# Patient Record
Sex: MALE | Race: WHITE | NOT HISPANIC OR LATINO | Employment: OTHER | URBAN - METROPOLITAN AREA
[De-identification: names, ages, dates, MRNs, and addresses within clinical notes are randomized per-mention and may not be internally consistent; named-entity substitution may affect disease eponyms.]

---

## 2017-01-16 ENCOUNTER — OFFICE VISIT (OUTPATIENT)
Dept: OTOLARYNGOLOGY | Facility: CLINIC | Age: 62
End: 2017-01-16

## 2017-01-16 DIAGNOSIS — J38.7 LARYNGEAL GRANULOMA: Primary | ICD-10-CM

## 2017-01-16 DIAGNOSIS — R07.0 THROAT DISCOMFORT: ICD-10-CM

## 2017-01-16 ASSESSMENT — PAIN SCALES - GENERAL: PAINLEVEL: NO PAIN (0)

## 2017-01-16 NOTE — PROGRESS NOTES
Dear Dr. Yost:    Arias Manzo recently returned for follow-up at the [facility]. My clinic note from our visit is enclosed below.     I appreciate the ongoing opportunity to participate in this patient's care.    Please feel free to contact me with any questions.      Sincerely yours,  Kalyn Negron M.D., M.P.H.  , Laryngology  Director, [facility]  Otolaryngology- Head & Neck Surgery  [phone]            =====  HISTORY OF PRESENT ILLNESS:  Arias Manzo is a pleasant 61-year-old male with a history of recalcitrant laryngeal granuloma who returns in follow up today. He is status post microdirect laryngoscopy with excision of granuloma and steroid injection, Prolaryn gel injection, and bilateral Botox injection on 12/08/2016. He underwent a repeat steroid injection in the clinic on 12/30/2016. He has followed vocal conservation since then and had felt pretty good overall. He has a little throat soreness today.      MEDICATIONS:     Current Outpatient Prescriptions   Medication Sig Dispense Refill     ondansetron (ZOFRAN) 4 MG tablet Take 1-2 tablets (4-8 mg) by mouth every 8 hours as needed for nausea 20 tablet 0     methylPREDNISolone (MEDROL DOSEPAK) 4 MG tablet Follow package instructions 21 tablet 0     BUDESONIDE, INHALATION, 90 MCG/ACT AEPB Inhale 2 puffs into the lungs 2 times daily 3 each 3     omeprazole (PRILOSEC) 20 MG CR capsule Take 1 capsule (20 mg) by mouth 2 times daily 30 capsule 1     botulinum toxin type A (BOTOX) 100 UNITS injection Inject into the muscle once 1 each      lisinopril-hydrochlorothiazide (PRINZIDE,ZESTORETIC) 20-25 MG per tablet Take 1 tablet by mouth daily       Omeprazole (PRILOSEC PO) Take 20 mg by mouth 2 times daily (before meals)       Sertraline HCl (ZOLOFT PO) Take 100 mg by mouth daily       Simvastatin (ZOCOR PO) Take 20 mg by mouth At Bedtime         ALLERGIES:    Allergies   Allergen  Reactions     Flexeril [Cyclobenzaprine] GI Disturbance     Naprosyn [Naproxen] GI Disturbance     Penicillins      As child     Ranitidine GI Disturbance       NEW PMH/PSH: None    REVIEW OF SYSTEMS:  The patient completed a comprehensive 11 point review of systems (below), which was reviewed. Positives are as noted below.  Patient Supplied Answers to Review of Systems   ENT ROS 1/16/2017   Constitutional Unexplained fatigue, Problems with sleep   Neurology Dizzy spells   Psychology Frequently feeling depressed or sad   Eyes -   Ears, Nose, Throat Ringing/noise in ears   Cardiopulmonary -   Gastrointestinal/Genitourinary Heartburn/indigestion   Musculoskeletal Back pain, Neck pain   Hematologic -   Endocrine -   Other Problems with anesthesia in surgery       PHYSICAL EXAM:  General: The patient was alert and conversant, and in no acute distress.    Resp: Breathing comfortably, no stridor or stertor.  Neuro: Symmetric facial function.  Psych: Normal affect, pleasant and cooperative.  Voice/speech: No significant dysphonia.      Intake scores  Last 2 Scores for Patient-Answered VHI Questionnaire  VHI Total Score 12/30/2016 1/16/2017   VHI Total Score 22 20      Last 2 Scores for Patient-Answered EAT Questionnaire  EAT Total Score 12/30/2016 1/16/2017   EAT Total Score 6 4        Last 2 Scores for Patient-Answered CSI Questionnaire  CSI Total Score 12/30/2016 1/16/2017   CSI Total Score 12 12       Procedure:   Flexible fiberoptic laryngoscopy and laryngovideostroboscopy  Indications: This procedure was warranted to evaluate the patient's laryngeal function. Risks, benefits, and alternatives were discussed.  Description: After written informed consent was obtained, a time-out was performed to confirm patient identity, procedure, and procedure site. Topical 3% lidocaine with 0.25% phenylephrine was applied to the nasal cavities. I performed the endoscopy and no complications were apparent. Continuous and  stroboscopic light were utilized to assess routine phonation and variable frequency phonation.  Performed by: Kalyn Negron MD MPH  Findings: Normal nasopharynx. Normal base of tongue, valleculae, and epiglottis. Vocal fold mobility: right: normal; left: normal. Medial edges of the vocal folds: smooth and straight. No focal mucosal lesions were observed on the true vocal folds. Glissade produced appropriate elongation. Mucosa of false vocal folds, aryepiglottic folds, piriform sinuses, and posterior glottis unremarkable. Airway was patent. Small left vocal process granuloma, slightly more prominent than at prior visit.    The addition of stroboscopy allowed evaluation of the mucosal wave.   Amplitude: right: normal; left: normal. Symmetry: good symmetry. Closure pattern: complete. Closure plane: at glottic level. Phase distribution: normal. The granuloma is directly impacted by vocal fold closure, but there is no apparent hyperfunctional pattern, and no significant vocal fold bowing.      IMPRESSION AND PLAN:   Arias Manzo returns with a small left granuloma. It remains dramatically smaller than pre-op, but is slightly larger than at his first post-op visit.     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 After he returns we may consider a repeat procedure. We will call him to schedule.

## 2017-01-16 NOTE — Clinical Note
1/16/2017       RE: Arias Manzo  [address] [address]     Dear Colleague,    Thank you for referring your patient, Arias Manzo, to the [facility] EAR NOSE AND THROAT at [facility]. Please see a copy of my visit note below.    No notes on file    Again, thank you for allowing me to participate in the care of your patient.      Sincerely,    Kalyn Negron MD

## 2017-01-16 NOTE — Clinical Note
1/16/2017      RE: Arias Manzo  [address] [address] [address]       Dear Dr. Yost:    Arias Manzo recently returned for follow-up at the [facility]. My clinic note from our visit is enclosed below.     I appreciate the ongoing opportunity to participate in this patient's care.    Please feel free to contact me with any questions.      Sincerely yours,  Kalyn Negron M.D., M.P.H.  , Laryngology  Director, [facility]  Otolaryngology- Head & Neck Surgery  [phone]            =====  HISTORY OF PRESENT ILLNESS:  Arias Manzo is a pleasant 61-year-old male with a history of recalcitrant laryngeal granuloma who returns in follow up today. He is status post microdirect laryngoscopy with excision of granuloma and steroid injection, Prolaryn gel injection, and bilateral Botox injection on 12/08/2016. He underwent a repeat steroid injection in the clinic on 12/30/2016. He has followed vocal conservation since then and had felt pretty good overall. He has a little throat soreness today.      MEDICATIONS:     Current Outpatient Prescriptions   Medication Sig Dispense Refill     ondansetron (ZOFRAN) 4 MG tablet Take 1-2 tablets (4-8 mg) by mouth every 8 hours as needed for nausea 20 tablet 0     methylPREDNISolone (MEDROL DOSEPAK) 4 MG tablet Follow package instructions 21 tablet 0     BUDESONIDE, INHALATION, 90 MCG/ACT AEPB Inhale 2 puffs into the lungs 2 times daily 3 each 3     omeprazole (PRILOSEC) 20 MG CR capsule Take 1 capsule (20 mg) by mouth 2 times daily 30 capsule 1     botulinum toxin type A (BOTOX) 100 UNITS injection Inject into the muscle once 1 each      lisinopril-hydrochlorothiazide (PRINZIDE,ZESTORETIC) 20-25 MG per tablet Take 1 tablet by mouth daily       Omeprazole (PRILOSEC PO) Take 20 mg by mouth 2 times daily (before meals)       Sertraline HCl (ZOLOFT PO) Take 100 mg by mouth daily       Simvastatin  (ZOCOR PO) Take 20 mg by mouth At Bedtime         ALLERGIES:    Allergies   Allergen Reactions     Flexeril [Cyclobenzaprine] GI Disturbance     Naprosyn [Naproxen] GI Disturbance     Penicillins      As child     Ranitidine GI Disturbance       NEW PMH/PSH: None    REVIEW OF SYSTEMS:  The patient completed a comprehensive 11 point review of systems (below), which was reviewed. Positives are as noted below.  Patient Supplied Answers to Review of Systems   ENT ROS 1/16/2017   Constitutional Unexplained fatigue, Problems with sleep   Neurology Dizzy spells   Psychology Frequently feeling depressed or sad   Eyes -   Ears, Nose, Throat Ringing/noise in ears   Cardiopulmonary -   Gastrointestinal/Genitourinary Heartburn/indigestion   Musculoskeletal Back pain, Neck pain   Hematologic -   Endocrine -   Other Problems with anesthesia in surgery       PHYSICAL EXAM:  General: The patient was alert and conversant, and in no acute distress.    Resp: Breathing comfortably, no stridor or stertor.  Neuro: Symmetric facial function.  Psych: Normal affect, pleasant and cooperative.  Voice/speech: No significant dysphonia.      Intake scores  Last 2 Scores for Patient-Answered VHI Questionnaire  VHI Total Score 12/30/2016 1/16/2017   VHI Total Score 22 20      Last 2 Scores for Patient-Answered EAT Questionnaire  EAT Total Score 12/30/2016 1/16/2017   EAT Total Score 6 4        Last 2 Scores for Patient-Answered CSI Questionnaire  CSI Total Score 12/30/2016 1/16/2017   CSI Total Score 12 12       Procedure:   Flexible fiberoptic laryngoscopy and laryngovideostroboscopy  Indications: This procedure was warranted to evaluate the patient's laryngeal function. Risks, benefits, and alternatives were discussed.  Description: After written informed consent was obtained, a time-out was performed to confirm patient identity, procedure, and procedure site. Topical 3% lidocaine with 0.25% phenylephrine was applied to the nasal cavities. I  performed the endoscopy and no complications were apparent. Continuous and stroboscopic light were utilized to assess routine phonation and variable frequency phonation.  Performed by: Kalyn Negron MD [facility]  Findings: Normal nasopharynx. Normal base of tongue, valleculae, and epiglottis. Vocal fold mobility: right: normal; left: normal. Medial edges of the vocal folds: smooth and straight. No focal mucosal lesions were observed on the true vocal folds. Glissade produced appropriate elongation. Mucosa of false vocal folds, aryepiglottic folds, piriform sinuses, and posterior glottis unremarkable. Airway was patent. Small left vocal process granuloma, slightly more prominent than at prior visit.    The addition of stroboscopy allowed evaluation of the mucosal wave.   Amplitude: right: normal; left: normal. Symmetry: good symmetry. Closure pattern: complete. Closure plane: at glottic level. Phase distribution: normal. The granuloma is directly impacted by vocal fold closure, but there is no apparent hyperfunctional pattern, and no significant vocal fold bowing.      IMPRESSION AND PLAN:   Arias Manzo returns with a small left granuloma. It remains dramatically smaller than pre-op, but is slightly larger than at his first post-op visit.     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 After he returns we may consider a repeat procedure. We will call him to schedule.        Kalyn Negron MD

## 2017-02-03 ENCOUNTER — TELEPHONE (OUTPATIENT)
Dept: OTOLARYNGOLOGY | Facility: CLINIC | Age: 62
End: 2017-02-03

## 2017-02-03 DIAGNOSIS — A49.9 BACTERIAL INFECTION: Primary | ICD-10-CM

## 2017-02-03 DIAGNOSIS — J38.7 LARYNGEAL GRANULOMA: ICD-10-CM

## 2017-02-03 RX ORDER — CLINDAMYCIN HCL 150 MG
CAPSULE ORAL
Qty: 189 CAPSULE | Refills: 0 | Status: SHIPPED | OUTPATIENT
Start: 2017-02-03 | End: 2017-07-03

## 2017-02-03 NOTE — TELEPHONE ENCOUNTER
Eleni--please write him for clindamycin 450 mg PO tid x 3 weeks.  him to eat yogurt with live cultures, and/or take probiotics bc clindamycin carries a risk of C. Diff. If he develops GI problems he should stop the med and call us. Unfortunately he has a penicillin allergy so I can't rx augmentin.     RN called pt and educated him regarding clindamycin script and the importance of eating foods with probiotics and live cultures, such as yogurt.  Patient stated he understood the information and will look out for GI symptoms while on the medication.  Order for medication placed and pharmacy verified with patient.    Eleni Ivy RN  [phone]  [facility] ENT   Head & Neck Surgery

## 2017-02-10 ENCOUNTER — OFFICE VISIT (OUTPATIENT)
Dept: OTOLARYNGOLOGY | Facility: CLINIC | Age: 62
End: 2017-02-10

## 2017-02-10 DIAGNOSIS — J38.7 LARYNGEAL GRANULOMA: Primary | ICD-10-CM

## 2017-02-10 ASSESSMENT — PAIN SCALES - GENERAL: PAINLEVEL: NO PAIN (0)

## 2017-02-10 NOTE — NURSING NOTE
Procedure: Upper aerodigestive tract endoscopy, Flexible or rigid laryngoscopy, possible stroboscopy, possible flexible endoscopic evaluation of swallowing   Reason: symptoms requiring examination   PREPROCEDURE:   Yes Patient ID verified with 2 identifiers (name and  or MRN)   Yes: Procedure and site verified with patient/designee (when able)   Yes: Accurate consent documentation in medical record   No: Marking not required. Reason: [ Procedure does not require site marking. ][ Provider is in continuous attendance with the patient from consent through completion of procedure. ][ Marking unable or refused by patient (see scanned diagram).   TIME OUT:   Yes: Time-Out performed immediately prior to starting procedure, including verbal and active participation of all team members addressing:   * Correct patient identity   * Confirmed that the correct side and site are marked   * An accurate procedure consent form   * Agreement on the procedure to be done   * Correct patient position   * Relevant images and results are properly labeled and appropriately displayed   * The need to administer antibiotics or fluids for irrigation purposes during the procedure as applicable   * Safety precations based on patient history or medication use   DURING PROCEDURE: Verification of correct person, site, and procedure occurs any time the responsibility for care of the patient is transferred to another member of the care team.   Eleni Ivy RN  P Otolaryngology/Head & Neck Surgery

## 2017-02-10 NOTE — LETTER
2/10/2017       RE: Arias Manzo  [address] [address] [facility] [address]     Dear Colleague,    Thank you for referring your patient, Arias Manzo, to the [facility] EAR NOSE AND THROAT at [facility]. Please see a copy of my visit note below.    PROCEDURE(S):  Transnasal flexible laryngoscopy  Steroid injection for left laryngeal granuloma    PRE-OPERATIVE DIAGNOSIS:   Left laryngeal granuloma    POST-OPERATIVE DIAGNOSIS:   As above    SURGEON: Kalyn Negron MD MPH    ASSISTANT(S): Eleni Ivy RN    ANAESTHESIA: Topical local anesthesia    INDICATIONS FOR PROCEDURE:   The patient is a very pleasant 60 year old male with a recalcitrant left laryngeal granuloma and heavy vocal demand who returned with a smaller but still persistent granuloma and persistent symptoms. He wished to undergo a repeat laser treatment and steroid injection. Risks, benefits, and alternatives of the procedure were discussed, including the risk of vocal fold atrophy, persistent granuloma, and need for additional procedures. We will use the same high concentration of dexamethasone as used previously, since he tolerated it well. The patient signed written informed consent.    DESCRIPTION OF PROCEDURE:   After written informed consent was obtained, a time-out was performed to confirm patient identity, procedure, and procedure site. Topical aerosolized 3% lidocaine with 0.25% phenylephrine was applied to both nasal cavities. Flexible fiberoptic laryngoscopy was performed with good visualization of the larynx. 4 cc 4% lidocaine was delivered by nebulizer. 10 cc of 2% lidocaine was then topically applied to the vocal folds through the channel of the endoscope sleeve. The patient was asked to gargle and cough. The endoscope was removed. When adequate topical anesthesia had been obtained, an endoscopic needle was placed through the sheath and a total of approximately 0.7 cc of dexamethasone  24 mg/mL was injected into the base of the granuloma. The patient tolerated the procedure well.      FINDINGS:   Normal nasopharynx. Normal base of tongue, valleculae, and epiglottis. The right true vocal fold demonstrated normal mobility. The left true vocal fold demonstrated normal mobility. Small left laryngeal granuloma. False vocal folds, aryepiglottic folds, piriform sinuses, and posterior glottis were unremarkable. Airway was patent. Successful steroid injection of left laryngeal granuloma.    SPECIMEN(S):   None    DRAINS:   None    ESTIMATED BLOOD LOSS:   Minimal    COMPLICATIONS:   None    DISPOSITION: Stable to home    ATTENDING PRESENCE STATEMENT:  I performed this procedure.    PLAN: Voice rest for 5 days, followed by gradual resumption of gentle voice use. Follow up in 1 month with tentative plan for repeat steroid injection is needed. Continue antibiotics.    Again, thank you for allowing me to participate in the care of your patient.      Sincerely,    Kalyn Negron MD

## 2017-02-10 NOTE — PROGRESS NOTES
PROCEDURE(S):  Transnasal flexible laryngoscopy  Steroid injection for left laryngeal granuloma    PRE-OPERATIVE DIAGNOSIS:   Left laryngeal granuloma    POST-OPERATIVE DIAGNOSIS:   As above    SURGEON: Kalyn Negron MD MPH    ASSISTANT(S): Eleni Ivy RN    ANAESTHESIA: Topical local anesthesia    INDICATIONS FOR PROCEDURE:   The patient is a very pleasant 60 year old male with a recalcitrant left laryngeal granuloma and heavy vocal demand who returned with a smaller but still persistent granuloma and persistent symptoms. He wished to undergo a repeat laser treatment and steroid injection. Risks, benefits, and alternatives of the procedure were discussed, including the risk of vocal fold atrophy, persistent granuloma, and need for additional procedures. We will use the same high concentration of dexamethasone as used previously, since he tolerated it well. The patient signed written informed consent.    DESCRIPTION OF PROCEDURE:   After written informed consent was obtained, a time-out was performed to confirm patient identity, procedure, and procedure site. Topical aerosolized 3% lidocaine with 0.25% phenylephrine was applied to both nasal cavities. Flexible fiberoptic laryngoscopy was performed with good visualization of the larynx. 4 cc 4% lidocaine was delivered by nebulizer. 10 cc of 2% lidocaine was then topically applied to the vocal folds through the channel of the endoscope sleeve. The patient was asked to gargle and cough. The endoscope was removed. When adequate topical anesthesia had been obtained, an endoscopic needle was placed through the sheath and a total of approximately 0.7 cc of dexamethasone 24 mg/mL was injected into the base of the granuloma. The patient tolerated the procedure well.      FINDINGS:   Normal nasopharynx. Normal base of tongue, valleculae, and epiglottis. The right true vocal fold demonstrated normal mobility. The left true vocal fold demonstrated normal mobility. Small  left laryngeal granuloma. False vocal folds, aryepiglottic folds, piriform sinuses, and posterior glottis were unremarkable. Airway was patent. Successful steroid injection of left laryngeal granuloma.    SPECIMEN(S):   None    DRAINS:   None    ESTIMATED BLOOD LOSS:   Minimal    COMPLICATIONS:   None    DISPOSITION: Stable to home    ATTENDING PRESENCE STATEMENT:  I performed this procedure.    PLAN: Voice rest for 5 days, followed by gradual resumption of gentle voice use. Follow up in 1 month with tentative plan for repeat steroid injection is needed. Continue antibiotics.

## 2017-02-10 NOTE — NURSING NOTE
Chief Complaint   Patient presents with     Minor Procedure     Procedure - Steroid injection      Pt signed consent.    JUS Vaca LPN

## 2017-02-20 ENCOUNTER — TELEPHONE (OUTPATIENT)
Dept: OTOLARYNGOLOGY | Facility: CLINIC | Age: 62
End: 2017-02-20

## 2017-02-20 NOTE — TELEPHONE ENCOUNTER
"Pt called scheduling line to report that he has stopped taking his antibiotics because of chest pain.  RN called pt to get more information/ description of the pain and whether it was relieved by discontinuing the antibiotics.  Left message.        Pt called back and stated that when he would be drinking fluids and eating while on the antibiotics, he would get chest pain radiating on the right side of the chest and describes it as a \"gas pain.\" He stated that it was very uncomfortable and he decided to discontinue taking the med.  The chest pain feeling did subside after stopping the medication.  RN will follow up with physician to determine next course of action.    Eleni Ivy RN  [phone]  [facility] ENT   Head & Neck Surgery       "

## 2017-02-23 DIAGNOSIS — A49.9 BACTERIAL INFECTION: Primary | ICD-10-CM

## 2017-02-23 DIAGNOSIS — J38.7 LARYNGEAL GRANULOMA: ICD-10-CM

## 2017-03-10 ENCOUNTER — OFFICE VISIT (OUTPATIENT)
Dept: OTOLARYNGOLOGY | Facility: CLINIC | Age: 62
End: 2017-03-10

## 2017-03-10 VITALS — BODY MASS INDEX: 35.56 KG/M2 | WEIGHT: 254 LBS | HEIGHT: 71 IN

## 2017-03-10 DIAGNOSIS — A49.9 BACTERIAL INFECTION: ICD-10-CM

## 2017-03-10 DIAGNOSIS — R49.0 DYSPHONIA: ICD-10-CM

## 2017-03-10 DIAGNOSIS — J38.7 LARYNGEAL GRANULOMA: Primary | ICD-10-CM

## 2017-03-10 ASSESSMENT — PAIN SCALES - GENERAL: PAINLEVEL: NO PAIN (0)

## 2017-03-10 NOTE — PATIENT INSTRUCTIONS
Plan of Care:  1. Please rest your voice for 5 days, followed by gradual resumption of gentle voice use. 2. 2. Follow up in 1 month with tentative plan for repeat steroid injection is needed. Continue antibiotics.    Clinic Information:  1. To schedule an appointment please call [phone], option 1  2. To talk to a Triage RN please call [phone] select option 3  3. To speak to Dr. eNgron's nurse, Eleni, please call [phone]    Eleni Ivy RN  [facility] ENT   Head & Neck Surgery

## 2017-03-10 NOTE — NURSING NOTE
Procedure: Upper aerodigestive tract endoscopy, Flexible or rigid laryngoscopy, possible stroboscopy, possible flexible endoscopic evaluation of swallowing   Reason: symptoms requiring examination   PREPROCEDURE:   Yes Patient ID verified with 2 identifiers (name and  or MRN)   Yes: Procedure and site verified with patient/designee (when able)   Yes: Accurate consent documentation in medical record   No: Marking not required. Reason: [ Procedure does not require site marking. ][ Provider is in continuous attendance with the patient from consent through completion of procedure. ][ Marking unable or refused by patient (see scanned diagram).   TIME OUT:   Yes: Time-Out performed immediately prior to starting procedure, including verbal and active participation of all team members addressing:   * Correct patient identity   * Confirmed that the correct side and site are marked   * An accurate procedure consent form   * Agreement on the procedure to be done   * Correct patient position   * Relevant images and results are properly labeled and appropriately displayed   * The need to administer antibiotics or fluids for irrigation purposes during the procedure as applicable   * Safety precations based on patient history or medication use   DURING PROCEDURE: Verification of correct person, site, and procedure occurs any time the responsibility for care of the patient is transferred to another member of the care team.   Eleni Ivy RN  [facility] Otolaryngology/Head & Neck Surgery    Patient here today to see Dr Negron for their return botox injection.  1. Patient instructed to monitor period of breathiness and period of positive botox effect.  2. Patient reminded to schedule next botox appointment when symptoms return.  3. Patient understands who to call with questions and concerns and has clinic phone number.  4 Patient stable when discharged.Invasive Procedure Safety Checklist    Date: 3/10/17  Procedure: Botox  Injection  Patient Name: Arias Manzo   MRN: [id number]  : 1955      ACTION: Complete sections and checkboxes as appropriate. Any discrepancy results in a HARD COPY until resolved.      PREPROCEDURE:  YES: Patient ID verified with 2 identifiers (name and  or MRN)  YES: Procedure and site verified with patient/designee (when able)  YES: Accurate consent documentation in medical record  No: H&P (or appropriate assessment) documented in medical record  H&P must be up to 30 days prior to procedure an updated within 24 hours of procedure as applicable  No: Relevant diagnostic and radiology test results appropriately labeled and displayed as applicable  No: Blood products, implants, devices, and/or special equipment available for the procedure as applicable  Yes; Exclusions : Procedure site(s) marked with provider initials  No: Marking not required. Reason: Provider is in continuous attendance with the patient from consent through completion of procedure.      TIME OUT:  YES: Time-Out performed immediately prior to starting procedure, including verbal and active participation of all team members addressing:  * Correct patient identity  * Confirmed that the correct side and site are marked  * An accurate procedure consent form  * Agreement on the procedure to be done  * Correct patient position  * Relevant images and results are properly labeled and appropriately displayed  * The need to administer antibiotics or fluids for irrigation purposes during the procedure as applicable  * Safety precations based on patient history or medication use

## 2017-03-10 NOTE — PROGRESS NOTES
Dear Dr. Yost:  Arias Manzo recently returned for follow-up at the [facility]. My clinic note from our visit is enclosed below.     I appreciate the ongoing opportunity to participate in this patient's care.    Please feel free to contact me with any questions.      Sincerely yours,  Kalyn Negron M.D., M.P.H.  , Laryngology  Director, [facility]  Otolaryngology- Head & Neck Surgery  [phone]            =====  HISTORY OF PRESENT ILLNESS:  Arias Manzo is a pleasant 61 year old male with a recalcitrant granuloma who returns in follow up today.     He developed chest and arm pain in response to the clindamycin that I had previously described, and stopped it. The symptoms resolved immediately so he did not seek further evaluation. He was able to tolerate the Augmentin without difficulty.      MEDICATIONS:     Current Outpatient Prescriptions   Medication Sig Dispense Refill     amoxicillin-clavulanate (AUGMENTIN) 875-125 MG per tablet Please take 1 tab PO BID for 7 days 14 tablet 0     clindamycin (CLEOCIN) 150 MG capsule Please take 3 tabs PO TID x 21 days 189 capsule 0     ondansetron (ZOFRAN) 4 MG tablet Take 1-2 tablets (4-8 mg) by mouth every 8 hours as needed for nausea 20 tablet 0     methylPREDNISolone (MEDROL DOSEPAK) 4 MG tablet Follow package instructions 21 tablet 0     BUDESONIDE, INHALATION, 90 MCG/ACT AEPB Inhale 2 puffs into the lungs 2 times daily 3 each 3     omeprazole (PRILOSEC) 20 MG CR capsule Take 1 capsule (20 mg) by mouth 2 times daily 30 capsule 1     botulinum toxin type A (BOTOX) 100 UNITS injection Inject into the muscle once 1 each      lisinopril-hydrochlorothiazide (PRINZIDE,ZESTORETIC) 20-25 MG per tablet Take 1 tablet by mouth daily       Omeprazole (PRILOSEC PO) Take 20 mg by mouth 2 times daily (before meals)       Sertraline HCl (ZOLOFT PO) Take 100 mg by mouth daily       Simvastatin (ZOCOR PO)  Take 20 mg by mouth At Bedtime         ALLERGIES:    Allergies   Allergen Reactions     Flexeril [Cyclobenzaprine] GI Disturbance     Naprosyn [Naproxen] GI Disturbance     Penicillins      As child     Ranitidine GI Disturbance       NEW PMH/PSH: None    REVIEW OF SYSTEMS:  The patient completed a comprehensive 11 point review of systems (below), which was reviewed. Positives are as noted below.  Patient Supplied Answers to Review of Systems  [facility] ENT ROS 1/16/2017   Constitutional Unexplained fatigue, Problems with sleep   Neurology Dizzy spells   Psychology Frequently feeling depressed or sad   Eyes -   Ears, Nose, Throat Ringing/noise in ears   Cardiopulmonary -   Gastrointestinal/Genitourinary Heartburn/indigestion   Musculoskeletal Back pain, Neck pain   Hematologic -   Endocrine -   Other Problems with anesthesia in surgery       PHYSICAL EXAM:  General: The patient was alert and conversant, and in no acute distress.    Resp: Breathing comfortably, no stridor or stertor.  Neuro: Symmetric facial function.   Psych: Normal affect, pleasant and cooperative.  Voice/speech: No significant dysphonia.      Intake scores  Last 2 Scores for Patient-Answered VHI Questionnaire  VHI Total Score 2/10/2017 3/10/2017   VHI Total Score 20 21      Last 2 Scores for Patient-Answered EAT Questionnaire  EAT Total Score 2/10/2017 3/10/2017   EAT Total Score 10 5      Last 2 Scores for Patient-Answered CSI Questionnaire  CSI Total Score 2/10/2017 3/10/2017   CSI Total Score 12 12       Procedure:   Flexible fiberoptic laryngoscopy  Indications: This procedure was warranted to evaluate the patient's laryngeal function. Risks, benefits, and alternatives were discussed.  Description: After written informed consent was obtained, a time-out was performed to confirm patient identity, procedure, and procedure site. Topical 3% lidocaine with 0.25% phenylephrine was applied to the nasal cavities. I performed the endoscopy and no  complications were apparent.  Performed by: Kalyn Negron MD MPH  SLP: NA  Findings: Normal nasopharynx. Normal base of tongue, valleculae, and epiglottis. The right true vocal fold demonstrated normal mobility. The left true vocal fold demonstrated normal mobility. The medial edges of the vocal folds appeared smooth and straight.   Glissade produced appropriate elongation. Mucosa of the false vocal folds, aryepiglottic folds, piriform sinuses, and posterior glottis unremarkable. Slightly more delineated left laryngeal granuloma compared to prior. Airway was patent.      IMPRESSION AND PLAN:   Arias Manzo returns with a persistent laryngeal granuloma. We discussed consideration of steroid injection and additional oral antibiotics. He is aware of potential risks of both. He would like to proceed. This procedure will be documented as an addendum to this note.        =====  Addendum    PROCEDURE(S):  Transnasal flexible laryngoscopy  Steroid injection for left laryngeal granuloma    PRE-OPERATIVE DIAGNOSIS:   Left laryngeal granuloma    POST-OPERATIVE DIAGNOSIS:   As above    SURGEON: Kalyn Negron MD MPH    ASSISTANT(S): Eleni Ivy RN    ANAESTHESIA: Topical local anesthesia    INDICATIONS FOR PROCEDURE:   The patient is a very pleasant 60 year old male with a recalcitrant left laryngeal granuloma and heavy vocal demand who returned with a smaller but still persistent granuloma and persistent symptoms. He wished to undergo a repeat laser treatment and steroid injection. Risks, benefits, and alternatives of the procedure were discussed, including the risk of vocal fold atrophy, persistent granuloma, and need for additional procedures. We will use the same high concentration of dexamethasone as used previously, since he tolerated it well. The patient signed written informed consent.    DESCRIPTION OF PROCEDURE:   After written informed consent was obtained, a time-out was performed to confirm patient  identity, procedure, and procedure site. Topical aerosolized 3% lidocaine with 0.25% phenylephrine was applied to both nasal cavities. Flexible fiberoptic laryngoscopy was performed with good visualization of the larynx. 4 cc 4% lidocaine was delivered by nebulizer. 10 cc of 2% lidocaine was then topically applied to the vocal folds through the channel of the endoscope sleeve. The patient was asked to gargle and cough. The endoscope was removed. When adequate topical anesthesia had been obtained, an endoscopic needle was placed through the sheath and a total of approximately 0.8 cc of dexamethasone 24 mg/mL was injected into the base of the granuloma; approximately another 1.5 cc was noted to run out but did not enter the vocal fold. The patient tolerated the procedure well.    FINDINGS:   Normal nasopharynx. Normal base of tongue, valleculae, and epiglottis. The right true vocal fold demonstrated normal mobility. The left true vocal fold demonstrated normal mobility. Small left laryngeal granuloma. False vocal folds, aryepiglottic folds, piriform sinuses, and posterior glottis were unremarkable. Airway was patent. Successful steroid injection of left laryngeal granuloma.    SPECIMEN(S):   None    DRAINS:   None    ESTIMATED BLOOD LOSS:   Minimal    COMPLICATIONS:   None    DISPOSITION: Stable to home    ATTENDING PRESENCE STATEMENT:  I performed this procedure.    PLAN: Voice rest for 5 days, followed by gradual resumption of gentle voice use. Follow up in 1 month with tentative plan for repeat steroid injection is needed. Continue antibiotics.

## 2017-03-10 NOTE — MR AVS SNAPSHOT
After Visit Summary   3/10/2017    Arias Manzo    MRN: [id number]           Patient Information     Date Of Birth          1955        Visit Information        Provider Department      3/10/2017 11:40 AM Kalyn Negron MD [facility] Ear Nose [facility] Thro[facility]        Today's Diagnoses     Laryngeal granuloma    -  1    Dysphonia        Bacterial infection          Care Instructions    Plan of Care:  1. Please rest your voice for 5 days, followed by gradual resumption of gentle voice use. 2. 2. Follow up in 1 month with tentative plan for repeat steroid injection is needed. Continue antibiotics.    Clinic Information:  1. To schedule an appointment please call [phone], option 1  2. To talk to a Triage RN please call [phone] select option 3  3. To speak to Dr. Negron's nurse, Eleni, please call [phone]    Eleni Ivy RN  [facility] ENT   Head & Neck Surgery               Follow-ups after your visit        Who to contact     Please call your clinic at [phone] to:    Ask questions about your health    Make or cancel appointments    Discuss your medicines    Learn about your test results    Speak to your doctor   If you have compliments or concerns about an experience at your clinic, or if you wish to file a complaint, please contact [facility] Patient Relations at [phone] or email us at [email or web address]         Additional Information About Your Visit        MyChart Information     [employer] is an electronic gateway that provides easy, online access to your medical records. With [employer], you can request a clinic appointment, read your test results, renew a prescription or communicate with your care team.     To sign up for [employer] visit the website at [email or web address]   You will be asked to enter the access code listed below, as well as some personal information. Please follow the directions to create  "your username and password.     Your access code is: [email or web address]  Expires: 2017  6:31 AM     Your access code will  in 90 days. If you need help or a new code, please contact your [facility] or call [phone] for assistance.        Care EveryWhere ID     This is your Care EveryWhere ID. This could be used by other organizations to access your [place] medical records  [id number]        Your Vitals Were     Height BMI (Body Mass Index)                1.8 m (5' 10.87\") 35.56 kg/m2           Blood Pressure from Last 3 Encounters:   16 97/60   10/30/14 126/81   14 134/75    Weight from Last 3 Encounters:   03/10/17 115.2 kg (254 lb)   16 110.2 kg (243 lb)   16 110.2 kg (243 lb)              We Performed the Following     IMAGESTREAM RECORDING ORDER     LARYNGOSCOPY FLEX FIBEROPTIC, DIAGNOSTIC     LARYNGOSCOPY,INDIRECT+INJECT CORD          Today's Medication Changes          These changes are accurate as of: 3/10/17  2:56 PM.  If you have any questions, ask your nurse or doctor.               These medicines have changed or have updated prescriptions.        Dose/Directions    amoxicillin-clavulanate 875-125 MG per tablet   Commonly known as:  AUGMENTIN   This may have changed:  additional instructions   Used for:  Bacterial infection, Laryngeal granuloma   Changed by:  Kalyn Negron MD        Please take 1 tab PO BID for 21 days   Quantity:  42 tablet   Refills:  0            Where to get your medicines      These medications were sent to [facility] [id number] [place] [address] [facility] [address] [address] [facility], [facility] [address]     Phone:  [phone]     amoxicillin-clavulanate 875-125 MG per tablet                Primary Care Provider Office Phone # Fax #    [facility] [phone] [address] [place] [address]        Thank you!     Thank you for " choosing [facility] EAR NOSE AND TH[facility]AT  for your care. Our goal is always to provide you with excellent care. Hearing back from our patients is one way we can continue to improve our services. Please take a few minutes to complete the written survey that you may receive in the mail after your visit with us. Thank you!             Your Updated Medication List - Protect others around you: Learn how to safely use, store and throw away your medicines at www.disposemymeds.org.          This list is accurate as of: 3/10/17  2:56 PM.  Always use your most recent med list.                   Brand Name Dispense Instructions for use    amoxicillin-clavulanate 875-125 MG per tablet    AUGMENTIN    42 tablet    Please take 1 tab PO BID for 21 days       BOTOX 100 UNITS injection   Generic drug:  botulinum toxin type A     1 each    Inject into the muscle once       BUDESONIDE (INHALATION) 90 MCG/ACT Aepb     3 each    Inhale 2 puffs into the lungs 2 times daily       clindamycin 150 MG capsule    CLEOCIN    189 capsule    Please take 3 tabs PO TID x 21 days       lisinopril-hydrochlorothiazide 20-25 MG per tablet    PRINZIDE/ZESTORETIC     Take 1 tablet by mouth daily       methylPREDNISolone 4 MG tablet    MEDROL DOSEPAK    21 tablet    Follow package instructions       omeprazole 20 MG CR capsule    priLOSEC    30 capsule    Take 1 capsule (20 mg) by mouth 2 times daily       ondansetron 4 MG tablet    ZOFRAN    20 tablet    Take 1-2 tablets (4-8 mg) by mouth every 8 hours as needed for nausea       PRILOSEC PO      Take 20 mg by mouth 2 times daily (before meals)       ZOCOR PO      Take 20 mg by mouth At Bedtime       ZOLOFT PO      Take 100 mg by mouth daily

## 2017-03-10 NOTE — LETTER
3/10/2017      RE: Arias Manzo  [id number] [address] [id number]       Dear Dr. Yost:  Arias Manzo recently returned for follow-up at the [facility]. My clinic note from our visit is enclosed below.     I appreciate the ongoing opportunity to participate in this patient's care.    Please feel free to contact me with any questions.      Sincerely yours,  Kalyn Negron M.D., M.P.H.  , Laryngology  Director, [facility]  Otolaryngology- Head & Neck Surgery  [phone]            =====  HISTORY OF PRESENT ILLNESS:  Arias Manzo is a pleasant 61 year old male with a recalcitrant granuloma who returns in follow up today.     He developed chest and arm pain in response to the clindamycin that I had previously described, and stopped it. The symptoms resolved immediately so he did not seek further evaluation. He was able to tolerate the Augmentin without difficulty.      MEDICATIONS:     Current Outpatient Prescriptions   Medication Sig Dispense Refill     amoxicillin-clavulanate (AUGMENTIN) 875-125 MG per tablet Please take 1 tab PO BID for 7 days 14 tablet 0     clindamycin (CLEOCIN) 150 MG capsule Please take 3 tabs PO TID x 21 days 189 capsule 0     ondansetron (ZOFRAN) 4 MG tablet Take 1-2 tablets (4-8 mg) by mouth every 8 hours as needed for nausea 20 tablet 0     methylPREDNISolone (MEDROL DOSEPAK) 4 MG tablet Follow package instructions 21 tablet 0     BUDESONIDE, INHALATION, 90 MCG/ACT AEPB Inhale 2 puffs into the lungs 2 times daily 3 each 3     omeprazole (PRILOSEC) 20 MG CR capsule Take 1 capsule (20 mg) by mouth 2 times daily 30 capsule 1     botulinum toxin type A (BOTOX) 100 UNITS injection Inject into the muscle once 1 each      lisinopril-hydrochlorothiazide (PRINZIDE,ZESTORETIC) 20-25 MG per tablet Take 1 tablet by mouth daily       Omeprazole (PRILOSEC PO) Take 20 mg by mouth 2 times daily (before meals)        Sertraline HCl (ZOLOFT PO) Take 100 mg by mouth daily       Simvastatin (ZOCOR PO) Take 20 mg by mouth At Bedtime         ALLERGIES:    Allergies   Allergen Reactions     Flexeril [Cyclobenzaprine] GI Disturbance     Naprosyn [Naproxen] GI Disturbance     Penicillins      As child     Ranitidine GI Disturbance       NEW PMH/PSH: None    REVIEW OF SYSTEMS:  The patient completed a comprehensive 11 point review of systems (below), which was reviewed. Positives are as noted below.  Patient Supplied Answers to Review of Systems  [facility] ENT ROS 1/16/2017   Constitutional Unexplained fatigue, Problems with sleep   Neurology Dizzy spells   Psychology Frequently feeling depressed or sad   Eyes -   Ears, Nose, Throat Ringing/noise in ears   Cardiopulmonary -   Gastrointestinal/Genitourinary Heartburn/indigestion   Musculoskeletal Back pain, Neck pain   Hematologic -   Endocrine -   Other Problems with anesthesia in surgery       PHYSICAL EXAM:  General: The patient was alert and conversant, and in no acute distress.    Resp: Breathing comfortably, no stridor or stertor.  Neuro: Symmetric facial function.   Psych: Normal affect, pleasant and cooperative.  Voice/speech: No significant dysphonia.      Intake scores  Last 2 Scores for Patient-Answered VHI Questionnaire  VHI Total Score 2/10/2017 3/10/2017   VHI Total Score 20 21      Last 2 Scores for Patient-Answered EAT Questionnaire  EAT Total Score 2/10/2017 3/10/2017   EAT Total Score 10 5      Last 2 Scores for Patient-Answered CSI Questionnaire  CSI Total Score 2/10/2017 3/10/2017   CSI Total Score 12 12       Procedure:   Flexible fiberoptic laryngoscopy  Indications: This procedure was warranted to evaluate the patient's laryngeal function. Risks, benefits, and alternatives were discussed.  Description: After written informed consent was obtained, a time-out was performed to confirm patient identity, procedure, and procedure site. Topical 3% lidocaine with 0.25%  phenylephrine was applied to the nasal cavities. I performed the endoscopy and no complications were apparent.  Performed by: Kalyn Negron MD MPH  SLP: NA  Findings: Normal nasopharynx. Normal base of tongue, valleculae, and epiglottis. The right true vocal fold demonstrated normal mobility. The left true vocal fold demonstrated normal mobility. The medial edges of the vocal folds appeared smooth and straight.   Glissade produced appropriate elongation. Mucosa of the false vocal folds, aryepiglottic folds, piriform sinuses, and posterior glottis unremarkable. Slightly more delineated left laryngeal granuloma compared to prior. Airway was patent.      IMPRESSION AND PLAN:   Arias Manzo returns with a persistent laryngeal granuloma. We discussed consideration of steroid injection and additional oral antibiotics. He is aware of potential risks of both. He would like to proceed. This procedure will be documented as an addendum to this note.        =====  Addendum    PROCEDURE(S):  Transnasal flexible laryngoscopy  Steroid injection for left laryngeal granuloma    PRE-OPERATIVE DIAGNOSIS:   Left laryngeal granuloma    POST-OPERATIVE DIAGNOSIS:   As above    SURGEON: Kalyn Negron MD MPH    ASSISTANT(S): Eleni Ivy RN    ANAESTHESIA: Topical local anesthesia    INDICATIONS FOR PROCEDURE:   The patient is a very pleasant 60 year old male with a recalcitrant left laryngeal granuloma and heavy vocal demand who returned with a smaller but still persistent granuloma and persistent symptoms. He wished to undergo a repeat laser treatment and steroid injection. Risks, benefits, and alternatives of the procedure were discussed, including the risk of vocal fold atrophy, persistent granuloma, and need for additional procedures. We will use the same high concentration of dexamethasone as used previously, since he tolerated it well. The patient signed written informed consent.    DESCRIPTION OF PROCEDURE:   After  written informed consent was obtained, a time-out was performed to confirm patient identity, procedure, and procedure site. Topical aerosolized 3% lidocaine with 0.25% phenylephrine was applied to both nasal cavities. Flexible fiberoptic laryngoscopy was performed with good visualization of the larynx. 4 cc 4% lidocaine was delivered by nebulizer. 10 cc of 2% lidocaine was then topically applied to the vocal folds through the channel of the endoscope sleeve. The patient was asked to gargle and cough. The endoscope was removed. When adequate topical anesthesia had been obtained, an endoscopic needle was placed through the sheath and a total of approximately 0.8 cc of dexamethasone 24 mg/mL was injected into the base of the granuloma; approximately another 1.5 cc was noted to run out but did not enter the vocal fold. The patient tolerated the procedure well.    FINDINGS:   Normal nasopharynx. Normal base of tongue, valleculae, and epiglottis. The right true vocal fold demonstrated normal mobility. The left true vocal fold demonstrated normal mobility. Small left laryngeal granuloma. False vocal folds, aryepiglottic folds, piriform sinuses, and posterior glottis were unremarkable. Airway was patent. Successful steroid injection of left laryngeal granuloma.    SPECIMEN(S):   None    DRAINS:   None    ESTIMATED BLOOD LOSS:   Minimal    COMPLICATIONS:   None    DISPOSITION: Stable to home    ATTENDING PRESENCE STATEMENT:  I performed this procedure.    PLAN: Voice rest for 5 days, followed by gradual resumption of gentle voice use. Follow up in 1 month with tentative plan for repeat steroid injection is needed. Continue antibiotics.      Kalyn Negron MD

## 2017-07-03 ENCOUNTER — HOSPITAL ENCOUNTER (INPATIENT)
Facility: CLINIC | Age: 62
LOS: 1 days | Discharge: HOME | DRG: 244 | End: 2017-07-04
Attending: INTERNAL MEDICINE | Admitting: INTERNAL MEDICINE
Payer: COMMERCIAL

## 2017-07-03 ENCOUNTER — APPOINTMENT (OUTPATIENT)
Dept: CARDIOLOGY | Facility: CLINIC | Age: 62
DRG: 244 | End: 2017-07-03
Attending: INTERNAL MEDICINE
Payer: COMMERCIAL

## 2017-07-03 ENCOUNTER — APPOINTMENT (OUTPATIENT)
Dept: GENERAL RADIOLOGY | Facility: CLINIC | Age: 62
End: 2017-07-03
Attending: EMERGENCY MEDICINE
Payer: COMMERCIAL

## 2017-07-03 ENCOUNTER — HOSPITAL ENCOUNTER (EMERGENCY)
Facility: CLINIC | Age: 62
Discharge: ANOTHER HOSPITAL | End: 2017-07-03
Attending: EMERGENCY MEDICINE | Admitting: EMERGENCY MEDICINE
Payer: COMMERCIAL

## 2017-07-03 VITALS
SYSTOLIC BLOOD PRESSURE: 166 MMHG | WEIGHT: 239 LBS | HEIGHT: 71 IN | OXYGEN SATURATION: 97 % | RESPIRATION RATE: 20 BRPM | DIASTOLIC BLOOD PRESSURE: 84 MMHG | HEART RATE: 35 BPM | BODY MASS INDEX: 33.46 KG/M2 | TEMPERATURE: 98.6 F

## 2017-07-03 DIAGNOSIS — R00.1 BRADYCARDIA: ICD-10-CM

## 2017-07-03 DIAGNOSIS — I44.1 MOBITZ TYPE 2 SECOND DEGREE HEART BLOCK: ICD-10-CM

## 2017-07-03 DIAGNOSIS — I45.9 HEART BLOCK: ICD-10-CM

## 2017-07-03 DIAGNOSIS — I44.30 HEART BLOCK, AV: Primary | ICD-10-CM

## 2017-07-03 LAB
ALBUMIN SERPL-MCNC: 4.3 G/DL (ref 3.4–5)
ALP SERPL-CCNC: 72 U/L (ref 40–150)
ALT SERPL W P-5'-P-CCNC: 91 U/L (ref 0–70)
ANION GAP SERPL CALCULATED.3IONS-SCNC: 5 MMOL/L (ref 3–14)
AST SERPL W P-5'-P-CCNC: 56 U/L (ref 0–45)
BASOPHILS # BLD AUTO: 0 10E9/L (ref 0–0.2)
BASOPHILS NFR BLD AUTO: 0.4 %
BILIRUB SERPL-MCNC: 0.7 MG/DL (ref 0.2–1.3)
BUN SERPL-MCNC: 24 MG/DL (ref 7–30)
CALCIUM SERPL-MCNC: 9 MG/DL (ref 8.5–10.1)
CHLORIDE SERPL-SCNC: 107 MMOL/L (ref 94–109)
CO2 SERPL-SCNC: 29 MMOL/L (ref 20–32)
CREAT SERPL-MCNC: 0.99 MG/DL (ref 0.66–1.25)
DIFFERENTIAL METHOD BLD: ABNORMAL
EOSINOPHIL # BLD AUTO: 0.2 10E9/L (ref 0–0.7)
EOSINOPHIL NFR BLD AUTO: 3.7 %
ERYTHROCYTE [DISTWIDTH] IN BLOOD BY AUTOMATED COUNT: 12.1 % (ref 10–15)
GFR SERPL CREATININE-BSD FRML MDRD: 76 ML/MIN/1.7M2
GLUCOSE SERPL-MCNC: 105 MG/DL (ref 70–99)
HCT VFR BLD AUTO: 43.1 % (ref 40–53)
HGB BLD-MCNC: 14.5 G/DL (ref 13.3–17.7)
IMM GRANULOCYTES # BLD: 0 10E9/L (ref 0–0.4)
IMM GRANULOCYTES NFR BLD: 0.2 %
INTERPRETATION ECG - MUSE: NORMAL
LYMPHOCYTES # BLD AUTO: 2.7 10E9/L (ref 0.8–5.3)
LYMPHOCYTES NFR BLD AUTO: 54.4 %
MCH RBC QN AUTO: 31.7 PG (ref 26.5–33)
MCHC RBC AUTO-ENTMCNC: 33.6 G/DL (ref 31.5–36.5)
MCV RBC AUTO: 94 FL (ref 78–100)
MONOCYTES # BLD AUTO: 0.5 10E9/L (ref 0–1.3)
MONOCYTES NFR BLD AUTO: 11 %
NEUTROPHILS # BLD AUTO: 1.5 10E9/L (ref 1.6–8.3)
NEUTROPHILS NFR BLD AUTO: 30.3 %
NRBC # BLD AUTO: 0 10*3/UL
NRBC BLD AUTO-RTO: 0 /100
NT-PROBNP SERPL-MCNC: 515 PG/ML (ref 0–900)
PLATELET # BLD AUTO: 171 10E9/L (ref 150–450)
POTASSIUM SERPL-SCNC: 3.6 MMOL/L (ref 3.4–5.3)
PROT SERPL-MCNC: 7.6 G/DL (ref 6.8–8.8)
RBC # BLD AUTO: 4.58 10E12/L (ref 4.4–5.9)
SODIUM SERPL-SCNC: 141 MMOL/L (ref 133–144)
T4 FREE SERPL-MCNC: 0.8 NG/DL (ref 0.76–1.46)
TROPONIN I SERPL-MCNC: 0.02 UG/L (ref 0–0.04)
TROPONIN I SERPL-MCNC: NORMAL UG/L (ref 0–0.04)
TSH SERPL DL<=0.005 MIU/L-ACNC: 4.21 MU/L (ref 0.4–4)
WBC # BLD AUTO: 4.9 10E9/L (ref 4–11)

## 2017-07-03 PROCEDURE — 99153 MOD SED SAME PHYS/QHP EA: CPT

## 2017-07-03 PROCEDURE — 84484 ASSAY OF TROPONIN QUANT: CPT | Performed by: INTERNAL MEDICINE

## 2017-07-03 PROCEDURE — 85025 COMPLETE CBC W/AUTO DIFF WBC: CPT | Performed by: EMERGENCY MEDICINE

## 2017-07-03 PROCEDURE — 25000132 ZZH RX MED GY IP 250 OP 250 PS 637: Performed by: INTERNAL MEDICINE

## 2017-07-03 PROCEDURE — 99285 EMERGENCY DEPT VISIT HI MDM: CPT | Mod: 25

## 2017-07-03 PROCEDURE — C1785 PMKR, DUAL, RATE-RESP: HCPCS

## 2017-07-03 PROCEDURE — 71020 XR CHEST 2 VW: CPT

## 2017-07-03 PROCEDURE — 84443 ASSAY THYROID STIM HORMONE: CPT | Performed by: EMERGENCY MEDICINE

## 2017-07-03 PROCEDURE — 96360 HYDRATION IV INFUSION INIT: CPT

## 2017-07-03 PROCEDURE — 99223 1ST HOSP IP/OBS HIGH 75: CPT | Mod: 25 | Performed by: INTERNAL MEDICINE

## 2017-07-03 PROCEDURE — 33208 INSRT HEART PM ATRIAL & VENT: CPT

## 2017-07-03 PROCEDURE — C1898 LEAD, PMKR, OTHER THAN TRANS: HCPCS

## 2017-07-03 PROCEDURE — 02HK3JZ INSERTION OF PACEMAKER LEAD INTO RIGHT VENTRICLE, PERCUTANEOUS APPROACH: ICD-10-PCS | Performed by: INTERNAL MEDICINE

## 2017-07-03 PROCEDURE — 0JH606Z INSERTION OF PACEMAKER, DUAL CHAMBER INTO CHEST SUBCUTANEOUS TISSUE AND FASCIA, OPEN APPROACH: ICD-10-PCS | Performed by: INTERNAL MEDICINE

## 2017-07-03 PROCEDURE — 27210784 ZZH KIT PACEMAKER CR8

## 2017-07-03 PROCEDURE — 33208 INSRT HEART PM ATRIAL & VENT: CPT | Performed by: INTERNAL MEDICINE

## 2017-07-03 PROCEDURE — 25000125 ZZHC RX 250: Performed by: INTERNAL MEDICINE

## 2017-07-03 PROCEDURE — 27210795 ZZH PAD DEFIB QUICK CR4

## 2017-07-03 PROCEDURE — 25000128 H RX IP 250 OP 636: Performed by: INTERNAL MEDICINE

## 2017-07-03 PROCEDURE — 21000000 ZZH R&B IMCU HEART CARE

## 2017-07-03 PROCEDURE — 83880 ASSAY OF NATRIURETIC PEPTIDE: CPT | Performed by: EMERGENCY MEDICINE

## 2017-07-03 PROCEDURE — 02H63JZ INSERTION OF PACEMAKER LEAD INTO RIGHT ATRIUM, PERCUTANEOUS APPROACH: ICD-10-PCS | Performed by: INTERNAL MEDICINE

## 2017-07-03 PROCEDURE — 99152 MOD SED SAME PHYS/QHP 5/>YRS: CPT | Performed by: INTERNAL MEDICINE

## 2017-07-03 PROCEDURE — 99223 1ST HOSP IP/OBS HIGH 75: CPT | Mod: AI | Performed by: INTERNAL MEDICINE

## 2017-07-03 PROCEDURE — 80053 COMPREHEN METABOLIC PANEL: CPT | Performed by: EMERGENCY MEDICINE

## 2017-07-03 PROCEDURE — 93610 INTRA-ATRIAL PACING: CPT | Mod: 26 | Performed by: INTERNAL MEDICINE

## 2017-07-03 PROCEDURE — 36415 COLL VENOUS BLD VENIPUNCTURE: CPT | Performed by: INTERNAL MEDICINE

## 2017-07-03 PROCEDURE — 93005 ELECTROCARDIOGRAM TRACING: CPT

## 2017-07-03 PROCEDURE — 96361 HYDRATE IV INFUSION ADD-ON: CPT

## 2017-07-03 PROCEDURE — 84484 ASSAY OF TROPONIN QUANT: CPT | Performed by: EMERGENCY MEDICINE

## 2017-07-03 PROCEDURE — 99152 MOD SED SAME PHYS/QHP 5/>YRS: CPT

## 2017-07-03 PROCEDURE — S0020 INJECTION, BUPIVICAINE HYDRO: HCPCS | Performed by: INTERNAL MEDICINE

## 2017-07-03 PROCEDURE — 25000128 H RX IP 250 OP 636: Performed by: EMERGENCY MEDICINE

## 2017-07-03 PROCEDURE — C1892 INTRO/SHEATH,FIXED,PEEL-AWAY: HCPCS

## 2017-07-03 PROCEDURE — S0077 INJECTION, CLINDAMYCIN PHOSP: HCPCS | Performed by: INTERNAL MEDICINE

## 2017-07-03 PROCEDURE — 99153 MOD SED SAME PHYS/QHP EA: CPT | Performed by: INTERNAL MEDICINE

## 2017-07-03 PROCEDURE — 84439 ASSAY OF FREE THYROXINE: CPT | Performed by: EMERGENCY MEDICINE

## 2017-07-03 RX ORDER — ONDANSETRON 4 MG/1
4 TABLET, ORALLY DISINTEGRATING ORAL EVERY 6 HOURS PRN
Status: DISCONTINUED | OUTPATIENT
Start: 2017-07-03 | End: 2017-07-04 | Stop reason: HOSPADM

## 2017-07-03 RX ORDER — HYDRALAZINE HYDROCHLORIDE 20 MG/ML
10 INJECTION INTRAMUSCULAR; INTRAVENOUS EVERY 6 HOURS PRN
Status: DISCONTINUED | OUTPATIENT
Start: 2017-07-03 | End: 2017-07-04 | Stop reason: HOSPADM

## 2017-07-03 RX ORDER — LIDOCAINE HYDROCHLORIDE 10 MG/ML
10-30 INJECTION, SOLUTION EPIDURAL; INFILTRATION; INTRACAUDAL; PERINEURAL
Status: COMPLETED | OUTPATIENT
Start: 2017-07-03 | End: 2017-07-03

## 2017-07-03 RX ORDER — CLINDAMYCIN PHOSPHATE 900 MG/50ML
900 INJECTION, SOLUTION INTRAVENOUS
Status: COMPLETED | OUTPATIENT
Start: 2017-07-03 | End: 2017-07-03

## 2017-07-03 RX ORDER — MORPHINE SULFATE 2 MG/ML
1-2 INJECTION, SOLUTION INTRAMUSCULAR; INTRAVENOUS EVERY 5 MIN PRN
Status: DISCONTINUED | OUTPATIENT
Start: 2017-07-03 | End: 2017-07-03

## 2017-07-03 RX ORDER — LIDOCAINE 40 MG/G
CREAM TOPICAL
Status: DISCONTINUED | OUTPATIENT
Start: 2017-07-03 | End: 2017-07-04 | Stop reason: HOSPADM

## 2017-07-03 RX ORDER — ACETAMINOPHEN 325 MG/1
650 TABLET ORAL EVERY 4 HOURS PRN
Status: DISCONTINUED | OUTPATIENT
Start: 2017-07-03 | End: 2017-07-04 | Stop reason: HOSPADM

## 2017-07-03 RX ORDER — FLUMAZENIL 0.1 MG/ML
0.2 INJECTION, SOLUTION INTRAVENOUS
Status: DISCONTINUED | OUTPATIENT
Start: 2017-07-03 | End: 2017-07-03

## 2017-07-03 RX ORDER — PROMETHAZINE HYDROCHLORIDE 25 MG/ML
6.25-25 INJECTION, SOLUTION INTRAMUSCULAR; INTRAVENOUS EVERY 4 HOURS PRN
Status: DISCONTINUED | OUTPATIENT
Start: 2017-07-03 | End: 2017-07-03

## 2017-07-03 RX ORDER — LORAZEPAM 2 MG/ML
.5-2 INJECTION INTRAMUSCULAR EVERY 10 MIN PRN
Status: DISCONTINUED | OUTPATIENT
Start: 2017-07-03 | End: 2017-07-03

## 2017-07-03 RX ORDER — FUROSEMIDE 10 MG/ML
20-100 INJECTION INTRAMUSCULAR; INTRAVENOUS
Status: DISCONTINUED | OUTPATIENT
Start: 2017-07-03 | End: 2017-07-03

## 2017-07-03 RX ORDER — LIDOCAINE HYDROCHLORIDE 10 MG/ML
10-30 INJECTION, SOLUTION EPIDURAL; INFILTRATION; INTRACAUDAL; PERINEURAL
Status: DISCONTINUED | OUTPATIENT
Start: 2017-07-03 | End: 2017-07-03

## 2017-07-03 RX ORDER — BUPIVACAINE HYDROCHLORIDE 2.5 MG/ML
10-30 INJECTION, SOLUTION EPIDURAL; INFILTRATION; INTRACAUDAL
Status: COMPLETED | OUTPATIENT
Start: 2017-07-03 | End: 2017-07-03

## 2017-07-03 RX ORDER — SODIUM CHLORIDE 9 MG/ML
INJECTION, SOLUTION INTRAVENOUS CONTINUOUS
Status: DISCONTINUED | OUTPATIENT
Start: 2017-07-03 | End: 2017-07-03

## 2017-07-03 RX ORDER — KETOROLAC TROMETHAMINE 30 MG/ML
15-30 INJECTION, SOLUTION INTRAMUSCULAR; INTRAVENOUS
Status: DISCONTINUED | OUTPATIENT
Start: 2017-07-03 | End: 2017-07-03

## 2017-07-03 RX ORDER — DOBUTAMINE HYDROCHLORIDE 200 MG/100ML
5-40 INJECTION INTRAVENOUS CONTINUOUS PRN
Status: DISCONTINUED | OUTPATIENT
Start: 2017-07-03 | End: 2017-07-03

## 2017-07-03 RX ORDER — NITROGLYCERIN 0.4 MG/1
0.4 TABLET SUBLINGUAL EVERY 5 MIN PRN
Status: DISCONTINUED | OUTPATIENT
Start: 2017-07-03 | End: 2017-07-04 | Stop reason: HOSPADM

## 2017-07-03 RX ORDER — IBUTILIDE FUMARATE 1 MG/10ML
0.01 INJECTION, SOLUTION INTRAVENOUS
Status: DISCONTINUED | OUTPATIENT
Start: 2017-07-03 | End: 2017-07-03

## 2017-07-03 RX ORDER — LIDOCAINE HYDROCHLORIDE AND EPINEPHRINE 10; 10 MG/ML; UG/ML
10-30 INJECTION, SOLUTION INFILTRATION; PERINEURAL
Status: DISCONTINUED | OUTPATIENT
Start: 2017-07-03 | End: 2017-07-03

## 2017-07-03 RX ORDER — BUPIVACAINE HYDROCHLORIDE 2.5 MG/ML
10-30 INJECTION, SOLUTION EPIDURAL; INFILTRATION; INTRACAUDAL
Status: DISCONTINUED | OUTPATIENT
Start: 2017-07-03 | End: 2017-07-03

## 2017-07-03 RX ORDER — AMOXICILLIN 250 MG
1-2 CAPSULE ORAL 2 TIMES DAILY PRN
Status: DISCONTINUED | OUTPATIENT
Start: 2017-07-03 | End: 2017-07-04 | Stop reason: HOSPADM

## 2017-07-03 RX ORDER — PROTAMINE SULFATE 10 MG/ML
1-5 INJECTION, SOLUTION INTRAVENOUS
Status: DISCONTINUED | OUTPATIENT
Start: 2017-07-03 | End: 2017-07-03

## 2017-07-03 RX ORDER — ONDANSETRON 2 MG/ML
4 INJECTION INTRAMUSCULAR; INTRAVENOUS EVERY 6 HOURS PRN
Status: DISCONTINUED | OUTPATIENT
Start: 2017-07-03 | End: 2017-07-04 | Stop reason: HOSPADM

## 2017-07-03 RX ORDER — POLYETHYLENE GLYCOL 3350 17 G/17G
17 POWDER, FOR SOLUTION ORAL DAILY PRN
Status: DISCONTINUED | OUTPATIENT
Start: 2017-07-03 | End: 2017-07-04 | Stop reason: HOSPADM

## 2017-07-03 RX ORDER — PROTAMINE SULFATE 10 MG/ML
5-40 INJECTION, SOLUTION INTRAVENOUS EVERY 10 MIN PRN
Status: DISCONTINUED | OUTPATIENT
Start: 2017-07-03 | End: 2017-07-03

## 2017-07-03 RX ORDER — LIDOCAINE 40 MG/G
CREAM TOPICAL
Status: DISCONTINUED | OUTPATIENT
Start: 2017-07-03 | End: 2017-07-03

## 2017-07-03 RX ORDER — ONDANSETRON 2 MG/ML
4 INJECTION INTRAMUSCULAR; INTRAVENOUS EVERY 4 HOURS PRN
Status: DISCONTINUED | OUTPATIENT
Start: 2017-07-03 | End: 2017-07-03

## 2017-07-03 RX ORDER — NALOXONE HYDROCHLORIDE 0.4 MG/ML
.1-.4 INJECTION, SOLUTION INTRAMUSCULAR; INTRAVENOUS; SUBCUTANEOUS
Status: DISCONTINUED | OUTPATIENT
Start: 2017-07-03 | End: 2017-07-04 | Stop reason: HOSPADM

## 2017-07-03 RX ORDER — PROCHLORPERAZINE 25 MG
25 SUPPOSITORY, RECTAL RECTAL EVERY 12 HOURS PRN
Status: DISCONTINUED | OUTPATIENT
Start: 2017-07-03 | End: 2017-07-04 | Stop reason: HOSPADM

## 2017-07-03 RX ORDER — ALUMINA, MAGNESIA, AND SIMETHICONE 2400; 2400; 240 MG/30ML; MG/30ML; MG/30ML
15-30 SUSPENSION ORAL EVERY 4 HOURS PRN
Status: DISCONTINUED | OUTPATIENT
Start: 2017-07-03 | End: 2017-07-04 | Stop reason: HOSPADM

## 2017-07-03 RX ORDER — ADENOSINE 3 MG/ML
6-12 INJECTION, SOLUTION INTRAVENOUS EVERY 5 MIN PRN
Status: DISCONTINUED | OUTPATIENT
Start: 2017-07-03 | End: 2017-07-03

## 2017-07-03 RX ORDER — ACETAMINOPHEN 650 MG/1
650 SUPPOSITORY RECTAL EVERY 4 HOURS PRN
Status: DISCONTINUED | OUTPATIENT
Start: 2017-07-03 | End: 2017-07-04 | Stop reason: HOSPADM

## 2017-07-03 RX ORDER — IBUTILIDE FUMARATE 1 MG/10ML
1 INJECTION, SOLUTION INTRAVENOUS
Status: DISCONTINUED | OUTPATIENT
Start: 2017-07-03 | End: 2017-07-03

## 2017-07-03 RX ORDER — DIPHENHYDRAMINE HYDROCHLORIDE 50 MG/ML
25-50 INJECTION INTRAMUSCULAR; INTRAVENOUS
Status: DISCONTINUED | OUTPATIENT
Start: 2017-07-03 | End: 2017-07-03

## 2017-07-03 RX ORDER — FENTANYL CITRATE 50 UG/ML
25-50 INJECTION, SOLUTION INTRAMUSCULAR; INTRAVENOUS
Status: DISCONTINUED | OUTPATIENT
Start: 2017-07-03 | End: 2017-07-03

## 2017-07-03 RX ORDER — PROCHLORPERAZINE MALEATE 5 MG
5-10 TABLET ORAL EVERY 6 HOURS PRN
Status: DISCONTINUED | OUTPATIENT
Start: 2017-07-03 | End: 2017-07-04 | Stop reason: HOSPADM

## 2017-07-03 RX ORDER — HEPARIN SODIUM 1000 [USP'U]/ML
1000-10000 INJECTION, SOLUTION INTRAVENOUS; SUBCUTANEOUS EVERY 5 MIN PRN
Status: DISCONTINUED | OUTPATIENT
Start: 2017-07-03 | End: 2017-07-03

## 2017-07-03 RX ORDER — ATROPINE SULFATE 0.1 MG/ML
.5-1 INJECTION INTRAVENOUS
Status: DISCONTINUED | OUTPATIENT
Start: 2017-07-03 | End: 2017-07-03

## 2017-07-03 RX ORDER — NALOXONE HYDROCHLORIDE 0.4 MG/ML
0.4 INJECTION, SOLUTION INTRAMUSCULAR; INTRAVENOUS; SUBCUTANEOUS EVERY 5 MIN PRN
Status: DISCONTINUED | OUTPATIENT
Start: 2017-07-03 | End: 2017-07-03

## 2017-07-03 RX ADMIN — MIDAZOLAM HYDROCHLORIDE 1 MG: 1 INJECTION, SOLUTION INTRAMUSCULAR; INTRAVENOUS at 14:36

## 2017-07-03 RX ADMIN — BUPIVACAINE HYDROCHLORIDE 25 MG: 2.5 INJECTION, SOLUTION EPIDURAL; INFILTRATION; INTRACAUDAL at 14:43

## 2017-07-03 RX ADMIN — MIDAZOLAM HYDROCHLORIDE 1 MG: 1 INJECTION, SOLUTION INTRAMUSCULAR; INTRAVENOUS at 14:43

## 2017-07-03 RX ADMIN — FENTANYL CITRATE 50 MCG: 50 INJECTION, SOLUTION INTRAMUSCULAR; INTRAVENOUS at 14:27

## 2017-07-03 RX ADMIN — MIDAZOLAM HYDROCHLORIDE 1 MG: 1 INJECTION, SOLUTION INTRAMUSCULAR; INTRAVENOUS at 14:27

## 2017-07-03 RX ADMIN — SODIUM CHLORIDE: 9 INJECTION, SOLUTION INTRAVENOUS at 14:02

## 2017-07-03 RX ADMIN — LIDOCAINE HYDROCHLORIDE 100 MG: 10 INJECTION, SOLUTION EPIDURAL; INFILTRATION; INTRACAUDAL; PERINEURAL at 14:43

## 2017-07-03 RX ADMIN — SODIUM CHLORIDE 500 ML: 9 INJECTION, SOLUTION INTRAVENOUS at 09:53

## 2017-07-03 RX ADMIN — FENTANYL CITRATE 50 MCG: 50 INJECTION, SOLUTION INTRAMUSCULAR; INTRAVENOUS at 14:36

## 2017-07-03 RX ADMIN — Medication 25000 UNITS: at 14:43

## 2017-07-03 RX ADMIN — CLINDAMYCIN PHOSPHATE 900 MG: 18 INJECTION, SOLUTION INTRAVENOUS at 14:20

## 2017-07-03 RX ADMIN — OMEPRAZOLE 20 MG: 20 CAPSULE, DELAYED RELEASE ORAL at 15:58

## 2017-07-03 RX ADMIN — ACETAMINOPHEN AND CODEINE PHOSPHATE 1 TABLET: 300; 30 TABLET ORAL at 22:23

## 2017-07-03 ASSESSMENT — ACTIVITIES OF DAILY LIVING (ADL)
SWALLOWING: 0-->SWALLOWS FOODS/LIQUIDS WITHOUT DIFFICULTY
RETIRED_EATING: 0-->INDEPENDENT
RETIRED_COMMUNICATION: 0-->UNDERSTANDS/COMMUNICATES WITHOUT DIFFICULTY
AMBULATION: 0-->INDEPENDENT
BATHING: 0-->INDEPENDENT
COGNITION: 0 - NO COGNITION ISSUES REPORTED
TRANSFERRING: 0-->INDEPENDENT
DRESS: 0-->INDEPENDENT
FALL_HISTORY_WITHIN_LAST_SIX_MONTHS: NO
TOILETING: 0-->INDEPENDENT

## 2017-07-03 ASSESSMENT — ENCOUNTER SYMPTOMS
NAUSEA: 0
SHORTNESS OF BREATH: 1
COUGH: 0
VOMITING: 0

## 2017-07-03 ASSESSMENT — PAIN DESCRIPTION - DESCRIPTORS: DESCRIPTORS: ACHING;DULL

## 2017-07-03 NOTE — H&P
"INTERNAL MEDICINE HISTORY AND PHYSICAL  [facility] Hospitalist Service      Arias Manzo [MR#: [id number]  : 1955  62 year old male]  Date of Admission:  2017  Primary Care Provider:  Dr. Elisa Viera, [facility]    Chief Complaint:   2:1 heart block/bradycardia (transfer from [facility]).    History of Present Illness:   Mr. Arias Manzo is a 63 yo male pt with history including HTN, depression, JOSE G, DLD, GERD, lumbar spine/disc disease, laryngeal granuloma and h/o known bifascicular block, who presented to [facility] earlier today with the symptoms of CP and SOB. He notes CP and SOB over the weekend. Notes GARCIA. CP was 3/10, no radiation. No N/V or jaw radiation. No fevers or chills. No abd pain, bloody stools or diarrhea. Given his persistent symptomatology, he presented to [facility] this am for eval.     There, vitals showed T98.6 /104, HR 35, RR20, O2 sats 99%. ECG was done which showed 2:1 AVB. Lytes were unremarkable. Trop negative. Discussion was made with Cardiology and it was felt that pt may need further eval and possibly PPM, so request for transfer was made.    Pt does also noted episode of \"convulsions\" waking him from sleep about a week ago. No leg swelling. H/o bifascicular block and avoids drugs that affect AV node.    I saw the pt in the CICU. Presently feeling OK at rest.    Past Medical History:   1. HTN.  2. Dep/anx.  3. JOSE G. Uses CPAP.  4. DLD.  5. GERD.  6. Spine/disk disease. Has had multiple spine/disk surgeries involving the L spine.  7. H/o laryngeal granulomas, s/p Botox injections and ENT surgeries. Sees Dr. Negron at the [facility].  8. IFG.  9. Bifascicular block. Known RBBB + LAFB.  10. Cholelithiasis.  11. H/o seizures in childhood.    Past Surgical History:     Past Surgical History:   Procedure Laterality Date     ANKLE SURGERY       APPENDECTOMY       BACK SURGERY      lami x2     ENT SURGERY      throat granulomas     HERNIA REPAIR - s/p umbilical " hernia repair and B IHR.       INJECT BOTOX N/A 10/30/2014    Procedure: INJECT BOTOX;  Surgeon: Kalyn Negron MD;  Location: [facility] OR     INJECT BOTOX N/A 12/8/2016    Procedure: INJECT BOTOX;  Surgeon: aKlyn Negron MD;  [facility]     INJECT STEROID (LOCATION) N/A 12/8/2016    Procedure: INJECT STEROID (LOCATION);  Surgeon: Kalyn Negron MD;  [facility] OR     LAMINECTOMY LUMBAR ONE LEVEL  3/5/2014    Procedure: LAMINECTOMY LUMBAR ONE LEVEL;  RIGHT L4-5 DISCECTOMY;  Surgeon: Rodrigo Ríos MD;  Location: [facility] OR     LASER CO2 LARYNGOSCOPY N/A 12/8/2016    Procedure: LASER CO2 LARYNGOSCOPY;  Surgeon: Kalyn Negron MD;  [facility] OR     LASER CO2 LARYNGOSCOPY, COMPLEX Left 10/30/2014    Procedure: LASER CO2 LARYNGOSCOPY, COMPLEX;  Surgeon: Kalyn Negron MD;  Location: [facility] OR     ORTHOPEDIC SURGERY          Allergies:     Allergies   Allergen Reactions     Flexeril [Cyclobenzaprine] GI Disturbance     Naprosyn [Naproxen] GI Disturbance     Penicillins      As child     Ranitidine GI Disturbance        Medications:     Prior to Admission medications    Medication Sig Last Dose Taking? Auth Provider   botulinum toxin type A (BOTOX) 100 UNITS injection Inject into the muscle once More than a month at Unknown time  Kalyn Negron MD   lisinopril-hydrochlorothiazide (PRINZIDE,ZESTORETIC) 20-25 MG per tablet Take 1 tablet by mouth daily 7/2/2017 at Unknown time  Reported, Patient   Omeprazole (PRILOSEC PO) Take 20 mg by mouth 2 times daily (before meals) Taking  Reported, Patient   Sertraline HCl (ZOLOFT PO) Take 100 mg by mouth daily 7/2/2017 at Unknown time  Reported, Patient   Simvastatin (ZOCOR PO) Take 20 mg by mouth At Bedtime 7/2/2017 at Unknown time  Reported, Patient       Social History:   . 2 kids. Does not smoke (smoked a pipe for 10 years but quit 1985). Does drink alcohol regularly. Works as a sales  representative.      Family History:   No h/o premature CAD.  Family History   Problem Relation Age of Onset     CANCER Father        Review of Systems:   As noted in the HPI; otherwise 10 point review of systems was negative.     Physical Exam:   VITALS:  Blood pressure (!) 154/95, resp. rate 25, SpO2 99 %.  General:  Alert, oriented, pleasant.  HEENT:  PERRL, no scleral icterus/conjunctival injection. Oropharynx no erythema or exudate.  Neck:  No bruits, JVD, adenopathy.  Heart/Chest:  Regular, camron, no m/r/g.  Lungs:  Clear bilaterally, no crackles/wheezes.  Abdomen:  Soft, nt, nd, +BS.  Extremities/Musculoskeletal:  No signif LE edema. Palpable DP pulses bilaterally.  Neurologic:  No gross focal motor/sensoary deficits.     Labs, Imaging & Other Data:     Results for orders placed or performed during the hospital encounter of 07/03/17   Chest XR,  PA & LAT    Narrative    XR CHEST 2 VW 7/3/2017 10:46 AM    COMPARISON: None.    HISTORY: Shortness of breath      Impression    IMPRESSION: Mildly enlarged cardiac silhouette. No focal airspace  disease, pleural effusion or pneumothorax.    ANDREW BECKWITH   CBC with platelets differential   Result Value Ref Range    WBC 4.9 4.0 - 11.0 10e9/L    RBC Count 4.58 4.4 - 5.9 10e12/L    Hemoglobin 14.5 13.3 - 17.7 g/dL    Hematocrit 43.1 40.0 - 53.0 %    MCV 94 78 - 100 fl    MCH 31.7 26.5 - 33.0 pg    MCHC 33.6 31.5 - 36.5 g/dL    RDW 12.1 10.0 - 15.0 %    Platelet Count 171 150 - 450 10e9/L    Diff Method Automated Method     % Neutrophils 30.3 %    % Lymphocytes 54.4 %    % Monocytes 11.0 %    % Eosinophils 3.7 %    % Basophils 0.4 %    % Immature Granulocytes 0.2 %    Nucleated RBCs 0 0 /100    Absolute Neutrophil 1.5 (L) 1.6 - 8.3 10e9/L    Absolute Lymphocytes 2.7 0.8 - 5.3 10e9/L    Absolute Monocytes 0.5 0.0 - 1.3 10e9/L    Absolute Eosinophils 0.2 0.0 - 0.7 10e9/L    Absolute Basophils 0.0 0.0 - 0.2 10e9/L    Abs Immature Granulocytes 0.0 0 - 0.4 10e9/L    Absolute  Nucleated RBC 0.0    Nt probnp inpatient   Result Value Ref Range    N-Terminal Pro BNP Inpatient 515 0 - 900 pg/mL   Comprehensive metabolic panel   Result Value Ref Range    Sodium 141 133 - 144 mmol/L    Potassium 3.6 3.4 - 5.3 mmol/L    Chloride 107 94 - 109 mmol/L    Carbon Dioxide 29 20 - 32 mmol/L    Anion Gap 5 3 - 14 mmol/L    Glucose 105 (H) 70 - 99 mg/dL    Urea Nitrogen 24 7 - 30 mg/dL    Creatinine 0.99 0.66 - 1.25 mg/dL    GFR Estimate 76 >60 mL/min/1.7m2    GFR Estimate If Black >90   GFR Calc   >60 mL/min/1.7m2    Calcium 9.0 8.5 - 10.1 mg/dL    Bilirubin Total 0.7 0.2 - 1.3 mg/dL    Albumin 4.3 3.4 - 5.0 g/dL    Protein Total 7.6 6.8 - 8.8 g/dL    Alkaline Phosphatase 72 40 - 150 U/L    ALT 91 (H) 0 - 70 U/L    AST 56 (H) 0 - 45 U/L   Troponin I   Result Value Ref Range    Troponin I ES  0.000 - 0.045 ug/L     <0.015  The 99th percentile for upper reference range is 0.045 ug/L.  Troponin values in   the range of 0.045 - 0.120 ug/L may be associated with risks of adverse   clinical events.     TSH with free T4 reflex   Result Value Ref Range    TSH 4.21 (H) 0.40 - 4.00 mU/L   T4 free   Result Value Ref Range    T4 Free 0.80 0.76 - 1.46 ng/dL   EKG 12 lead   Result Value Ref Range    Interpretation ECG Click View Image link to view waveform and result      ECG: showed bradycardia in 30's with 2:1 AV Block.      ASSESSMENT & PLAN:   Mr. Arias Manzo is a 63 yo male pt with history including HTN, depression, JOSE G, DLD, GERD, lumbar spine/disc disease, laryngeal granuloma and h/o known bifascicular block, who presented to [facility] earlier today with the symptoms of CP and SOB and found with bradycardia in 30's and 2nd degree AVB type 2. Subsequently transferred to [facility].    1. Bradycardia/second degree AV block, suspect progression of underlying conduction system disease, but also with CP.  * Known h/o bifascicular block.  - Cardiology aware of pt and will be formally consulted.  -  "Monitor on telemetry and external pacing as needed for now.  - Invasive measures (temp pacer etc) as needed, per Cardiology.  - Check echocardiogram.  - CP/SOB w/u as below.    2. CP/SOB, suspect due to the above.  * ECG as above, trop negative. CXR no acute infiltrates.  - Echo as above.  - Repeat another trop.  - Consider ischemic eval, likely outpt.  - Cardiology consult as above.    3. Elevated TSH.  * TSH slightly high at 4.21, FT4 normal.  - Recheck outpt in several weeks.    4. H/o \"convulsion\" 1 week PTA.   * Brief episode which woke pt from sleep, ?due to bradyarrhythmia.  * Pt with h/o seizures in childhood.  - Monitor clinically for now.    5. HTN.  [PTA: lisinopril-HCTZ 20-25 mg daily.]  - Hold lisinopril-HCTZ for now, given risk of further hemodynamic instability due to the above.    6. GERD.  - Continue omeprazole.    7. Depression/anxiety.  - Continue sertraline.    8. Dyslipidemia.  - Continue simvastatin.    9. JOSE G.  - Continue CPAP.    Prophylaxis.  - PCD's.    Dispo.  - Pending above.    CODE STATUS: FULL      Kareem Milner Jr., MD  [phone] (p)  Text Page              "

## 2017-07-03 NOTE — IP AVS SNAPSHOT
[facility]    [address]    [facility] [address]    Phone:  [phone]                                       After Visit Summary   7/3/2017    Arias Manzo    MRN: [id number]           After Visit Summary Signature Page     I have received my discharge instructions, and my questions have been answered. I have discussed any challenges I see with this plan with the nurse or doctor.    ..........................................................................................................................................  Patient/Patient Representative Signature      ..........................................................................................................................................  Patient Representative Print Name and Relationship to Patient    ..................................................               ................................................  Date                                            Time    ..........................................................................................................................................  Reviewed by Signature/Title    ...................................................              ..............................................  Date                                                            Time

## 2017-07-03 NOTE — ED NOTES
HR dipping to 32, MD gave verbal order to apply pacer pads as a precaution.  Explained procedure to pt and applied the pads.

## 2017-07-03 NOTE — IP AVS SNAPSHOT
MRN:[id number]                      After Visit Summary   7/3/2017    Arias Manzo    MRN: [id number]           Thank you!     Thank you for choosing [facility] for your care. Our goal is always to provide you with excellent care. Hearing back from our patients is one way we can continue to improve our services. Please take a few minutes to complete the written survey that you may receive in the mail after you visit with us. Thank you!        Patient Information     Date Of Birth          1955        Designated Caregiver       Most Recent Value    Caregiver    Will someone help with your care after discharge? no      About your hospital stay     You were admitted on:  July 3, 2017 You last received care in the:  [facility]    You were discharged on:  July 4, 2017       Who to Call     For medical emergencies, please call 911.  For non-urgent questions about your medical care, please call your primary care provider or clinic, [phone]          Attending Provider     Provider Specialty    Kareem Milner MD Internal Medicine       Primary Care Provider Office Phone # Fax #    [facility] [phone]      After Care Instructions     Activity       Your activity upon discharge: activity as tolerated            Diet       Follow this diet upon discharge: Orders Placed This Encounter      Advance Diet as Tolerated: Regular Diet Adult                    Follow-up Appointments     Follow Up and recommended labs and tests       Follow up with primary care provider, [facility], within 7 days for hospital follow- up.  No follow up labs or test are needed.                  Additional Services     Follow-Up with Cardiac Advanced Practice Provider                 Further instructions from your care team       Heart clinic will arrange for Device Clinic appointment in 1 week and with EP Clinic provider in 6 weeks    Pending  "Results     Date and Time Order Name Status Description    7/3/2017 1531 X-ray Chest 2 vws* Preliminary     7/3/2017 1303 Echo Complete with Rashad In process             Statement of Approval     Ordered          17 1052  I have reviewed and agree with all the recommendations and orders detailed in this document.  EFFECTIVE NOW     Approved and electronically signed by:  Kareem Milner MD             Admission Information     Date & Time Provider Department Dept. Phone    7/3/2017 Kareem Milner MD [facility] [phone]      Your Vitals Were     Blood Pressure Pulse Temperature Respirations Height Weight    155/103 (BP Location: Left arm) 55 98  F (36.7  C) (Oral) 18 1.803 m (5' 11\") 108.4 kg (239 lb)    Pulse Oximetry BMI (Body Mass Index)                96% 33.33 kg/m2          MyChart Information     [employer] lets you send messages to your doctor, view your test results, renew your prescriptions, schedule appointments and more. To sign up, go to [email or web address] . Click on \"Log in\" on the left side of the screen, which will take you to the Welcome page. Then click on \"Sign up Now\" on the right side of the page.     You will be asked to enter the access code listed below, as well as some personal information. Please follow the directions to create your username and password.     Your access code is: [email or web address]  Expires: 10/2/2017 11:00 AM     Your access code will  in 90 days. If you need help or a new code, please call your [place] clinic or [phone].        Care EveryWhere ID     This is your Care EveryWhere ID. This could be used by other organizations to access your [facility] medical records  [id number]        Equal Access to Services     GREGG ESPINOZA : Jameel Forrest, josé antonio deleon, alphonso segal, juan f deshpande. So [facility] [phone].    ATENCIÓN: Si habla español, tiene a hansen " disposición servicios gratuitos de asistencia lingüística. Isiah tirado [phone].    We comply with applicable federal civil rights laws and [place] laws. We do not discriminate on the basis of race, color, national origin, age, disability sex, sexual orientation or gender identity.               Review of your medicines      START taking        Dose / Directions    acetaminophen-codeine 300-30 MG per tablet   Commonly known as:  TYLENOL #3        Dose:  1-2 tablet   Take 1-2 tablets by mouth every 4 hours as needed for other (mild or moderate pain)   Quantity:  20 tablet   Refills:  0         CONTINUE these medicines which have NOT CHANGED        Dose / Directions    BOTOX 100 UNITS injection   Used for:  Dysphonia, Laryngeal granuloma   Generic drug:  botulinum toxin type A        Inject into the muscle once   Quantity:  1 each   Refills:  0       lisinopril-hydrochlorothiazide 20-25 MG per tablet   Commonly known as:  PRINZIDE/ZESTORETIC        Dose:  1 tablet   Take 1 tablet by mouth daily   Refills:  0       PRILOSEC PO        Dose:  20 mg   Take 20 mg by mouth 2 times daily (before meals)   Refills:  0       ZOCOR PO        Dose:  20 mg   Take 20 mg by mouth At Bedtime   Refills:  0       ZOLOFT PO        Dose:  100 mg   Take 100 mg by mouth daily   Refills:  0            Where to get your medicines      Some of these will need a paper prescription and others can be bought over the counter. Ask your nurse if you have questions.     Bring a paper prescription for each of these medications     acetaminophen-codeine 300-30 MG per tablet                Protect others around you: Learn how to safely use, store and throw away your medicines at www.disposemymeds.org.             Medication List: This is a list of all your medications and when to take them. Check marks below indicate your daily home schedule. Keep this list as a reference.      Medications           Morning Afternoon Evening Bedtime As Needed     acetaminophen-codeine 300-30 MG per tablet   Commonly known as:  TYLENOL #3   Take 1-2 tablets by mouth every 4 hours as needed for other (mild or moderate pain)   Last time this was given:  1 tablet on 7/4/2017  2:56 AM   Next Dose Due:  As needed                                   BOTOX 100 UNITS injection   Inject into the muscle once   Generic drug:  botulinum toxin type A   Next Dose Due:  Resume taking as you do at home                                  lisinopril-hydrochlorothiazide 20-25 MG per tablet   Commonly known as:  PRINZIDE/ZESTORETIC   Take 1 tablet by mouth daily   Next Dose Due:  Today any time                                   PRILOSEC PO   Take 20 mg by mouth 2 times daily (before meals)   Last time this was given:  20 mg on 7/4/2017  6:59 AM   Next Dose Due:  This evening                                       ZOCOR PO   Take 20 mg by mouth At Bedtime   Next Dose Due:  Tonight at bedtime                                   ZOLOFT PO   Take 100 mg by mouth daily   Last time this was given:  100 mg on 7/4/2017  8:46 AM   Next Dose Due:  Tomorrow 7/5/17                                             More Information        Living with a Pacemaker  When you have a pacemaker, you can do almost everything you did before your surgery. Here are tips for living well with a pacemaker.    Carry an ID card  When you first get your pacemaker, you ll be given an ID card to carry. This card contains important information about the device. Show it to any doctor, dentist, or other provider you visit. Pacemakers may set off metal detectors. So you may need to show your card to security personnel, such as those in the airport security checkpoint.  What to avoid    Be careful when using a cell phone. Hold it to the ear farthest from your pacemaker, or use a headset. Don t carry the phone in your breast pocket, over the pacemaker, even when it s turned off.    Avoid very strong magnets. These include those used for an MRI  or in hand-held security wands. Some pacemaker devices are considered safer for having an MRI (MR-conditional devices) but safety precautions must still be used. Show your ID card when you go through security.    Avoid strong electrical fields. These are made by radio transmitting towers and ham radios. They are also made by heavy-duty electrical equipment. A running engine makes an electrical field. Don't lean over the open blanco of a running car. Most household and yard appliances will not cause any problems. If you use any large power tools, such as an industrial , talk to your doctor.     Call your doctor if you have any symptoms. These include dizziness or palpitations.  What s OK  Below are some of the many things that are safe to use:    Microwave ovens    Computers    Hair dryers    Power tools    Radios, televisions, and CD players    Electric blankets and heating pads    Vacuum     Cars  Follow up  Plan to have periodic checkps with your healthcare provider to evaluate the battery life of your pacemaker. Depending on your device and how much your body uses the pacing functions of the pacemaker, you will need a new pacemaker generator implanted at some point, usually about every 5 to 7 years. On average, this monitoring should happen every 6 months, or as advised by your healthcare provider.  For some devices, the monitoring of the device function and battery-life can be done with a remote monitor that can be set up in your home. Remote monitoring systems use the internet or telephone to communicate the information from your device to your healthcare provider.  Date Last Reviewed: 5/1/2016 2000-2017 [employer]. [address] [place] [address], [place] [id number]. All rights reserved. This information is not intended as a substitute for professional medical care. Always follow your healthcare professional's instructions.                Pacemakers    A pacemaker is a small electronic  device that keeps your heart beating at the right pace. Inserting the pacemaker into your body is called implantation. You stay awake during the procedure. You may be asked some questions or be asked to take some deep breaths.  How do I get ready for a pacemaker procedure?    Don t eat or drink anything after midnight the night before the procedure, or 8 hours before the procedure.    Tell your doctor about any allergies to medicines, shellfish or iodine contrast, tape or adhesives, or antibiotic types of soaps. Alternatives can be provided and precautions can be taken to avoid exposing you to anything that you are allergic to.    Follow your healthcare provider's instructions on what medicines to take. If you are taking a blood thinning medicines, your healthcare provider may give you instructions on stopping it before the procedure to decrease the risk of major bleeding. You may be instructed to stop medicines that interact with the contrast dye that is used to place the pacemaker wires in the vein.    You may be asked to shower with antibacterial soap the night before your procedure and the morning of the procedure. Ask your provider if he or she wants you to use a certain kind of soap.    Your provider may ask you to use clean bed sheets and pajamas the night before the procedure to reduce the risk of infection.    You may be given an antibiotic through an IV to also protect you from infection after the pacemaker implant procedure.    You will have blood drawn depending on your overall health. If your kidney function is not normal, special precautions may be needed before the procedure.    If you are a woman of child-bearing age you may be asked to take a pregnancy test before the procedure.  What happens during the procedure?    Your doctor may prescribe a medicine to help you relax and to prevent pain during the procedure.    A local anesthetic is given by injection to numb the area where the pacemaker will be  inserted. This keeps you from feeling pain during the procedure.    The doctor will make a cut (incision) where the generator is placed.    The doctor will guide the wire (lead) through a vein into your heart s chambers using X-ray monitors.    The doctor will attach the pacemaker generator to the lead or leads.    The doctor will close the incision site with stitches and may seal the site with a surgical glue to prevent infection.    A dressing may be applied to your incision to reduce the risk of bleeding and also protect it from infection.    The pacemaker s settings are programmed to help your heart beat at a rate that s right for you.  What happens after the procedure?    You will have a chest X-ray while you are in the recovery area.    Your pacemaker settings will be rechecked.    Your provider may prescribe antibiotics to take after the procedure to prevent infection.    Follow the instructions you are given for caring for the implantation site. You will likely be told not to raise the arm on that side for a certain amount of time. You may have a sling or arm immobilizer put on to keep you from moving your arm on the incision side. This is done to prevent the new pacemaker wired from becoming displaced. Your doctor will give you instructions on how long and when you should wear this.    Take your temperature and check your incision for signs of infection every day for a week.    Return for a follow-up visit as advised.    As your healthcare provider when it will be safe to shower, bathe, or swim. Generally, you should not soak in water for about a week to prevent the incision from softening, opening, or becoming infected.    Avoid all activities that would put pressure on your incision site or cause irritation to the incision. Do not use lotions, powders, or ointments unless your provider says it is safe to do so. Do not carry a purse or backpack that would put pressure on your incision site.  Call 911  Call  911 if you have:    Chest pain    Severe trouble breathing  When should I call my healthcare provider?  Call your healthcare provider right away if you have any of the following:    You feel any of the symptoms you had before the pacemaker was implanted. These include dizziness, lightheadedness, lack of energy, or fainting spells.    Your chest or abdominal muscles twitch.    You have hiccups that won't stop    You have a rapid or pounding heartbeat or shortness of breath.    You feel pain in the area around your pacemaker.    You have a fever over 100.4 F (38 C)    Redness, severe swelling, drainage, bleeding, or warmth at the incision site    Your incision site is not healing or the incision separates or opens    Your pacemaker generator feels loose or like it is wiggling in the pocket under the skin    If you need an MRI for any reason. In some cases, it is not safe to have an MRI with a pacemaker.   Date Last Reviewed: 3/30/2016    1087-1079 [employer]. [address] [place] [address]. All rights reserved. This information is not intended as a substitute for professional medical care. Always follow your healthcare professional's instructions.                Discharge Instructions for Pacemaker Implantation  You have had a procedure to insert a pacemaker. Once inside your body, this small electronic device helps keep your heart from beating too slowly. A pacemaker can t fix existing heart problems. But it can help you feel better and have more energy. As you recover, follow all of the instructions you are given, including those below.  Activity    Don t drive until your doctor says it s OK. Plan to have someone drive you home after the procedure.    Follow the instructions you are given about limiting your activity.    If you are fitted with an arm sling, keep your arm in the sling for as long as your doctor tells you to. Most often, the sling will be removed the following day though you  may be instructed to sleep with it on for a period to prevent damage to the pacemaker while it's healing.    Do not raise your arm on the incision side above shoulder level or stretch your arm behind your back for as long as directed by your doctor. This gives the leads a chance to secure themselves inside your heart.    Ask your doctor when you can expect to return to work. Depending on the type of work you do, you may have restrictions until your cardiologist clears you for unrestricted activity.    You can still exercise. It s good for your body and your heart. Talk with your doctor about an exercise plan and the types of exercise to minimize the risk of damaging your pacemaker.  Other precautions    Follow your doctor's directions carefully for wound care. If there is a dressing, ask whether you should remove it or keep it on until your next visit. Never put any creams, lotions, or products like peroxide on an incision unless your doctor tells you to. Do not get the incision wet until your doctor says it's OK.    Every day, take your temperature and check your incision for signs of infection (redness, swelling, drainage, or warmth). Do this for 7 days or as advised by your doctor.    Learn to take your own pulse. Keep a record of your results. Ask your doctor what pulse rate means you should call for medical attention.    Before you receive any treatment, tell all healthcare providers (including your dentist) that you have a pacemaker.    You will be given an ID card that contains information about your pacemaker. Always carry this card with you. You can show this card if your pacemaker sets off a metal detector. You should also show it to avoid screening with a hand-held security wand.    Keep your cell phone away from your pacemaker. Don t carry the phone in your shirt pocket overlying the pacemaker, even when it s turned off.    Avoid strong magnets. Examples are those used in MRIs or in hand-held security  reid. Some pacemakers are now safe to use with MRI scanners. Ask your doctor if you have such a pacemaker.    Avoid strong electrical fields. Examples are those made by radio transmitting towers,  ham  radios, and heavy-duty electrical equipment.    Avoid leaning over the open blanco of a running car. A running engine creates an electrical field. Most household and yard appliances will not cause any problems. If you use any large power tools, such as an industrial , talk with your doctor.   Follow-up care    See your cardiologist in the next 7 to 10 days. Call and make an appointment as soon as you get home.    Make regular follow-up appointments with your doctor. He or she will check the pacemaker to make sure it s working properly.    Plan on having periodic check-ups with your healthcare provider to evaluate the battery life of your pacemaker. Depending on your device and how much your body uses the pacing functions of the pacemaker, you will need a new device generator implanted at some point, generally about every 5 to 7 years.    Some pacemakers have a built-in antenna that can transmit information such as trouble alerts over the internet to your doctor. Ask your doctor if your pacemaker is capable of remote monitoring.  When should I call my healthcare provider?  Call 911 if you have:    Chest pain    Severe trouble breathing     Call your healthcare provider right away if you have any of these:    Dizziness    Lack of energy    Fainting spells    Twitching chest muscles    Rapid pulse or pounding heartbeat    Shortness of breath    Pain around your pacemaker    Fever above 100.4 F (38 C) or other signs of infection (redness, swelling, drainage, or warmth at the incision site)    Your incision is not healing or your incision separates or opens    Hiccups that won t stop    Redness, severe swelling, drainage, worsening pain, bleeding, or warmth at the incision site    If your pacemaker generator feels  loose or like it is wiggling in the pocket under the skin   Date Last Reviewed: 6/1/2016 2000-2017 [employer] [address] [place] [address], [place] [address]. All rights reserved. This information is not intended as a substitute for professional medical care. Always follow your healthcare professional's instructions.

## 2017-07-03 NOTE — ED PROVIDER NOTES
"  History     Chief Complaint:  Shortness of Breath    The history is provided by the patient.      Arias Manzo is a 62 year old male who presents with shortness of breath and dizziness. Patient had onset of chest pain and dyspnea with exertion starting on Friday (today being Monday).  Patient states that over the weekend, he gets winded with ambulation and experiences substernal chest pain (3/10 with no radiation).  No nausea or vomiting or radiation to the jaw. This persisted prompting visit to the emergency department. Currently patient has no chest pain or shortness of breath.   Patient has never experienced chest pain in the past.  The patient does also note an episode of \"convulsions\" or shaking waking him from sleep a week prior, this lasted just a couple seconds (patient with a remote history of seizures as a young man but no recently).  He does not think he passed out. He denies nausea, vomiting, cough, leg swelling, history of cardiac stenting, or other complaint.   Patient does not have a cardiac history.  He is not on b blockers.  No skipped doses of medications.  No arm pain or abdominal pain.  Currently no chest pain or SOB.    Allergies:  Flexeril [Cyclobenzaprine]  Naprosyn [Naproxen]  Penicillins  Ranitidine     Medications:    Lisinopril-HCTZ  Sertraline  Simvastatin  Botox  Omeprazole     Past Medical History:    Alcohol withdrawal  CPAP  Depression  GERD  Hepatic steatosis   Hoarseness  HLD  HTN  IFG  Bifascicular heart block:   Left anterior fascicular block   RBBB  PONV  Seizures  Sleep apnea  Asthma   Dysphonia  Laryngeal granuloma     Past Surgical History:    Ankle surgery  Appendectomy  Laminectomy x 2  Throat granulomas  Hernia repair  Inject botox  Inject steroid  Laser CO2 laryngoscopy x 2  Orthopedic surgery     Family History:    Cancer    Social History:  Presents alone   Tobacco use: Former pipe smoker of 10 years, QD 1985  Alcohol use: 2 drinks daily  PCP: Ester Jara " "Nicollet    Marital Status:       Review of Systems   Respiratory: Positive for shortness of breath. Negative for cough.    Cardiovascular: Positive for chest pain.   Gastrointestinal: Negative for nausea and vomiting.   All other systems reviewed and are negative.  Pt c/o dyspnea with exertion since Friday. Denies cough.   No PND.  GARCIA but not at rest.    Physical Exam     Patient Vitals for the past 24 hrs:   BP Temp Temp src Pulse Heart Rate Resp SpO2 Height Weight   07/03/17 1015 152/76 - - - 33 - 95 % - -   07/03/17 0945 - - - - - - 97 % - -   07/03/17 0930 - - - - - - 96 % - -   07/03/17 0910 - - - - - - 98 % - -   07/03/17 0909 162/77 - - - - - 97 % - -   07/03/17 0857 (!) 150/104 98.6  F (37  C) Oral (!) 35 - 20 99 % 1.803 m (5' 11\") 108.4 kg (239 lb)       Physical Exam  GEN: patient appears tired  HEAD: atraumatic, normocephalic  EYES: pupils reactive, conjunctivae normal  ENT: TMs flat and white bilaterally, oropharynx normal with no erythema or exudate, mucus membranes dry  NECK: no cervical LAD, trachea midline, no JVD  RESPIRATORY: no tachypnea, breath sounds clear to auscultation (no rales, wheezes, rhonchi)  CVS: normal S1/S2, I /VI systolic ej murmurs, no rubs/gallops  ABDOMEN: soft, nontender, no masses or organomegaly, no rebound, decreased bowel sounds  BACK: no CVAT  EXTREMITIES: intact pulses x 2 (radial pulses intact), no edema  MUSCULOSKELETAL: no deformities  SKIN: warm and dry  NEURO: GCS 15, cranial nerves intact.  Motor- moves all 4 extremities  Sensation- intact  Coordination- ambulatory.  Overall symmetrical exam  HEME: no bruising or petechiae/contusions  LYMPH: no lymphadenopathy      Emergency Department Course   ECG (09:07:21):  Rate 36 bpm. DE interval 160. QRS duration 162. QT/QTc 568/439. P-R-T axes 55 -61 84. Marked sinus bradycardia. RBBB. Left anterior fascicular block. Bifascicular block. Abnormal ECG.  Interpreted at 0910 by Roxana Jackson MD.   2nd degree heart " "block type II with dropped beats  Old ECG is a poor copy from outside system ([facility] in Nov 2016).  At that time, RBBB and LAFB with HR 56    Imaging:  Radiographic findings were communicated with the patient who voiced understanding of the findings.    XR chest, 2 views:  IMPRESSION: Mildly enlarged cardiac silhouette. No focal airspace disease, pleural effusion or pneumothorax.    ANDREW BECKWITH    Imaging independently reviewed and agree with radiologist interpretation.     Laboratory:  CBC: WNL (WBC 4.9, HGB 14.5, )   CMP: Glucose 105 (H), ALT 91 (H), AST 56 (H) ow WNL (Creatinine 0.99)   BNP: 515 (normal)  0909: Troponin: <0.015   TSH with free T4 reflex: 4.21 (H)  T4 free: 0.80    Interventions:  0953: NS 0.5L IV Bolus    [place]  Cardiac/Sp02 monitoring  Oxygen by nasal cannula at 2L/min  Pacemaker pads and code cart to the bedside    Emergency Department Course:  Past medical records, nursing notes, and vitals reviewed.  0910: I performed an exam of the patient and obtained history as documented above.   Above workup undertaken.  0943: Discussed patient with Dr. Fritz of cardiology.   9:53 AM Calling [facility] for cardiology  0957: I spoke with Dr. Fritz again who agreed with plan for transfer to [facility].   1014: Discussed patient with Dr. Lisa of cardiology at [facility].   1020: I rechecked the patient. Explained findings to patient.     I personally reviewed the laboratory results with the Patient and answered all related questions prior to transfer.    Findings and plan explained to the Patient.    1039: Patient will be transferred to [facility] via EMS. Discussed the case with Dr. Milner, who will admit the patient to a monitored bed for further monitoring, evaluation, and treatment.      /84  Pulse (!) 35  Temp 98.6  F (37  C) (Oral)  Resp 20  Ht 1.803 m (5' 11\")  Wt 108.4 kg (239 lb)  SpO2 97%  BMI 33.33 kg/m2  Discussed results with patient.  Gave patient copies of results (applicable labs, CT scans and/or " ultrasound).  Answered questions.  Asked patient to followup with PCP.    Impression & Plan    Medical Decision Making:  Airas Manzo is a very pleasant 62 year old male who does not have a cardiac history (except for bifascicular block) who presents with shortness of breath and windedness whenever he goes to ambulate (GARCIA). The patient states that he normally does not get chest pain but he describes dyspnea on exertion without any cough and also feeling tightness in his chest when he ambulates. He feels dizzy at times but has not passed out. The patient's initial EKG showed that he was markedly bradycardic with a heart rate in the 30's. We have an old ECG from an outside system and at that time he had a RBBB and a left anterior fascicular block with a heart rate of 56 (bifascicular block).  However, today's EKG shows the bifascicular block but then a rate of 36. But more importantly today, there is a second degree heart block with every other dropped beat so basically a 2:1 AV conduction block (Mobitz type II). The case was discussed with cardiology Dr Fritz who reviewed his records/ECG and agrese with me that he does need a pacemaker. His heart rate is currently in the 30's, but is hemodynamically stable with regard to his blood pressure.   IV was placed and labs sent.    Pro BNP does not show any CHF. CBC has no abnormality. Troponin and TSH are normal. The chest XR does show a mildly enlarged cardiac silhouette which could be early diastolic CHF. His nuclear stress test we have from the health [facility]'s system was in 2016 (see care everywhere). The echo was non-diagnostic due to resting ST and T wave abnormalities but there was no perfusion defects and his ejection fraction was about 66%. We have discussed the case with the team at [facility] and are waiting on him to get transferred there.     Pacer pads have been placed in the meantime and his blood pressure is staying steady.     Diagnosis:    ICD-10-CM    1. Bradycardia R00.1   2. Heart block I45.9   3. Type II AV block (Mobitz II)    Disposition:  Transferred to [facility].      Malik Dumont  7/3/2017   [facility] EMERGENCY DEPARTMENT  I, Malik Dumont, am serving as a scribe at 9:23 AM on 7/3/2017 to document services personally performed by Roxana Jackson MD based on my observations and the provider's statements to me.       Roxana Jackson MD  07/03/17 1919

## 2017-07-03 NOTE — PLAN OF CARE
Problem: Cardiac Output Decreased (Adult)  Goal: Identify Related Risk Factors and Signs and Symptoms  Related risk factors and signs and symptoms are identified upon initiation of Human Response Clinical Practice Guideline (CPG)   Outcome: Improving  Pacemaker site CDI, pressure dressing in place. Rhythm paced, pt slightly hypertensive. Pt denies pain except for some soreness.

## 2017-07-03 NOTE — CONSULTS
Cardiology Progress Note  [facility], [facility]          Assessment and Plan:   #1 Symptomatic heart block, not on AV forest blocking agents, with history of bifascicular block    His symptoms of chest pressure the past few days likely demand in the setting of low heart rate. Troponin negative. History of bifascicular block, not on AV forest blocking agents.  No tick bites or other recent exposures.  Risks/benefits of PPM discussed and patient understands and agrees with proceeding.  Has been NPO past midnight.  No concerns for infection.     SHIVA Lisa MD [facility]         History:   Mr. Arias Manzo is a 63 yo male pt with history including HTN, depression, JOSE G, DLD, GERD, lumbar spine/disc disease, laryngeal granuloma and h/o known bifascicular block, who presented to [facility] earlier today with the symptoms of CP and SOB. He notes CP and SOB over the weekend associated with exertion.  No history of CAD.  Troponin negative.  In the ED 2:1 AVB noted and sent by ambulance to [facility] for consideration of PM.   No reversible cause found for heart block; not on AV forest blocking agents. No tick bites. Lytes were unremarkable.          Past Medical History:   1. HTN.  2. Dep/anx.  3. JOSE G. Uses CPAP.  4. DLD.  5. GERD.  6. Spine/disk disease. Has had multiple spine/disk surgeries involving the L spine.  7. H/o laryngeal granulomas, s/p Botox injections and ENT surgeries. Sees Dr. Negron at the [facility].  8. IFG.  9. Bifascicular block. Known RBBB + LAFB.  10. Cholelithiasis.  11. H/o seizures in childhood.     Past Surgical History:            Past Surgical History:   Procedure Laterality Date     ANKLE SURGERY         APPENDECTOMY         BACK SURGERY         lami x2     ENT SURGERY         throat granulomas     HERNIA REPAIR - s/p umbilical hernia repair and B IHR.         INJECT BOTOX N/A 10/30/2014     Procedure: INJECT BOTOX;  Surgeon: Kalyn Negron MD;  Location:  [facility] OR     INJECT BOTOX N/A 12/8/2016     Procedure: INJECT BOTOX;  Surgeon: Kalyn Negron MD;  Location: [facility] OR     INJECT STEROID (LOCATION) N/A 12/8/2016     Procedure: INJECT STEROID (LOCATION);  Surgeon: Kalyn Negron MD;  Location: [facility] OR     LAMINECTOMY LUMBAR ONE LEVEL   3/5/2014     Procedure: LAMINECTOMY LUMBAR ONE LEVEL;  RIGHT L4-5 DISCECTOMY;  Surgeon: Rodrigo Ríos MD;  Location: [facility] OR     LASER CO2 LARYNGOSCOPY N/A 12/8/2016     Procedure: LASER CO2 LARYNGOSCOPY;  Surgeon: Kalyn Negron MD;  Location: [facility] OR     LASER CO2 LARYNGOSCOPY, COMPLEX Left 10/30/2014     Procedure: LASER CO2 LARYNGOSCOPY, COMPLEX;  Surgeon: Kalyn Negron MD;  Location: [facility] OR     ORTHOPEDIC SURGERY          SH:      2 Kids  Quit smoking 20 years ago  Works in [employer]    FH:  No early CAD    10 point ROS negative beyond that noted in the HPI      Allergies:            Allergies   Allergen Reactions     Flexeril [Cyclobenzaprine] GI Disturbance     Naprosyn [Naproxen] GI Disturbance     Penicillins         As child     Ranitidine GI Disturbance              Medications:       sodium chloride (PF)  3 mL Intracatheter Q8H     omeprazole (priLOSEC) CR capsule 20 mg  20 mg Oral BID AC     [START ON 7/4/2017] sertraline (ZOLOFT) tablet 100 mg  100 mg Oral Daily     clindamycin  900 mg Intravenous Pre-Op/Pre-procedure x 1 dose     lidocaine (buffered or not buffered), lidocaine 4%, sodium chloride (PF), - MEDICATION INSTRUCTIONS -, nitroGLYcerin, alum & mag hydroxide-simethicone, senna-docusate, polyethylene glycol, acetaminophen, acetaminophen, ondansetron **OR** ondansetron, prochlorperazine **OR** prochlorperazine **OR** prochlorperazine, lidocaine (buffered or not buffered), lidocaine 4%               Physical Exam:   Blood pressure (!) 154/95, temperature 98  F (36.7  C), temperature source Oral, resp. rate 25, SpO2 99 %.  Wt Readings from Last 4 Encounters:   07/03/17 108.4 kg  (239 lb)   03/10/17 115.2 kg (254 lb)   16 110.2 kg (243 lb)   16 110.2 kg (243 lb)         Vital Sign Ranges  Temperature Temp  Av.3  F (36.8  C)  Min: 98  F (36.7  C)  Max: 98.6  F (37  C)   Blood pressure Systolic (24hrs), Av , Min:146 , Max:171        Diastolic (24hrs), Av, Min:71, Max:104      Pulse Pulse  Av  Min: 35  Max: 35   Respirations Resp  Av.5  Min: 15  Max: 25   Pulse oximetry SpO2  Av.1 %  Min: 95 %  Max: 100 %       No intake or output data in the 24 hours ending 17 1356    Constitutional: Awake, alert, cooperative, no apparent distress   Lungs: Clear to auscultation bilaterally, no crackles or wheezing   Cardiovascular: Regular rate and rhythm, normal S1 and S2, and no murmur noted   Abdomen: Normal bowel sounds, soft, non-distended, non-tender   Skin: No rashes, no cyanosis, no edema   Other: Vessl - no elevated JVP          Data:     Recent Labs   Lab Test  17   0909   WBC  4.9   HGB  14.5   MCV  94   PLT  171      Recent Labs   Lab Test  17   0909   NA  141   POTASSIUM  3.6   CHLORIDE  107   CO2  29   BUN  24   CR  0.99   ANIONGAP  5   UZIEL  9.0   GLC  105*         Fabian Lisa MD

## 2017-07-03 NOTE — PROCEDURES
Dictated.  Successful dual-chamber PM placement ([employer]).  DDD 55/140 ppm.    Pt was in atrial flutter with slow VR before instrumentation.  We paced him out of it (via the RA lead).  Poor RV sensing in many locations.  Lead tip was placed in the RVOT.    EBL 15 cc.    Plan:  - CXR in the am  - [facility] monitoring  - routine post PM care  - this is a relatively young patient with AV conduction disease and poor RV electrical properties.  ?infiltrative myocardial disease, such as cardiac sarcoid.  Consider cMRI after 2 months.    - the patient appears to have paroxysmal atrial arrhythmias - we will monitor via device.

## 2017-07-04 ENCOUNTER — APPOINTMENT (OUTPATIENT)
Dept: CARDIOLOGY | Facility: CLINIC | Age: 62
DRG: 244 | End: 2017-07-04
Attending: INTERNAL MEDICINE
Payer: COMMERCIAL

## 2017-07-04 ENCOUNTER — APPOINTMENT (OUTPATIENT)
Dept: GENERAL RADIOLOGY | Facility: CLINIC | Age: 62
DRG: 244 | End: 2017-07-04
Attending: INTERNAL MEDICINE
Payer: COMMERCIAL

## 2017-07-04 VITALS
SYSTOLIC BLOOD PRESSURE: 149 MMHG | TEMPERATURE: 97.4 F | HEIGHT: 71 IN | BODY MASS INDEX: 33.46 KG/M2 | DIASTOLIC BLOOD PRESSURE: 98 MMHG | HEART RATE: 55 BPM | WEIGHT: 239 LBS | RESPIRATION RATE: 18 BRPM | OXYGEN SATURATION: 96 %

## 2017-07-04 LAB
ANION GAP SERPL CALCULATED.3IONS-SCNC: 8 MMOL/L (ref 3–14)
BASOPHILS # BLD AUTO: 0 10E9/L (ref 0–0.2)
BASOPHILS NFR BLD AUTO: 0.2 %
BUN SERPL-MCNC: 19 MG/DL (ref 7–30)
CALCIUM SERPL-MCNC: 8.4 MG/DL (ref 8.5–10.1)
CHLORIDE SERPL-SCNC: 105 MMOL/L (ref 94–109)
CO2 SERPL-SCNC: 27 MMOL/L (ref 20–32)
CREAT SERPL-MCNC: 0.86 MG/DL (ref 0.66–1.25)
DIFFERENTIAL METHOD BLD: ABNORMAL
EOSINOPHIL # BLD AUTO: 0.2 10E9/L (ref 0–0.7)
EOSINOPHIL NFR BLD AUTO: 2.6 %
ERYTHROCYTE [DISTWIDTH] IN BLOOD BY AUTOMATED COUNT: 12.5 % (ref 10–15)
GFR SERPL CREATININE-BSD FRML MDRD: 90 ML/MIN/1.7M2
GLUCOSE SERPL-MCNC: 106 MG/DL (ref 70–99)
HCT VFR BLD AUTO: 39.3 % (ref 40–53)
HGB BLD-MCNC: 13.8 G/DL (ref 13.3–17.7)
IMM GRANULOCYTES # BLD: 0 10E9/L (ref 0–0.4)
IMM GRANULOCYTES NFR BLD: 0.2 %
LYMPHOCYTES # BLD AUTO: 2 10E9/L (ref 0.8–5.3)
LYMPHOCYTES NFR BLD AUTO: 35 %
MCH RBC QN AUTO: 32.2 PG (ref 26.5–33)
MCHC RBC AUTO-ENTMCNC: 35.1 G/DL (ref 31.5–36.5)
MCV RBC AUTO: 92 FL (ref 78–100)
MONOCYTES # BLD AUTO: 0.7 10E9/L (ref 0–1.3)
MONOCYTES NFR BLD AUTO: 11.2 %
NEUTROPHILS # BLD AUTO: 3 10E9/L (ref 1.6–8.3)
NEUTROPHILS NFR BLD AUTO: 50.8 %
NRBC # BLD AUTO: 0 10*3/UL
NRBC BLD AUTO-RTO: 0 /100
PLATELET # BLD AUTO: 149 10E9/L (ref 150–450)
POTASSIUM SERPL-SCNC: 3.7 MMOL/L (ref 3.4–5.3)
RBC # BLD AUTO: 4.28 10E12/L (ref 4.4–5.9)
SODIUM SERPL-SCNC: 140 MMOL/L (ref 133–144)
WBC # BLD AUTO: 5.8 10E9/L (ref 4–11)

## 2017-07-04 PROCEDURE — 25000132 ZZH RX MED GY IP 250 OP 250 PS 637: Performed by: INTERNAL MEDICINE

## 2017-07-04 PROCEDURE — 99207 ZZC CDG-CODE INCORRECT PER BILLING BASED ON TIME: CPT | Performed by: INTERNAL MEDICINE

## 2017-07-04 PROCEDURE — 80048 BASIC METABOLIC PNL TOTAL CA: CPT | Performed by: INTERNAL MEDICINE

## 2017-07-04 PROCEDURE — 99232 SBSQ HOSP IP/OBS MODERATE 35: CPT | Mod: 24 | Performed by: INTERNAL MEDICINE

## 2017-07-04 PROCEDURE — 40000264 ECHO COMPLETE WITH LUMASON

## 2017-07-04 PROCEDURE — 36415 COLL VENOUS BLD VENIPUNCTURE: CPT | Performed by: INTERNAL MEDICINE

## 2017-07-04 PROCEDURE — 94660 CPAP INITIATION&MGMT: CPT

## 2017-07-04 PROCEDURE — 25500064 ZZH RX 255 OP 636: Performed by: INTERNAL MEDICINE

## 2017-07-04 PROCEDURE — 93306 TTE W/DOPPLER COMPLETE: CPT | Mod: 26 | Performed by: INTERNAL MEDICINE

## 2017-07-04 PROCEDURE — 40000275 ZZH STATISTIC RCP TIME EA 10 MIN

## 2017-07-04 PROCEDURE — 99239 HOSP IP/OBS DSCHRG MGMT >30: CPT | Performed by: INTERNAL MEDICINE

## 2017-07-04 PROCEDURE — 40000986 XR CHEST 2 VW

## 2017-07-04 PROCEDURE — 85025 COMPLETE CBC W/AUTO DIFF WBC: CPT | Performed by: INTERNAL MEDICINE

## 2017-07-04 RX ADMIN — ACETAMINOPHEN AND CODEINE PHOSPHATE 1 TABLET: 300; 30 TABLET ORAL at 02:56

## 2017-07-04 RX ADMIN — OMEPRAZOLE 20 MG: 20 CAPSULE, DELAYED RELEASE ORAL at 06:59

## 2017-07-04 RX ADMIN — SULFUR HEXAFLUORIDE 2 ML: KIT at 08:30

## 2017-07-04 RX ADMIN — SERTRALINE HYDROCHLORIDE 100 MG: 50 TABLET ORAL at 08:46

## 2017-07-04 ASSESSMENT — PAIN DESCRIPTION - DESCRIPTORS: DESCRIPTORS: ACHING;DULL

## 2017-07-04 NOTE — PLAN OF CARE
Problem: Goal Outcome Summary  Goal: Goal Outcome Summary  Outcome: No Change  VSS, HR 55 A Paced. O2 sats 98% RA. A/O x3. NO alarms, calls appropriately. L upper chest dressing C/D/I no hematoma at pacemaker site.

## 2017-07-04 NOTE — DISCHARGE SUMMARY
[facility]  Discharge Summary        Arias Manzo MRN# [id number]   YOB: 1955 Age: 62 year old     Date of Admission: 7/3/2017  Date of Discharge: 7/4/2017  Admitting Physician: Kareem Milner MD  Discharge Physician: Kareem Milner MD     Primary Provider: Dr. Elisa Viera, [facility]  Primary Care Physician Phone Number: [phone]         Discharge Diagnoses:   1. Mobitz 2 AV block, suspect progression of underlying conduction system disease, s/p PPM 7/3.2. 2. CP/SOB, suspect due to the above.        Other Chronic Medical Problems:      1. HTN.  2. Dep/anx.  3. JOSE G. Uses CPAP.  4. DLD.  5. GERD.  6. Spine/disk disease. Has had multiple spine/disk surgeries involving the L spine.  7. H/o laryngeal granulomas, s/p Botox injections and ENT surgeries. Sees Dr. Negron at the [facility].  8. IFG.  9. Bifascicular block. Known RBBB + LAFB.  10. Cholelithiasis.  11. H/o seizures in childhood.       Allergies:         Allergies   Allergen Reactions     Flexeril [Cyclobenzaprine] GI Disturbance     Naprosyn [Naproxen] GI Disturbance     Penicillins      As child     Ranitidine GI Disturbance           Discharge Medications:        Current Discharge Medication List      START taking these medications    Details   acetaminophen-codeine (TYLENOL #3) 300-30 MG per tablet Take 1-2 tablets by mouth every 4 hours as needed for other (mild or moderate pain)  Qty: 20 tablet, Refills: 0    Associated Diagnoses: Heart block, AV         CONTINUE these medications which have NOT CHANGED    Details   botulinum toxin type A (BOTOX) 100 UNITS injection Inject into the muscle once  Qty: 1 each    Associated Diagnoses: Dysphonia; Laryngeal granuloma      lisinopril-hydrochlorothiazide (PRINZIDE,ZESTORETIC) 20-25 MG per tablet Take 1 tablet by mouth daily      Omeprazole (PRILOSEC PO) Take 20 mg by mouth 2 times daily (before meals)      Sertraline HCl (ZOLOFT PO) Take 100 mg by  "mouth daily      Simvastatin (ZOCOR PO) Take 20 mg by mouth At Bedtime                 Discharge Instructions and Follow-Up:      F/u with PCP 1-2 weeks.  F/u with Device Clinic apt in 1 week and with EP Clinic provider in 6 weeks.        Consultations This Hospital Stay:      Cardiology.        Admission History:      Please see the H&P by Kareem Milner MD on 7/3/2017 for complete details. Briefly, Mr. Arias Manzo is a 61 yo male pt with history including HTN, depression, JOSE G, DLD, GERD, lumbar spine/disc disease, laryngeal granuloma and h/o known bifascicular block, who presented to [facility] 7/3 with the symptoms of CP and SOB and found with bradycardia in 30's and 2nd degree AVB type 2. Subsequently transferred to [facility].        Problem Oriented Hospital Course:      1. Bradycardia/Mobitz 2 AV block, suspect progression of underlying conduction system disease, s/p PPM 7/3.  * Known h/o bifascicular block.  * Seen by Cardiology and underwent PPM 7/3 placement.  * Echo 7/4 results pending.  - Pt will f/u with EP outpt and consider cardiac MRI to eval for infiltrated disease causing above issues.     2. CP/SOB, suspect due to the above.  * ECG as above, trop negative. CXR no acute infiltrates.  * Symptoms resolved at the time of d/c.     3. Elevated TSH.  * TSH slightly high at 4.21, FT4 normal.  - Recheck outpt in several weeks.     4. H/o \"convulsion\" 1 week PTA.   * Brief episode which woke pt from sleep, ?due to bradyarrhythmia.  * Pt with h/o seizures in childhood.  * No events here.        Code Status:      Full Code        Pending Results:      Echo 7/4 results pending.  Unresulted Labs Ordered in the Past 30 Days of this Admission     No orders found for last 61 day(s).                Discharge Disposition:      Discharged to home.        Discharge Time:      Greater than 30 minutes.        Key Imaging Studies, Lab Findings and Procedures/Surgeries:      S/p PPM placement 7/3.    Results for " orders placed or performed during the hospital encounter of 07/03/17   X-ray Chest 2 vws*    Narrative    CHEST TWO VIEWS July 4, 2017 8:22 AM     HISTORY: Check for pneumothorax and lead position.     COMPARISON: None.      Impression    IMPRESSION: New left chest pacemaker identified. No pneumothorax. No  acute airspace disease. Stable cardiac silhouette.     Echo 7/4: results pending.    ADDENDUM:  Echo 7/4 showed:  Interpretation Summary     Left ventricular systolic function is borderline reduced.  The visual ejection fraction is estimated at 45-50%.  The study was technically difficult. There is no comparison study available.    Pt will have primary care and cardiology f/u as noted above.    Kareem Milner Jr., MD  [phone] (p)  Text Page

## 2017-07-04 NOTE — DISCHARGE INSTRUCTIONS
Heart clinic will arrange for Device Clinic appointment in 1 week and with EP Clinic provider in 6 weeks

## 2017-07-04 NOTE — PROGRESS NOTES
"EP Cardiology Progress Note  Enmanuel Benavidez MD         Assessment and Plan:      1.  Mobitz 2 AV block:  had PM implanted on 07/03.  Incision, CXR, PM interrogation ---> all OK this am.    Plan:  - OK to send home  - I reviewed post-PM care with patient  - will arrange for Device Clinic apt in 1 week and with EP Clinic provider in 6 weeks  - poor R-wave sensing in many locations - consider cMRI after 2 months (will arrange forthis when we see in the clinic).                     Interval History:   no new complaints; feels well          Review of Systems:   C: NEGATIVE for fever, chills, change in weight  E/M: NEGATIVE for ear, mouth and throat problems  R: NEGATIVE for significant cough or SOB  CV: NEGATIVE for chest pain, palpitations or peripheral edema          Physical Exam:        Blood pressure (!) 155/103, pulse 55, temperature 98  F (36.7  C), temperature source Oral, resp. rate 18, height 1.803 m (5' 11\"), weight 108.4 kg (239 lb), SpO2 96 %.  Vitals:    07/03/17 1630   Weight: 108.4 kg (239 lb)     Vital Signs with Ranges  Temp:  [97.7  F (36.5  C)-98  F (36.7  C)] 98  F (36.7  C)  Pulse:  [55] 55  Heart Rate:  [32-76] 74  Resp:  [12-25] 18  BP: (146-171)/() 155/103  SpO2:  [94 %-100 %] 96 %  I/O's Last 24 hours  I/O last 3 completed shifts:  In: 530 [P.O.:530]  Out: 1350 [Urine:1350]    EXAM:  A+O x 3  LUNGS: clear  CV: RRR, no murmur  ABD: soft, NT  EXTR: no edema         Medications:          sodium chloride (PF)  10 mL Intravenous Once     sodium chloride (PF)  3 mL Intracatheter Q8H     omeprazole (priLOSEC) CR capsule 20 mg  20 mg Oral BID AC     sertraline (ZOLOFT) tablet 100 mg  100 mg Oral Daily     sodium chloride (PF)  3 mL Intracatheter Q8H            Data:      All new lab and imaging data was reviewed.   Recent Labs   Lab Test  07/04/17   0500  07/03/17   0909   WBC  5.8  4.9   HGB  13.8  14.5   MCV  92  94   PLT  149*  171      Recent Labs   Lab Test  07/04/17   0500  07/03/17   0909 "   NA  140  141   POTASSIUM  3.7  3.6   CHLORIDE  105  107   CO2  27  29   BUN  19  24   CR  0.86  0.99   ANIONGAP  8  5   UZIEL  8.4*  9.0   GLC  106*  105*     Recent Labs   Lab Test  07/03/17   1554  07/03/17   0909   TROPI  0.018  <0.015  The 99th percentile for upper reference range is 0.045 ug/L.  Troponin values in   the range of 0.045 - 0.120 ug/L may be associated with risks of adverse   clinical events.           EKG results:  Reviewed      Imaging:   Recent Results (from the past 24 hour(s))   Chest XR,  PA & LAT    Narrative    XR CHEST 2 VW 7/3/2017 10:46 AM    COMPARISON: None.    HISTORY: Shortness of breath      Impression    IMPRESSION: Mildly enlarged cardiac silhouette. No focal airspace  disease, pleural effusion or pneumothorax.    ANDREW BECKWITH   X-ray Chest 2 vws*    Narrative    CHEST TWO VIEWS July 4, 2017 8:22 AM     HISTORY: Check for pneumothorax and lead position.     COMPARISON: None.      Impression    IMPRESSION: New left chest pacemaker identified. No pneumothorax. No  acute airspace disease. Stable cardiac silhouette.

## 2017-07-04 NOTE — PLAN OF CARE
Problem: Discharge Planning  Goal: Discharge Planning (Adult, OB, Behavioral, Peds)  Outcome: Adequate for Discharge Date Met:  07/04/17  Pt is adequate to be discharged to home per Dr. Benavidez, pacer site stable, discharge instruction is given to pt. Pt verbalized his understanding regarding instruction.

## 2017-07-04 NOTE — PROGRESS NOTES
"[facility]    Internal Medicine Hospitalist Progress Note  07/04/2017  I evaluated patient on the above date.    Kareem Milner Jr., MD  [phone] (p)  Text Page (7 am to 6 pm)      Assessment & Plan D/c home, see d/c summary.    Interval History   Doing much better. No CP or SOB.    -Data reviewed today: I reviewed all new labs and imaging over the last 24 hours. I personally reviewed no images or EKG's today.    Physical Exam   Heart Rate: 74, Blood pressure (!) 155/103, pulse 55, temperature 98  F (36.7  C), temperature source Oral, resp. rate 18, height 1.803 m (5' 11\"), weight 108.4 kg (239 lb), SpO2 96 %.  Vitals:    07/03/17 1630   Weight: 108.4 kg (239 lb)     Vital Signs with Ranges  Temp:  [97.7  F (36.5  C)-98  F (36.7  C)] 98  F (36.7  C)  Pulse:  [55] 55  Heart Rate:  [32-76] 74  Resp:  [12-25] 18  BP: (149-171)/() 155/103  SpO2:  [94 %-100 %] 96 %  Patient Vitals for the past 24 hrs:   BP Temp Temp src Pulse Heart Rate Resp SpO2 Height Weight   07/04/17 0837 (!) 155/103 - - 55 - 18 96 % - -   07/04/17 0717 - 98  F (36.7  C) Oral - - - - - -   07/04/17 0600 - 97.7  F (36.5  C) - - - 16 - - -   07/04/17 0400 - 97.8  F (36.6  C) Oral - - 16 98 % - -   07/04/17 0348 - - - - - - 99 % - -   07/04/17 0300 - - - - - 16 - - -   07/04/17 0255 - - - - - - 99 % - -   07/04/17 0200 - - - - - 16 - - -   07/04/17 0100 - - - - - 16 - - -   07/04/17 0000 - 97.7  F (36.5  C) Oral - - 16 98 % - -   07/03/17 2300 - - - - - 16 - - -   07/03/17 2223 154/88 - - - 74 16 98 % - -   07/03/17 2138 156/90 - - - 75 16 - - -   07/03/17 2100 152/89 - - - 75 16 - - -   07/03/17 2000 150/88 97.7  F (36.5  C) Oral - 76 16 98 % - -   07/03/17 1902 155/89 97.9  F (36.6  C) Oral - 75 16 - - -   07/03/17 1830 (!) 155/91 - - - 63 18 - - -   07/03/17 1800 (!) 154/92 - - - 62 21 97 % - -   07/03/17 1730 156/86 - - - 62 20 96 % - -   07/03/17 1700 (!) 154/113 - - - 70 21 - - -   07/03/17 1630 (!) 149/92 - - - 66 12 94 % " "1.803 m (5' 11\") 108.4 kg (239 lb)   07/03/17 1615 (!) 151/93 - - - 57 16 95 % - -   07/03/17 1600 149/89 - - - 60 16 96 % - -   07/03/17 1546 158/89 - - - 60 18 94 % - -   07/03/17 1531 (!) 155/92 - - - 61 - - - -   07/03/17 1500 (!) 155/92 97.8  F (36.6  C) - - 60 16 - - -   07/03/17 1300 (!) 154/95 98  F (36.7  C) Oral - 36 25 99 % - -   07/03/17 1258 (!) 154/95 - - - 32 15 100 % - -   07/03/17 1255 162/75 - - - 32 18 96 % - -     I/O's Last 24 hours  I/O last 3 completed shifts:  In: 530 [P.O.:530]  Out: 1350 [Urine:1350]    Constitutional: Alert, oriented, pleasant.  Respiratory:   Cardiovascular:   GI:   Skin/Integumen:   Other:        Data     Recent Labs  Lab 07/04/17  0500 07/03/17  1554 07/03/17  0909   WBC 5.8  --  4.9   HGB 13.8  --  14.5   MCV 92  --  94   *  --  171     --  141   POTASSIUM 3.7  --  3.6   CHLORIDE 105  --  107   CO2 27  --  29   BUN 19  --  24   CR 0.86  --  0.99   ANIONGAP 8  --  5   UZIEL 8.4*  --  9.0   *  --  105*   ALBUMIN  --   --  4.3   PROTTOTAL  --   --  7.6   BILITOTAL  --   --  0.7   ALKPHOS  --   --  72   ALT  --   --  91*   AST  --   --  56*   TROPI  --  0.018 <0.015The 99th percentile for upper reference range is 0.045 ug/L.  Troponin values in the range of 0.045 - 0.120 ug/L may be associated with risks of adverse clinical events.     Recent Labs   Lab Test  07/04/17   0500  07/03/17   0909   GLC  106*  105*         Recent Results (from the past 24 hour(s))   X-ray Chest 2 vws*    Narrative    CHEST TWO VIEWS July 4, 2017 8:22 AM     HISTORY: Check for pneumothorax and lead position.     COMPARISON: None.      Impression    IMPRESSION: New left chest pacemaker identified. No pneumothorax. No  acute airspace disease. Stable cardiac silhouette.       Medications   All medications were reviewed.    - MEDICATION INSTRUCTIONS -         sodium chloride (PF)  3 mL Intracatheter Q8H     omeprazole (priLOSEC) CR capsule 20 mg  20 mg Oral BID AC     sertraline " (ZOLOFT) tablet 100 mg  100 mg Oral Daily

## 2017-07-11 ENCOUNTER — ALLIED HEALTH/NURSE VISIT (OUTPATIENT)
Dept: CARDIOLOGY | Facility: CLINIC | Age: 62
End: 2017-07-11
Payer: COMMERCIAL

## 2017-07-11 DIAGNOSIS — Z95.0 CARDIAC PACEMAKER IN SITU: Primary | ICD-10-CM

## 2017-07-11 PROCEDURE — 93280 PM DEVICE PROGR EVAL DUAL: CPT | Performed by: INTERNAL MEDICINE

## 2017-07-11 NOTE — MR AVS SNAPSHOT
"              After Visit Summary   7/11/2017    Arias Manzo    MRN: [id number]           Patient Information     Date Of Birth          1955        Visit Information        Provider Department      7/11/2017 10:00 AM DELONG DCR2 [facility]        Today's Diagnoses     Cardiac pacemaker in situ    -  1       Follow-ups after your visit        Your next 10 appointments already scheduled     Aug 23, 2017  3:30 PM CDT   Pacemaker Check with RU DCRN   [facility] ([facility])    [address]  [facility] [address] [phone]              Who to contact     If you have questions or need follow up information about today's clinic visit or your schedule please contact [facility] directly at [phone].  Normal or non-critical lab and imaging results will be communicated to you by uSpeakhart, letter or phone within 4 business days after the clinic has received the results. If you do not hear from us within 7 days, please contact the clinic through MyChart or phone. If you have a critical or abnormal lab result, we will notify you by phone as soon as possible.  Submit refill requests through [employer] or call your pharmacy and they will forward the refill request to us. Please allow 3 business days for your refill to be completed.          Additional Information About Your Visit        uSpeakhart Information     [employer] lets you send messages to your doctor, view your test results, renew your prescriptions, schedule appointments and more. To sign up, go to [email or web address] . Click on \"Log in\" on the left side of the screen, which will take you to the Welcome page. Then click on \"Sign up Now\" on the right side of the page.     You will be asked to enter the access code listed below, as well as some personal information. Please follow the directions to create your " username and password.     Your access code is: [email or web address]  Expires: 10/2/2017 11:00 AM     Your access code will  in 90 days. If you need help or a new code, please call your [place] clinic or [phone].        Care EveryWhere ID     This is your Care EveryWhere ID. This could be used by other organizations to access your [place] medical records  [id number]         Blood Pressure from Last 3 Encounters:   17 (!) 149/98   17 166/84   16 97/60    Weight from Last 3 Encounters:   17 108.4 kg (239 lb)   17 108.4 kg (239 lb)   03/10/17 115.2 kg (254 lb)              We Performed the Following     PM DEVICE PROGRAMMING EVAL, DUAL LEAD PACER ([id number])        Primary Care Provider Office Phone # Fax #    Ester [facility] [phone] [address] [place] [address]        Equal Access to Services     [facility]: Hadii aad ku hadasho Soomaali, waaxda luqadaha, qaybta kaalmada adeegyada, waxay idiin haykimberlyn shayne gruber . So [facility] [phone].    ATENCIÓN: Si habla español, tiene a hansen disposición servicios gratuitos de asistencia lingüística. Llame al [phone].    We comply with applicable federal civil rights laws and [place] laws. We do not discriminate on the basis of race, color, national origin, age, disability sex, sexual orientation or gender identity.            Thank you!     Thank you for choosing [facility] [place]  for your care. Our goal is always to provide you with excellent care. Hearing back from our patients is one way we can continue to improve our services. Please take a few minutes to complete the written survey that you may receive in the mail after your visit with us. Thank you!             Your Updated Medication List - Protect others around you: Learn how to safely use, store and throw away your medicines at www.disposemymeds.org.          This list is accurate as of: 17  10:45 AM.  Always use your most recent med list.                   Brand Name Dispense Instructions for use Diagnosis    acetaminophen-codeine 300-30 MG per tablet    TYLENOL #3    20 tablet    Take 1-2 tablets by mouth every 4 hours as needed for other (mild or moderate pain)    Heart block, AV       BOTOX 100 UNITS injection   Generic drug:  botulinum toxin type A     1 each    Inject into the muscle once    Dysphonia, Laryngeal granuloma       lisinopril-hydrochlorothiazide 20-25 MG per tablet    PRINZIDE/ZESTORETIC     Take 1 tablet by mouth daily        PRILOSEC PO      Take 20 mg by mouth 2 times daily (before meals)        ZOCOR PO      Take 20 mg by mouth At Bedtime        ZOLOFT PO      Take 100 mg by mouth daily

## 2017-07-11 NOTE — PROGRESS NOTES
[employer] Anny DR/MRI 7-10 day Post Pacemaker Device Check  AP: 11.5% : 100 %  Mode: DDD        Underlying Rhythm: SR with 2:1 AVBlock/ rate 32 bpm  Heart Rate: initializing  Sensing: Stable    Pacing Threshold: Stable    Impedance: WNL  Battery Status: initializing  Incision: Steri strips were removed by pt 2 days ago; incision healing well with no evidence of hematoma or infection. Minimal swelling over device. No redness  Atrial Arrhythmia: 7 mode switch episodes for AF/AFl. Durations <5 minutes on all but one, lasting 57 minutes. PT IS NOT ON AC.   Ventricular Arrhythmia: NONE  Setting Change: NONE    Care Plan: RTC in 6 weeks/ Reviewed use of remote transmission monitor and follow up schedule. ashvin

## 2017-07-18 ENCOUNTER — TELEPHONE (OUTPATIENT)
Dept: INTERNAL MEDICINE | Facility: CLINIC | Age: 62
End: 2017-07-18

## 2017-07-18 NOTE — TELEPHONE ENCOUNTER
ELDON at patient's home number to call back to see what the reason is for his appointment with Chrissy Cabral on 7/21 as in computer it says pacemaker check.  ELEANOR Madrid R.N.

## 2017-07-21 ENCOUNTER — OFFICE VISIT (OUTPATIENT)
Dept: INTERNAL MEDICINE | Facility: CLINIC | Age: 62
End: 2017-07-21
Payer: COMMERCIAL

## 2017-07-21 VITALS
HEIGHT: 71 IN | HEART RATE: 68 BPM | RESPIRATION RATE: 12 BRPM | WEIGHT: 240 LBS | DIASTOLIC BLOOD PRESSURE: 70 MMHG | SYSTOLIC BLOOD PRESSURE: 120 MMHG | TEMPERATURE: 98.3 F | BODY MASS INDEX: 33.6 KG/M2 | OXYGEN SATURATION: 97 %

## 2017-07-21 DIAGNOSIS — M62.08 DIASTASIS OF RECTUS ABDOMINIS: Primary | ICD-10-CM

## 2017-07-21 DIAGNOSIS — Z95.0 HISTORY OF PERMANENT CARDIAC PACEMAKER PLACEMENT: ICD-10-CM

## 2017-07-21 PROCEDURE — 99214 OFFICE O/P EST MOD 30 MIN: CPT | Performed by: NURSE PRACTITIONER

## 2017-07-21 NOTE — MR AVS SNAPSHOT
"              After Visit Summary   7/21/2017    Arias Manzo    MRN: [id number]           Patient Information     Date Of Birth          1955        Visit Information        Provider Department      7/21/2017 8:20 AM Chrissy Andres APRN CNP [facility]        Today's Diagnoses     Diastasis of rectus abdominis    -  1    History of permanent cardiac pacemaker placement          Care Instructions    Follow up per cardiology           Follow-ups after your visit        Your next 10 appointments already scheduled     Aug 23, 2017  3:30 PM CDT   Pacemaker Check with SHARMILA DOVE   [facility] [place] ([facility])    [address]  [facility] [address] [phone]              Who to contact     If you have questions or need follow up information about today's clinic visit or your schedule please contact [facility] directly at [phone].  Normal or non-critical lab and imaging results will be communicated to you by MyChart, letter or phone within 4 business days after the clinic has received the results. If you do not hear from us within 7 days, please contact the clinic through MyChart or phone. If you have a critical or abnormal lab result, we will notify you by phone as soon as possible.  Submit refill requests through [employer] or call your pharmacy and they will forward the refill request to us. Please allow 3 business days for your refill to be completed.          Additional Information About Your Visit        MyChart Information     [employer] lets you send messages to your doctor, view your test results, renew your prescriptions, schedule appointments and more. To sign up, go to [email or web address] . Click on \"Log in\" on the left side of the screen, which will take you to the Welcome page. Then click on \"Sign up Now\" on the right side of the page.     You will be asked to enter the access code listed " "below, as well as some personal information. Please follow the directions to create your username and password.     Your access code is: [email or web address]  Expires: 10/2/2017 11:00 AM     Your access code will  in 90 days. If you need help or a new code, please call your [place] clinic or [phone].        Care EveryWhere ID     This is your Care EveryWhere ID. This could be used by other organizations to access your [facility] medical records  [id number]        Your Vitals Were     Pulse Temperature Respirations Height Pulse Oximetry BMI (Body Mass Index)    68 98.3  F (36.8  C) (Oral) 12 5' 11\" (1.803 m) 97% 33.47 kg/m2       Blood Pressure from Last 3 Encounters:   17 120/70   17 (!) 149/98   17 166/84    Weight from Last 3 Encounters:   17 240 lb (108.9 kg)   17 239 lb (108.4 kg)   17 239 lb (108.4 kg)              Today, you had the following     No orders found for display       Primary Care Provider Office Phone # Fax #    Ester [facility] [phone] [address] [place] [address]        Equal Access to Services     [facility] : Hadii yair ku hadasho Soomaali, waaxda luqadaha, qaybta kaalmada adeegyada, juan f sheth hayyakelin gruber . So [facility] [phone].    ATENCIÓN: Si habla español, tiene a hansen disposición servicios gratuitos de asistencia lingüística. Llame al [phone].    We comply with applicable federal civil rights laws and [place] laws. We do not discriminate on the basis of race, color, national origin, age, disability sex, sexual orientation or gender identity.            Thank you!     Thank you for choosing [facility]  for your care. Our goal is always to provide you with excellent care. Hearing back from our patients is one way we can continue to improve our services. Please take a few minutes to complete the written survey that you may receive in the mail after your visit with us. " Thank you!             Your Updated Medication List - Protect others around you: Learn how to safely use, store and throw away your medicines at www.disposemymeds.org.          This list is accurate as of: 7/21/17 12:29 PM.  Always use your most recent med list.                   Brand Name Dispense Instructions for use Diagnosis    lisinopril-hydrochlorothiazide 20-25 MG per tablet    PRINZIDE/ZESTORETIC     Take 1 tablet by mouth daily        ZOCOR PO      Take 20 mg by mouth At Bedtime        ZOLOFT PO      Take 100 mg by mouth daily

## 2017-07-21 NOTE — PROGRESS NOTES
SUBJECTIVE:                                                    Arias Manzo is a 62 year old male who presents to clinic today for the following health issues:          Hospital Follow-up Visit:    Hospital/Nursing Home/IP Rehab Facility: [facility]  Date of Admission: 7/3/17  Date of Discharge: 7/4/17  Reason(s) for Admission: Pace Maker Placement            Problems taking medications regularly:  None       Medication changes since discharge: None       Problems adhering to non-medication therapy:  None    Summary of hospitalization:  [facility] discharge summary reviewed  Diagnostic Tests/Treatments reviewed.  Follow up needed: cardiology   Other Healthcare Providers Involved in Patient s Care:         Specialist appointment - cardiology ; pacemaker   Update since discharge: improved.     Post Discharge Medication Reconciliation: discharge medications reconciled and changed, per note/orders (see AVS).  Plan of care communicated with patient     Coding guidelines for this visit:  Type of Medical   Decision Making Face-to-Face Visit       within 7 Days of discharge Face-to-Face Visit        within 14 days of discharge   Moderate Complexity [id number]   High Complexity 73890 [id number]     Pacemaker recently   Doing well   Heart block - PPM placed     Diastasis recti   Not new   No issues                  Problem list and histories reviewed & adjusted, as indicated.  Additional history: as documented    Patient Active Problem List   Diagnosis     Dysphonia     Laryngeal granuloma     Heart block, AV     History of permanent cardiac pacemaker placement     Past Surgical History:   Procedure Laterality Date     ANKLE SURGERY       APPENDECTOMY       BACK SURGERY      lami x2     ENT SURGERY      throat granulomas     HERNIA REPAIR       INJECT BOTOX N/A 10/30/2014    Procedure: INJECT BOTOX;  Surgeon: Kalyn Negron MD;  Location: [facility] OR     INJECT BOTOX N/A 12/8/2016     "Procedure: INJECT BOTOX;  Surgeon: Kalyn Negron MD;  [facility]     INJECT STEROID (LOCATION) N/A 12/8/2016    Procedure: INJECT STEROID (LOCATION);  Surgeon: Kalyn Negron MD;  Location: [facility] OR     LAMINECTOMY LUMBAR ONE LEVEL  3/5/2014    Procedure: LAMINECTOMY LUMBAR ONE LEVEL;  RIGHT L4-5 DISCECTOMY;  Surgeon: Rodrigo Ríos MD;  Location: [facility] OR     LASER CO2 LARYNGOSCOPY N/A 12/8/2016    Procedure: LASER CO2 LARYNGOSCOPY;  Surgeon: Kalyn Negron MD;  Location: [facility] OR     LASER CO2 LARYNGOSCOPY, COMPLEX Left 10/30/2014    Procedure: LASER CO2 LARYNGOSCOPY, COMPLEX;  Surgeon: Kalyn Negron MD;  Location: [facility] OR     ORTHOPEDIC SURGERY         Social History   Substance Use Topics     Smoking status: Former Smoker     Years: 10.00     Types: Pipe     Start date: 10/10/1976     Quit date: 5/25/1985     Smokeless tobacco: Never Used     Alcohol use Yes      Comment: daily- 2 drinks /day     Family History   Problem Relation Age of Onset     CANCER Father              Reviewed and updated as needed this visit by clinical staffTobacco  Allergies  Meds  Fam Hx  Soc Hx      Reviewed and updated as needed this visit by Provider         ROS:  Constitutional, HEENT, cardiovascular, pulmonary, GI, , musculoskeletal, neuro, skin, endocrine and psych systems are negative, except as otherwise noted.      OBJECTIVE:   /70 (BP Location: Left arm, Patient Position: Chair, Cuff Size: Adult Large)  Pulse 68  Temp 98.3  F (36.8  C) (Oral)  Resp 12  Ht 5' 11\" (1.803 m)  Wt 240 lb (108.9 kg)  SpO2 97%  BMI 33.47 kg/m2  Body mass index is 33.47 kg/(m^2).  GENERAL: alert and no distress  RESP: lungs clear to auscultation - no rales, rhonchi or wheezes  CV: regular rate and rhythm, normal S1 S2, no S3 or S4, no murmur, click or rub, no peripheral edema; PPM site left chest well healed without redness swelling or inflammation   ABDOMEN: soft, nontender, no " hepatosplenomegaly, no masses and bowel sounds normal  diastasis recti present   MS: no gross musculoskeletal defects noted, no edema  NEURO: Normal strength and tone, mentation intact and speech normal  PSYCH: mentation appears normal, affect normal/bright    Diagnostic Test Results:  Reviewed previous notes     ASSESSMENT/PLAN:           (M62.08) Diastasis of rectus abdominis  (primary encounter diagnosis)  Comment: no pian or issues; just curiosity as to what it was   Plan: reassurance and if pain or issues would follow up     (Z95.0) History of permanent cardiac pacemaker placement  Comment: tolerating placement; site well healed    Plan: cardiology follow up            Patient Instructions   Follow up per cardiology       DEEJAY Figueroa [facility]

## 2017-07-21 NOTE — NURSING NOTE
"Chief Complaint   Patient presents with     Hospital F/U       Initial /70 (BP Location: Left arm, Patient Position: Chair, Cuff Size: Adult Large)  Pulse 68  Temp 98.3  F (36.8  C) (Oral)  Resp 12  Ht 5' 11\" (1.803 m)  Wt 240 lb (108.9 kg)  SpO2 97%  BMI 33.47 kg/m2 Estimated body mass index is 33.47 kg/(m^2) as calculated from the following:    Height as of this encounter: 5' 11\" (1.803 m).    Weight as of this encounter: 240 lb (108.9 kg).  Medication Reconciliation: complete     JENNY Castillo      "

## 2017-08-16 ENCOUNTER — HOSPITAL ENCOUNTER (EMERGENCY)
Facility: CLINIC | Age: 62
Discharge: HOME | End: 2017-08-16
Attending: EMERGENCY MEDICINE | Admitting: EMERGENCY MEDICINE
Payer: COMMERCIAL

## 2017-08-16 VITALS
OXYGEN SATURATION: 96 % | RESPIRATION RATE: 17 BRPM | WEIGHT: 238 LBS | TEMPERATURE: 98.1 F | DIASTOLIC BLOOD PRESSURE: 104 MMHG | HEART RATE: 80 BPM | BODY MASS INDEX: 33.32 KG/M2 | HEIGHT: 71 IN | SYSTOLIC BLOOD PRESSURE: 173 MMHG

## 2017-08-16 DIAGNOSIS — R11.0 NAUSEA: ICD-10-CM

## 2017-08-16 LAB
ALBUMIN SERPL-MCNC: 4.1 G/DL (ref 3.4–5)
ALP SERPL-CCNC: 85 U/L (ref 40–150)
ALT SERPL W P-5'-P-CCNC: 30 U/L (ref 0–70)
ANION GAP SERPL CALCULATED.3IONS-SCNC: 4 MMOL/L (ref 3–14)
AST SERPL W P-5'-P-CCNC: 23 U/L (ref 0–45)
BASOPHILS # BLD AUTO: 0 10E9/L (ref 0–0.2)
BASOPHILS NFR BLD AUTO: 0.3 %
BILIRUB DIRECT SERPL-MCNC: 0.2 MG/DL (ref 0–0.2)
BILIRUB SERPL-MCNC: 0.7 MG/DL (ref 0.2–1.3)
BUN SERPL-MCNC: 18 MG/DL (ref 7–30)
CALCIUM SERPL-MCNC: 9.3 MG/DL (ref 8.5–10.1)
CHLORIDE SERPL-SCNC: 105 MMOL/L (ref 94–109)
CO2 SERPL-SCNC: 31 MMOL/L (ref 20–32)
CREAT SERPL-MCNC: 1.04 MG/DL (ref 0.66–1.25)
DIFFERENTIAL METHOD BLD: ABNORMAL
EOSINOPHIL # BLD AUTO: 0.2 10E9/L (ref 0–0.7)
EOSINOPHIL NFR BLD AUTO: 2.1 %
ERYTHROCYTE [DISTWIDTH] IN BLOOD BY AUTOMATED COUNT: 12.7 % (ref 10–15)
GFR SERPL CREATININE-BSD FRML MDRD: 72 ML/MIN/1.7M2
GLUCOSE SERPL-MCNC: 113 MG/DL (ref 70–99)
HCT VFR BLD AUTO: 40.4 % (ref 40–53)
HGB BLD-MCNC: 14 G/DL (ref 13.3–17.7)
IMM GRANULOCYTES # BLD: 0 10E9/L (ref 0–0.4)
IMM GRANULOCYTES NFR BLD: 0.3 %
LIPASE SERPL-CCNC: 101 U/L (ref 73–393)
LYMPHOCYTES # BLD AUTO: 2.5 10E9/L (ref 0.8–5.3)
LYMPHOCYTES NFR BLD AUTO: 34.8 %
MCH RBC QN AUTO: 32 PG (ref 26.5–33)
MCHC RBC AUTO-ENTMCNC: 34.7 G/DL (ref 31.5–36.5)
MCV RBC AUTO: 92 FL (ref 78–100)
MONOCYTES # BLD AUTO: 0.7 10E9/L (ref 0–1.3)
MONOCYTES NFR BLD AUTO: 9.4 %
NEUTROPHILS # BLD AUTO: 3.8 10E9/L (ref 1.6–8.3)
NEUTROPHILS NFR BLD AUTO: 53.1 %
NRBC # BLD AUTO: 0 10*3/UL
NRBC BLD AUTO-RTO: 0 /100
PLATELET # BLD AUTO: 179 10E9/L (ref 150–450)
POTASSIUM SERPL-SCNC: 3.7 MMOL/L (ref 3.4–5.3)
PROT SERPL-MCNC: 7.6 G/DL (ref 6.8–8.8)
RBC # BLD AUTO: 4.38 10E12/L (ref 4.4–5.9)
SODIUM SERPL-SCNC: 140 MMOL/L (ref 133–144)
TROPONIN I BLD-MCNC: 0 UG/L (ref 0–0.1)
WBC # BLD AUTO: 7.2 10E9/L (ref 4–11)

## 2017-08-16 PROCEDURE — 83690 ASSAY OF LIPASE: CPT | Performed by: EMERGENCY MEDICINE

## 2017-08-16 PROCEDURE — 85025 COMPLETE CBC W/AUTO DIFF WBC: CPT | Performed by: EMERGENCY MEDICINE

## 2017-08-16 PROCEDURE — 93005 ELECTROCARDIOGRAM TRACING: CPT

## 2017-08-16 PROCEDURE — 80048 BASIC METABOLIC PNL TOTAL CA: CPT | Performed by: EMERGENCY MEDICINE

## 2017-08-16 PROCEDURE — 84484 ASSAY OF TROPONIN QUANT: CPT

## 2017-08-16 PROCEDURE — 99284 EMERGENCY DEPT VISIT MOD MDM: CPT

## 2017-08-16 PROCEDURE — 80076 HEPATIC FUNCTION PANEL: CPT | Performed by: EMERGENCY MEDICINE

## 2017-08-16 ASSESSMENT — ENCOUNTER SYMPTOMS
NAUSEA: 1
SHORTNESS OF BREATH: 0
VOMITING: 0
ABDOMINAL PAIN: 0
DIARRHEA: 0
FEVER: 0

## 2017-08-16 NOTE — ED AVS SNAPSHOT
[facility] Emergency Department    [address]    [facility] [address]    Phone:  [phone]    Fax:  [phone]                                       Arias Manzo   MRN: [id number]    Department:  [facility] Emergency Department   Date of Visit:  8/16/2017           After Visit Summary Signature Page     I have received my discharge instructions, and my questions have been answered. I have discussed any challenges I see with this plan with the nurse or doctor.    ..........................................................................................................................................  Patient/Patient Representative Signature      ..........................................................................................................................................  Patient Representative Print Name and Relationship to Patient    ..................................................               ................................................  Date                                            Time    ..........................................................................................................................................  Reviewed by Signature/Title    ...................................................              ..............................................  Date                                                            Time

## 2017-08-16 NOTE — ED AVS SNAPSHOT
[facility] Emergency Department    [address] [place] [address]    Phone:  [phone]    Fax:  [phone]                                       Arias Manzo   MRN: [id number]    Department:  [facility] Emergency Department   Date of Visit:  8/16/2017           Patient Information     Date Of Birth          1955        Your diagnoses for this visit were:     Nausea        You were seen by Brendon Parikh MD.      Follow-up Information     Schedule an appointment as soon as possible for a visit with [facility], [place].    Specialty:  Family Practice    Contact information:    [address]   [place] [address]  [phone]        Future Appointments        Provider Department Dept Phone Center    8/23/2017 3:30 PM SHARMILA [facility] Heart Device RN [facility] [place] [address] [phone] [facility] CLIN    8/25/2017 8:50 AM Priscila López, NICKOLAS, APRN CNP [facility] [place] [address] [phone] [facility] PSA CLIN      24 Hour Appointment Hotline       To make an appointment at any [facility] clinic, call [phone] ([phone]). If you don't have a family doctor or clinic, we will help you find one. [facility] clinics are conveniently located to serve the needs of you and your family.             Review of your medicines      Our records show that you are taking the medicines listed below. If these are incorrect, please call your family doctor or clinic.        Dose / Directions Last dose taken    lisinopril-hydrochlorothiazide 20-25 MG per tablet   Commonly known as:  PRINZIDE/ZESTORETIC   Dose:  1 tablet        Take 1 tablet by mouth daily   Refills:  0        ZOCOR PO   Dose:  20 mg        Take 20 mg by mouth At Bedtime   Refills:  0        ZOLOFT PO   Dose:  100 mg        Take 100 mg by mouth daily   Refills:  0                Procedures and tests performed during your visit     Basic metabolic  panel (BMP)    CBC + differential    EKG 12 lead    Hepatic panel    ISTAT troponin    IV access    Lipase    Troponin POCT      Orders Needing Specimen Collection     None      Pending Results     Date and Time Order Name Status Description    8/16/2017 1946 EKG 12 lead Preliminary             Pending Culture Results     No orders found from 8/14/2017 to 8/17/2017.            Pending Results Instructions     If you had any lab results that were not finalized at the time of your Discharge, you can call the ED Lab Result RN at [phone]. You will be contacted by this team for any positive Lab results or changes in treatment. The nurses are available 7 days a week from 10A to 6:30P.  You can leave a message 24 hours per day and they will return your call.        Test Results From Your Hospital Stay        8/16/2017  8:24 PM      Component Results     Component Value Ref Range & Units Status    WBC 7.2 4.0 - 11.0 10e9/L Final    RBC Count 4.38 (L) 4.4 - 5.9 10e12/L Final    Hemoglobin 14.0 13.3 - 17.7 g/dL Final    Hematocrit 40.4 40.0 - 53.0 % Final    MCV 92 78 - 100 fl Final    MCH 32.0 26.5 - 33.0 pg Final    MCHC 34.7 31.5 - 36.5 g/dL Final    RDW 12.7 10.0 - 15.0 % Final    Platelet Count 179 150 - 450 10e9/L Final    Diff Method Automated Method  Final    % Neutrophils 53.1 % Final    % Lymphocytes 34.8 % Final    % Monocytes 9.4 % Final    % Eosinophils 2.1 % Final    % Basophils 0.3 % Final    % Immature Granulocytes 0.3 % Final    Nucleated RBCs 0 0 /100 Final    Absolute Neutrophil 3.8 1.6 - 8.3 10e9/L Final    Absolute Lymphocytes 2.5 0.8 - 5.3 10e9/L Final    Absolute Monocytes 0.7 0.0 - 1.3 10e9/L Final    Absolute Eosinophils 0.2 0.0 - 0.7 10e9/L Final    Absolute Basophils 0.0 0.0 - 0.2 10e9/L Final    Abs Immature Granulocytes 0.0 0 - 0.4 10e9/L Final    Absolute Nucleated RBC 0.0  Final         8/16/2017  8:43 PM      Component Results     Component Value Ref Range & Units Status    Sodium 140 133  - 144 mmol/L Final    Potassium 3.7 3.4 - 5.3 mmol/L Final    Chloride 105 94 - 109 mmol/L Final    Carbon Dioxide 31 20 - 32 mmol/L Final    Anion Gap 4 3 - 14 mmol/L Final    Glucose 113 (H) 70 - 99 mg/dL Final    Urea Nitrogen 18 7 - 30 mg/dL Final    Creatinine 1.04 0.66 - 1.25 mg/dL Final    GFR Estimate 72 >60 mL/min/1.7m2 Final    Non  GFR Calc    GFR Estimate If Black 87 >60 mL/min/1.7m2 Final    African American GFR Calc    Calcium 9.3 8.5 - 10.1 mg/dL Final         8/16/2017  8:43 PM      Component Results     Component Value Ref Range & Units Status    Bilirubin Direct 0.2 0.0 - 0.2 mg/dL Final    Bilirubin Total 0.7 0.2 - 1.3 mg/dL Final    Albumin 4.1 3.4 - 5.0 g/dL Final    Protein Total 7.6 6.8 - 8.8 g/dL Final    Alkaline Phosphatase 85 40 - 150 U/L Final    ALT 30 0 - 70 U/L Final    AST 23 0 - 45 U/L Final         8/16/2017  8:43 PM      Component Results     Component Value Ref Range & Units Status    Lipase 101 73 - 393 U/L Final         8/16/2017  8:26 PM      Component Results     Component Value Ref Range & Units Status    Troponin I 0.00 0.00 - 0.10 ug/L Final                Clinical Quality Measure: Blood Pressure Screening     Your blood pressure was checked while you were in the emergency department today. The last reading we obtained was  BP: (!) 162/99 . Please read the guidelines below about what these numbers mean and what you should do about them.  If your systolic blood pressure (the top number) is less than 120 and your diastolic blood pressure (the bottom number) is less than 80, then your blood pressure is normal. There is nothing more that you need to do about it.  If your systolic blood pressure (the top number) is 120-139 or your diastolic blood pressure (the bottom number) is 80-89, your blood pressure may be higher than it should be. You should have your blood pressure rechecked within a year by a primary care provider.  If your systolic blood pressure (the  "top number) is 140 or greater or your diastolic blood pressure (the bottom number) is 90 or greater, you may have high blood pressure. High blood pressure is treatable, but if left untreated over time it can put you at risk for heart attack, stroke, or kidney failure. You should have your blood pressure rechecked by a primary care provider within the next 4 weeks.  If your provider in the emergency department today gave you specific instructions to follow-up with your doctor or provider even sooner than that, you should follow that instruction and not wait for up to 4 weeks for your follow-up visit.        Thank you for choosing [facility]       Thank you for choosing [facility] for your care. Our goal is always to provide you with excellent care. Hearing back from our patients is one way we can continue to improve our services. Please take a few minutes to complete the written survey that you may receive in the mail after you visit with us. Thank you!        Just[employer] ePrint Information     [facility] lets you send messages to your doctor, view your test results, renew your prescriptions, schedule appointments and more. To sign up, go to [email or web address] . Click on \"Log in\" on the left side of the screen, which will take you to the Welcome page. Then click on \"Sign up Now\" on the right side of the page.     You will be asked to enter the access code listed below, as well as some personal information. Please follow the directions to create your username and password.     Your access code is: [email or web address]  Expires: 10/2/2017 11:00 AM     Your access code will  in 90 days. If you need help or a new code, please call your [facility] clinic or [phone].        Care EveryWhere ID     This is your Care EveryWhere ID. This could be used by other organizations to access your [facility] medical records  [id number]        Equal Access to Services     STEPHEN ESPINOZA : josé antonio Gil qaybta " juan f gamboa ah. So [facility] [phone].    ATENCIÓN: Si habla español, tiene a hansen disposición servicios gratuitos de asistencia lingüística. Llame al [phone].    We comply with applicable federal civil rights laws and [place] laws. We do not discriminate on the basis of race, color, national origin, age, disability sex, sexual orientation or gender identity.            After Visit Summary       This is your record. Keep this with you and show to your community pharmacist(s) and doctor(s) at your next visit.

## 2017-08-17 ENCOUNTER — DOCUMENTATION ONLY (OUTPATIENT)
Dept: CARDIOLOGY | Facility: CLINIC | Age: 62
End: 2017-08-17

## 2017-08-17 LAB — INTERPRETATION ECG - MUSE: NORMAL

## 2017-08-17 NOTE — ED NOTES
"Recent pacemaker last month  Has been feeling well but sudden onset of nausea and \"just not feeling right\"   Feels it may be indigestion   Alert warm dry    Still having short of breath  When walking up steps   Here for eval  "

## 2017-08-17 NOTE — PROGRESS NOTES
Call to pt; he sent a remote [facility] last evening at 7pm after experiencing severe nausea. He went to the ED, not knowing if it was a heart attack.  Review of the transmission shows presenting rhythm at the time of transmission was sinus, - rate 80 bpm. No abnormal rhythms noted. He has a check in [facility] next Wednesday, and will be seeing Priscila López on Friday 8-25.  He is feeling well now. No etiology found at ED. SueLangenbrunnerRN

## 2017-08-17 NOTE — ED PROVIDER NOTES
History     Chief Complaint:  Nausea    HPI   Arias Manzo is a 62 year old male with a pacemaker and history of GERD and appendectomy who presents to the emergency department today for evaluation of nausea. The patient had a pacemaker implant at [facility] on July 3 due to a second degree AV block. Today, the patient reports the sudden onset of severe nausea at 1800 without vomiting, abdominal pain, diarrhea, syncope, or diaphoresis. He also denies any chest pain or shortness of breath, but as he had the cardiac pacemaker placed recently, he wanted to make sure things were okay. Upon arrival here, nausea is improved and manageable.    Allergies:  Flexeril [Cyclobenzaprine]  Naprosyn [Naproxen]  Penicillins  Ranitidine      Medications:    Lisinopril-HCTZ  Sertraline  Simvastatin  Botox  Omeprazole      Past Medical History:    Alcohol withdrawal  CPAP  Depression  GERD  Hepatic steatosis   Hoarseness  HLD  HTN  IFG  Bifascicular heart block:  Left anterior fascicular block  RBBB  PONV  Seizures  Sleep apnea  Asthma   Dysphonia  Laryngeal granuloma      Past Surgical History:    Ankle surgery  Appendectomy  Laminectomy x 2  Throat granulomas  Hernia repair  Inject botox  Inject steroid  Laser CO2 laryngoscopy x 2  Orthopedic surgery   Implant pacemaker     Family History:    Cancer     Social History:  Presents alone   Tobacco use: Former pipe smoker of 10 years, QD 1985  Alcohol use: 2 drinks daily  PCP: [place] [facility]    Marital Status:       Review of Systems   Constitutional: Negative for fever.   Respiratory: Negative for shortness of breath.    Cardiovascular: Negative for chest pain.   Gastrointestinal: Positive for nausea. Negative for abdominal pain, diarrhea and vomiting.   Neurological: Negative for syncope.   All other systems reviewed and are negative.    Physical Exam   Vitals:  Patient Vitals for the past 24 hrs:   BP Temp Temp src Pulse Heart Rate Resp SpO2 Height Weight  "  08/16/17 2145 (!) 173/104 - - - 65 - 96 % - -   08/16/17 2130 (!) 164/106 - - - 68 - 94 % - -   08/16/17 2115 (!) 161/103 - - - 67 - 94 % - -   08/16/17 2100 (!) 162/99 - - - 66 - 94 % - -   08/16/17 2045 - - - - 72 - 94 % - -   08/16/17 2030 - - - - 77 - 94 % - -   08/16/17 2015 - - - - 72 - 94 % - -   08/16/17 2000 - - - - 71 - 94 % - -   08/16/17 1945 - - - - 73 - 94 % - -   08/16/17 1940 (!) 163/100 98.1  F (36.7  C) Oral 80 - 17 95 % 1.803 m (5' 11\") 108 kg (238 lb)      Physical Exam  Vital signs and nursing notes reviewed.     Constitutional: laying on gurney appears comfortable no diaphoresis  HENT: Oropharynx is clear and moist  Eyes: Conjunctivae are normal bilaterally. Pupils equal  Neck: normal range of motion  Cardiovascular: Normal rate, regular rhythm, normal heart sounds.   Pulmonary/Chest: Effort normal and breath sounds normal. No respiratory distress.  Pacemaker left upper chest, no overlying erythema  Abdominal: Soft. Bowel sounds are normal. No tenderness to palpation. No rebound or guarding.   Musculoskeletal: No joint swelling or edema.  No pedal edema  Neurological: Alert and oriented. No focal weakness  Skin: Skin is warm and dry. No rash noted.   Psych: normal affect    Emergency Department Course     ECG:  ECG taken at 1940, ECG read at 1942  Atrial-sensed ventricular-paced rhythm with prolonged AV conduction  Abnormal ECG  Rate 78 bpm. IA interval 228. QRS duration 170. QT/QTc 432/492. P-R-T axes 45 87 -65.    Laboratory:  Laboratory findings were communicated with the patient who voiced understanding of the findings.    Troponin POCT: 0.00  CBC: WNL. (WBC 7.2, HGB 14.0, )   BMP: Glucose 113 (H) o/w WNL (Creatinine 1.04)  Hepatic panel: AWNL  Lipase: 101    Emergency Department Course:  Nursing notes and vitals reviewed.  I performed an exam of the patient as documented above.   IV was inserted and blood was drawn for laboratory testing, results above.  At 2106 the patient was " rechecked and was updated on the results of laboratory studies.   I discussed the treatment plan with the patient. He expressed understanding of this plan and consented to discharge. He will be discharged home with instructions for care and follow up. In addition, the patient will return to the emergency department if their symptoms persist, worsen, if new symptoms arise or if there is any concern.  All questions were answered.  I personally reviewed the laboratory results with the Patient and answered all related questions prior to discharge.    Impression & Plan      Medical Decision Making:  Arias Manzo is a 62 year old male who presents with episode of sudden onset of extreme nausea. He denied having any chest pain or shortness of breath associated with it.  He recently had a cardiac pacemaker placed, and he wanted to make sure things were looking okay. On arrival, patient has normal vital signs, aside from hypertension, and has a paced rhythm at a regular rate. His laboratory tests showed no significant abnormality either. He was observed here in the emergency department without any signs of dysrhythmia or other concerning findings. Patient continued to appear well. He had no recurrent episodes of nausea, chest pain, shortness of breath, or other complaints. Therefore, I felt he'd be safe for discharge home. There is no indication he has acute coronary syndrome or any other dangerous process at this time and I felt he would be safe for discharge home.    Diagnosis:    ICD-10-CM    1. Nausea R11.0      Disposition:   Home    Scribe Disclosure:  I, Tavia Ramírez, am serving as a scribe at 7:48 PM on 8/16/2017 to document services personally performed by Brendon Parikh MD, based on my observations and the provider's statements to me.    8/16/2017   [facility] EMERGENCY DEPARTMENT       Brendon Parikh MD  08/17/17 0210

## 2017-08-23 ENCOUNTER — PRE VISIT (OUTPATIENT)
Dept: CARDIOLOGY | Facility: CLINIC | Age: 62
End: 2017-08-23

## 2017-08-23 ENCOUNTER — ALLIED HEALTH/NURSE VISIT (OUTPATIENT)
Dept: CARDIOLOGY | Facility: CLINIC | Age: 62
End: 2017-08-23
Payer: COMMERCIAL

## 2017-08-23 DIAGNOSIS — Z95.0 CARDIAC PACEMAKER IN SITU: Primary | ICD-10-CM

## 2017-08-23 PROCEDURE — 93280 PM DEVICE PROGR EVAL DUAL: CPT | Performed by: INTERNAL MEDICINE

## 2017-08-23 NOTE — PROGRESS NOTES
[employer] Ad[employer] DR BARGER Pacemaker Device Check  AP: 6 % : 100 %  Mode: DDD        Underlying Rhythm: SR with first degree AVB  Heart Rate: Adequate variation per histogram  Sensing: stable    Pacing Threshold: stable   Impedance: stable  Battery Status: 10.5 years  Atrial Arrhythmia: one mode switch episode for PAF lasting 9 min.   Ventricular Arrhythmia: none  Setting Change: AV delay extended ( OK'd with Dr Benavidez) to promote intrinsic conduction with resulting AS/VS    Care Plan: f/u 3 months remote PPM check. And with Priscila on 8/25/17. Kristyn

## 2017-08-23 NOTE — MR AVS SNAPSHOT
After Visit Summary   8/23/2017    Arias Manzo    MRN: [id number]           Patient Information     Date Of Birth          1955        Visit Information        Provider Department      8/23/2017 3:30 PM RU GABEN [facility]        Today's Diagnoses     Cardiac pacemaker in situ    -  1       Follow-ups after your visit        Your next 10 appointments already scheduled     Aug 25, 2017  8:50 AM CDT   [facility] EP RETURN with DEEJAY Haque CNP   [facility] ([facility])    [address]  [place] [id number] [phone]            Nov 27, 2017  9:30 AM CST   Remote PPM Check with DELONG TECH1   [facility] ([facility])    [address]  [place] [id number] [phone]           This appointment is for a remote check of your pacemaker.  This is not an appointment at the office.              Who to contact     If you have questions or need follow up information about today's clinic visit or your schedule please contact [facility] directly at [phone].  Normal or non-critical lab and imaging results will be communicated to you by Baboohart, letter or phone within 4 business days after the clinic has received the results. If you do not hear from us within 7 days, please contact the clinic through Baboohart or phone. If you have a critical or abnormal lab result, we will notify you by phone as soon as possible.  Submit refill requests through Bgifty or call your pharmacy and they will forward the refill request to us. Please allow 3 business days for your refill to be completed.          Additional Information About Your Visit        Baboohart Information     [employer]ty lets you send messages to your doctor, view your test results, renew your prescriptions, schedule  "appointments and more. To sign up, go to [email or web address] . Click on \"Log in\" on the left side of the screen, which will take you to the Welcome page. Then click on \"Sign up Now\" on the right side of the page.     You will be asked to enter the access code listed below, as well as some personal information. Please follow the directions to create your username and password.     Your access code is: [email or web address]  Expires: 10/2/2017 11:00 AM     Your access code will  in 90 days. If you need help or a new code, please call your [facility] clinic or [phone].        Care EveryWhere ID     This is your Care EveryWhere ID. This could be used by other organizations to access your [facility] medical records  [id number]         Blood Pressure from Last 3 Encounters:   17 (!) 173/104   17 120/70   17 (!) 149/98    Weight from Last 3 Encounters:   17 108 kg (238 lb)   17 108.9 kg (240 lb)   17 108.4 kg (239 lb)              We Performed the Following     PM DEVICE PROGRAMMING EVAL, DUAL LEAD PACER (39129)        Primary Care Provider Office Phone # Fax #    [facility] [phone] [address] [place] [address]        Equal Access to Services     STEPHEN ESPINOZA : Hadii aad ku hadasho Soomaali, waaxda luqadaha, qaybta kaalmada adeegyada, waxay meryl gruber . So [facility] [phone].    ATENCIÓN: Si habla español, tiene a hansen disposición servicios gratuitos de asistencia lingüística. Llame al [phone].    We comply with applicable federal civil rights laws and [place] laws. We do not discriminate on the basis of race, color, national origin, age, disability sex, sexual orientation or gender identity.            Thank you!     Thank you for choosing [facility] [place]  for your care. Our goal is always to provide you with excellent care. Hearing back from our patients is one way we " can continue to improve our services. Please take a few minutes to complete the written survey that you may receive in the mail after your visit with us. Thank you!             Your Updated Medication List - Protect others around you: Learn how to safely use, store and throw away your medicines at www.disposemymeds.org.          This list is accurate as of: 8/23/17  4:19 PM.  Always use your most recent med list.                   Brand Name Dispense Instructions for use Diagnosis    lisinopril-hydrochlorothiazide 20-25 MG per tablet    PRINZIDE/ZESTORETIC     Take 1 tablet by mouth daily        ZOCOR PO      Take 20 mg by mouth At Bedtime        ZOLOFT PO      Take 100 mg by mouth daily

## 2017-08-25 ENCOUNTER — OFFICE VISIT (OUTPATIENT)
Dept: CARDIOLOGY | Facility: CLINIC | Age: 62
End: 2017-08-25
Attending: INTERNAL MEDICINE
Payer: COMMERCIAL

## 2017-08-25 VITALS
HEIGHT: 71 IN | SYSTOLIC BLOOD PRESSURE: 132 MMHG | WEIGHT: 244.8 LBS | BODY MASS INDEX: 34.27 KG/M2 | HEART RATE: 58 BPM | DIASTOLIC BLOOD PRESSURE: 84 MMHG

## 2017-08-25 DIAGNOSIS — I44.30 HEART BLOCK, AV: ICD-10-CM

## 2017-08-25 DIAGNOSIS — I42.9 CARDIOMYOPATHY, UNSPECIFIED (H): Primary | ICD-10-CM

## 2017-08-25 DIAGNOSIS — F40.240 CLAUSTROPHOBIA: ICD-10-CM

## 2017-08-25 PROCEDURE — 99214 OFFICE O/P EST MOD 30 MIN: CPT | Mod: 24 | Performed by: NURSE PRACTITIONER

## 2017-08-25 RX ORDER — ASPIRIN 81 MG/1
81 TABLET ORAL DAILY
COMMUNITY
Start: 2017-08-25 | End: 2018-05-25

## 2017-08-25 RX ORDER — DIAZEPAM 10 MG
TABLET ORAL
Qty: 2 TABLET | Refills: 0 | Status: SHIPPED | OUTPATIENT
Start: 2017-08-25 | End: 2018-02-15

## 2017-08-25 NOTE — PROGRESS NOTES
HPI and Plan:   See dictation    Orders Placed This Encounter   Procedures     MRI Cardiac w/contrast and flow       Orders Placed This Encounter   Medications     diazepam (VALIUM) 10 MG tablet     Sig: Take one pill one hour prior to procedure.  May be repeated x1 one hour later for effect.     Dispense:  2 tablet     Refill:  0     aspirin EC 81 MG EC tablet     Sig: Take 1 tablet (81 mg) by mouth daily       There are no discontinued medications.      Encounter Diagnoses   Name Primary?     Heart block, AV      Cardiomyopathy, unspecified (H) Yes     Claustrophobia        CURRENT MEDICATIONS:  Current Outpatient Prescriptions   Medication Sig Dispense Refill     diazepam (VALIUM) 10 MG tablet Take one pill one hour prior to procedure.  May be repeated x1 one hour later for effect. 2 tablet 0     aspirin EC 81 MG EC tablet Take 1 tablet (81 mg) by mouth daily       lisinopril-hydrochlorothiazide (PRINZIDE,ZESTORETIC) 20-25 MG per tablet Take 1 tablet by mouth daily       Sertraline HCl (ZOLOFT PO) Take 100 mg by mouth daily       Simvastatin (ZOCOR PO) Take 20 mg by mouth At Bedtime         ALLERGIES     Allergies   Allergen Reactions     Flexeril [Cyclobenzaprine] GI Disturbance     Naprosyn [Naproxen] GI Disturbance     Penicillins      As child     Ranitidine GI Disturbance       PAST MEDICAL HISTORY:  Past Medical History:   Diagnosis Date     CPAP (continuous positive airway pressure) dependence      Depression      Gastro-oesophageal reflux disease      Hepatic steatosis      Hoarseness      Hyperlipidemia      Hypertension      IFG (impaired fasting glucose)      LAFB (left anterior fascicular block)      PONV (postoperative nausea and vomiting)      RBBB      Seizures (H)     as child     Sleep apnea     cpap     Uncomplicated asthma     denies asthma       PAST SURGICAL HISTORY:  Past Surgical History:   Procedure Laterality Date     ANKLE SURGERY       APPENDECTOMY       BACK SURGERY      lami x2      ENT SURGERY      throat granulomas     HERNIA REPAIR       INJECT BOTOX N/A 10/30/2014    Procedure: INJECT BOTOX;  Surgeon: Kalyn Negron MD;  Location: [facility]     INJECT BOTOX N/A 12/8/2016    Procedure: INJECT BOTOX;  Surgeon: Kalyn Negron MD;  [facility]     INJECT STEROID (LOCATION) N/A 12/8/2016    Procedure: INJECT STEROID (LOCATION);  Surgeon: Kalyn Negron MD;  Location[facility]     LAMINECTOMY LUMBAR ONE LEVEL  3/5/2014    Procedure: LAMINECTOMY LUMBAR ONE LEVEL;  RIGHT L4-5 DISCECTOMY;  Surgeon: Rodrigo Ríos MD;  Location: [facility] OR     LASER CO2 LARYNGOSCOPY N/A 12/8/2016    Procedure: LASER CO2 LARYNGOSCOPY;  Surgeon: Kalyn Negron MD;  [facility]     LASER CO2 LARYNGOSCOPY, COMPLEX Left 10/30/2014    Procedure: LASER CO2 LARYNGOSCOPY, COMPLEX;  Surgeon: Kalyn Negron MD;  Location: [facility] OR     ORTHOPEDIC SURGERY         FAMILY HISTORY:  Family History   Problem Relation Age of Onset     CANCER Father        SOCIAL HISTORY:  Social History     Social History     Marital status:      Spouse name: N/A     Number of children: N/A     Years of education: N/A     Social History Main Topics     Smoking status: Former Smoker     Years: 10.00     Types: Pipe     Start date: 10/10/1976     Quit date: 5/25/1985     Smokeless tobacco: Never Used     Alcohol use Yes      Comment: daily- 2 drinks /day     Drug use: No     Sexual activity: Yes     Partners: Female     Birth control/ protection: Other     Other Topics Concern     None     Social History Narrative       Review of Systems:  Skin:  Negative       Eyes:  Positive for glasses    ENT:  Negative      Respiratory:  Positive for dyspnea on exertion;sleep apnea;CPAP     Cardiovascular:  Negative      Gastroenterology: Negative      Genitourinary:  not assessed      Musculoskeletal:  Positive for back pain;neck pain    Neurologic:  Negative      Psychiatric:  Positive for sleep  "disturbances    Heme/Lymph/Imm:  Negative      Endocrine:  Negative        Physical Exam:  Vitals: /84  Pulse 58  Ht 1.803 m (5' 10.98\")  Wt 111 kg (244 lb 12.8 oz)  BMI 34.16 kg/m2              "

## 2017-08-25 NOTE — PATIENT INSTRUCTIONS
-Start aspirin 81 mg daily  -Schedule MRI.  We may change it to a stress MRI if Dr. Benavidez feels it is warranted.  -We will continue monitoring your device check for any further arrhythmia and let you know if anything needs to be changed.

## 2017-08-25 NOTE — MR AVS SNAPSHOT
After Visit Summary   8/25/2017    Arias Manzo    MRN: [id number]           Patient Information     Date Of Birth          1955        Visit Information        Provider Department      8/25/2017 8:50 AM Priscila López APRN CNP [facility]        Today's Diagnoses     Cardiomyopathy, unspecified (H)    -  1    Heart block, AV        Claustrophobia          Care Instructions    -Start aspirin 81 mg daily  -Schedule MRI.  We may change it to a stress MRI if Dr. Benavidez feels it is warranted.  -We will continue monitoring your device check for any further arrhythmia and let you know if anything needs to be changed.            Follow-ups after your visit        Your next 10 appointments already scheduled     Nov 27, 2017  9:30 AM CST   Remote PPM Check with DELONG TECH1   [facility] ([facility])    [address] [place] [id number] [phone]           This appointment is for a remote check of your pacemaker.  This is not an appointment at the office.              Future tests that were ordered for you today     Open Future Orders        Priority Expected Expires Ordered    MRI Cardiac w/contrast and flow Routine 8/26/2017 8/25/2018 8/25/2017            Who to contact     If you have questions or need follow up information about today's clinic visit or your schedule please contact [facility] directly at [phone].  Normal or non-critical lab and imaging results will be communicated to you by MyChart, letter or phone within 4 business days after the clinic has received the results. If you do not hear from us within 7 days, please contact the clinic through MyChart or phone. If you have a critical or abnormal lab result, we will notify you by phone as soon as possible.  Submit refill requests through SureVisit or call your pharmacy and  "they will forward the refill request to us. Please allow 3 business days for your refill to be completed.          Additional Information About Your Visit        [employer] Information     [employer] lets you send messages to your doctor, view your test results, renew your prescriptions, schedule appointments and more. To sign up, go to [email or web address] . Click on \"Log in\" on the left side of the screen, which will take you to the Welcome page. Then click on \"Sign up Now\" on the right side of the page.     You will be asked to enter the access code listed below, as well as some personal information. Please follow the directions to create your username and password.     Your access code is: [email or web address]  Expires: 10/2/2017 11:00 AM     Your access code will  in 90 days. If you need help or a new code, please call your [place] clinic or [phone].        Care EveryWhere ID     This is your [facility] EveryWhere ID. This could be used by other organizations to access your [place] medical records  [id number]        Your Vitals Were     Pulse Height BMI (Body Mass Index)             58 1.803 m (5' 10.98\") 34.16 kg/m2          Blood Pressure from Last 3 Encounters:   17 132/84   17 (!) 173/104   17 120/70    Weight from Last 3 Encounters:   17 111 kg (244 lb 12.8 oz)   17 108 kg (238 lb)   17 108.9 kg (240 lb)              We Performed the Following     Follow-Up with Cardiac Advanced Practice Provider          Today's Medication Changes          These changes are accurate as of: 17  9:15 AM.  If you have any questions, ask your nurse or doctor.               Start taking these medicines.        Dose/Directions    aspirin EC 81 MG EC tablet   Used for:  Cardiomyopathy, unspecified (H)   Started by:  Priscila López APRN CNP        Dose:  81 mg   Take 1 tablet (81 mg) by mouth daily   Refills:  0       diazepam 10 MG tablet   Commonly known as:  VALIUM   Used for: "  Claustrophobia   Started by:  Priscila López, DEEJAY CNP        Take one pill one hour prior to procedure.  May be repeated x1 one hour later for effect.   Quantity:  2 tablet   Refills:  0            Where to get your medicines      Some of these will need a paper prescription and others can be bought over the counter.  Ask your nurse if you have questions.     Bring a paper prescription for each of these medications     diazepam 10 MG tablet       You don't need a prescription for these medications     aspirin EC 81 MG EC tablet                Primary Care Provider Office Phone # Fax #    [facility] [phone] [address] [place] [address]        Equal Access to Services     GREGG ESPINOZA : Hadii yair Forrest, waaxda luqadaha, qaybta kaalmada luzma, juan f deshpande. So [facility] [phone].    ATENCIÓN: Si habla español, tiene a hansen disposición servicios gratuitos de asistencia lingüística. Llame al [phone].    We comply with applicable federal civil rights laws and [place] laws. We do not discriminate on the basis of race, color, national origin, age, disability sex, sexual orientation or gender identity.            Thank you!     Thank you for choosing [facility]  for your care. Our goal is always to provide you with excellent care. Hearing back from our patients is one way we can continue to improve our services. Please take a few minutes to complete the written survey that you may receive in the mail after your visit with us. Thank you!             Your Updated Medication List - Protect others around you: Learn how to safely use, store and throw away your medicines at www.disposemymeds.org.          This list is accurate as of: 8/25/17  9:15 AM.  Always use your most recent med list.                   Brand Name Dispense Instructions for use Diagnosis    aspirin EC 81 MG EC  tablet      Take 1 tablet (81 mg) by mouth daily    Cardiomyopathy, unspecified (H)       diazepam 10 MG tablet    VALIUM    2 tablet    Take one pill one hour prior to procedure.  May be repeated x1 one hour later for effect.    Claustrophobia       lisinopril-hydrochlorothiazide 20-25 MG per tablet    PRINZIDE/ZESTORETIC     Take 1 tablet by mouth daily        ZOCOR PO      Take 20 mg by mouth At Bedtime        ZOLOFT PO      Take 100 mg by mouth daily

## 2017-08-25 NOTE — PROGRESS NOTES
HISTORY OF PRESENT ILLNESS:  Arias is a 62-year-old gentleman presenting in clinic today for a followup visit.  He has a history of hypertension, obstructive sleep apnea, dyslipidemia, lumbar spine disease and laryngeal granulomas who presented to [facility] in 07/2017 with chest pain and shortness of breath.  His troponin was negative.  In the ER he was found to 2:1 AV block and was taken to [facility] for consideration of a pacemaker.  Additionally, electrolytes were stable and there is no apparent history consistent for Lyme disease.        He underwent successful dual-chamber pacemaker implantation by Dr. Benavidez.  During the implantation, he was found to have poor RV electrical properties, raising question of infiltrative myocardial disease such as sarcoid.  MRI was recommended for 2 months post pacemaker.      Echocardiogram on 07/04 showed normal LV size with borderline reduction in left ventricular function with an ejection fraction of 45%-50% and mild global hypokinesis.  The right ventricle was normal in size with borderline reduction in function.  RVSP could not be approximated due to inadequate TR.  Right atrial pressure was noted to be mildly elevated at 15-20.      A device check from 08/23 showed 100% V pacing.  Underlying rhythm was sinus with first-degree AV block.  There was 1 mode switch episodes for paroxysmal atrial fibrillation lasting around 9 minutes.      He had a nuclear stress test in 02/2016 that was without ischemia.       In clinic today, Mr. Manzo tells me that he has been healing well from his pacemaker insertion.  He has no concerns about the site.  The only symptom he does notice is dyspnea at very high levels of exertion, such as climbing 3 flights of stairs.  This has been present for the last year.  He has no exertional chest pain.  He did have an episode of severe nausea recently and was seen in the ER where no specific abnormality was found.      In clinic  today, blood pressure is 132/84, heart rate is 58 and regular.      PHYSICAL EXAMINATION:   GENERAL:  Reveals a well-appearing gentleman in no acute distress.   HEENT:  Normocephalic and atraumatic.   NECK:  Supple without jugular venous distension, carotid bruits.   LUNGS:  Clear bilaterally.   HEART:  S1, S2, with a regular rate and rhythm, no murmurs, rubs or gallops.  The device is well healed in the left infraclavicular area.   ABDOMEN:  Soft.     EXTREMITIES:  Lower extremities show no edema.      IMPRESSION AND PLAN:  Mr. Christian is a 62-year-old gentleman who presented to the ER recently with shortness of breath and chest pain.  He was found to be in high-degree AV block and underwent permanent pacemaker implantation.  During the study, it was noted that his RV had poor electrical properties.  This raised a question about infiltrative cardiomyopathy and cardiac MRI has now been recommended.  We will schedule this as he is about 2 months out from is MRI-compatible device.  Additionally, echocardiogram in the hospital showed a mild cardiomyopathy with just some RV and LV dysfunction.  This will further be evaluated with an MRI.  He had a nuclear stress test in 2016 that was without ischemia.        We will continue to follow his Device Clinic checks closely for his burden of atrial fibrillation as it was rather low with only that 9-minute episode that was rate controlled.  He is on an aspirin daily and his CHADS2-VASc score is 1 for hypertension alone.  If he has anymore prolonged episodes, we will need to consider anticoagulation.        It was my pleasure participating in the care of Mr. Christian in clinic today.  Further recommendations will be pending the results of the MRI.         DEEJAY SHAFER, CNP             D: 2017 12:45   T: 2017 13:31   MT: RYAN      Name:     JOHN CHRISTIAN   MRN:      [id number]        Account:      [id number]   :      1955            Service Date: 08/25/2017      Document: [id number]

## 2017-08-25 NOTE — LETTER
8/25/2017    Leann G Hutchison, MD [facility]   [address] [place] [address]    RE: Arias Manzo       Dear Colleague,    I had the pleasure of seeing Arias Manzo in the [facility].    Arias is a 62-year-old gentleman presenting in clinic today for a followup visit.  He has a history of hypertension, obstructive sleep apnea, dyslipidemia, lumbar spine disease and laryngeal granulomas who presented to [facility] in 07/2017 with chest pain and shortness of breath.  His troponin was negative.  In the ER he was found to 2:1 AV block and was taken to [facility] for consideration of a pacemaker.  Additionally, electrolytes were stable and there is no apparent history consistent for Lyme disease.        He underwent successful dual-chamber pacemaker implantation by Dr. Benavidez.  During the implantation, he was found to have poor RV electrical properties, raising question of infiltrative myocardial disease such as sarcoid.  MRI was recommended for 2 months post pacemaker.      Echocardiogram on 07/04 showed normal LV size with borderline reduction in left ventricular function with an ejection fraction of 45%-50% and mild global hypokinesis.  The right ventricle was normal in size with borderline reduction in function.  RVSP could not be approximated due to inadequate TR.  Right atrial pressure was noted to be mildly elevated at 15-20.      A device check from 08/23 showed 100% V pacing.  Underlying rhythm was sinus with first-degree AV block.  There was 1 mode switch episodes for paroxysmal atrial fibrillation lasting around 9 minutes.      He had a nuclear stress test in 02/2016 that was without ischemia.       In clinic today, Mr. Manzo tells me that he has been healing well from his pacemaker insertion.  He has no concerns about the site.  The only symptom he does notice is dyspnea at very high levels of exertion, such as climbing 3  flights of stairs.  This has been present for the last year.  He has no exertional chest pain.  He did have an episode of severe nausea recently and was seen in the ER where no specific abnormality was found.      In clinic today, blood pressure is 132/84, heart rate is 58 and regular.      PHYSICAL EXAMINATION:   GENERAL:  Reveals a well-appearing gentleman in no acute distress.   HEENT:  Normocephalic and atraumatic.   NECK:  Supple without jugular venous distension, carotid bruits.   LUNGS:  Clear bilaterally.   HEART:  S1, S2, with a regular rate and rhythm, no murmurs, rubs or gallops.  The device is well healed in the left infraclavicular area.   ABDOMEN:  Soft.     EXTREMITIES:  Lower extremities show no edema.     Outpatient Encounter Prescriptions as of 8/25/2017   Medication Sig Dispense Refill     diazepam (VALIUM) 10 MG tablet Take one pill one hour prior to procedure.  May be repeated x1 one hour later for effect. 2 tablet 0     aspirin EC 81 MG EC tablet Take 1 tablet (81 mg) by mouth daily       lisinopril-hydrochlorothiazide (PRINZIDE,ZESTORETIC) 20-25 MG per tablet Take 1 tablet by mouth daily       Sertraline HCl (ZOLOFT PO) Take 100 mg by mouth daily       Simvastatin (ZOCOR PO) Take 20 mg by mouth At Bedtime       Facility-Administered Encounter Medications as of 8/25/2017   Medication Dose Route Frequency Provider Last Rate Last Dose     neostigmine (PROSTIGMINE) injection   Intravenous PRN Marietta Mehta, APRN CRNA   4 mg at 10/30/14 1429     ondansetron (ZOFRAN) injection   Intravenous PRN Marietta Mehta APRN CRNA   4 mg at 10/30/14 1425      IMPRESSION AND PLAN:  Mr. Manzo is a 62-year-old gentleman who presented to the ER recently with shortness of breath and chest pain.  He was found to be in high-degree AV block and underwent permanent pacemaker implantation.  During the study, it was noted that his RV had poor electrical properties.  This raised a  question about infiltrative cardiomyopathy and cardiac MRI has now been recommended.  We will schedule this as he is about 2 months out from is MRI-compatible device.  Additionally, echocardiogram in the hospital showed a mild cardiomyopathy with just some RV and LV dysfunction.  This will further be evaluated with an MRI.  He had a nuclear stress test in 02/2016 that was without ischemia.        We will continue to follow his Device Clinic checks closely for his burden of atrial fibrillation as it was rather low with only that 9-minute episode that was rate controlled.  He is on an aspirin daily and his CHADS2-VASc score is 1 for hypertension alone.  If he has anymore prolonged episodes, we will need to consider anticoagulation.        It was my pleasure participating in the care of Mr. Manzo in clinic today.  Further recommendations will be pending the results of the MRI.      Again, thank you for allowing me to participate in the care of your patient.      Sincerely,    Priscila López, NICKOLAS, APRN CNP     [facility]

## 2017-08-28 ENCOUNTER — DOCUMENTATION ONLY (OUTPATIENT)
Dept: CARDIOLOGY | Facility: CLINIC | Age: 62
End: 2017-08-28

## 2017-09-13 ENCOUNTER — HOSPITAL ENCOUNTER (OUTPATIENT)
Dept: CARDIOLOGY | Facility: CLINIC | Age: 62
Discharge: HOME | End: 2017-09-13
Attending: NURSE PRACTITIONER | Admitting: NURSE PRACTITIONER
Payer: COMMERCIAL

## 2017-09-13 VITALS — SYSTOLIC BLOOD PRESSURE: 118 MMHG | OXYGEN SATURATION: 99 % | DIASTOLIC BLOOD PRESSURE: 70 MMHG | HEART RATE: 60 BPM

## 2017-09-13 DIAGNOSIS — I42.9 CARDIOMYOPATHY, UNSPECIFIED (H): ICD-10-CM

## 2017-09-13 DIAGNOSIS — I44.30 HEART BLOCK, AV: ICD-10-CM

## 2017-09-13 PROCEDURE — 75561 CARDIAC MRI FOR MORPH W/DYE: CPT

## 2017-09-13 PROCEDURE — A9585 GADOBUTROL INJECTION: HCPCS | Performed by: INTERNAL MEDICINE

## 2017-09-13 PROCEDURE — 25000128 H RX IP 250 OP 636: Performed by: INTERNAL MEDICINE

## 2017-09-13 PROCEDURE — 75561 CARDIAC MRI FOR MORPH W/DYE: CPT | Mod: 26 | Performed by: INTERNAL MEDICINE

## 2017-09-13 RX ORDER — DIAZEPAM 5 MG
5 TABLET ORAL EVERY 30 MIN PRN
Status: DISCONTINUED | OUTPATIENT
Start: 2017-09-13 | End: 2017-09-14 | Stop reason: HOSPADM

## 2017-09-13 RX ORDER — DIPHENHYDRAMINE HYDROCHLORIDE 50 MG/ML
25-50 INJECTION INTRAMUSCULAR; INTRAVENOUS
Status: DISCONTINUED | OUTPATIENT
Start: 2017-09-13 | End: 2017-09-14 | Stop reason: HOSPADM

## 2017-09-13 RX ORDER — ONDANSETRON 2 MG/ML
4 INJECTION INTRAMUSCULAR; INTRAVENOUS
Status: DISCONTINUED | OUTPATIENT
Start: 2017-09-13 | End: 2017-09-14 | Stop reason: HOSPADM

## 2017-09-13 RX ORDER — METHYLPREDNISOLONE SODIUM SUCCINATE 125 MG/2ML
125 INJECTION, POWDER, LYOPHILIZED, FOR SOLUTION INTRAMUSCULAR; INTRAVENOUS
Status: DISCONTINUED | OUTPATIENT
Start: 2017-09-13 | End: 2017-09-14 | Stop reason: HOSPADM

## 2017-09-13 RX ORDER — ACYCLOVIR 200 MG/1
0-1 CAPSULE ORAL
Status: DISCONTINUED | OUTPATIENT
Start: 2017-09-13 | End: 2017-09-14 | Stop reason: HOSPADM

## 2017-09-13 RX ORDER — DIPHENHYDRAMINE HCL 25 MG
25 CAPSULE ORAL
Status: DISCONTINUED | OUTPATIENT
Start: 2017-09-13 | End: 2017-09-14 | Stop reason: HOSPADM

## 2017-09-13 RX ORDER — GADOBUTROL 604.72 MG/ML
5-65 INJECTION INTRAVENOUS ONCE
Status: COMPLETED | OUTPATIENT
Start: 2017-09-13 | End: 2017-09-13

## 2017-09-13 RX ADMIN — GADOBUTROL 15 ML: 604.72 INJECTION INTRAVENOUS at 16:11

## 2017-09-21 ENCOUNTER — TELEPHONE (OUTPATIENT)
Dept: CARDIOLOGY | Facility: CLINIC | Age: 62
End: 2017-09-21

## 2017-09-21 DIAGNOSIS — I42.9 CARDIOMYOPATHY, UNSPECIFIED (H): Primary | ICD-10-CM

## 2017-09-21 NOTE — TELEPHONE ENCOUNTER
Patient called to request results of MR myocardium w/contrast.  Will message Priscila Ivey, KIAN to review results and will get back to patient.  CUCO Kelly

## 2017-09-22 NOTE — TELEPHONE ENCOUNTER
MRI looks ok.  No evidence for cardiac infiltrative disease.  LVEF low normal at 51%.  Dr. Benavidez reviewed study and suggests echo and follow up with him in 1 year.  Thanks! -KJ

## 2017-09-26 NOTE — TELEPHONE ENCOUNTER
Spoke with patient regarding results of MRI.  No evidence for cardiac infiltrative disease.  Instructed patient to follow up with Dr Benavidez next August and do ECHO prior to appt.  Orders placed in epic.  CUCO Kelly

## 2017-11-15 ENCOUNTER — TELEPHONE (OUTPATIENT)
Dept: CARDIOLOGY | Facility: CLINIC | Age: 62
End: 2017-11-15

## 2017-11-15 NOTE — TELEPHONE ENCOUNTER
Antibiotic prophylaxis is not routinely recommended in patients with pacemakers/ICDs, unless there are OTHER high-risk conditions present (none of these is present in Rui Manzo).    DI

## 2017-11-15 NOTE — LETTER
November 15, 2017    RE:  Arias Manzo 1955  [address] [facility] [id number]          To whom it may concern:    Antibiotic prophylaxis is not routinely recommended in patients with pacemakers/ICDs, unless there are OTHER high-risk conditions present. Arias Manzo does not have any other high-risk conditions, so there is no reason for antibiotics prior to dental treatment.     Should you have any questions, please call [phone].      Sincerely,    Dr. Miley Benavidez    [facility]

## 2017-11-15 NOTE — TELEPHONE ENCOUNTER
Pt called in, he was supposed to see the dentist for a routine cleaning, but they said because of his pacemaker he either needs antibiotic prophylaxis, or a note from his cardiologist saying antibiotics are unnecessary. Dual chamber PPM was implanted in July 2017 for AVB. Pt has no dental procedures needed, just a routine cleaning.      Pt would like this reviewed with Dr. Benavidez. He said if no antibiotics are necessary, he would like the letter mailed to him so he can keep a copy and give a copy to his dentist. Will review with Dr. Benavidez.

## 2017-11-15 NOTE — TELEPHONE ENCOUNTER
Letter written. Called pt, he requests 2 copies to be mailed to his home address. Verified address on file and mailed pt 2 copies of letter.

## 2017-11-27 ENCOUNTER — ALLIED HEALTH/NURSE VISIT (OUTPATIENT)
Dept: CARDIOLOGY | Facility: CLINIC | Age: 62
End: 2017-11-27
Payer: COMMERCIAL

## 2017-11-27 DIAGNOSIS — Z95.0 CARDIAC PACEMAKER IN SITU: Primary | ICD-10-CM

## 2017-11-27 PROCEDURE — 93296 REM INTERROG EVL PM/IDS: CPT | Performed by: INTERNAL MEDICINE

## 2017-11-27 PROCEDURE — 93294 REM INTERROG EVL PM/LDLS PM: CPT | Performed by: INTERNAL MEDICINE

## 2017-11-27 NOTE — PROGRESS NOTES
[employer] (D) Remote PPM Device Check  AP: 21 % : 21 %  Mode: DDD        Presenting Rhythm: AS/VS, rate 60bpm  Heart Rate: Adequate rates per histogram  Sensing: Stable    Pacing Threshold: Stable    Impedance: Stable  Battery Status: 9-11.5 years  Atrial Arrhythmia: 1 brief mode switch episode, no EGM.  Ventricular Arrhythmia: None     Care Plan: F/u PPM [facility] q 3 months. LM with results. RosarioCVT

## 2017-11-27 NOTE — MR AVS SNAPSHOT
"              After Visit Summary   11/27/2017    Arias Manzo    MRN: [id number]           Patient Information     Date Of Birth          1955        Visit Information        Provider Department      11/27/2017 9:30 AM SINDI ASHTON [facility]        Today's Diagnoses     Cardiac pacemaker in situ    -  1       Follow-ups after your visit        Your next 10 appointments already scheduled     Mar 05, 2018  2:15 PM CST   Remote PPM Check with DELONG TECH1   [facility] ([facility])    [address] [place], [place] [address]  [facility] [address] [phone]           This appointment is for a remote check of your pacemaker.  This is not an appointment at the office.              Who to contact     If you have questions or need follow up information about today's clinic visit or your schedule please contact [facility] directly at [phone].  Normal or non-critical lab and imaging results will be communicated to you by [employer], letter or phone within 4 business days after the clinic has received the results. If you do not hear from us within 7 days, please contact the clinic through [employer] or phone. If you have a critical or abnormal lab result, we will notify you by phone as soon as possible.  Submit refill requests through Dop[employer] or call your pharmacy and they will forward the refill request to us. Please allow 3 business days for your refill to be completed.          Additional Information About Your Visit        MyChart Information     Dopios lets you send messages to your doctor, view your test results, renew your prescriptions, schedule appointments and more. To sign up, go to [email or web address] . Click on \"Log in\" on the left side of the screen, which will take you to the Welcome page. Then click on \"Sign up Now\" on the right side of the page.     You will be asked to enter the " access code listed below, as well as some personal information. Please follow the directions to create your username and password.     Your access code is: [email or web address]  Expires: 2018 10:39 AM     Your access code will  in 90 days. If you need help or a new code, please call your [place] clinic or [phone].        Care EveryWhere ID     This is your Care EveryWhere ID. This could be used by other organizations to access your [place] medical records  [id number]         Blood Pressure from Last 3 Encounters:   17 118/70   17 132/84   17 (!) 173/104    Weight from Last 3 Encounters:   17 111 kg (244 lb 12.8 oz)   17 108 kg (238 lb)   17 108.9 kg (240 lb)              We Performed the Following     INTERROGATION DEVICE EVAL REMOTE, PACER/ICD (95456)     PM DEVICE INTERROGATE REMOTE (43003)        Primary Care Provider Office Phone # Fax #    Elisa Dougherty [phone]       [facility] [address] [place] [address]        Equal Access to Services     STEPHEN ESPINOZA AH: Hadii aad ku hadasho Soomaali, waaxda luqadaha, qaybta kaalmada adeegyada, waxay idiin hayaan adeeg kharareal lamatti deshpande. So [facility] [phone].    ATENCIÓN: Si habla español, tiene a hansen disposición servicios gratuitos de asistencia lingüística. Llame al [phone].    We comply with applicable federal civil rights laws and [place] laws. We do not discriminate on the basis of race, color, national origin, age, disability, sex, sexual orientation, or gender identity.            Thank you!     Thank you for choosing [facility]ART [facility]  for your care. Our goal is always to provide you with excellent care. Hearing back from our patients is one way we can continue to improve our services. Please take a few minutes to complete the written survey that you may receive in the mail after your visit with us. Thank you!             Your Updated  Medication List - Protect others around you: Learn how to safely use, store and throw away your medicines at www.disposemymeds.org.          This list is accurate as of: 11/27/17 10:39 AM.  Always use your most recent med list.                   Brand Name Dispense Instructions for use Diagnosis    aspirin EC 81 MG EC tablet      Take 1 tablet (81 mg) by mouth daily    Cardiomyopathy, unspecified       diazepam 10 MG tablet    VALIUM    2 tablet    Take one pill one hour prior to procedure.  May be repeated x1 one hour later for effect.    Claustrophobia       lisinopril-hydrochlorothiazide 20-25 MG per tablet    PRINZIDE/ZESTORETIC     Take 1 tablet by mouth daily        ZOCOR PO      Take 20 mg by mouth At Bedtime        ZOLOFT PO      Take 100 mg by mouth daily

## 2017-12-21 ENCOUNTER — ALLIED HEALTH/NURSE VISIT (OUTPATIENT)
Dept: CARDIOLOGY | Facility: CLINIC | Age: 62
End: 2017-12-21
Payer: COMMERCIAL

## 2017-12-21 DIAGNOSIS — Z95.0 CARDIAC PACEMAKER IN SITU: Primary | ICD-10-CM

## 2017-12-21 NOTE — MR AVS SNAPSHOT
After Visit Summary   12/21/2017    Arias Manzo    MRN: [id number]           Patient Information     Date Of Birth          1955        Visit Information        Provider Department      12/21/2017 1:30 PM DELONG [address]        Today's Diagnoses     Cardiac pacemaker in situ    -  1       Follow-ups after your visit        Your next 10 appointments already scheduled     Dec 21, 2017  1:30 PM CST   Remote PPM Check with DELONG TECH1   [facility] ([facility])    [address] [id number] [phone]           This appointment is for a remote check of your pacemaker.  This is not an appointment at the office.            Mar 05, 2018  2:15 PM CST   Remote PPM Check with DELONG TECH1   [facility] ([facility])    [address] [id number] [phone]           This appointment is for a remote check of your pacemaker.  This is not an appointment at the office.              Who to contact     If you have questions or need follow up information about today's clinic visit or your schedule please contact [facility] directly at [phone].  Normal or non-critical lab and imaging results will be communicated to you by [employer], letter or phone within 4 business days after the clinic has received the results. If you do not hear from us within 7 days, please contact the clinic through [employer] or phone. If you have a critical or abnormal lab result, we will notify you by phone as soon as possible.  Submit refill requests through Atrent[employer] or call your pharmacy and they will forward the refill request to us. Please allow 3 business days for your refill to be completed.          Additional Information About Your Visit        Atrenta Information     Atrenta lets you send messages to  "your doctor, view your test results, renew your prescriptions, schedule appointments and more. To sign up, go to [email or web address] . Click on \"Log in\" on the left side of the screen, which will take you to the Welcome page. Then click on \"Sign up Now\" on the right side of the page.     You will be asked to enter the access code listed below, as well as some personal information. Please follow the directions to create your username and password.     Your access code is: [email or web address]  Expires: 2018 10:39 AM     Your access code will  in 90 days. If you need help or a new code, please call your [place] clinic or [phone].        Care EveryWhere ID     This is your Care EveryWhere ID. This could be used by other organizations to access your [place] medical records  [id number]         Blood Pressure from Last 3 Encounters:   17 118/70   17 132/84   17 (!) 173/104    Weight from Last 3 Encounters:   17 111 kg (244 lb 12.8 oz)   17 108 kg (238 lb)   17 108.9 kg (240 lb)              Today, you had the following     No orders found for display       Primary Care Provider Office Phone # Fax #    Elisa Dougherty [phone]       [facility] [address] [place] [address]        Equal Access to Services     [facility] Hospital[facility]YL[facility] AH: Hadii aad ku hadasho Soomaali, waaxda luqadaha, qaybta kaalmada adeegyada, waxay meryl gruber . So [facility] [phone].    ATENCIÓN: Si habla español, tiene a hansen disposición servicios gratuitos de asistencia lingüística. Llame al [phone].    We comply with applicable federal civil rights laws and [place] laws. We do not discriminate on the basis of race, color, national origin, age, disability, sex, sexual orientation, or gender identity.            Thank you!     Thank you for choosing [facility]ART [facility]   [facility]  for your care. Our goal is always to provide " you with excellent care. Hearing back from our patients is one way we can continue to improve our services. Please take a few minutes to complete the written survey that you may receive in the mail after your visit with us. Thank you!             Your Updated Medication List - Protect others around you: Learn how to safely use, store and throw away your medicines at www.disposemymeds.org.          This list is accurate as of: 12/21/17  9:55 AM.  Always use your most recent med list.                   Brand Name Dispense Instructions for use Diagnosis    aspirin EC 81 MG EC tablet      Take 1 tablet (81 mg) by mouth daily    Cardiomyopathy, unspecified       diazepam 10 MG tablet    VALIUM    2 tablet    Take one pill one hour prior to procedure.  May be repeated x1 one hour later for effect.    Claustrophobia       lisinopril-hydrochlorothiazide 20-25 MG per tablet    PRINZIDE/ZESTORETIC     Take 1 tablet by mouth daily        ZOCOR PO      Take 20 mg by mouth At Bedtime        ZOLOFT PO      Take 100 mg by mouth daily

## 2017-12-21 NOTE — PROGRESS NOTES
"Courtesy Check    Patient sent transmission due to feeling some pain near pacemaker site and feeling some \"jolts.\"    Presenting Rhythm shows AS/VS, rates 64-66bpm. Leads stable. No episodes detected. Patient no longer has pain. Instructed patient to let us know if it happens again. E.Quarles,CVT  "

## 2018-01-04 ENCOUNTER — DOCUMENTATION ONLY (OUTPATIENT)
Dept: CARDIOLOGY | Facility: CLINIC | Age: 63
End: 2018-01-04

## 2018-01-04 DIAGNOSIS — R06.09 DYSPNEA ON EXERTION: Primary | ICD-10-CM

## 2018-01-04 NOTE — PROGRESS NOTES
I spoke with Dr. Dougherty earlier today.  Mr. AGUILAR will be having lap alexandra- next week.  He is doing well overall but does c/o GARCIA; no CP.  OK to proceed with surgery next week.  I have requested a f/up with me in 3-4 weeks to discuss his GARCIA.

## 2018-01-31 ENCOUNTER — TELEPHONE (OUTPATIENT)
Dept: CARDIOLOGY | Facility: CLINIC | Age: 63
End: 2018-01-31

## 2018-01-31 NOTE — TELEPHONE ENCOUNTER
Patient calling in to report that his heart rate has been elevated throughout the day at 110-135 BPM. He is otherwise not that symptomatic. He has history of a one time episode of PAF last year, not on AC. Requesting to send remote to check for arrhythmias. He will send tonight when he gets home from work. CUCO Michael

## 2018-02-01 NOTE — TELEPHONE ENCOUNTER
"Reviewed Carelink transmission. Rhythm at time of transmission showed AS/ at 98 BPM. There were no Mode switches nor HVR detected (MS rate set at 171 BPM and VT monitor zone set at 150 BPM); 9% A-paced; 8 % V-paced; programmed DDD 55. History of First degree AVB and intermittent second degree. AV delays set at 180/150 ms.   Patient states he usually has a slow HR and was concerned that it was elevated yesterday. He reports it started when he was \"sweeping\" the snow off his driveway with a broom. Felt SOB, saw his fitbit showed HR at 133 BPM. States it stayed elevated in the 100's \"most of the day\" with SOB and left sided CP. . Also reports a few days ago of left arm tingling down to fingertips which has since subsided. He is seeing Dr Benavidez next week for regular follow up. Nothing concerning seen on transmission. CUCO Michael  "

## 2018-02-15 ENCOUNTER — OFFICE VISIT (OUTPATIENT)
Dept: CARDIOLOGY | Facility: CLINIC | Age: 63
End: 2018-02-15
Attending: INTERNAL MEDICINE
Payer: COMMERCIAL

## 2018-02-15 VITALS
HEIGHT: 71 IN | DIASTOLIC BLOOD PRESSURE: 74 MMHG | BODY MASS INDEX: 34.44 KG/M2 | HEART RATE: 64 BPM | SYSTOLIC BLOOD PRESSURE: 138 MMHG | WEIGHT: 246 LBS

## 2018-02-15 DIAGNOSIS — I44.30 HEART BLOCK, AV: Primary | ICD-10-CM

## 2018-02-15 DIAGNOSIS — R06.09 DYSPNEA ON EXERTION: ICD-10-CM

## 2018-02-15 PROCEDURE — 99214 OFFICE O/P EST MOD 30 MIN: CPT | Performed by: INTERNAL MEDICINE

## 2018-02-15 PROCEDURE — 93000 ELECTROCARDIOGRAM COMPLETE: CPT | Performed by: INTERNAL MEDICINE

## 2018-02-15 NOTE — LETTER
2/15/2018      Leann G Hutchison, MD [facility] [place] [address] [place] [address]      RE: Arias Manzo       Dear Colleague,    I had the pleasure of seeing Arias Manzo in the [facility].    Service Date: 02/15/2018      HISTORY OF PRESENT ILLNESS:    It was my pleasure seeing Mr. Arias Manzo in follow-up of symptomatic 2:1 AV block with pacemaker implantation (07/03/2017), hypertension, obstructive sleep apnea, dyslipidemia, lumbar spine disease.  I was involved in his pacemaker implantation.  During the procedure, he had somewhat poor RV sensing and pacing properties, raising the question of a myocardial infiltrative process.  Later on he had a cardiac MRI which showed LVEF of 51%, with no evidence of infiltrative myocardial disease.      His most significant concern is dyspnea on exertion.  For example, going up 2 flights of stairs causes significant shortness of breath.  He does not have distinct chest pressure with this.  However, he can be uncomfortable for up to 15 minutes.      A couple of months ago he had an episode of tachycardia according to his Fitbit.  This was after rather modest exertion.  His heart rate was in the 140s and stayed there for a while.  Interrogation of his pacemaker after this event failed to show any atrial arrhythmias such as atrial fibrillation or atrial flutter.  It is unclear what caused this.  He has not had a recurrence.      PHYSICAL EXAMINATION:   VITAL SIGNS:  Blood pressure 138/74, pulse 64 and regular, weight 111 kg, height 180 cm.   GENERAL:  He is a pleasant gentleman who is moderately overweight, in no apparent distress.  I went for a walk with him and together we went up 3 flights of stairs.  He started with a heart rate of 80 beats per minute and after this exertion it went up to 112 beats per minute.  His room air saturation remained stable around 97%.  He appeared mildly short of breath.  NECK:   Thick, supple, without bruits.   LUNGS:  Clear.   CARDIOVASCULAR:  Regular rhythm, no gallop, murmur or rub.   ABDOMEN:  Soft, nontender, mildly obese.   EXTREMITIES:  No edema.     DIAGNOSTIC STUDIES:  ECG today shows sinus rhythm with RBBB and left anterior fascicular block.      IMPRESSION:   1.  Intermittent second-degree AV block.  His pacemaker is well functioning.  His incision has healed well.  Continue routine follow-up in the Device Clinic.   2.  Dyspnea on exertion.  No anginal features.  However, he is male with dyslipidemia and hypertension.  Given his risk factors, I would investigate further for myocardial ischemia.  He has chronotropic incompetence (he told me that even years ago his heart rate would never go above 118 beats per minute) and because of this as well as his underlying bundle branch block, I have ordered a Lexiscan nuclear stress test.      RECOMMENDATIONS:   A.  Lexiscan nuclear stress test.   B.  If the test is normal, we would see him in the clinic every 2 years.   C.  His LVEF is 51%, low normal, and we will keep an eye on it periodically with echocardiography.   D.  Routine Device Clinic visits.        It was my pleasure seeing Mr. Christian.  Time spent 25 minutes, greater than 50% of the time spent in discussion.        TREV JOHNSON MD, FACC         cc:   Elisa Dougherty MD    [facility] [place] [address] [place] [address]             D: 02/15/2018   T: 02/15/2018   MT: TIAN      Name:     JOHN CHRISTIAN   MRN:      [id number]        Account:      [id number]   :      1955           Service Date: 02/15/2018      Document: [id number]           Outpatient Encounter Prescriptions as of 2/15/2018   Medication Sig Dispense Refill     lisinopril-hydrochlorothiazide (PRINZIDE,ZESTORETIC) 20-25 MG per tablet Take 1 tablet by mouth daily       Sertraline HCl (ZOLOFT PO) Take 100 mg by mouth daily       Simvastatin (ZOCOR PO) Take 20 mg by  mouth At Bedtime       aspirin EC 81 MG EC tablet Take 1 tablet (81 mg) by mouth daily (Patient not taking: Reported on 2/15/2018)       [DISCONTINUED] diazepam (VALIUM) 10 MG tablet Take one pill one hour prior to procedure.  May be repeated x1 one hour later for effect. 2 tablet 0     Facility-Administered Encounter Medications as of 2/15/2018   Medication Dose Route Frequency Provider Last Rate Last Dose     neostigmine (PROSTIGMINE) injection   Intravenous PRN Marietta Mehta APRN CRNA   4 mg at 10/30/14 1429     ondansetron (ZOFRAN) injection   Intravenous PRN Marietta Mehta, APRN CRNA   4 mg at 10/30/14 1425       Again, thank you for allowing me to participate in the care of your patient.      Sincerely,    Miley Benavidez MD     [facility]

## 2018-02-15 NOTE — MR AVS SNAPSHOT
After Visit Summary   2/15/2018    Arias Manzo    MRN: [id number]           Patient Information     Date Of Birth          1955        Visit Information        Provider Department      2/15/2018 2:45 PM [facility]        Today's Diagnoses     Heart block, AV    -  1    Dyspnea on exertion           Follow-ups after your visit        Additional Services     Follow-Up with Cardiac Advanced Practice Provider                 Your next 10 appointments already scheduled     Feb 19, 2018 11:00 AM CST   (Arrive by 10:45 AM)   NM MPI WITH LEXISCAN with RHNM1   [facility] ([facility])    [address]  [facility] [id number] [phone]           For a ONE day exam: Allow 3-4 hours for test. For a TWO day exam: Allow  minutes PER day for test.  On the day of your resting scan: Please stop eating 3 hours before the test. You may drink water or juice.  On the day of your stress test: Stop all caffeine 12 hours before the test. This includes coffee, tea, soda pop, chocolate and certain medicines (such as Anacin, Excedrin and NoDoz). Also avoid decaf coffee and tea, as these contain small amounts of caffeine.  Stop eating 3 hours before the test. You may drink water.  You may need to stop some medicines before the test. Follow your doctor s orders. - If you take a beta blocker: Do not take your beta-blocker on the day before your test, unless specifically told to by your doctor. And do not take it on the day of your test. Bring it with you to take after the test. - If you take Aggrenox or dipyridamole (Persantine, Permole), stop taking it 48 hours before your test. - If you take Viagra, Cialis or Levitra, stop taking it 48 hours before your test. - If you take theophylline or aminophylline, stop taking it 12 hours before your test.  Do not take nitrates on the day of your test. Do  not wear your Nitro-Patch.  Please wear a loose two-piece outfit. If you will have an exercise test, bring rubber-soled walking shoes.  When you arrive, please tell us if you have a pacemaker or ICD (implantable defibrillator).  Please call your Imaging Department at your exam site with any questions.            Feb 19, 2018 12:30 PM CST   Ekg Stress Nm Lexiscan with RESRM3   [facility] ([facility])    [address]  [facility] [id number] [phone]            Mar 05, 2018  2:15 PM CST   Remote PPM Check with DELONG TECH1   [facility]   [place] ([facility])    [address] [facility] [address]  [facility] [address] [phone]           This appointment is for a remote check of your pacemaker.  This is not an appointment at the office.              Future tests that were ordered for you today     Open Future Orders        Priority Expected Expires Ordered    Follow-Up with Cardiac Advanced Practice Provider Routine 2/19/2020 6/30/2020 2/15/2018    NM Lexiscan stress test (nuc card) Routine 2/22/2018 2/15/2019 2/15/2018            Who to contact     If you have questions or need follow up information about today's clinic visit or your schedule please contact [facility] directly at [phone].  Normal or non-critical lab and imaging results will be communicated to you by MyChart, letter or phone within 4 business days after the clinic has received the results. If you do not hear from us within 7 days, please contact the clinic through [employer] or phone. If you have a critical or abnormal lab result, we will notify you by phone as soon as possible.  Submit refill requests through [employer] or call your pharmacy and they will forward the refill request to us. Please allow 3 business days for your refill to be completed.          Additional Information About Your Visit        MyChart Information      "[employer] lets you send messages to your doctor, view your test results, renew your prescriptions, schedule appointments and more. To sign up, go to [email or web address] . Click on \"Log in\" on the left side of the screen, which will take you to the Welcome page. Then click on \"Sign up Now\" on the right side of the page.     You will be asked to enter the access code listed below, as well as some personal information. Please follow the directions to create your username and password.     Your access code is: [email or web address]  Expires: 2018 10:39 AM     Your access code will  in 90 days. If you need help or a new code, please call your [place] clinic or [phone].        Care EveryWhere ID     This is your Care EveryWhere ID. This could be used by other organizations to access your [place] medical records  [id number]        Your Vitals Were     Pulse Height BMI (Body Mass Index)             64 1.803 m (5' 10.98\") 34.33 kg/m2          Blood Pressure from Last 3 Encounters:   02/15/18 138/74   17 118/70   17 132/84    Weight from Last 3 Encounters:   02/15/18 111.6 kg (246 lb)   17 111 kg (244 lb 12.8 oz)   17 108 kg (238 lb)              We Performed the Following     EKG 12-lead complete w/read - Clinics (performed today)     Follow-Up with Electrophysiologist        Primary Care Provider Office Phone # Fax #    Elisa Dougherty [phone]       [facility] [address] [facility] [place] [address]        Equal Access to Services     [facility] Hospital[facility] : Jameel Forrest, walatoya deleon, alphonso castelanaluvaldo segal, juan f deshpande. So [facility] [phone].    ATENCIÓN: Si habla español, tiene a hansen disposición servicios gratuitos de asistencia lingüística. Llame al [phone].    We comply with applicable federal civil rights laws and [place] laws. We do not discriminate on the basis of race, color, national origin, " age, disability, sex, sexual orientation, or gender identity.            Thank you!     Thank you for choosing [facility]  for your care. Our goal is always to provide you with excellent care. Hearing back from our patients is one way we can continue to improve our services. Please take a few minutes to complete the written survey that you may receive in the mail after your visit with us. Thank you!             Your Updated Medication List - Protect others around you: Learn how to safely use, store and throw away your medicines at www.disposemymeds.org.          This list is accurate as of 2/15/18  3:19 PM.  Always use your most recent med list.                   Brand Name Dispense Instructions for use Diagnosis    aspirin EC 81 MG EC tablet      Take 1 tablet (81 mg) by mouth daily    Cardiomyopathy, unspecified       lisinopril-hydrochlorothiazide 20-25 MG per tablet    PRINZIDE/ZESTORETIC     Take 1 tablet by mouth daily        ZOCOR PO      Take 20 mg by mouth At Bedtime        ZOLOFT PO      Take 100 mg by mouth daily

## 2018-02-15 NOTE — PROGRESS NOTES
HPI and Plan:   See dictation    Orders Placed This Encounter   Procedures     NM Lexiscan stress test (nuc card)     Follow-Up with Cardiac Advanced Practice Provider     EKG 12-lead complete w/read - Clinics (performed today)       No orders of the defined types were placed in this encounter.      Medications Discontinued During This Encounter   Medication Reason     diazepam (VALIUM) 10 MG tablet Therapy completed         Encounter Diagnoses   Name Primary?     Dyspnea on exertion      Heart block, AV Yes       CURRENT MEDICATIONS:  Current Outpatient Prescriptions   Medication Sig Dispense Refill     lisinopril-hydrochlorothiazide (PRINZIDE,ZESTORETIC) 20-25 MG per tablet Take 1 tablet by mouth daily       Sertraline HCl (ZOLOFT PO) Take 100 mg by mouth daily       Simvastatin (ZOCOR PO) Take 20 mg by mouth At Bedtime       aspirin EC 81 MG EC tablet Take 1 tablet (81 mg) by mouth daily (Patient not taking: Reported on 2/15/2018)         ALLERGIES     Allergies   Allergen Reactions     Flexeril [Cyclobenzaprine] GI Disturbance     Naprosyn [Naproxen] GI Disturbance     Penicillins      As child     Ranitidine GI Disturbance       PAST MEDICAL HISTORY:  Past Medical History:   Diagnosis Date     CPAP (continuous positive airway pressure) dependence      Depression      Gastro-oesophageal reflux disease      Hepatic steatosis      Hoarseness      Hyperlipidemia      Hypertension      IFG (impaired fasting glucose)      LAFB (left anterior fascicular block)      PONV (postoperative nausea and vomiting)      RBBB      Seizures (H)     as child     Sleep apnea     cpap     Uncomplicated asthma     denies asthma       PAST SURGICAL HISTORY:  Past Surgical History:   Procedure Laterality Date     ANKLE SURGERY       APPENDECTOMY       BACK SURGERY      lami x2     ENT SURGERY      throat granulomas     HERNIA REPAIR       INJECT BOTOX N/A 10/30/2014    Procedure: INJECT BOTOX;  Surgeon: Kalyn Negron MD;   [facility]     INJECT BOTOX N/A 12/8/2016    Procedure: INJECT BOTOX;  Surgeon: Kalyn Negron MD;  [facility]     INJECT STEROID (LOCATION) N/A 12/8/2016    Procedure: INJECT STEROID (LOCATION);  Surgeon: Kalyn Negron MD;  [facility]     LAMINECTOMY LUMBAR ONE LEVEL  3/5/2014    Procedure: LAMINECTOMY LUMBAR ONE LEVEL;  RIGHT L4-5 DISCECTOMY;  Surgeon: Rodrigo Ríos MD;  Location: [facility] OR     LASER CO2 LARYNGOSCOPY N/A 12/8/2016    Procedure: LASER CO2 LARYNGOSCOPY;  Surgeon: Kalyn Negron MD;  [facility]     LASER CO2 LARYNGOSCOPY, COMPLEX Left 10/30/2014    Procedure: LASER CO2 LARYNGOSCOPY, COMPLEX;  Surgeon: Kalyn Negron MD;  Location[facility] OR     ORTHOPEDIC SURGERY         FAMILY HISTORY:  Family History   Problem Relation Age of Onset     CANCER Father        SOCIAL HISTORY:  Social History     Social History     Marital status:      Spouse name: N/A     Number of children: N/A     Years of education: N/A     Social History Main Topics     Smoking status: Former Smoker     Years: 10.00     Types: Pipe     Start date: 10/10/1976     Quit date: 5/25/1985     Smokeless tobacco: Never Used     Alcohol use Yes      Comment: daily- 2 drinks /day     Drug use: No     Sexual activity: Yes     Partners: Female     Birth control/ protection: Other     Other Topics Concern     None     Social History Narrative       Review of Systems:  Skin:  Negative       Eyes:  Positive for glasses    ENT:  Negative      Respiratory:  Positive for dyspnea on exertion;sleep apnea;CPAP     Cardiovascular:  Negative palpitations;Positive for    Gastroenterology: Negative      Genitourinary:  not assessed      Musculoskeletal:  Positive for back pain;neck pain    Neurologic:  Negative      Psychiatric:  Positive for sleep disturbances    Heme/Lymph/Imm:  Negative      Endocrine:  Negative        [id number]

## 2018-02-15 NOTE — LETTER
2/15/2018    Leann G Hutchison, MD [facility] [place] [address] [place] [address]    RE: Arias Manzo       Dear Colleague,    I had the pleasure of seeing Arias Manzo in the [facility].    HPI and Plan:   See dictation    Orders Placed This Encounter   Procedures     NM Lexiscan stress test (nuc card)     Follow-Up with Cardiac Advanced Practice Provider     EKG 12-lead complete w/read - Clinics (performed today)       No orders of the defined types were placed in this encounter.      Medications Discontinued During This Encounter   Medication Reason     diazepam (VALIUM) 10 MG tablet Therapy completed         Encounter Diagnoses   Name Primary?     Dyspnea on exertion      Heart block, AV Yes       CURRENT MEDICATIONS:  Current Outpatient Prescriptions   Medication Sig Dispense Refill     lisinopril-hydrochlorothiazide (PRINZIDE,ZESTORETIC) 20-25 MG per tablet Take 1 tablet by mouth daily       Sertraline HCl (ZOLOFT PO) Take 100 mg by mouth daily       Simvastatin (ZOCOR PO) Take 20 mg by mouth At Bedtime       aspirin EC 81 MG EC tablet Take 1 tablet (81 mg) by mouth daily (Patient not taking: Reported on 2/15/2018)         ALLERGIES     Allergies   Allergen Reactions     Flexeril [Cyclobenzaprine] GI Disturbance     Naprosyn [Naproxen] GI Disturbance     Penicillins      As child     Ranitidine GI Disturbance       PAST MEDICAL HISTORY:  Past Medical History:   Diagnosis Date     CPAP (continuous positive airway pressure) dependence      Depression      Gastro-oesophageal reflux disease      Hepatic steatosis      Hoarseness      Hyperlipidemia      Hypertension      IFG (impaired fasting glucose)      LAFB (left anterior fascicular block)      PONV (postoperative nausea and vomiting)      RBBB      Seizures (H)     as child     Sleep apnea     cpap     Uncomplicated asthma     denies asthma       PAST SURGICAL HISTORY:  Past Surgical History:    Procedure Laterality Date     ANKLE SURGERY       APPENDECTOMY       BACK SURGERY      lami x2     ENT SURGERY      throat granulomas     HERNIA REPAIR       INJECT BOTOX N/A 10/30/2014    Procedure: INJECT BOTOX;  Surgeon: Kalyn Negron MD;  [facility]     INJECT BOTOX N/A 12/8/2016    Procedure: INJECT BOTOX;  Surgeon: Kalyn Negron MD;  [facility]     INJECT STEROID (LOCATION) N/A 12/8/2016    Procedure: INJECT STEROID (LOCATION);  Surgeon: Kalyn Negron MD;  [facility]     LAMINECTOMY LUMBAR ONE LEVEL  3/5/2014    Procedure: LAMINECTOMY LUMBAR ONE LEVEL;  RIGHT L4-5 DISCECTOMY;  Surgeon: Rodrigo Ríos MD;  [facility]     LASER CO2 LARYNGOSCOPY N/A 12/8/2016    Procedure: LASER CO2 LARYNGOSCOPY;  Surgeon: Kalyn Negron MD;  [facility]     LASER CO2 LARYNGOSCOPY, COMPLEX Left 10/30/2014    Procedure: LASER CO2 LARYNGOSCOPY, COMPLEX;  Surgeon: Kalyn Negron MD;  [facility]     ORTHOPEDIC SURGERY         FAMILY HISTORY:  Family History   Problem Relation Age of Onset     CANCER Father        SOCIAL HISTORY:  Social History     Social History     Marital status:      Spouse name: N/A     Number of children: N/A     Years of education: N/A     Social History Main Topics     Smoking status: Former Smoker     Years: 10.00     Types: Pipe     Start date: 10/10/1976     Quit date: 5/25/1985     Smokeless tobacco: Never Used     Alcohol use Yes      Comment: daily- 2 drinks /day     Drug use: No     Sexual activity: Yes     Partners: Female     Birth control/ protection: Other     Other Topics Concern     None     Social History Narrative       Review of Systems:  Skin:  Negative       Eyes:  Positive for glasses    ENT:  Negative      Respiratory:  Positive for dyspnea on exertion;sleep apnea;CPAP     Cardiovascular:  Negative palpitations;Positive for    Gastroenterology: Negative      Genitourinary:  not assessed       Musculoskeletal:  Positive for back pain;neck pain    Neurologic:  Negative      Psychiatric:  Positive for sleep disturbances    Heme/Lymph/Imm:  Negative      Endocrine:  Negative        [id number]              Thank you for allowing me to participate in the care of your patient.      Sincerely,     Miley Benavidez MD     [facility]    cc:   Miley Benavidez MD  [address] [facility] [address]  [place] [address]

## 2018-02-16 NOTE — PROGRESS NOTES
Service Date: 02/15/2018      HISTORY OF PRESENT ILLNESS:    It was my pleasure seeing Mr. Arias Manzo in follow-up of symptomatic 2:1 AV block with pacemaker implantation (07/03/2017), hypertension, obstructive sleep apnea, dyslipidemia, lumbar spine disease.  I was involved in his pacemaker implantation.  During the procedure, he had somewhat poor RV sensing and pacing properties, raising the question of a myocardial infiltrative process.  Later on he had a cardiac MRI which showed LVEF of 51%, with no evidence of infiltrative myocardial disease.      His most significant concern is dyspnea on exertion.  For example, going up 2 flights of stairs causes significant shortness of breath.  He does not have distinct chest pressure with this.  However, he can be uncomfortable for up to 15 minutes.      A couple of months ago he had an episode of tachycardia according to his Fitbit.  This was after rather modest exertion.  His heart rate was in the 140s and stayed there for a while.  Interrogation of his pacemaker after this event failed to show any atrial arrhythmias such as atrial fibrillation or atrial flutter.  It is unclear what caused this.  He has not had a recurrence.      PHYSICAL EXAMINATION:   VITAL SIGNS:  Blood pressure 138/74, pulse 64 and regular, weight 111 kg, height 180 cm.   GENERAL:  He is a pleasant gentleman who is moderately overweight, in no apparent distress.  I went for a walk with him and together we went up 3 flights of stairs.  He started with a heart rate of 80 beats per minute and after this exertion it went up to 112 beats per minute.  His room air saturation remained stable around 97%.  He appeared mildly short of breath.  NECK:  Thick, supple, without bruits.   LUNGS:  Clear.   CARDIOVASCULAR:  Regular rhythm, no gallop, murmur or rub.   ABDOMEN:  Soft, nontender, mildly obese.   EXTREMITIES:  No edema.     DIAGNOSTIC STUDIES:  ECG today shows sinus rhythm with RBBB and left  anterior fascicular block.      IMPRESSION:   1.  Intermittent second-degree AV block.  His pacemaker is well functioning.  His incision has healed well.  Continue routine follow-up in the Device Clinic.   2.  Dyspnea on exertion.  No anginal features.  However, he is male with dyslipidemia and hypertension.  Given his risk factors, I would investigate further for myocardial ischemia.  He has chronotropic incompetence (he told me that even years ago his heart rate would never go above 118 beats per minute) and because of this as well as his underlying bundle branch block, I have ordered a Lexiscan nuclear stress test.      RECOMMENDATIONS:   A.  Lexiscan nuclear stress test.   B.  If the test is normal, we would see him in the clinic every 2 years.   C.  His LVEF is 51%, low normal, and we will keep an eye on it periodically with echocardiography.   D.  Routine Device Clinic visits.        It was my pleasure seeing Mr. Christian.  Time spent 25 minutes, greater than 50% of the time spent in discussion.        TREV JOHNSON MD, FACC         cc:   Elisa Dougherty MD    [facility] [place] [address] [place]  [address]             D: 02/15/2018   T: 02/15/2018   MT: TIAN      Name:     JOHN CHRISTIAN   MRN:      [id number]        Account:      [id number]   :      1955           Service Date: 02/15/2018      Document: [id number]

## 2018-02-19 ENCOUNTER — HOSPITAL ENCOUNTER (OUTPATIENT)
Dept: CARDIOLOGY | Facility: CLINIC | Age: 63
Discharge: HOME | End: 2018-02-19
Attending: INTERNAL MEDICINE | Admitting: INTERNAL MEDICINE
Payer: COMMERCIAL

## 2018-02-19 ENCOUNTER — HOSPITAL ENCOUNTER (OUTPATIENT)
Dept: NUCLEAR MEDICINE | Facility: CLINIC | Age: 63
Setting detail: NUCLEAR MEDICINE
End: 2018-02-19
Attending: INTERNAL MEDICINE
Payer: COMMERCIAL

## 2018-02-19 DIAGNOSIS — R06.09 DYSPNEA ON EXERTION: ICD-10-CM

## 2018-02-19 DIAGNOSIS — I44.30 HEART BLOCK, AV: ICD-10-CM

## 2018-02-19 PROCEDURE — 78452 HT MUSCLE IMAGE SPECT MULT: CPT | Mod: 26 | Performed by: INTERNAL MEDICINE

## 2018-02-19 PROCEDURE — 34300033 ZZH RX 343: Performed by: INTERNAL MEDICINE

## 2018-02-19 PROCEDURE — A9502 TC99M TETROFOSMIN: HCPCS | Performed by: INTERNAL MEDICINE

## 2018-02-19 PROCEDURE — 93016 CV STRESS TEST SUPVJ ONLY: CPT | Performed by: INTERNAL MEDICINE

## 2018-02-19 PROCEDURE — 78452 HT MUSCLE IMAGE SPECT MULT: CPT

## 2018-02-19 PROCEDURE — 93018 CV STRESS TEST I&R ONLY: CPT | Performed by: INTERNAL MEDICINE

## 2018-02-19 PROCEDURE — 93017 CV STRESS TEST TRACING ONLY: CPT

## 2018-02-19 PROCEDURE — 25000128 H RX IP 250 OP 636

## 2018-02-19 RX ORDER — REGADENOSON 0.08 MG/ML
INJECTION, SOLUTION INTRAVENOUS
Status: COMPLETED
Start: 2018-02-19 | End: 2018-02-19

## 2018-02-19 RX ADMIN — REGADENOSON 0.4 MG: 0.08 INJECTION, SOLUTION INTRAVENOUS at 12:23

## 2018-02-19 RX ADMIN — TETROFOSMIN 32.5 MCI.: 1.38 INJECTION, POWDER, LYOPHILIZED, FOR SOLUTION INTRAVENOUS at 12:15

## 2018-02-19 RX ADMIN — TETROFOSMIN 11 MCI.: 1.38 INJECTION, POWDER, LYOPHILIZED, FOR SOLUTION INTRAVENOUS at 10:59

## 2018-02-19 NOTE — PROGRESS NOTES
Pre-procedure:    Initial vital signs: /94, HR 75, RR 12  Allergies reviewed:    Rhythm:   Medications taken within 48 hours of procedure:    Last Caffeine:   Lung sounds: CTA, no wheezing, crackles or rtx  Health History (COPD, Asthma, etc):     Procedure: Lexiscan  Reaction/symptoms after receiving Cayla injection:   Intensity of Pain:   Rhythm:   1. Vital Signs:/85, HR 71, RR 14  2. Vital Signs:/90, HR 76, RR 12     Reversal agent:     Post:   Resolution of symptoms?: YES  Vital signs: /88, HR 75, RR 11  Vital signs: BP /, HR , RR   Rhythm:   Walk: NO  Comment:   Return to Radiology

## 2018-02-20 ENCOUNTER — TELEPHONE (OUTPATIENT)
Dept: CARDIOLOGY | Facility: CLINIC | Age: 63
End: 2018-02-20

## 2018-02-20 NOTE — TELEPHONE ENCOUNTER
Notes Recorded by Miley Benavidez MD on 2/20/2018 at 7:29 AM  Normal test, good news.   Please call.  Called and informed pt of stress test results and need to f/u every 2 years. Pt was pleased with results. Carter

## 2018-03-12 ENCOUNTER — ALLIED HEALTH/NURSE VISIT (OUTPATIENT)
Dept: CARDIOLOGY | Facility: CLINIC | Age: 63
End: 2018-03-12
Payer: COMMERCIAL

## 2018-03-12 DIAGNOSIS — Z95.0 CARDIAC PACEMAKER IN SITU: Primary | ICD-10-CM

## 2018-03-12 PROCEDURE — 93294 REM INTERROG EVL PM/LDLS PM: CPT | Performed by: INTERNAL MEDICINE

## 2018-03-12 PROCEDURE — 93296 REM INTERROG EVL PM/IDS: CPT | Performed by: INTERNAL MEDICINE

## 2018-03-12 NOTE — MR AVS SNAPSHOT
After Visit Summary   3/12/2018    Arias Manzo    MRN: [id number]           Patient Information     Date Of Birth          1955        Visit Information        Provider Department      3/12/2018 2:15 PM DELONG [address]        Today's Diagnoses     Cardiac pacemaker in situ    -  1       Follow-ups after your visit        Your next 10 appointments already scheduled     Mar 12, 2018  2:15 PM CDT   Remote PPM Check with DELONG TECH1   [facility] ([facility])    [address] [id number] [phone]           This appointment is for a remote check of your pacemaker.  This is not an appointment at the office.            Jun 18, 2018 10:30 AM CDT   Remote PPM Check with DELONG TECH[address] [place] [address] ([facility])    [id number]  [facility] [id number] [phone]           This appointment is for a remote check of your pacemaker.  This is not an appointment at the office.              Who to contact     If you have questions or need follow up information about today's clinic visit or your schedule please contact [facility] directly at [phone].  Normal or non-critical lab and imaging results will be communicated to you by "[employer]hart, letter or phone within 4 business days after the clinic has received the results. If you do not hear from us within 7 days, please contact the clinic through "[employer]hart or phone. If you have a critical or abnormal lab result, we will notify you by phone as soon as possible.  Submit refill requests through [employer] or call your pharmacy and they will forward the refill request to us. Please allow 3 business days for your refill to be completed.          Additional Information About Your Visit        [employer] Information     [employer] lets you send messages to  "your doctor, view your test results, renew your prescriptions, schedule appointments and more. To sign up, go to [email or web address] . Click on \"Log in\" on the left side of the screen, which will take you to the Welcome page. Then click on \"Sign up Now\" on the right side of the page.     You will be asked to enter the access code listed below, as well as some personal information. Please follow the directions to create your username and password.     Your access code is: [email or web address]  Expires: 6/10/2018  8:58 AM     Your access code will  in 90 days. If you need help or a new code, please call your [facility] clinic or [phone].        Care EveryWhere ID     This is your Care EveryWhere ID. This could be used by other organizations to access your [facility] medical records  [id number]         Blood Pressure from Last 3 Encounters:   02/15/18 138/74   17 118/70   17 132/84    Weight from Last 3 Encounters:   02/15/18 111.6 kg (246 lb)   17 111 kg (244 lb 12.8 oz)   17 108 kg (238 lb)              We Performed the Following     INTERROGATION DEVICE EVAL REMOTE, PACER/ICD (63803)     PM DEVICE INTERROGATE REMOTE (98877)        Primary Care Provider Office Phone # Fax #    Elisa Dougherty [phone]       [facility] [place] [address] [place] [address]        Equal Access to Services     GREGG ESPINOZA : Hadii yair ku hadasho Soomaali, waaxda luqadaha, qaybta kaalmada adeegyada, juan f deshpande. So [facility] [phone].    ATENCIÓN: Si habla español, tiene a hansen disposición servicios gratuitos de asistencia lingüística. Llame al [phone].    We comply with applicable federal civil rights laws and [place] laws. We do not discriminate on the basis of race, color, national origin, age, disability, sex, sexual orientation, or gender identity.            Thank you!     Thank you for choosing [facility] " CARE   [facility]  for your care. Our goal is always to provide you with excellent care. Hearing back from our patients is one way we can continue to improve our services. Please take a few minutes to complete the written survey that you may receive in the mail after your visit with us. Thank you!             Your Updated Medication List - Protect others around you: Learn how to safely use, store and throw away your medicines at www.disposemymeds.org.          This list is accurate as of 3/12/18  8:58 AM.  Always use your most recent med list.                   Brand Name Dispense Instructions for use Diagnosis    aspirin EC 81 MG EC tablet      Take 1 tablet (81 mg) by mouth daily    Cardiomyopathy, unspecified       lisinopril-hydrochlorothiazide 20-25 MG per tablet    PRINZIDE/ZESTORETIC     Take 1 tablet by mouth daily        ZOCOR PO      Take 20 mg by mouth At Bedtime        ZOLOFT PO      Take 100 mg by mouth daily

## 2018-03-12 NOTE — PROGRESS NOTES
[employer] (D) Remote PPM Device Check  AP: 19 % : 42 %  Mode: DDD        Presenting Rhythm: AS/VS, rate 67bpm  Heart Rate: Adequate rates per histogram  Sensing: Stable    Pacing Threshold: Stable    Impedance: Stable  Battery Status: 8.5-11 years  Atrial Arrhythmia: None  Ventricular Arrhythmia: None     Care Plan: F/u PPM [facility] q 3 months. LM with results. DIDI PonceT

## 2018-05-16 ENCOUNTER — TELEPHONE (OUTPATIENT)
Dept: CARDIOLOGY | Facility: CLINIC | Age: 63
End: 2018-05-16

## 2018-05-16 ENCOUNTER — ALLIED HEALTH/NURSE VISIT (OUTPATIENT)
Dept: CARDIOLOGY | Facility: CLINIC | Age: 63
End: 2018-05-16
Payer: COMMERCIAL

## 2018-05-16 DIAGNOSIS — Z95.0 CARDIAC PACEMAKER IN SITU: Primary | ICD-10-CM

## 2018-05-16 PROCEDURE — 99207 ZZC NO CHARGE LOS: CPT | Performed by: INTERNAL MEDICINE

## 2018-05-16 NOTE — TELEPHONE ENCOUNTER
Courtesy check: No charge. Pt sent transmission 5/15/18 due to chest pain that was relieved with rest. Presenting rhythm was AS/ at rate 100. He was implanted for 2nd degree AVB and he  44% of the time. He has MVP on since AVB in intermittent. He also had 6 episodes of PAF with longest lasting 3 hours. Episodes were on 4/12, 4/2 and 3/23. Stable lead measurements. Called and reviewed results with pt. No further chest pain. He had negative Lexiscan in 2/18. He was  when he had chest discomfort, so he might get symptoms with AV block and  at faster rates. MVP is on and may need to turn it off, so his AV delay is not so long. Will review  episodes of PAF with Dr Benavidez. His Chads Vasc score is 1 (HTN). Carter

## 2018-05-16 NOTE — TELEPHONE ENCOUNTER
Needs a f/up (with KIAN or me) to discuss AF (new problem, ?this may be the cause of his sx's?).  Would start BB to control VR.    We could try MVP turning 'off' and see if he feels better.  Hard to know.  But he will  a lot more and his EF is already borderline.  Let's do it though.    Thx, D

## 2018-05-16 NOTE — TELEPHONE ENCOUNTER
Called pt and informed him of Dr Benavidez' recommendations. He will f/u with KIAN to discuss afib. We will turn MVP off if pt cont to have symptomatic episodes with AS/ at rate in tne 100s. ANA

## 2018-05-16 NOTE — MR AVS SNAPSHOT
After Visit Summary   5/16/2018    Arias Manzo    MRN: [id number]           Patient Information     Date Of Birth          1955        Visit Information        Provider Department      5/16/2018 4:30 PM DELONG DCR2 [facility]        Today's Diagnoses     Cardiac pacemaker in situ    -  1       Follow-ups after your visit        Your next 10 appointments already scheduled     May 16, 2018  4:30 PM CDT   Courtesy Device Check with DELONG DCR2   [facility] ([facility])    [address] [facility] [address]  [facility] [address] [phone] OPT 2            Jun 18, 2018 10:30 AM CDT   Remote PPM Check with DELONG TECH1   [facility] ([facility])    [id number]  [facility] [address] [phone] OPT 2           This appointment is for a remote check of your pacemaker.  This is not an appointment at the office.              Who to contact     If you have questions or need follow up information about today's clinic visit or your schedule please contact [facility] directly at [phone].  Normal or non-critical lab and imaging results will be communicated to you by [employer], letter or phone within 4 business days after the clinic has received the results. If you do not hear from us within 7 days, please contact the clinic through [employer] or phone. If you have a critical or abnormal lab result, we will notify you by phone as soon as possible.  Submit refill requests through [employer] or call your pharmacy and they will forward the refill request to us. Please allow 3 business days for your refill to be completed.          Additional Information About Your Visit        MyChart Information     [employer] lets you send messages to your doctor, view your test results, renew your prescriptions, schedule appointments and more. To  "sign up, go to [email or web address] . Click on \"Log in\" on the left side of the screen, which will take you to the Welcome page. Then click on \"Sign up Now\" on the right side of the page.     You will be asked to enter the access code listed below, as well as some personal information. Please follow the directions to create your username and password.     Your access code is: [email or web address]  Expires: 6/10/2018  8:58 AM     Your access code will  in 90 days. If you need help or a new code, please call your [facility] clinic or [phone].        Care EveryWhere ID     This is your Care EveryWhere ID. This could be used by other organizations to access your [facility] medical records  [id number]         Blood Pressure from Last 3 Encounters:   02/15/18 138/74   17 118/70   17 132/84    Weight from Last 3 Encounters:   02/15/18 111.6 kg (246 lb)   17 111 kg (244 lb 12.8 oz)   17 108 kg (238 lb)              Today, you had the following     No orders found for display       Primary Care Provider Office Phone # Fax #    Elisa Dougherty [phone]       [facility] [address] [place] [address]        Equal Access to Services     [facility]: Hadii aad ku hadasho Soomaali, waaxda luqadaha, qaybta kaalmada adeegyada, juan f gruber . So [facility] [phone].    ATENCIÓN: Si habla español, tiene a hansen disposición servicios gratuitos de asistencia lingüística. Llame al [phone].    We comply with applicable federal civil rights laws and [place] laws. We do not discriminate on the basis of race, color, national origin, age, disability, sex, sexual orientation, or gender identity.            Thank you!     Thank you for choosing [facility]  for your care. Our goal is always to provide you with excellent care. Hearing back from our patients is one way we can continue to improve our " services. Please take a few minutes to complete the written survey that you may receive in the mail after your visit with us. Thank you!             Your Updated Medication List - Protect others around you: Learn how to safely use, store and throw away your medicines at www.disposemymeds.org.          This list is accurate as of 5/16/18 10:21 AM.  Always use your most recent med list.                   Brand Name Dispense Instructions for use Diagnosis    aspirin 81 MG EC tablet      Take 1 tablet (81 mg) by mouth daily    Cardiomyopathy, unspecified       lisinopril-hydrochlorothiazide 20-25 MG per tablet    PRINZIDE/ZESTORETIC     Take 1 tablet by mouth daily        ZOCOR PO      Take 20 mg by mouth At Bedtime        ZOLOFT PO      Take 100 mg by mouth daily

## 2018-05-17 ENCOUNTER — ALLIED HEALTH/NURSE VISIT (OUTPATIENT)
Dept: CARDIOLOGY | Facility: CLINIC | Age: 63
End: 2018-05-17
Payer: COMMERCIAL

## 2018-05-17 ENCOUNTER — OFFICE VISIT (OUTPATIENT)
Dept: CARDIOLOGY | Facility: CLINIC | Age: 63
End: 2018-05-17
Attending: INTERNAL MEDICINE
Payer: COMMERCIAL

## 2018-05-17 VITALS
BODY MASS INDEX: 33.43 KG/M2 | DIASTOLIC BLOOD PRESSURE: 70 MMHG | HEIGHT: 71 IN | SYSTOLIC BLOOD PRESSURE: 112 MMHG | WEIGHT: 238.8 LBS | HEART RATE: 86 BPM

## 2018-05-17 DIAGNOSIS — I48.0 PAROXYSMAL ATRIAL FIBRILLATION (H): ICD-10-CM

## 2018-05-17 DIAGNOSIS — Z95.0 CARDIAC PACEMAKER IN SITU: Primary | ICD-10-CM

## 2018-05-17 DIAGNOSIS — I44.30 HEART BLOCK, AV: Primary | ICD-10-CM

## 2018-05-17 DIAGNOSIS — Z95.0 CARDIAC PACEMAKER IN SITU: ICD-10-CM

## 2018-05-17 PROCEDURE — 99214 OFFICE O/P EST MOD 30 MIN: CPT | Performed by: PHYSICIAN ASSISTANT

## 2018-05-17 RX ORDER — METOPROLOL SUCCINATE 25 MG/1
25 TABLET, EXTENDED RELEASE ORAL DAILY
Qty: 30 TABLET | Refills: 3 | Status: SHIPPED | OUTPATIENT
Start: 2018-05-17 | End: 2018-09-06

## 2018-05-17 RX ORDER — SIMVASTATIN 20 MG
20 TABLET ORAL DAILY
COMMUNITY
End: 2023-12-15

## 2018-05-17 NOTE — PATIENT INSTRUCTIONS
1. Discussed your episodes of atrial fibrillation. This could be related to chest discomfort    * Will start metoprolol XL 25 mg daily   * Discussed risk of stroke given history of high blood pressure (though almost 64 years old and slightly low ejection fraction).      * As we discussed will start Eliquis 5 mg twice daily.     * Other options are Xarelto 20 mg daily and Pradaxa 150 mg twice daily.     * All increase risk of bleeding    2. Continue all medications as planned    3. If any issues, questions CALL [phone]

## 2018-05-17 NOTE — MR AVS SNAPSHOT
"              After Visit Summary   5/17/2018    Arias Manzo    MRN: [id number]           Patient Information     Date Of Birth          1955        Visit Information        Provider Department      5/17/2018 2:45 PM DELONG DCR2 [facility]        Today's Diagnoses     Cardiac pacemaker in situ    -  1       Follow-ups after your visit        Your next 10 appointments already scheduled     Jun 18, 2018 10:30 AM CDT   Remote PPM Check with DELONG TECH1   [facility] ([facility])    [address] [place], [place] [address]  [facility] [address] [phone] OPT 2           This appointment is for a remote check of your pacemaker.  This is not an appointment at the office.              Who to contact     If you have questions or need follow up information about today's clinic visit or your schedule please contact [facility] directly at [phone].  Normal or non-critical lab and imaging results will be communicated to you by [employer], letter or phone within 4 business days after the clinic has received the results. If you do not hear from us within 7 days, please contact the clinic through [employer] or phone. If you have a critical or abnormal lab result, we will notify you by phone as soon as possible.  Submit refill requests through [employer] or call your pharmacy and they will forward the refill request to us. Please allow 3 business days for your refill to be completed.          Additional Information About Your Visit        MyChart Information     [employer] lets you send messages to your doctor, view your test results, renew your prescriptions, schedule appointments and more. To sign up, go to [email or web address] . Click on \"Log in\" on the left side of the screen, which will take you to the Welcome page. Then click on \"Sign up Now\" on the right side of the page.     You will be asked to enter the " access code listed below, as well as some personal information. Please follow the directions to create your username and password.     Your access code is: [email or web address]  Expires: 6/10/2018  8:58 AM     Your access code will  in 90 days. If you need help or a new code, please call your [facility] clinic or [phone].        Care EveryWhere ID     This is your Care EveryWhere ID. This could be used by other organizations to access your [facility] medical records  [id number]         Blood Pressure from Last 3 Encounters:   18 112/70   02/15/18 138/74   17 118/70    Weight from Last 3 Encounters:   18 108.3 kg (238 lb 12.8 oz)   02/15/18 111.6 kg (246 lb)   17 111 kg (244 lb 12.8 oz)              Today, you had the following     No orders found for display         Today's Medication Changes          These changes are accurate as of 18 11:59 PM.  If you have any questions, ask your nurse or doctor.               Start taking these medicines.        Dose/Directions    metoprolol succinate 25 MG 24 hr tablet   Commonly known as:  TOPROL-XL   Used for:  Paroxysmal atrial fibrillation (H)   Started by:  Melani Khan PA-C        Dose:  25 mg   Take 1 tablet (25 mg) by mouth daily   Quantity:  30 tablet   Refills:  3            Where to get your medicines      These medications were sent to [facility] [address] [place] [address] AT [address]  [id number] [place] [address]     Phone:  [phone]     metoprolol succinate 25 MG 24 hr tablet                Primary Care Provider Office Phone # Fax #    Elisa Dougherty [phone]       [facility] [address] [address] [address]        Equal Access to Services     [facility]: Jameel Forrest, waaxda luqadaha, qaybta kaalmaraegan segal, juan f deshpande. [facility] [phone].    ATENCIÓN: Josephine yarbrough  español, tiene a hansen disposición servicios gratuitos de asistencia lingüística. Isiah tirado [phone].    We comply with applicable federal civil rights laws and [place] laws. We do not discriminate on the basis of race, color, national origin, age, disability, sex, sexual orientation, or gender identity.            Thank you!     Thank you for choosing [facility]  for your care. Our goal is always to provide you with excellent care. Hearing back from our patients is one way we can continue to improve our services. Please take a few minutes to complete the written survey that you may receive in the mail after your visit with us. Thank you!             Your Updated Medication List - Protect others around you: Learn how to safely use, store and throw away your medicines at www.disposemymeds.org.          This list is accurate as of 5/17/18 11:59 PM.  Always use your most recent med list.                   Brand Name Dispense Instructions for use Diagnosis    aspirin 81 MG EC tablet      Take 1 tablet (81 mg) by mouth daily    Cardiomyopathy, unspecified       lisinopril-hydrochlorothiazide 20-25 MG per tablet    PRINZIDE/ZESTORETIC     Take 1 tablet by mouth daily        metoprolol succinate 25 MG 24 hr tablet    TOPROL-XL    30 tablet    Take 1 tablet (25 mg) by mouth daily    Paroxysmal atrial fibrillation (H)       simvastatin 20 MG tablet    ZOCOR     Take 20 mg by mouth daily        ZOLOFT PO      Take 100 mg by mouth daily

## 2018-05-17 NOTE — LETTER
5/17/2018      Leann G Hutchison, MD [facility] [address] [place] [address]      RE: Arias LUU Jovany       Dear Colleague,    I had the pleasure of seeing Arias Manzo in the [facility].    Service Date: 05/17/2018      HISTORY OF PRESENT ILLNESS:  I had the pleasure of seeing Arias today when he came for followup of his recently discovered atrial fibrillation.  He is a very pleasant 63-year-old who has a history of hypertension, obstructive sleep apnea, dyslipidemia, lumbar spine disease and laryngeal granulomas.  He presented to [facility] in 07/2017 with chest discomfort and shortness of breath.  Troponin was negative, but he was noted to have a 2:1 AV block.  He was taken to [facility], where he met with Dr. Benavidez.  A dual-chamber Medtronic pacemaker was implanted in 07/2017.      There was some concern about infiltrate of heart disease, as he had somewhat poor RV sensing and pacing properties noted during the procedure.  A cardiac MRI showed an ejection fraction of 51% and no evidence of myocardial infarction, fibrosis or infiltrative disease (09/2017).      When he saw Dr. Benavidez in February, he complained of dyspnea on exertion, especially with going up 2 flights of stairs.  He did not have any distinct chest discomfort but noted he could be uncomfortable at times for up to 15 minutes.  Dr. Benavidez recommended a nuclear stress test, which was done 02/2018 showing no evidence of ischemia.      More recently he had a device interrogation done due to concerns regarding chest discomfort.  He had called on 05/15 noting that he had had episodes of chest discomfort that was relieved with rest.  Courtesy check showed that he was V paced 44% of the time.  He had 6 episodes of paroxysmal atrial fibrillation, with the longest episode lasting 3 hours (03/23, 04/02 and 04/12).  Interestingly, some of these episodes of chest discomfort correlated with  "these findings.  Samra Graves noted that he was ventricular pacing when he had chest discomfort, so he could get symptoms with AV block and ventricular pacing at faster rates.  Dr. Benavidez reviewed this and recommended initiation of beta blocker for atrial fibrillation, turning MVP off so his AV delay was not so long, and to discuss anticoagulation given a CHADS-VASc score of 1 (hypertension).      Arias comes back today telling me that he feels a \"tightness\" in the upper chest near his shoulder on the left with physical exertion.  He has not had any lightheadedness or dizziness associated.  It has not been getting worse.  He denies palpitations, lightheadedness or syncope.  He notes dyspnea with stairs and states it has not changed.      He denies any recent bleeding issues.  He can think of nothing he has been doing differently since this discomfort started a few months ago.        ASSESSMENT AND PLAN:     1.  Atrial fibrillation.  He has had brief episodes of atrial fibrillation in the past, even noted back at his first device check where he had an episode lasting almost 1 hour.  He had episodes now lasting up to 3 hours, and some of these appeared to be symptomatic with chest discomfort.      At this time, we will initiate beta blocker therapy in the form of metoprolol 25 mg daily.      We had a long discussion regarding anticoagulation.  Technically, his CHADS-VASc score is 1 (hypertension), and we note that he is 63 years old and has an EF by cardiac MRI done in September which was low normal at 51%.      He would like to err on the side of caution and would like to initiate anticoagulation.  We talked about warfarin therapy, and he notes that he would have a hard time keeping his alcohol intake and diet consistent.  We again reviewed recommendations to keep alcohol intake less than 2 servings per day for men.      We also discussed novel anticoagulants such as Eliquis, Xarelto and Pradaxa.  He understands that all " of these anticoagulants increase the risk of bleeding which could potentially be fatal.  At this time, he agrees to start Eliquis 5 mg twice daily.  I will start him on samples, but I did give him the name of Xarelto and Pradaxa should he find his insurance company prefers one over the other.      I will contact him next week to see if the addition of metoprolol has improved his symptoms at all (as well as the device changes that Julia makes today) and can send in a prescription for Eliquis at that time as well if he is tolerating it.      2.  Hypertension.  Blood pressure is under fine control.  We will have him continue his current medications.      3.  Cardiomyopathy.  Cardiac MRI showed no evidence of infiltrative disease.  His EF was slightly low at 51%.  Dr. Benavidez notes that he could have a reduction in his ejection fraction as he may pace more as MVP is turned off, but he would like to watch that carefully.  We will set up followup and followup echocardiogram when I talk to him next week.         LIBRA BERRY PA-C             D: 2018   T: 2018   MT: TIAN      Name:     JOHN CHRISTIAN   MRN:      [id number]        Account:      [id number]   :      1955           Service Date: 2018      Document: [id number]           Outpatient Encounter Prescriptions as of 2018   Medication Sig Dispense Refill     lisinopril-hydrochlorothiazide (PRINZIDE,ZESTORETIC) 20-25 MG per tablet Take 1 tablet by mouth daily       metoprolol succinate (TOPROL-XL) 25 MG 24 hr tablet Take 1 tablet (25 mg) by mouth daily 30 tablet 3     Sertraline HCl (ZOLOFT PO) Take 100 mg by mouth daily       simvastatin (ZOCOR) 20 MG tablet Take 20 mg by mouth daily       aspirin EC 81 MG EC tablet Take 1 tablet (81 mg) by mouth daily (Patient not taking: Reported on 2/15/2018)       [DISCONTINUED] Simvastatin (ZOCOR PO) Take 20 mg by mouth At Bedtime       Facility-Administered Encounter Medications as of  5/17/2018   Medication Dose Route Frequency Provider Last Rate Last Dose     neostigmine (PROSTIGMINE) injection   Intravenous PRN Marietta Mehta APRN CRNA   4 mg at 10/30/14 1429     ondansetron (ZOFRAN) injection   Intravenous PRN Marietta Mehta APRN CRNA   4 mg at 10/30/14 1425       Again, thank you for allowing me to participate in the care of your patient.      Sincerely,    Melani Khan PA-C     [facility]

## 2018-05-17 NOTE — PROGRESS NOTES
HPI and Plan:   See dictation #[id number]    No orders of the defined types were placed in this encounter.      Orders Placed This Encounter   Medications     simvastatin (ZOCOR) 20 MG tablet     Sig: Take 20 mg by mouth daily     metoprolol succinate (TOPROL-XL) 25 MG 24 hr tablet     Sig: Take 1 tablet (25 mg) by mouth daily     Dispense:  30 tablet     Refill:  3       Medications Discontinued During This Encounter   Medication Reason     Simvastatin (ZOCOR PO) Medication Reconciliation Clean Up       Encounter Diagnoses   Name Primary?     Cardiac pacemaker in situ      Heart block, AV Yes     Paroxysmal atrial fibrillation (H)        CURRENT MEDICATIONS:  Current Outpatient Prescriptions   Medication Sig Dispense Refill     lisinopril-hydrochlorothiazide (PRINZIDE,ZESTORETIC) 20-25 MG per tablet Take 1 tablet by mouth daily       metoprolol succinate (TOPROL-XL) 25 MG 24 hr tablet Take 1 tablet (25 mg) by mouth daily 30 tablet 3     Sertraline HCl (ZOLOFT PO) Take 100 mg by mouth daily       simvastatin (ZOCOR) 20 MG tablet Take 20 mg by mouth daily       aspirin EC 81 MG EC tablet Take 1 tablet (81 mg) by mouth daily (Patient not taking: Reported on 2/15/2018)         ALLERGIES     Allergies   Allergen Reactions     Flexeril [Cyclobenzaprine] GI Disturbance     Naprosyn [Naproxen] GI Disturbance     Penicillins      As child     Ranitidine GI Disturbance       PAST MEDICAL HISTORY:  Past Medical History:   Diagnosis Date     CPAP (continuous positive airway pressure) dependence      Depression      Gastro-oesophageal reflux disease      Hepatic steatosis      Hoarseness      Hyperlipidemia      Hypertension      IFG (impaired fasting glucose)      LAFB (left anterior fascicular block)      PONV (postoperative nausea and vomiting)      RBBB      Seizures (H)     as child     Sleep apnea     cpap     Uncomplicated asthma     denies asthma       PAST SURGICAL HISTORY:  Past Surgical History:   Procedure Laterality  Date     ANKLE SURGERY       APPENDECTOMY       BACK SURGERY      lami x2     ENT SURGERY      throat granulomas     HERNIA REPAIR       INJECT BOTOX N/A 10/30/2014    Procedure: INJECT BOTOX;  Surgeon: Kalyn Negron MD;  Location: [facility]     INJECT BOTOX N/A 12/8/2016    Procedure: INJECT BOTOX;  Surgeon: Kalyn Negron MD;  [facility]     INJECT STEROID (LOCATION) N/A 12/8/2016    Procedure: INJECT STEROID (LOCATION);  Surgeon: Kalyn Negron MD;  [facility]     LAMINECTOMY LUMBAR ONE LEVEL  3/5/2014    Procedure: LAMINECTOMY LUMBAR ONE LEVEL;  RIGHT L4-5 DISCECTOMY;  Surgeon: Rodrigo Ríos MD;  Location: [facility] OR     LASER CO2 LARYNGOSCOPY N/A 12/8/2016    Procedure: LASER CO2 LARYNGOSCOPY;  Surgeon: Kalyn Negron MD;  [facility]     LASER CO2 LARYNGOSCOPY, COMPLEX Left 10/30/2014    Procedure: LASER CO2 LARYNGOSCOPY, COMPLEX;  Surgeon: Kalyn Negron MD;  Location: [facility] OR     ORTHOPEDIC SURGERY         FAMILY HISTORY:  Family History   Problem Relation Age of Onset     CANCER Father        SOCIAL HISTORY:  Social History     Social History     Marital status:      Spouse name: N/A     Number of children: N/A     Years of education: N/A     Social History Main Topics     Smoking status: Former Smoker     Years: 10.00     Types: Pipe     Start date: 10/10/1976     Quit date: 5/25/1985     Smokeless tobacco: Never Used     Alcohol use Yes      Comment: daily- 2 drinks /day     Drug use: No     Sexual activity: Yes     Partners: Female     Birth control/ protection: Other     Other Topics Concern     None     Social History Narrative       Review of Systems:  Skin:  Negative       Eyes:  Positive for glasses    ENT:  Negative      Respiratory:  Positive for dyspnea on exertion;sleep apnea;CPAP See HPI   Cardiovascular:  Negative for;palpitations;edema Positive for;chest pain    Gastroenterology: Negative for melena;hematochezia   "  Genitourinary:  not assessed      Musculoskeletal:  Positive for back pain;neck pain    Neurologic:  Negative      Psychiatric:  Positive for sleep disturbances    Heme/Lymph/Imm:  Negative      Endocrine:  Negative        Physical Exam:  Vitals: /70  Pulse 86  Ht 1.803 m (5' 10.98\")  Wt 108.3 kg (238 lb 12.8 oz)  BMI 33.32 kg/m2    Constitutional:  cooperative, alert and oriented, well developed, well nourished, in no acute distress        Skin:  warm and dry to the touch, no apparent skin lesions or masses noted   pacemaker incision in the left infraclavicular area was well-healed      Head:  normocephalic, no masses or lesions        Eyes:  pupils equal and round;conjunctivae and lids unremarkable;sclera white;no xanthalasma        Lymph:      ENT:  no pallor or cyanosis, dentition good        Neck:  JVP normal;carotid pulses are full and equal bilaterally        Respiratory:  normal breath sounds, clear to auscultation, normal A-P diameter, normal symmetry, normal respiratory excursion, no use of accessory muscles         Cardiac: regular rhythm, normal S1/S2, no S3 or S4, apical impulse not displaced, no murmurs, gallops or rubs                pulses full and equal                                        GI:           Extremities and Muscular Skeletal:  no edema;no deformities, clubbing, cyanosis, erythema observed              Neurological:  no gross motor deficits        Psych:  Alert and Oriented x 3        "

## 2018-05-17 NOTE — LETTER
5/17/2018    Leann G Hutchison, MD [facility] [place] [address] [place] [address]    RE: Arias Manzo       Dear Colleague,    I had the pleasure of seeing Arias Manzo in the [facility].    HPI and Plan:   See dictation #[id number]    No orders of the defined types were placed in this encounter.      Orders Placed This Encounter   Medications     simvastatin (ZOCOR) 20 MG tablet     Sig: Take 20 mg by mouth daily     metoprolol succinate (TOPROL-XL) 25 MG 24 hr tablet     Sig: Take 1 tablet (25 mg) by mouth daily     Dispense:  30 tablet     Refill:  3       Medications Discontinued During This Encounter   Medication Reason     Simvastatin (ZOCOR PO) Medication Reconciliation Clean Up       Encounter Diagnoses   Name Primary?     Cardiac pacemaker in situ      Heart block, AV Yes     Paroxysmal atrial fibrillation (H)        CURRENT MEDICATIONS:  Current Outpatient Prescriptions   Medication Sig Dispense Refill     lisinopril-hydrochlorothiazide (PRINZIDE,ZESTORETIC) 20-25 MG per tablet Take 1 tablet by mouth daily       metoprolol succinate (TOPROL-XL) 25 MG 24 hr tablet Take 1 tablet (25 mg) by mouth daily 30 tablet 3     Sertraline HCl (ZOLOFT PO) Take 100 mg by mouth daily       simvastatin (ZOCOR) 20 MG tablet Take 20 mg by mouth daily       aspirin EC 81 MG EC tablet Take 1 tablet (81 mg) by mouth daily (Patient not taking: Reported on 2/15/2018)         ALLERGIES     Allergies   Allergen Reactions     Flexeril [Cyclobenzaprine] GI Disturbance     Naprosyn [Naproxen] GI Disturbance     Penicillins      As child     Ranitidine GI Disturbance       PAST MEDICAL HISTORY:  Past Medical History:   Diagnosis Date     CPAP (continuous positive airway pressure) dependence      Depression      Gastro-oesophageal reflux disease      Hepatic steatosis      Hoarseness      Hyperlipidemia      Hypertension      IFG (impaired fasting glucose)      LAFB (left  anterior fascicular block)      PONV (postoperative nausea and vomiting)      RBBB      Seizures (H)     as child     Sleep apnea     cpap     Uncomplicated asthma     denies asthma       PAST SURGICAL HISTORY:  Past Surgical History:   Procedure Laterality Date     ANKLE SURGERY       APPENDECTOMY       BACK SURGERY      lami x2     ENT SURGERY      throat granulomas     HERNIA REPAIR       INJECT BOTOX N/A 10/30/2014    Procedure: INJECT BOTOX;  Surgeon: Kalyn Negron MD;  [facility]: [facility]     INJECT BOTOX N/A 12/8/2016    Procedure: INJECT BOTOX;  Surgeon: Kalyn Negron MD;  [facility]     INJECT STEROID (LOCATION) N/A 12/8/2016    Procedure: INJECT STEROID (LOCATION);  Surgeon: Kalyn Negron MD;  [facility]     LAMINECTOMY LUMBAR ONE LEVEL  3/5/2014    Procedure: LAMINECTOMY LUMBAR ONE LEVEL;  RIGHT L4-5 DISCECTOMY;  Surgeon: Rodrigo Ríos MD;  Location: [facility]     LASER CO2 LARYNGOSCOPY N/A 12/8/2016    Procedure: LASER CO2 LARYNGOSCOPY;  Surgeon: Kalyn Negron MD;  [facility]     LASER CO2 LARYNGOSCOPY, COMPLEX Left 10/30/2014    Procedure: LASER CO2 LARYNGOSCOPY, COMPLEX;  Surgeon: Kalyn Negron MD;  Location: [facility]     ORTHOPEDIC SURGERY         FAMILY HISTORY:  Family History   Problem Relation Age of Onset     CANCER Father        SOCIAL HISTORY:  Social History     Social History     Marital status:      Spouse name: N/A     Number of children: N/A     Years of education: N/A     Social History Main Topics     Smoking status: Former Smoker     Years: 10.00     Types: Pipe     Start date: 10/10/1976     Quit date: 5/25/1985     Smokeless tobacco: Never Used     Alcohol use Yes      Comment: daily- 2 drinks /day     Drug use: No     Sexual activity: Yes     Partners: Female     Birth control/ protection: Other     Other Topics Concern     None     Social History Narrative       Review of Systems:  Skin:  Negative      "  Eyes:  Positive for glasses    ENT:  Negative      Respiratory:  Positive for dyspnea on exertion;sleep apnea;CPAP See HPI   Cardiovascular:  Negative for;palpitations;edema Positive for;chest pain    Gastroenterology: Negative for melena;hematochezia    Genitourinary:  not assessed      Musculoskeletal:  Positive for back pain;neck pain    Neurologic:  Negative      Psychiatric:  Positive for sleep disturbances    Heme/Lymph/Imm:  Negative      Endocrine:  Negative        Physical Exam:  Vitals: /70  Pulse 86  Ht 1.803 m (5' 10.98\")  Wt 108.3 kg (238 lb 12.8 oz)  BMI 33.32 kg/m2    Constitutional:  cooperative, alert and oriented, well developed, well nourished, in no acute distress        Skin:  warm and dry to the touch, no apparent skin lesions or masses noted   pacemaker incision in the left infraclavicular area was well-healed      Head:  normocephalic, no masses or lesions        Eyes:  pupils equal and round;conjunctivae and lids unremarkable;sclera white;no xanthalasma        Lymph:      ENT:  no pallor or cyanosis, dentition good        Neck:  JVP normal;carotid pulses are full and equal bilaterally        Respiratory:  normal breath sounds, clear to auscultation, normal A-P diameter, normal symmetry, normal respiratory excursion, no use of accessory muscles         Cardiac: regular rhythm, normal S1/S2, no S3 or S4, apical impulse not displaced, no murmurs, gallops or rubs                pulses full and equal                                        GI:           Extremities and Muscular Skeletal:  no edema;no deformities, clubbing, cyanosis, erythema observed              Neurological:  no gross motor deficits        Psych:  Alert and Oriented x 3          Thank you for allowing me to participate in the care of your patient.      Sincerely,     Melani Khan PA-C     [facility]    cc:   Miley Benavidez MD  [address] [facility] [address]  [place], " [place] [address]

## 2018-05-17 NOTE — MR AVS SNAPSHOT
After Visit Summary   5/17/2018    Arias Manzo    MRN: [id number]           Patient Information     Date Of Birth          1955        Visit Information        Provider Department      5/17/2018 2:30 PM Melani Khan PA-C [facility]        Today's Diagnoses     Heart block, AV    -  1    Cardiac pacemaker in situ        Paroxysmal atrial fibrillation (H)          Care Instructions    1. Discussed your episodes of atrial fibrillation. This could be related to chest discomfort    * Will start metoprolol XL 25 mg daily   * Discussed risk of stroke given history of high blood pressure (though almost 64 years old and slightly low ejection fraction).      * As we discussed will start Eliquis 5 mg twice daily.     * Other options are Xarelto 20 mg daily and Pradaxa 150 mg twice daily.     * All increase risk of bleeding    2. Continue all medications as planned    3. If any issues, questions CALL [phone]          Follow-ups after your visit        Your next 10 appointments already scheduled     Jun 18, 2018 10:30 AM CDT   Remote PPM Check with DELONG TECH1   [facility] ([facility])    [address] [place] [address] [phone] OPT 2           This appointment is for a remote check of your pacemaker.  This is not an appointment at the office.              Who to contact     If you have questions or need follow up information about today's clinic visit or your schedule please contact [facility] directly at [phone].  Normal or non-critical lab and imaging results will be communicated to you by MyChart, letter or phone within 4 business days after the clinic has received the results. If you do not hear from us within 7 days, please contact the clinic through MyChart or phone. If you have a critical or abnormal lab result, we will  "notify you by phone as soon as possible.  Submit refill requests through [employer] or call your pharmacy and they will forward the refill request to us. Please allow 3 business days for your refill to be completed.          Additional Information About Your Visit        [employer] Information     [employer] lets you send messages to your doctor, view your test results, renew your prescriptions, schedule appointments and more. To sign up, go to [email or web address] . Click on \"Log in\" on the left side of the screen, which will take you to the Welcome page. Then click on \"Sign up Now\" on the right side of the page.     You will be asked to enter the access code listed below, as well as some personal information. Please follow the directions to create your username and password.     Your access code is: [email or web address]  Expires: 6/10/2018  8:58 AM     Your access code will  in 90 days. If you need help or a new code, please call your [place] clinic or [phone].        Care EveryWhere ID     This is your Care EveryWhere ID. This could be used by other organizations to access your [place] medical records  [id number]        Your Vitals Were     Pulse Height BMI (Body Mass Index)             86 1.803 m (5' 10.98\") 33.32 kg/m2          Blood Pressure from Last 3 Encounters:   18 112/70   02/15/18 138/74   17 118/70    Weight from Last 3 Encounters:   18 108.3 kg (238 lb 12.8 oz)   02/15/18 111.6 kg (246 lb)   17 111 kg (244 lb 12.8 oz)              We Performed the Following     Follow-Up with Cardiac Advanced Practice Provider          Today's Medication Changes          These changes are accurate as of 18  2:58 PM.  If you have any questions, ask your nurse or doctor.               Start taking these medicines.        Dose/Directions    metoprolol succinate 25 MG 24 hr tablet   Commonly known as:  TOPROL-XL   Used for:  Paroxysmal atrial fibrillation (H)   Started by:  Melani Khan " MYA Turcios        Dose:  25 mg   Take 1 tablet (25 mg) by mouth daily   Quantity:  30 tablet   Refills:  3            Where to get your medicines      These medications were sent to [facility] [address] [facility] [address] [facility] [address] [address] [facility], [facility] [address]     Phone:  [phone]     metoprolol succinate 25 MG 24 hr tablet                Primary Care Provider Office Phone # Fax #    Elisa Dougherty [phone]       [place] [facility] [address] [facility]   [facility] [address]        Equal Access to Services     [facility] Center[facility] : Hadii yair an hadasho Soroyal, waaxda luqadaha, qaybta kaalmada adeegyada, juan f deshpande. So [facility] [phone].    ATENCIÓN: Si habla español, tiene a hansen disposición servicios gratuitos de asistencia lingüística. [facility] [phone].    We comply with applicable federal civil rights laws and [place] laws. We do not discriminate on the basis of race, color, national origin, age, disability, sex, sexual orientation, or gender identity.            Thank you!     Thank you for choosing [facility]  for your care. Our goal is always to provide you with excellent care. Hearing back from our patients is one way we can continue to improve our services. Please take a few minutes to complete the written survey that you may receive in the mail after your visit with us. Thank you!             Your Updated Medication List - Protect others around you: Learn how to safely use, store and throw away your medicines at www.disposemymeds.org.          This list is accurate as of 5/17/18  2:58 PM.  Always use your most recent med list.                   Brand Name Dispense Instructions for use Diagnosis    aspirin 81 MG EC tablet      Take 1 tablet (81 mg) by mouth daily    Cardiomyopathy, unspecified       lisinopril-hydrochlorothiazide 20-25 MG per tablet     PRINZIDE/ZESTORETIC     Take 1 tablet by mouth daily        metoprolol succinate 25 MG 24 hr tablet    TOPROL-XL    30 tablet    Take 1 tablet (25 mg) by mouth daily    Paroxysmal atrial fibrillation (H)       simvastatin 20 MG tablet    ZOCOR     Take 20 mg by mouth daily        ZOLOFT PO      Take 100 mg by mouth daily

## 2018-05-17 NOTE — PROGRESS NOTES
Medtronic Advisa PPM Courtesy Check Only-No Charge  Pt seen by Maria Elena Khan today. Pt had been experiencing intermittent episodes of chest pain yesterday, now believed to be related to episodes of PAF. Questioned if could be related to  at higher rates. Medication adjusted by Maria Elena Khan. Writer  pt at  for a few minutes and chest pain not generated. Denies chest pain prior to device check and at time of discharge. BATSHEVA Mccloud RN.

## 2018-05-18 NOTE — PROGRESS NOTES
Service Date: 05/17/2018      HISTORY OF PRESENT ILLNESS:  I had the pleasure of seeing Arias today when he came for followup of his recently discovered atrial fibrillation.  He is a very pleasant 63-year-old who has a history of hypertension, obstructive sleep apnea, dyslipidemia, lumbar spine disease and laryngeal granulomas.  He presented to [facility] in 07/2017 with chest discomfort and shortness of breath.  Troponin was negative, but he was noted to have a 2:1 AV block.  He was taken to [facility], where he met with Dr. Benavidez.  A dual-chamber Medtronic pacemaker was implanted in 07/2017.      There was some concern about infiltrate of heart disease, as he had somewhat poor RV sensing and pacing properties noted during the procedure.  A cardiac MRI showed an ejection fraction of 51% and no evidence of myocardial infarction, fibrosis or infiltrative disease (09/2017).      When he saw Dr. Benavidez in February, he complained of dyspnea on exertion, especially with going up 2 flights of stairs.  He did not have any distinct chest discomfort but noted he could be uncomfortable at times for up to 15 minutes.  Dr. Benavidez recommended a nuclear stress test, which was done 02/2018 showing no evidence of ischemia.      More recently he had a device interrogation done due to concerns regarding chest discomfort.  He had called on 05/15 noting that he had had episodes of chest discomfort that was relieved with rest.  Courtesy check showed that he was V paced 44% of the time.  He had 6 episodes of paroxysmal atrial fibrillation, with the longest episode lasting 3 hours (03/23, 04/02 and 04/12).  Interestingly, some of these episodes of chest discomfort correlated with these findings.  Samra Graves noted that he was ventricular pacing when he had chest discomfort, so he could get symptoms with AV block and ventricular pacing at faster rates.  Dr. Benavidez reviewed this and recommended initiation of beta blocker for atrial  "fibrillation, turning MVP off so his AV delay was not so long, and to discuss anticoagulation given a CHADS-VASc score of 1 (hypertension).      Arias comes back today telling me that he feels a \"tightness\" in the upper chest near his shoulder on the left with physical exertion.  He has not had any lightheadedness or dizziness associated.  It has not been getting worse.  He denies palpitations, lightheadedness or syncope.  He notes dyspnea with stairs and states it has not changed.      He denies any recent bleeding issues.  He can think of nothing he has been doing differently since this discomfort started a few months ago.        ASSESSMENT AND PLAN:     1.  Atrial fibrillation.  He has had brief episodes of atrial fibrillation in the past, even noted back at his first device check where he had an episode lasting almost 1 hour.  He had episodes now lasting up to 3 hours, and some of these appeared to be symptomatic with chest discomfort.      At this time, we will initiate beta blocker therapy in the form of metoprolol 25 mg daily.      We had a long discussion regarding anticoagulation.  Technically, his CHADS-VASc score is 1 (hypertension), and we note that he is 63 years old and has an EF by cardiac MRI done in September which was low normal at 51%.      He would like to err on the side of caution and would like to initiate anticoagulation.  We talked about warfarin therapy, and he notes that he would have a hard time keeping his alcohol intake and diet consistent.  We again reviewed recommendations to keep alcohol intake less than 2 servings per day for men.      We also discussed novel anticoagulants such as Eliquis, Xarelto and Pradaxa.  He understands that all of these anticoagulants increase the risk of bleeding which could potentially be fatal.  At this time, he agrees to start Eliquis 5 mg twice daily.  I will start him on samples, but I did give him the name of Xarelto and Pradaxa should he find his " insurance company prefers one over the other.      I will contact him next week to see if the addition of metoprolol has improved his symptoms at all (as well as the device changes that Julia makes today) and can send in a prescription for Eliquis at that time as well if he is tolerating it.      2.  Hypertension.  Blood pressure is under fine control.  We will have him continue his current medications.      3.  Cardiomyopathy.  Cardiac MRI showed no evidence of infiltrative disease.  His EF was slightly low at 51%.  Dr. Benavidez notes that he could have a reduction in his ejection fraction as he may pace more as MVP is turned off, but he would like to watch that carefully.  We will set up followup and followup echocardiogram when I talk to him next week.         LIBRA BERRY PA-C             D: 2018   T: 2018   MT: TIAN      Name:     JOHN CHRISTIAN   MRN:      [id number]        Account:      [id number]   :      1955           Service Date: 2018      Document: [id number]

## 2018-05-25 ENCOUNTER — TELEPHONE (OUTPATIENT)
Dept: CARDIOLOGY | Facility: CLINIC | Age: 63
End: 2018-05-25

## 2018-05-25 DIAGNOSIS — I48.0 PAROXYSMAL ATRIAL FIBRILLATION (H): Primary | ICD-10-CM

## 2018-05-25 NOTE — TELEPHONE ENCOUNTER
"Called pt today to see how he was doing after initiation of metoprolol XL 25 mg daily and adjustments to PPM settings given episodes of CP, AFib and GARCIA.    He feels \"110% better.\"  Has been very active (moving wood, mowing lawn, etc) and has had NO issues with CP. GARCIA is also much improved. Feels great and really happy with how things have gone    Tolerating Eliquis without issue and he checked insurance. Will send Eliquis Rx to [facility]'s as requested.    Pt to see us 10/2018 with echo. Call if issues prior.  "

## 2018-06-18 ENCOUNTER — TELEPHONE (OUTPATIENT)
Dept: CARDIOLOGY | Facility: CLINIC | Age: 63
End: 2018-06-18

## 2018-06-18 ENCOUNTER — ALLIED HEALTH/NURSE VISIT (OUTPATIENT)
Dept: CARDIOLOGY | Facility: CLINIC | Age: 63
End: 2018-06-18
Payer: COMMERCIAL

## 2018-06-18 DIAGNOSIS — Z95.0 CARDIAC PACEMAKER IN SITU: Primary | ICD-10-CM

## 2018-06-18 PROCEDURE — 93296 REM INTERROG EVL PM/IDS: CPT | Performed by: INTERNAL MEDICINE

## 2018-06-18 PROCEDURE — 93294 REM INTERROG EVL PM/LDLS PM: CPT | Performed by: INTERNAL MEDICINE

## 2018-06-18 NOTE — MR AVS SNAPSHOT
After Visit Summary   6/18/2018    Arias Manzo    MRN: [id number]           Patient Information     Date Of Birth          1955        Visit Information        Provider Department      6/18/2018 10:30 AM SINDI ASHTON [facility]        Today's Diagnoses     Cardiac pacemaker in situ    -  1       Follow-ups after your visit        Additional Services     Follow-Up with Device Clinic                 Your next 10 appointments already scheduled     Jun 18, 2018 10:30 AM CDT   Remote PPM Check with SINDI ASHTON   [facility] ([facility])    [facility] [facility] [address]  [facility] [address] [phone] OPT 2           This appointment is for a remote check of your pacemaker.  This is not an appointment at the office.              Future tests that were ordered for you today     Open Future Orders        Priority Expected Expires Ordered    Follow-Up with Device Clinic Routine 9/18/2018 6/18/2019 6/18/2018            Who to contact     If you have questions or need follow up information about today's clinic visit or your schedule please contact [facility] directly at [phone].  Normal or non-critical lab and imaging results will be communicated to you by [employer], letter or phone within 4 business days after the clinic has received the results. If you do not hear from us within 7 days, please contact the clinic through [employer] or phone. If you have a critical or abnormal lab result, we will notify you by phone as soon as possible.  Submit refill requests through [employer] or call your pharmacy and they will forward the refill request to us. Please allow 3 business days for your refill to be completed.          Additional Information About Your Visit        [employer] Information     [employer] lets you send messages to your doctor, view your test results, renew your prescriptions,  "schedule appointments and more. To sign up, go to [email or web address] . Click on \"Log in\" on the left side of the screen, which will take you to the Welcome page. Then click on \"Sign up Now\" on the right side of the page.     You will be asked to enter the access code listed below, as well as some personal information. Please follow the directions to create your username and password.     Your access code is: [email or web address]  Expires: 2018  8:29 AM     Your access code will  in 90 days. If you need help or a new code, please call your [place] clinic or [phone].        Care EveryWhere ID     This is your Care EveryWhere ID. This could be used by other organizations to access your [place] medical records  [id number]         Blood Pressure from Last 3 Encounters:   18 112/70   02/15/18 138/74   17 118/70    Weight from Last 3 Encounters:   18 108.3 kg (238 lb 12.8 oz)   02/15/18 111.6 kg (246 lb)   17 111 kg (244 lb 12.8 oz)              We Performed the Following     INTERROGATION DEVICE EVAL REMOTE, PACER/ICD (28674)     PM DEVICE INTERROGATE REMOTE (56358)        Primary Care Provider Office Phone # Fax #    Elisa Dougherty [phone]       [facility] [address] [place] [address]        Equal Access to Services     [facility] AH: Hadii aad ku hadasho Soomaali, waaxda luqadaha, qaybta kaalmada adeegyada, waxay meryl deshpande. So [facility] [phone].    ATENCIÓN: Si habla español, tiene a hansen disposición servicios gratuitos de asistencia lingüística. Llame al [phone].    We comply with applicable federal civil rights laws and [place] laws. We do not discriminate on the basis of race, color, national origin, age, disability, sex, sexual orientation, or gender identity.            Thank you!     Thank you for choosing [facility]  for your care. Our goal is always to " provide you with excellent care. Hearing back from our patients is one way we can continue to improve our services. Please take a few minutes to complete the written survey that you may receive in the mail after your visit with us. Thank you!             Your Updated Medication List - Protect others around you: Learn how to safely use, store and throw away your medicines at www.disposemymeds.org.          This list is accurate as of 6/18/18  8:29 AM.  Always use your most recent med list.                   Brand Name Dispense Instructions for use Diagnosis    apixaban ANTICOAGULANT 5 MG tablet    ELIQUIS    60 tablet    Take 1 tablet (5 mg) by mouth 2 times daily    Paroxysmal atrial fibrillation (H)       lisinopril-hydrochlorothiazide 20-25 MG per tablet    PRINZIDE/ZESTORETIC     Take 1 tablet by mouth daily        metoprolol succinate 25 MG 24 hr tablet    TOPROL-XL    30 tablet    Take 1 tablet (25 mg) by mouth daily    Paroxysmal atrial fibrillation (H)       simvastatin 20 MG tablet    ZOCOR     Take 20 mg by mouth daily        ZOLOFT PO      Take 100 mg by mouth daily

## 2018-06-18 NOTE — TELEPHONE ENCOUNTER
,    FYI: Your patient had an episode of NSVT on today's remote transmission. Episode lasted 16 beats, rates starting at 81bpm and ramping up to 170bpm. EF 45-50% (2017). Had to leave message for patient so was unable to inquire about symptoms.      Medtronic Advisa (D) Remote PPM Device Check  AP: 46 % : 86 %  Mode: DDD        Presenting Rhythm: AP/  Heart Rate: Adequate rates per histogram  Sensing: Stable    Pacing Threshold: Stable    Impedance: Stable  Battery Status: 8-10 years  Atrial Arrhythmia: None  Ventricular Arrhythmia: 1 ventricular high rate. EGM shows Vs>As for NSVT lasting 16 beats, rates starting at 81bpm and ramping up to 170bpm. EF 45-50% (2017). Will message Dr. Benavidez with findings.        Care Plan: F/u Annual threshold in 3 months, order placed. LM with results. ETrish,CVT

## 2018-06-18 NOTE — PROGRESS NOTES
Medtronic Advisa (D) Remote PPM Device Check  AP: 46 % : 86 %  Mode: DDD        Presenting Rhythm: AP/  Heart Rate: Adequate rates per histogram  Sensing: Stable    Pacing Threshold: Stable    Impedance: Stable  Battery Status: 8-10 years  Atrial Arrhythmia: None  Ventricular Arrhythmia: 1 ventricular high rate. EGM shows Vs>As for NSVT lasting 16 beats, rates starting at 81bpm and ramping up to 170bpm. EF 45-50% (2017). Will        Care Plan: F/u Annual threshold in 3 months, order placed. LM with results. Rosario,CVT

## 2018-09-06 DIAGNOSIS — I48.0 PAROXYSMAL ATRIAL FIBRILLATION (H): ICD-10-CM

## 2018-09-06 RX ORDER — METOPROLOL SUCCINATE 25 MG/1
25 TABLET, EXTENDED RELEASE ORAL DAILY
Qty: 30 TABLET | Refills: 3 | Status: SHIPPED | OUTPATIENT
Start: 2018-09-06 | End: 2019-01-04

## 2018-09-17 ENCOUNTER — ALLIED HEALTH/NURSE VISIT (OUTPATIENT)
Dept: CARDIOLOGY | Facility: CLINIC | Age: 63
End: 2018-09-17
Attending: INTERNAL MEDICINE
Payer: COMMERCIAL

## 2018-09-17 ENCOUNTER — HOSPITAL ENCOUNTER (OUTPATIENT)
Dept: CARDIOLOGY | Facility: CLINIC | Age: 63
Discharge: HOME | End: 2018-09-17
Attending: NURSE PRACTITIONER | Admitting: NURSE PRACTITIONER
Payer: COMMERCIAL

## 2018-09-17 ENCOUNTER — OFFICE VISIT (OUTPATIENT)
Dept: CARDIOLOGY | Facility: CLINIC | Age: 63
End: 2018-09-17
Attending: NURSE PRACTITIONER
Payer: COMMERCIAL

## 2018-09-17 VITALS
WEIGHT: 241.8 LBS | HEIGHT: 71 IN | DIASTOLIC BLOOD PRESSURE: 92 MMHG | BODY MASS INDEX: 33.85 KG/M2 | HEART RATE: 55 BPM | SYSTOLIC BLOOD PRESSURE: 150 MMHG

## 2018-09-17 DIAGNOSIS — Z95.0 CARDIAC PACEMAKER IN SITU: ICD-10-CM

## 2018-09-17 DIAGNOSIS — I48.0 PAROXYSMAL ATRIAL FIBRILLATION (H): ICD-10-CM

## 2018-09-17 DIAGNOSIS — I44.30 HEART BLOCK, AV: Primary | ICD-10-CM

## 2018-09-17 DIAGNOSIS — I42.9 CARDIOMYOPATHY (H): ICD-10-CM

## 2018-09-17 PROCEDURE — 99214 OFFICE O/P EST MOD 30 MIN: CPT | Performed by: INTERNAL MEDICINE

## 2018-09-17 PROCEDURE — 93306 TTE W/DOPPLER COMPLETE: CPT

## 2018-09-17 PROCEDURE — 93280 PM DEVICE PROGR EVAL DUAL: CPT | Performed by: INTERNAL MEDICINE

## 2018-09-17 PROCEDURE — 93306 TTE W/DOPPLER COMPLETE: CPT | Mod: 26 | Performed by: INTERNAL MEDICINE

## 2018-09-17 PROCEDURE — 25500064 ZZH RX 255 OP 636: Performed by: NURSE PRACTITIONER

## 2018-09-17 RX ADMIN — HUMAN ALBUMIN MICROSPHERES AND PERFLUTREN 7 ML: 10; .22 INJECTION, SOLUTION INTRAVENOUS at 13:20

## 2018-09-17 NOTE — PROGRESS NOTES
HPI and Plan:   See dictation    Orders Placed This Encounter   Procedures     Follow-Up with Electrophysiologist     Echocardiogram       No orders of the defined types were placed in this encounter.      There are no discontinued medications.      Encounter Diagnoses   Name Primary?     Heart block, AV Yes     Paroxysmal atrial fibrillation (H)        CURRENT MEDICATIONS:  Current Outpatient Prescriptions   Medication Sig Dispense Refill     apixaban ANTICOAGULANT (ELIQUIS) 5 MG tablet Take 1 tablet (5 mg) by mouth 2 times daily 60 tablet 5     lisinopril-hydrochlorothiazide (PRINZIDE,ZESTORETIC) 20-25 MG per tablet Take 1 tablet by mouth daily       metoprolol succinate (TOPROL-XL) 25 MG 24 hr tablet Take 1 tablet (25 mg) by mouth daily 30 tablet 3     Sertraline HCl (ZOLOFT PO) Take 100 mg by mouth daily       simvastatin (ZOCOR) 20 MG tablet Take 20 mg by mouth daily         ALLERGIES     Allergies   Allergen Reactions     Flexeril [Cyclobenzaprine] GI Disturbance     Naprosyn [Naproxen] GI Disturbance     Penicillins      As child     Ranitidine GI Disturbance       PAST MEDICAL HISTORY:  Past Medical History:   Diagnosis Date     CPAP (continuous positive airway pressure) dependence      Depression      Gastro-oesophageal reflux disease      Hepatic steatosis      Hoarseness      Hyperlipidemia      Hypertension      IFG (impaired fasting glucose)      LAFB (left anterior fascicular block)      PONV (postoperative nausea and vomiting)      RBBB      Seizures (H)     as child     Sleep apnea     cpap     Uncomplicated asthma     denies asthma       PAST SURGICAL HISTORY:  Past Surgical History:   Procedure Laterality Date     ANKLE SURGERY       APPENDECTOMY       BACK SURGERY      lami x2     ENT SURGERY      throat granulomas     HERNIA REPAIR       INJECT BOTOX N/A 10/30/2014    Procedure: INJECT BOTOX;  Surgeon: Kalyn Negron MD;  Location:  OR     INJECT BOTOX N/A 12/8/2016    Procedure:  INJECT BOTOX;  Surgeon: Kalyn Negron MD;  Location: [facility] OR     INJECT STEROID (LOCATION) N/A 12/8/2016    Procedure: INJECT STEROID (LOCATION);  Surgeon: Kalyn Negron MD;  Location: [facility] OR     LAMINECTOMY LUMBAR ONE LEVEL  3/5/2014    Procedure: LAMINECTOMY LUMBAR ONE LEVEL;  RIGHT L4-5 DISCECTOMY;  Surgeon: Rodrigo Ríos MD;  Location: [facility] OR     LASER CO2 LARYNGOSCOPY N/A 12/8/2016    Procedure: LASER CO2 LARYNGOSCOPY;  Surgeon: Kalyn Negron MD;  Location: [facility] OR     LASER CO2 LARYNGOSCOPY, COMPLEX Left 10/30/2014    Procedure: LASER CO2 LARYNGOSCOPY, COMPLEX;  Surgeon: aKlyn Negron MD;  Location: [facility] OR     ORTHOPEDIC SURGERY         FAMILY HISTORY:  Family History   Problem Relation Age of Onset     Cancer Father        SOCIAL HISTORY:  Social History     Social History     Marital status:      Spouse name: N/A     Number of children: N/A     Years of education: N/A     Social History Main Topics     Smoking status: Former Smoker     Years: 10.00     Types: Pipe     Start date: 10/10/1976     Quit date: 5/25/1985     Smokeless tobacco: Never Used     Alcohol use Yes      Comment: daily- 2 drinks /day     Drug use: No     Sexual activity: Yes     Partners: Female     Birth control/ protection: Other     Other Topics Concern     None     Social History Narrative       Review of Systems:  Skin:  Negative       Eyes:  Positive for glasses    ENT:  Negative      Respiratory:  Negative       Cardiovascular:  Negative      Gastroenterology: Negative      Genitourinary:  Negative      Musculoskeletal:  Negative      Neurologic:  Positive for numbness or tingling of hands occ  Psychiatric:  Positive for depression trreated  Heme/Lymph/Imm:  Negative      Endocrine:  Negative        [id number]

## 2018-09-17 NOTE — MR AVS SNAPSHOT
After Visit Summary   9/17/2018    Arias Manzo    MRN: [id number]           Patient Information     Date Of Birth          1955        Visit Information        Provider Department      9/17/2018 11:15 AM SINDI BERNALN [facility]        Today's Diagnoses     Heart block, AV    -  1    Cardiac pacemaker in situ           Follow-ups after your visit        Your next 10 appointments already scheduled     Sep 17, 2018 12:45 PM CDT   Ech Complete with [id number]   [facility] CV Echocardiography (Cardiovascular Imaging at [facility])    [address]  [address]  [facility] [address] [phone]           1.  Please bring or wear a comfortable two-piece outfit. 2.  You may eat, drink and take your normal medicines. 3.  For any questions that cannot be answered, please contact the ordering physician 4.  Please do not wear perfumes or scented lotions on the day of your exam.            Sep 17, 2018  3:45 PM CDT   P EP RETURN with Miley Benavidez MD   [facility] ([facility])    [facility]  [facility] [address] [phone] OPT 2            Dec 17, 2018  8:45 AM CST   Remote PPM Check with DELONG TECH1   [facility] ([facility])    [address] [place], [place] [address]  [facility] [address] [phone] OPT 2           This appointment is for a remote check of your pacemaker.  This is not an appointment at the office.              Who to contact     If you have questions or need follow up information about today's clinic visit or your schedule please contact [facility] directly at [phone].  Normal or non-critical lab and imaging results will be communicated to you by MyChart, letter or phone within 4 business days after the clinic has received the results. If you do not hear  from us within 7 days, please contact the clinic through ACEt or phone. If you have a critical or abnormal lab result, we will notify you by phone as soon as possible.  Submit refill requests through TryLife or call your pharmacy and they will forward the refill request to us. Please allow 3 business days for your refill to be completed.          Additional Information About Your Visit        Care EveryWhere ID     This is your Care EveryWhere ID. This could be used by other organizations to access your [facility] medical records  [id number]         Blood Pressure from Last 3 Encounters:   05/17/18 112/70   02/15/18 138/74   09/13/17 118/70    Weight from Last 3 Encounters:   05/17/18 108.3 kg (238 lb 12.8 oz)   02/15/18 111.6 kg (246 lb)   08/25/17 111 kg (244 lb 12.8 oz)              We Performed the Following     Follow-Up with Device Clinic     PM DEVICE PROGRAMMING EVAL, DUAL LEAD PACER ([id number])        Primary Care Provider Office Phone # Fax #    Elisa Dougherty [phone]       [facility] [address] [place] [address]        Equal Access to Services     STEPHEN ESPINOZA AH: Hadii aad ku hadasho Socorettaali, waaxda luqadaha, qaybta kaalmada adeegyada, juan f deshpande. So [facility] [phone].    ATENCIÓN: Si habla español, tiene a hansen disposición servicios gratuitos de asistencia lingüística. [facility] [phone].    We comply with applicable federal civil rights laws and [place] laws. We do not discriminate on the basis of race, color, national origin, age, disability, sex, sexual orientation, or gender identity.            Thank you!     Thank you for choosing [facility]  for your care. Our goal is always to provide you with excellent care. Hearing back from our patients is one way we can continue to improve our services. Please take a few minutes to complete the written survey that you may receive in the  mail after your visit with us. Thank you!             Your Updated Medication List - Protect others around you: Learn how to safely use, store and throw away your medicines at www.disposemymeds.org.          This list is accurate as of 9/17/18 11:41 AM.  Always use your most recent med list.                   Brand Name Dispense Instructions for use Diagnosis    apixaban ANTICOAGULANT 5 MG tablet    ELIQUIS    60 tablet    Take 1 tablet (5 mg) by mouth 2 times daily    Paroxysmal atrial fibrillation (H)       lisinopril-hydrochlorothiazide 20-25 MG per tablet    PRINZIDE/ZESTORETIC     Take 1 tablet by mouth daily        metoprolol succinate 25 MG 24 hr tablet    TOPROL-XL    30 tablet    Take 1 tablet (25 mg) by mouth daily    Paroxysmal atrial fibrillation (H)       simvastatin 20 MG tablet    ZOCOR     Take 20 mg by mouth daily        ZOLOFT PO      Take 100 mg by mouth daily

## 2018-09-17 NOTE — PROGRESS NOTES
Medtronic Advisa (D) Pacemaker Device Check  AP: 41 % : 87 %  Mode: DDD         Underlying Rhythm: SB in the 40's, first degree AVB of 210ms  Heart Rate: good variability  Sensing: WNL    Pacing Threshold: WNL   Impedance: WNL  Battery Status: 8.5 yrs estimated longevity   Device Site: [facility]  Atrial Arrhythmia: noen  Ventricular Arrhythmia: none  Setting Change: changed A Lead Amplitude Safety Margin from 2.0X to 1.5X per clinic protocol. Increased Paced and Sensed AV Delays to reduce V pacing (changed Paced AVD from 270ms to 300ms, and changed Sensed AVD from 250ms to 270ms). As a result of changing AV Delays, had to change Upper Tracking Rate and Upper Sensor Rate from 140 bpm to 130 bpm. Based on HR Histogram, pt had AS beats up in the 120's, but nothing higher.     Care Plan: remote in 3 months, scheduled. OV with Dr. Benavidez this afternoon.    RODRÍGUEZ RN

## 2018-09-17 NOTE — LETTER
9/17/2018    Leann G Hutchison, MD [facility] [place] [address] [place] [address]    RE: Airas Manzo       Dear Colleague,    I had the pleasure of seeing Arias Manzo in the [facility].    HPI and Plan:   See dictation    Orders Placed This Encounter   Procedures     Follow-Up with Electrophysiologist     Echocardiogram       No orders of the defined types were placed in this encounter.      There are no discontinued medications.      Encounter Diagnoses   Name Primary?     Heart block, AV Yes     Paroxysmal atrial fibrillation (H)        CURRENT MEDICATIONS:  Current Outpatient Prescriptions   Medication Sig Dispense Refill     apixaban ANTICOAGULANT (ELIQUIS) 5 MG tablet Take 1 tablet (5 mg) by mouth 2 times daily 60 tablet 5     lisinopril-hydrochlorothiazide (PRINZIDE,ZESTORETIC) 20-25 MG per tablet Take 1 tablet by mouth daily       metoprolol succinate (TOPROL-XL) 25 MG 24 hr tablet Take 1 tablet (25 mg) by mouth daily 30 tablet 3     Sertraline HCl (ZOLOFT PO) Take 100 mg by mouth daily       simvastatin (ZOCOR) 20 MG tablet Take 20 mg by mouth daily         ALLERGIES     Allergies   Allergen Reactions     Flexeril [Cyclobenzaprine] GI Disturbance     Naprosyn [Naproxen] GI Disturbance     Penicillins      As child     Ranitidine GI Disturbance       PAST MEDICAL HISTORY:  Past Medical History:   Diagnosis Date     CPAP (continuous positive airway pressure) dependence      Depression      Gastro-oesophageal reflux disease      Hepatic steatosis      Hoarseness      Hyperlipidemia      Hypertension      IFG (impaired fasting glucose)      LAFB (left anterior fascicular block)      PONV (postoperative nausea and vomiting)      RBBB      Seizures (H)     as child     Sleep apnea     cpap     Uncomplicated asthma     denies asthma       PAST SURGICAL HISTORY:  Past Surgical History:   Procedure Laterality Date     ANKLE SURGERY       APPENDECTOMY        BACK SURGERY      lami x2     ENT SURGERY      throat granulomas     HERNIA REPAIR       INJECT BOTOX N/A 10/30/2014    Procedure: INJECT BOTOX;  Surgeon: Kalyn Negron MD;  Location: [facility] OR     INJECT BOTOX N/A 12/8/2016    Procedure: INJECT BOTOX;  Surgeon: Kalyn Negron MD;  [facility]     INJECT STEROID (LOCATION) N/A 12/8/2016    Procedure: INJECT STEROID (LOCATION);  Surgeon: Kalyn Negron MD;  Location[facility] OR     LAMINECTOMY LUMBAR ONE LEVEL  3/5/2014    Procedure: LAMINECTOMY LUMBAR ONE LEVEL;  RIGHT L4-5 DISCECTOMY;  Surgeon: Rodrigo Ríos MD;  Location: [facility] OR     LASER CO2 LARYNGOSCOPY N/A 12/8/2016    Procedure: LASER CO2 LARYNGOSCOPY;  Surgeon: Kalyn Negron MD;  [facility]: [facility] OR     LASER CO2 LARYNGOSCOPY, COMPLEX Left 10/30/2014    Procedure: LASER CO2 LARYNGOSCOPY, COMPLEX;  Surgeon: Kalyn Negron MD;  Location: [facility] OR     ORTHOPEDIC SURGERY         FAMILY HISTORY:  Family History   Problem Relation Age of Onset     Cancer Father        SOCIAL HISTORY:  Social History     Social History     Marital status:      Spouse name: N/A     Number of children: N/A     Years of education: N/A     Social History Main Topics     Smoking status: Former Smoker     Years: 10.00     Types: Pipe     Start date: 10/10/1976     Quit date: 5/25/1985     Smokeless tobacco: Never Used     Alcohol use Yes      Comment: daily- 2 drinks /day     Drug use: No     Sexual activity: Yes     Partners: Female     Birth control/ protection: Other     Other Topics Concern     None     Social History Narrative       Review of Systems:  Skin:  Negative       Eyes:  Positive for glasses    ENT:  Negative      Respiratory:  Negative       Cardiovascular:  Negative      Gastroenterology: Negative      Genitourinary:  Negative      Musculoskeletal:  Negative      Neurologic:  Positive for numbness or tingling of hands occ  Psychiatric:  Positive for depression  trreated  Heme/Lymph/Imm:  Negative      Endocrine:  Negative        [id number]          Thank you for allowing me to participate in the care of your patient.      Sincerely,     Miley Benavidez MD     [facility]    cc:   Priscila López, APRN CNP  [address]  [place] [address]

## 2018-09-17 NOTE — LETTER
9/17/2018      Leann G Hutchison, MD [facility] [place] [address] [place] [address]      RE: Arias Manzo       Dear Colleague,    I had the pleasure of seeing Arias Manzo in the [facility].    Service Date: 09/17/2018      HISTORY OF PRESENT ILLNESS:    I had the pleasure to see Mr. Arias Manzo, a delightful 63-year-old gentleman with the following medical issues:   A.  Symptomatic 2:1 AV block with pacemaker implantation in 07/2017.   B.  Borderline LV dysfunction, EF 45%-50%.   C.  Hypertension.   D.  Paroxysmal atrial fibrillation, treated with metoprolol and apixaban.      Mr. Manzo has been feeling well.  He has occasional awareness of atrial fibrillation.  Typically symptoms last no more than 1-2 minutes.      Over the past year, we have seen ventricular pacing percentage around 80%-90%, which is higher than previously.  Interestingly, Arias can occasionally feel ventricular pacing.  He says it is a sharp sensation.  Thankfully, it is only intermittent.      He does not have chest pain with exertion.  He does have some mild dyspnea on exertion.  No syncope or near-syncope.      PHYSICAL EXAMINATION:   VITAL SIGNS:  Blood pressure (second measurement) 150/92, pulse 60 and regular, weight 109 kilos, height 180 cm.   GENERAL:  He is a pleasant gentleman who is mildly overweight, in no distress.   HEAD:  Normocephalic.   NECK:  Supple without bruits.   LUNGS:  Clear.   CARDIOVASCULAR:  Normal JVP, regular rhythm.  No gallop, murmur or rub.  Nicely healed left pectoral incision.   ABDOMEN:  Mildly obese, soft, nontender.   EXTREMITIES:  No edema.      DIAGNOSTIC STUDIES:    His echocardiogram earlier today showed EF of 45%-50%, which is stable compared to previous studies.  There is septal dyssynchrony.  No significant valvular abnormalities.      IMPRESSION:   1.  Symptomatic AV block.  The patient's pacemaker is functioning normally.   Ventricular pacing is around 80%.  The patient had low-normal LV function to begin with and this may be aggravated by nearly continuous ventricular pacing.  EF has remained stable in the 45%-50% range and he has no symptoms.  There is no strong indication for further intervention at this point.  He did have a previous nuclear stress test that did not show ischemia.   2.  Hypertension.  Due to an oversight there was a huge delay in rooming him today as we erroneously thought that he was a no-show for his appointment.  His blood pressure was initially high with systolic in the 160s but after a 15-minute wait was in the 150s.  I asked him to keep an eye on his blood pressure with his automated blood pressure cuff at home.      RECOMMENDATIONS:   A.  Monitor blood pressure at home.   B.  Continue current medications.   C.  Routine followup in the Device Clinic.   D.  I have requested a followup visit in 1 year in our [facility] with echocardiogram before the visit.      Thank you for the opportunity to be part of his care.        TREV JOHNSON MD, FACC         cc:   Elisa Dougherty MD    [facility] [place] [address] [place] [address]             D: 2018   T: 2018   MT: TIAN      Name:     JOHN CHRISTIAN   MRN:      [id number]        Account:      [id number]   :      1955           Service Date: 2018      Document: [id number]           Outpatient Encounter Prescriptions as of 2018   Medication Sig Dispense Refill     apixaban ANTICOAGULANT (ELIQUIS) 5 MG tablet Take 1 tablet (5 mg) by mouth 2 times daily 60 tablet 5     lisinopril-hydrochlorothiazide (PRINZIDE,ZESTORETIC) 20-25 MG per tablet Take 1 tablet by mouth daily       metoprolol succinate (TOPROL-XL) 25 MG 24 hr tablet Take 1 tablet (25 mg) by mouth daily 30 tablet 3     Sertraline HCl (ZOLOFT PO) Take 100 mg by mouth daily       simvastatin (ZOCOR) 20 MG tablet Take 20 mg by mouth daily        Facility-Administered Encounter Medications as of 9/17/2018   Medication Dose Route Frequency Provider Last Rate Last Dose     neostigmine (PROSTIGMINE) injection   Intravenous PRN Marietta Mehta APRN CRNA   4 mg at 10/30/14 1429     ondansetron (ZOFRAN) injection   Intravenous PRN Marietta Mehta APRN CRNA   4 mg at 10/30/14 1425       Again, thank you for allowing me to participate in the care of your patient.      Sincerely,    Miley Benavidez MD     [facility]

## 2018-09-17 NOTE — MR AVS SNAPSHOT
After Visit Summary   9/17/2018    Arias Manzo    MRN: [id number]           Patient Information     Date Of Birth          1955        Visit Information        Provider Department      9/17/2018 3:45 PM Miley Benavidez MD [facility]        Today's Diagnoses     Heart block, AV    -  1    Paroxysmal atrial fibrillation (H)           Follow-ups after your visit        Additional Services     Follow-Up with Electrophysiologist                 Your next 10 appointments already scheduled     Dec 17, 2018  8:45 AM CST   Remote PPM Check with DELONG TECH1   [facility] ([facility])    [address]  [facility] [id number] [phone] OPT 2           This appointment is for a remote check of your pacemaker.  This is not an appointment at the office.              Future tests that were ordered for you today     Open Future Orders        Priority Expected Expires Ordered    Echocardiogram Routine 9/17/2019 9/18/2019 9/17/2018    Follow-Up with Electrophysiologist Routine 9/17/2019 9/18/2019 9/17/2018    ECHO COMPLETE WITH OPTISON Routine 10/1/2018 9/26/2019 9/26/2017            Who to contact     If you have questions or need follow up information about today's clinic visit or your schedule please contact [facility] directly at [phone].  Normal or non-critical lab and imaging results will be communicated to you by MyChart, letter or phone within 4 business days after the clinic has received the results. If you do not hear from us within 7 days, please contact the clinic through MyChart or phone. If you have a critical or abnormal lab result, we will notify you by phone as soon as possible.  Submit refill requests through Wanderu or call your pharmacy and they will forward the refill request to us. Please allow 3 business days for your refill to  "be completed.          Additional Information About Your Visit        Care EveryWhere ID     This is your Care EveryWhere ID. This could be used by other organizations to access your [place] medical records  [id number]        Your Vitals Were     Pulse Height BMI (Body Mass Index)             55 1.803 m (5' 10.98\") 33.74 kg/m2          Blood Pressure from Last 3 Encounters:   09/17/18 (!) 150/92   05/17/18 112/70   02/15/18 138/74    Weight from Last 3 Encounters:   09/17/18 109.7 kg (241 lb 12.8 oz)   05/17/18 108.3 kg (238 lb 12.8 oz)   02/15/18 111.6 kg (246 lb)              We Performed the Following     Follow-Up with Electrophysiologist        Primary Care Provider Office Phone # Fax #    Elisa Dougherty [phone]       [facility] [address] [place] [address]        Equal Access to Services     [place] campusTAYLOR : Hadii aad ku hadasho Soomaali, waaxda luqadaha, qaybta kaalmada adeegyada, waxay idiin haykimberlyn shayne gruber . So [facility] [phone].    ATENCIÓN: Si habla español, tiene a hansen disposición servicios gratuitos de asistencia lingüística. Llame al [phone].    We comply with applicable federal civil rights laws and [place] laws. We do not discriminate on the basis of race, color, national origin, age, disability, sex, sexual orientation, or gender identity.            Thank you!     Thank you for choosing [facility] HEART [facility]  for your care. Our goal is always to provide you with excellent care. Hearing back from our patients is one way we can continue to improve our services. Please take a few minutes to complete the written survey that you may receive in the mail after your visit with us. Thank you!             Your Updated Medication List - Protect others around you: Learn how to safely use, store and throw away your medicines at www.disposemymeds.org.          This list is accurate as of 9/17/18  5:30 PM.  Always use " your most recent med list.                   Brand Name Dispense Instructions for use Diagnosis    apixaban ANTICOAGULANT 5 MG tablet    ELIQUIS    60 tablet    Take 1 tablet (5 mg) by mouth 2 times daily    Paroxysmal atrial fibrillation (H)       lisinopril-hydrochlorothiazide 20-25 MG per tablet    PRINZIDE/ZESTORETIC     Take 1 tablet by mouth daily        metoprolol succinate 25 MG 24 hr tablet    TOPROL-XL    30 tablet    Take 1 tablet (25 mg) by mouth daily    Paroxysmal atrial fibrillation (H)       simvastatin 20 MG tablet    ZOCOR     Take 20 mg by mouth daily        ZOLOFT PO      Take 100 mg by mouth daily

## 2018-09-18 NOTE — PROGRESS NOTES
Service Date: 09/17/2018      HISTORY OF PRESENT ILLNESS:    I had the pleasure to see Mr. Arias Manzo, a delightful 63-year-old gentleman with the following medical issues:   A.  Symptomatic 2:1 AV block with pacemaker implantation in 07/2017.   B.  Borderline LV dysfunction, EF 45%-50%.   C.  Hypertension.   D.  Paroxysmal atrial fibrillation, treated with metoprolol and apixaban.      Mr. Manzo has been feeling well.  He has occasional awareness of atrial fibrillation.  Typically symptoms last no more than 1-2 minutes.      Over the past year, we have seen ventricular pacing percentage around 80%-90%, which is higher than previously.  Interestingly, Arias can occasionally feel ventricular pacing.  He says it is a sharp sensation.  Thankfully, it is only intermittent.      He does not have chest pain with exertion.  He does have some mild dyspnea on exertion.  No syncope or near-syncope.      PHYSICAL EXAMINATION:   VITAL SIGNS:  Blood pressure (second measurement) 150/92, pulse 60 and regular, weight 109 kilos, height 180 cm.   GENERAL:  He is a pleasant gentleman who is mildly overweight, in no distress.   HEAD:  Normocephalic.   NECK:  Supple without bruits.   LUNGS:  Clear.   CARDIOVASCULAR:  Normal JVP, regular rhythm.  No gallop, murmur or rub.  Nicely healed left pectoral incision.   ABDOMEN:  Mildly obese, soft, nontender.   EXTREMITIES:  No edema.      DIAGNOSTIC STUDIES:    His echocardiogram earlier today showed EF of 45%-50%, which is stable compared to previous studies.  There is septal dyssynchrony.  No significant valvular abnormalities.      IMPRESSION:   1.  Symptomatic AV block.  The patient's pacemaker is functioning normally.  Ventricular pacing is around 80%.  The patient had low-normal LV function to begin with and this may be aggravated by nearly continuous ventricular pacing.  EF has remained stable in the 45%-50% range and he has no symptoms.  There is no strong indication for  further intervention at this point.  He did have a previous nuclear stress test that did not show ischemia.   2.  Hypertension.  Due to an oversight there was a huge delay in rooming him today as we erroneously thought that he was a no-show for his appointment.  His blood pressure was initially high with systolic in the 160s but after a 15-minute wait was in the 150s.  I asked him to keep an eye on his blood pressure with his automated blood pressure cuff at home.      RECOMMENDATIONS:   A.  Monitor blood pressure at home.   B.  Continue current medications.   C.  Routine followup in the Device Clinic.   D.  I have requested a followup visit in 1 year in our [facility] with echocardiogram before the visit.      Thank you for the opportunity to be part of his care.        TREV JOHNSON MD, [facility]         cc:   Elisa Dougherty MD    [facility] [place] [address] [place] [address]             D: 2018   T: 2018   MT: TIAN      Name:     JOHN CHRISTIAN   MRN:      [id number]        Account:      [id number]   :      1955           Service Date: 2018      Document: [id number]

## 2018-12-17 ENCOUNTER — ANCILLARY PROCEDURE (OUTPATIENT)
Dept: CARDIOLOGY | Facility: CLINIC | Age: 63
End: 2018-12-17
Attending: INTERNAL MEDICINE
Payer: COMMERCIAL

## 2018-12-17 DIAGNOSIS — I44.1 AV BLOCK, 2ND DEGREE: ICD-10-CM

## 2018-12-17 PROCEDURE — 93294 REM INTERROG EVL PM/LDLS PM: CPT | Performed by: INTERNAL MEDICINE

## 2018-12-17 PROCEDURE — 93296 REM INTERROG EVL PM/IDS: CPT | Performed by: INTERNAL MEDICINE

## 2018-12-19 DIAGNOSIS — I48.0 PAROXYSMAL ATRIAL FIBRILLATION (H): ICD-10-CM

## 2019-01-04 DIAGNOSIS — I48.0 PAROXYSMAL ATRIAL FIBRILLATION (H): ICD-10-CM

## 2019-01-04 RX ORDER — METOPROLOL SUCCINATE 25 MG/1
25 TABLET, EXTENDED RELEASE ORAL DAILY
Qty: 90 TABLET | Refills: 2 | Status: SHIPPED | OUTPATIENT
Start: 2019-01-04 | End: 2020-04-29

## 2019-02-11 ENCOUNTER — DOCUMENTATION ONLY (OUTPATIENT)
Dept: CARE COORDINATION | Facility: CLINIC | Age: 64
End: 2019-02-11

## 2019-02-20 LAB
MDC_IDC_EPISODE_DTM: NORMAL
MDC_IDC_EPISODE_DTM: NORMAL
MDC_IDC_EPISODE_DURATION: 117 S
MDC_IDC_EPISODE_DURATION: 40 S
MDC_IDC_EPISODE_ID: 19
MDC_IDC_EPISODE_ID: 20
MDC_IDC_EPISODE_TYPE: NORMAL
MDC_IDC_EPISODE_TYPE: NORMAL
MDC_IDC_LEAD_IMPLANT_DT: NORMAL
MDC_IDC_LEAD_IMPLANT_DT: NORMAL
MDC_IDC_LEAD_LOCATION: NORMAL
MDC_IDC_LEAD_LOCATION: NORMAL
MDC_IDC_LEAD_LOCATION_DETAIL_1: NORMAL
MDC_IDC_LEAD_LOCATION_DETAIL_1: NORMAL
MDC_IDC_LEAD_MFG: NORMAL
MDC_IDC_LEAD_MFG: NORMAL
MDC_IDC_LEAD_MODEL: NORMAL
MDC_IDC_LEAD_MODEL: NORMAL
MDC_IDC_LEAD_POLARITY_TYPE: NORMAL
MDC_IDC_LEAD_POLARITY_TYPE: NORMAL
MDC_IDC_LEAD_SERIAL: NORMAL
MDC_IDC_LEAD_SERIAL: NORMAL
MDC_IDC_MSMT_BATTERY_DTM: NORMAL
MDC_IDC_MSMT_BATTERY_REMAINING_LONGEVITY: 106 MO
MDC_IDC_MSMT_BATTERY_RRT_TRIGGER: 2.83
MDC_IDC_MSMT_BATTERY_STATUS: NORMAL
MDC_IDC_MSMT_BATTERY_VOLTAGE: 3.02 V
MDC_IDC_MSMT_LEADCHNL_RA_IMPEDANCE_VALUE: 361 OHM
MDC_IDC_MSMT_LEADCHNL_RA_IMPEDANCE_VALUE: 418 OHM
MDC_IDC_MSMT_LEADCHNL_RA_PACING_THRESHOLD_AMPLITUDE: 0.5 V
MDC_IDC_MSMT_LEADCHNL_RA_PACING_THRESHOLD_PULSEWIDTH: 0.4 MS
MDC_IDC_MSMT_LEADCHNL_RA_SENSING_INTR_AMPL: 1 MV
MDC_IDC_MSMT_LEADCHNL_RA_SENSING_INTR_AMPL: 1 MV
MDC_IDC_MSMT_LEADCHNL_RV_IMPEDANCE_VALUE: 437 OHM
MDC_IDC_MSMT_LEADCHNL_RV_IMPEDANCE_VALUE: 627 OHM
MDC_IDC_MSMT_LEADCHNL_RV_PACING_THRESHOLD_AMPLITUDE: 0.62 V
MDC_IDC_MSMT_LEADCHNL_RV_PACING_THRESHOLD_PULSEWIDTH: 0.4 MS
MDC_IDC_MSMT_LEADCHNL_RV_SENSING_INTR_AMPL: 15.75 MV
MDC_IDC_MSMT_LEADCHNL_RV_SENSING_INTR_AMPL: 15.75 MV
MDC_IDC_PG_IMPLANT_DTM: NORMAL
MDC_IDC_PG_MFG: NORMAL
MDC_IDC_PG_MODEL: NORMAL
MDC_IDC_PG_SERIAL: NORMAL
MDC_IDC_PG_TYPE: NORMAL
MDC_IDC_SESS_CLINIC_NAME: NORMAL
MDC_IDC_SESS_DTM: NORMAL
MDC_IDC_SESS_TYPE: NORMAL
MDC_IDC_SET_BRADY_AT_MODE_SWITCH_RATE: 171 {BEATS}/MIN
MDC_IDC_SET_BRADY_HYSTRATE: NORMAL
MDC_IDC_SET_BRADY_LOWRATE: 55 {BEATS}/MIN
MDC_IDC_SET_BRADY_MAX_SENSOR_RATE: 130 {BEATS}/MIN
MDC_IDC_SET_BRADY_MAX_TRACKING_RATE: 130 {BEATS}/MIN
MDC_IDC_SET_BRADY_MODE: NORMAL
MDC_IDC_SET_BRADY_PAV_DELAY_LOW: 300 MS
MDC_IDC_SET_BRADY_SAV_DELAY_LOW: 270 MS
MDC_IDC_SET_LEADCHNL_RA_PACING_AMPLITUDE: 1.5 V
MDC_IDC_SET_LEADCHNL_RA_PACING_ANODE_ELECTRODE_1: NORMAL
MDC_IDC_SET_LEADCHNL_RA_PACING_ANODE_LOCATION_1: NORMAL
MDC_IDC_SET_LEADCHNL_RA_PACING_CAPTURE_MODE: NORMAL
MDC_IDC_SET_LEADCHNL_RA_PACING_CATHODE_ELECTRODE_1: NORMAL
MDC_IDC_SET_LEADCHNL_RA_PACING_CATHODE_LOCATION_1: NORMAL
MDC_IDC_SET_LEADCHNL_RA_PACING_POLARITY: NORMAL
MDC_IDC_SET_LEADCHNL_RA_PACING_PULSEWIDTH: 0.4 MS
MDC_IDC_SET_LEADCHNL_RA_SENSING_ANODE_ELECTRODE_1: NORMAL
MDC_IDC_SET_LEADCHNL_RA_SENSING_ANODE_LOCATION_1: NORMAL
MDC_IDC_SET_LEADCHNL_RA_SENSING_CATHODE_ELECTRODE_1: NORMAL
MDC_IDC_SET_LEADCHNL_RA_SENSING_CATHODE_LOCATION_1: NORMAL
MDC_IDC_SET_LEADCHNL_RA_SENSING_POLARITY: NORMAL
MDC_IDC_SET_LEADCHNL_RA_SENSING_SENSITIVITY: 0.3 MV
MDC_IDC_SET_LEADCHNL_RV_PACING_AMPLITUDE: 2 V
MDC_IDC_SET_LEADCHNL_RV_PACING_ANODE_ELECTRODE_1: NORMAL
MDC_IDC_SET_LEADCHNL_RV_PACING_ANODE_LOCATION_1: NORMAL
MDC_IDC_SET_LEADCHNL_RV_PACING_CAPTURE_MODE: NORMAL
MDC_IDC_SET_LEADCHNL_RV_PACING_CATHODE_ELECTRODE_1: NORMAL
MDC_IDC_SET_LEADCHNL_RV_PACING_CATHODE_LOCATION_1: NORMAL
MDC_IDC_SET_LEADCHNL_RV_PACING_POLARITY: NORMAL
MDC_IDC_SET_LEADCHNL_RV_PACING_PULSEWIDTH: 0.4 MS
MDC_IDC_SET_LEADCHNL_RV_SENSING_ANODE_ELECTRODE_1: NORMAL
MDC_IDC_SET_LEADCHNL_RV_SENSING_ANODE_LOCATION_1: NORMAL
MDC_IDC_SET_LEADCHNL_RV_SENSING_CATHODE_ELECTRODE_1: NORMAL
MDC_IDC_SET_LEADCHNL_RV_SENSING_CATHODE_LOCATION_1: NORMAL
MDC_IDC_SET_LEADCHNL_RV_SENSING_POLARITY: NORMAL
MDC_IDC_SET_LEADCHNL_RV_SENSING_SENSITIVITY: 0.9 MV
MDC_IDC_SET_ZONE_DETECTION_INTERVAL: 350 MS
MDC_IDC_SET_ZONE_DETECTION_INTERVAL: 400 MS
MDC_IDC_SET_ZONE_TYPE: NORMAL
MDC_IDC_STAT_AT_BURDEN_PERCENT: 0 %
MDC_IDC_STAT_AT_DTM_END: NORMAL
MDC_IDC_STAT_AT_DTM_START: NORMAL
MDC_IDC_STAT_BRADY_AP_VP_PERCENT: 36.64 %
MDC_IDC_STAT_BRADY_AP_VS_PERCENT: 4.43 %
MDC_IDC_STAT_BRADY_AS_VP_PERCENT: 40.99 %
MDC_IDC_STAT_BRADY_AS_VS_PERCENT: 17.94 %
MDC_IDC_STAT_BRADY_DTM_END: NORMAL
MDC_IDC_STAT_BRADY_DTM_START: NORMAL
MDC_IDC_STAT_BRADY_RA_PERCENT_PACED: 40.86 %
MDC_IDC_STAT_BRADY_RV_PERCENT_PACED: 77.45 %
MDC_IDC_STAT_EPISODE_RECENT_COUNT: 0
MDC_IDC_STAT_EPISODE_RECENT_COUNT: 2
MDC_IDC_STAT_EPISODE_RECENT_COUNT_DTM_END: NORMAL
MDC_IDC_STAT_EPISODE_RECENT_COUNT_DTM_START: NORMAL
MDC_IDC_STAT_EPISODE_TOTAL_COUNT: 0
MDC_IDC_STAT_EPISODE_TOTAL_COUNT: 1
MDC_IDC_STAT_EPISODE_TOTAL_COUNT: 17
MDC_IDC_STAT_EPISODE_TOTAL_COUNT: 2
MDC_IDC_STAT_EPISODE_TOTAL_COUNT_DTM_END: NORMAL
MDC_IDC_STAT_EPISODE_TOTAL_COUNT_DTM_START: NORMAL
MDC_IDC_STAT_EPISODE_TYPE: NORMAL

## 2019-03-04 ENCOUNTER — OFFICE VISIT (OUTPATIENT)
Dept: OTOLARYNGOLOGY | Facility: CLINIC | Age: 64
End: 2019-03-04
Payer: COMMERCIAL

## 2019-03-04 ENCOUNTER — APPOINTMENT (OUTPATIENT)
Dept: OTOLARYNGOLOGY | Facility: CLINIC | Age: 64
End: 2019-03-04
Payer: COMMERCIAL

## 2019-03-04 DIAGNOSIS — J38.7 LARYNGEAL GRANULOMA: Primary | ICD-10-CM

## 2019-03-04 NOTE — PATIENT INSTRUCTIONS
1.  You were seen in the ENT Clinic today by Dr. Negron.  If you have any questions or concerns after your appointment, please call [phone].  Press option #1 for scheduling related needs.  Press option #3 for Nurse advice.  2.  Plan is to return to clinic as needed    RUFUS Rboerts  [facility]

## 2019-03-04 NOTE — PROGRESS NOTES
Dear Colleague:    Arias Manzo recently returned for follow-up at the [facility]. My clinic note from our visit is enclosed below.  Speech recognition software may have been used in the documentation below; input is reviewed before signature to the best of my ability.     I appreciate the ongoing opportunity to participate in this patient's care.    Please feel free to contact me with any questions.      Sincerely yours,  Kalyn Negron M.D., M.P.H.  , Laryngology  Director, [facility]  Otolaryngology- Head & Neck Surgery  [phone]            =====  HISTORY OF PRESENT ILLNESS:  Arias Manzo is a pleasant 63-year-old male with a history of recalcitrant and recurrent laryngeal granuloma who returns in follow up today.  He was last seen in 2017. Since then he has been doing well overall, with occasional twinges.    Recently, he coughed up a little blood, which he felt was how the granuloma originally started six years ago, and therefore scheduled a check-up. He has decreased vocal demands now as he is semi-retired.    He has a pacemaker now.       MEDICATIONS:     Current Outpatient Medications   Medication Sig Dispense Refill     apixaban ANTICOAGULANT (ELIQUIS) 5 MG tablet Take 1 tablet (5 mg) by mouth 2 times daily 180 tablet 3     lisinopril-hydrochlorothiazide (PRINZIDE,ZESTORETIC) 20-25 MG per tablet Take 1 tablet by mouth daily       metoprolol succinate ER (TOPROL-XL) 25 MG 24 hr tablet Take 1 tablet (25 mg) by mouth daily 90 tablet 2     Sertraline HCl (ZOLOFT PO) Take 100 mg by mouth daily       simvastatin (ZOCOR) 20 MG tablet Take 20 mg by mouth daily         ALLERGIES:    Allergies   Allergen Reactions     Flexeril [Cyclobenzaprine] GI Disturbance     Naprosyn [Naproxen] GI Disturbance     Penicillins      As child     Ranitidine GI Disturbance       NEW PMH/PSH: As above    REVIEW OF SYSTEMS:  The patient completed a  comprehensive 11 point review of systems (below), which was reviewed. Positives are as noted below.  Patient Supplied Answers to Review of Systems   ENT ROS 3/4/2019   Constitutional Problems with sleep   Neurology -   Psychology -   Eyes -   Ears, Nose, Throat Ringing/noise in ears   Cardiopulmonary Breathing problems   Gastrointestinal/Genitourinary -   Musculoskeletal Neck pain   Hematologic Easy bruising   Endocrine -   Other Problems with anesthesia in surgery       PHYSICAL EXAM:  General: The patient was alert and conversant, and in no acute distress.    Oral cavity/oropharynx: No masses or lesions. Dentition unchanged since prior. Tongue mobility and palate elevation intact and symmetric.  Neck: No palpable cervical lymphadenopathy, no significant tenderness to palpation of the thyrohyoid space, which was narrow. No obvious thyroid abnormality.  Resp: Breathing comfortably, no stridor or stertor.  Neuro: Symmetric facial function. Other cranial nerve function as documented above.  Psych: Normal affect, pleasant and cooperative.  Voice/speech: No significant dysphonia.      Intake scores  Last 2 Scores for Patient-Answered VHI Questionnaire  VHI Total Score 2/10/2017 3/10/2017   VHI Total Score 20 21      Last 2 Scores for Patient-Answered EAT Questionnaire  EAT Total Score 2/10/2017 3/10/2017   EAT Total Score 10 5      Last 2 Scores for Patient-Answered CSI Questionnaire  CSI Total Score 2/10/2017 3/10/2017   CSI Total Score 12 12       Procedure:   Flexible fiberoptic laryngoscopy  Indications: This procedure was warranted to evaluate the patient's laryngeal anatomy and function. Risks, benefits, and alternatives were discussed.  Description: After written informed consent was obtained, a time-out was performed to confirm patient identity, procedure, and procedure site. Topical 3% lidocaine with 0.25% phenylephrine was applied to the nasal cavities. I performed the endoscopy and no complications were  apparent.  Performed by: Kalyn Negron MD MPH  SLP: NA  Findings: Normal nasopharynx. Normal base of tongue, valleculae, and epiglottis. The right true vocal fold demonstrated normal mobility. The left true vocal fold demonstrated normal mobility. The medial edges of the vocal folds appeared smooth and straight. No focal mucosal lesions were observed on the true vocal folds. Glissade produced appropriate elongation. There was mild to moderate supraglottic recruitment with connected speech. Mucosa of the false vocal folds, aryepiglottic folds, piriform sinuses, and posterior glottis unremarkable. Airway was patent.          IMPRESSION AND PLAN:   Arias Manzo returns with resolution of his granuloma. I did not see any obvious sources of hemoptysis, which occurred once but has not recurred. He is on apixaban now.     We discussed that if it recurs, we will need to repeat a scope or consider referrals to Gastroenterology/Pulmonary for further evaluation, but for now we are happy to see that his throat is doing well.     He will return as needed.  I appreciate the opportunity to participate in the care of this pleasant patient.

## 2019-03-04 NOTE — LETTER
3/4/2019      RE: Arias Manzo  [address] [facility] [id number]       Dear Colleague:    Arias Manzo recently returned for follow-up at the [facility]. My clinic note from our visit is enclosed below.  Speech recognition software may have been used in the documentation below; input is reviewed before signature to the best of my ability.     I appreciate the ongoing opportunity to participate in this patient's care.    Please feel free to contact me with any questions.      Sincerely yours,  Kalyn Negron M.D., M.P.H.  , Laryngology  Director, [facility]  Otolaryngology- Head & Neck Surgery  [phone]            =====  HISTORY OF PRESENT ILLNESS:  Arias Manzo is a pleasant 63-year-old male with a history of recalcitrant and recurrent laryngeal granuloma who returns in follow up today.  He was last seen in 2017. Since then he has been doing well overall, with occasional twinges.    Recently, he coughed up a little blood, which he felt was how the granuloma originally started six years ago, and therefore scheduled a check-up. He has decreased vocal demands now as he is semi-retired.    He has a pacemaker now.       MEDICATIONS:     Current Outpatient Medications   Medication Sig Dispense Refill     apixaban ANTICOAGULANT (ELIQUIS) 5 MG tablet Take 1 tablet (5 mg) by mouth 2 times daily 180 tablet 3     lisinopril-hydrochlorothiazide (PRINZIDE,ZESTORETIC) 20-25 MG per tablet Take 1 tablet by mouth daily       metoprolol succinate ER (TOPROL-XL) 25 MG 24 hr tablet Take 1 tablet (25 mg) by mouth daily 90 tablet 2     Sertraline HCl (ZOLOFT PO) Take 100 mg by mouth daily       simvastatin (ZOCOR) 20 MG tablet Take 20 mg by mouth daily         ALLERGIES:    Allergies   Allergen Reactions     Flexeril [Cyclobenzaprine] GI Disturbance     Naprosyn [Naproxen] GI Disturbance     Penicillins      As child     Ranitidine GI  Disturbance       NEW PMH/PSH: As above    REVIEW OF SYSTEMS:  The patient completed a comprehensive 11 point review of systems (below), which was reviewed. Positives are as noted below.  Patient Supplied Answers to Review of Systems   ENT ROS 3/4/2019   Constitutional Problems with sleep   Neurology -   Psychology -   Eyes -   Ears, Nose, Throat Ringing/noise in ears   Cardiopulmonary Breathing problems   Gastrointestinal/Genitourinary -   Musculoskeletal Neck pain   Hematologic Easy bruising   Endocrine -   Other Problems with anesthesia in surgery       PHYSICAL EXAM:  General: The patient was alert and conversant, and in no acute distress.    Oral cavity/oropharynx: No masses or lesions. Dentition unchanged since prior. Tongue mobility and palate elevation intact and symmetric.  Neck: No palpable cervical lymphadenopathy, no significant tenderness to palpation of the thyrohyoid space, which was narrow. No obvious thyroid abnormality.  Resp: Breathing comfortably, no stridor or stertor.  Neuro: Symmetric facial function. Other cranial nerve function as documented above.  Psych: Normal affect, pleasant and cooperative.  Voice/speech: No significant dysphonia.      Intake scores  Last 2 Scores for Patient-Answered VHI Questionnaire  VHI Total Score 2/10/2017 3/10/2017   VHI Total Score 20 21      Last 2 Scores for Patient-Answered EAT Questionnaire  EAT Total Score 2/10/2017 3/10/2017   EAT Total Score 10 5      Last 2 Scores for Patient-Answered CSI Questionnaire  CSI Total Score 2/10/2017 3/10/2017   CSI Total Score 12 12       Procedure:   Flexible fiberoptic laryngoscopy  Indications: This procedure was warranted to evaluate the patient's laryngeal anatomy and function. Risks, benefits, and alternatives were discussed.  Description: After written informed consent was obtained, a time-out was performed to confirm patient identity, procedure, and procedure site. Topical 3% lidocaine with 0.25% phenylephrine was  applied to the nasal cavities. I performed the endoscopy and no complications were apparent.  Performed by: Kalyn Negron MD MPH  SLP: NA  Findings: Normal nasopharynx. Normal base of tongue, valleculae, and epiglottis. The right true vocal fold demonstrated normal mobility. The left true vocal fold demonstrated normal mobility. The medial edges of the vocal folds appeared smooth and straight. No focal mucosal lesions were observed on the true vocal folds. Glissade produced appropriate elongation. There was mild to moderate supraglottic recruitment with connected speech. Mucosa of the false vocal folds, aryepiglottic folds, piriform sinuses, and posterior glottis unremarkable. Airway was patent.          IMPRESSION AND PLAN:   Arias Manzo returns with resolution of his granuloma. I did not see any obvious sources of hemoptysis, which occurred once but has not recurred. He is on apixaban now.     We discussed that if it recurs, we will need to repeat a scope or consider referrals to Gastroenterology/Pulmonary for further evaluation, but for now we are happy to see that his throat is doing well.     He will return as needed.  I appreciate the opportunity to participate in the care of this pleasant patient.         Kalyn Negron MD

## 2019-04-08 ENCOUNTER — ANCILLARY PROCEDURE (OUTPATIENT)
Dept: CARDIOLOGY | Facility: CLINIC | Age: 64
End: 2019-04-08
Attending: INTERNAL MEDICINE
Payer: MEDICARE

## 2019-04-08 DIAGNOSIS — I44.1 AV BLOCK, 2ND DEGREE: ICD-10-CM

## 2019-04-08 PROCEDURE — 93296 REM INTERROG EVL PM/IDS: CPT | Performed by: INTERNAL MEDICINE

## 2019-04-08 PROCEDURE — 93294 REM INTERROG EVL PM/LDLS PM: CPT | Performed by: INTERNAL MEDICINE

## 2019-05-07 LAB
MDC_IDC_EPISODE_DTM: NORMAL
MDC_IDC_EPISODE_DTM: NORMAL
MDC_IDC_EPISODE_DURATION: 102 S
MDC_IDC_EPISODE_DURATION: 35 S
MDC_IDC_EPISODE_ID: 23
MDC_IDC_EPISODE_ID: 24
MDC_IDC_EPISODE_TYPE: NORMAL
MDC_IDC_EPISODE_TYPE: NORMAL
MDC_IDC_LEAD_IMPLANT_DT: NORMAL
MDC_IDC_LEAD_IMPLANT_DT: NORMAL
MDC_IDC_LEAD_LOCATION: NORMAL
MDC_IDC_LEAD_LOCATION: NORMAL
MDC_IDC_LEAD_LOCATION_DETAIL_1: NORMAL
MDC_IDC_LEAD_LOCATION_DETAIL_1: NORMAL
MDC_IDC_LEAD_MFG: NORMAL
MDC_IDC_LEAD_MFG: NORMAL
MDC_IDC_LEAD_MODEL: NORMAL
MDC_IDC_LEAD_MODEL: NORMAL
MDC_IDC_LEAD_POLARITY_TYPE: NORMAL
MDC_IDC_LEAD_POLARITY_TYPE: NORMAL
MDC_IDC_LEAD_SERIAL: NORMAL
MDC_IDC_LEAD_SERIAL: NORMAL
MDC_IDC_MSMT_BATTERY_DTM: NORMAL
MDC_IDC_MSMT_BATTERY_REMAINING_LONGEVITY: 97 MO
MDC_IDC_MSMT_BATTERY_RRT_TRIGGER: 2.83
MDC_IDC_MSMT_BATTERY_STATUS: NORMAL
MDC_IDC_MSMT_BATTERY_VOLTAGE: 3.01 V
MDC_IDC_MSMT_LEADCHNL_RA_IMPEDANCE_VALUE: 361 OHM
MDC_IDC_MSMT_LEADCHNL_RA_IMPEDANCE_VALUE: 418 OHM
MDC_IDC_MSMT_LEADCHNL_RA_PACING_THRESHOLD_AMPLITUDE: 0.62 V
MDC_IDC_MSMT_LEADCHNL_RA_PACING_THRESHOLD_PULSEWIDTH: 0.4 MS
MDC_IDC_MSMT_LEADCHNL_RA_SENSING_INTR_AMPL: 0.88 MV
MDC_IDC_MSMT_LEADCHNL_RA_SENSING_INTR_AMPL: 0.88 MV
MDC_IDC_MSMT_LEADCHNL_RV_IMPEDANCE_VALUE: 418 OHM
MDC_IDC_MSMT_LEADCHNL_RV_IMPEDANCE_VALUE: 456 OHM
MDC_IDC_MSMT_LEADCHNL_RV_PACING_THRESHOLD_AMPLITUDE: 0.5 V
MDC_IDC_MSMT_LEADCHNL_RV_PACING_THRESHOLD_PULSEWIDTH: 0.4 MS
MDC_IDC_MSMT_LEADCHNL_RV_SENSING_INTR_AMPL: 16.5 MV
MDC_IDC_MSMT_LEADCHNL_RV_SENSING_INTR_AMPL: 16.5 MV
MDC_IDC_PG_IMPLANT_DTM: NORMAL
MDC_IDC_PG_MFG: NORMAL
MDC_IDC_PG_MODEL: NORMAL
MDC_IDC_PG_SERIAL: NORMAL
MDC_IDC_PG_TYPE: NORMAL
MDC_IDC_SESS_CLINIC_NAME: NORMAL
MDC_IDC_SESS_DTM: NORMAL
MDC_IDC_SESS_TYPE: NORMAL
MDC_IDC_SET_BRADY_AT_MODE_SWITCH_RATE: 171 {BEATS}/MIN
MDC_IDC_SET_BRADY_HYSTRATE: NORMAL
MDC_IDC_SET_BRADY_LOWRATE: 55 {BEATS}/MIN
MDC_IDC_SET_BRADY_MAX_SENSOR_RATE: 130 {BEATS}/MIN
MDC_IDC_SET_BRADY_MAX_TRACKING_RATE: 130 {BEATS}/MIN
MDC_IDC_SET_BRADY_MODE: NORMAL
MDC_IDC_SET_BRADY_PAV_DELAY_LOW: 300 MS
MDC_IDC_SET_BRADY_SAV_DELAY_LOW: 270 MS
MDC_IDC_SET_LEADCHNL_RA_PACING_AMPLITUDE: 1.5 V
MDC_IDC_SET_LEADCHNL_RA_PACING_ANODE_ELECTRODE_1: NORMAL
MDC_IDC_SET_LEADCHNL_RA_PACING_ANODE_LOCATION_1: NORMAL
MDC_IDC_SET_LEADCHNL_RA_PACING_CAPTURE_MODE: NORMAL
MDC_IDC_SET_LEADCHNL_RA_PACING_CATHODE_ELECTRODE_1: NORMAL
MDC_IDC_SET_LEADCHNL_RA_PACING_CATHODE_LOCATION_1: NORMAL
MDC_IDC_SET_LEADCHNL_RA_PACING_POLARITY: NORMAL
MDC_IDC_SET_LEADCHNL_RA_PACING_PULSEWIDTH: 0.4 MS
MDC_IDC_SET_LEADCHNL_RA_SENSING_ANODE_ELECTRODE_1: NORMAL
MDC_IDC_SET_LEADCHNL_RA_SENSING_ANODE_LOCATION_1: NORMAL
MDC_IDC_SET_LEADCHNL_RA_SENSING_CATHODE_ELECTRODE_1: NORMAL
MDC_IDC_SET_LEADCHNL_RA_SENSING_CATHODE_LOCATION_1: NORMAL
MDC_IDC_SET_LEADCHNL_RA_SENSING_POLARITY: NORMAL
MDC_IDC_SET_LEADCHNL_RA_SENSING_SENSITIVITY: 0.3 MV
MDC_IDC_SET_LEADCHNL_RV_PACING_AMPLITUDE: 2 V
MDC_IDC_SET_LEADCHNL_RV_PACING_ANODE_ELECTRODE_1: NORMAL
MDC_IDC_SET_LEADCHNL_RV_PACING_ANODE_LOCATION_1: NORMAL
MDC_IDC_SET_LEADCHNL_RV_PACING_CAPTURE_MODE: NORMAL
MDC_IDC_SET_LEADCHNL_RV_PACING_CATHODE_ELECTRODE_1: NORMAL
MDC_IDC_SET_LEADCHNL_RV_PACING_CATHODE_LOCATION_1: NORMAL
MDC_IDC_SET_LEADCHNL_RV_PACING_POLARITY: NORMAL
MDC_IDC_SET_LEADCHNL_RV_PACING_PULSEWIDTH: 0.4 MS
MDC_IDC_SET_LEADCHNL_RV_SENSING_ANODE_ELECTRODE_1: NORMAL
MDC_IDC_SET_LEADCHNL_RV_SENSING_ANODE_LOCATION_1: NORMAL
MDC_IDC_SET_LEADCHNL_RV_SENSING_CATHODE_ELECTRODE_1: NORMAL
MDC_IDC_SET_LEADCHNL_RV_SENSING_CATHODE_LOCATION_1: NORMAL
MDC_IDC_SET_LEADCHNL_RV_SENSING_POLARITY: NORMAL
MDC_IDC_SET_LEADCHNL_RV_SENSING_SENSITIVITY: 0.9 MV
MDC_IDC_SET_ZONE_DETECTION_INTERVAL: 350 MS
MDC_IDC_SET_ZONE_DETECTION_INTERVAL: 400 MS
MDC_IDC_SET_ZONE_TYPE: NORMAL
MDC_IDC_STAT_AT_BURDEN_PERCENT: 0 %
MDC_IDC_STAT_AT_DTM_END: NORMAL
MDC_IDC_STAT_AT_DTM_START: NORMAL
MDC_IDC_STAT_BRADY_AP_VP_PERCENT: 41.66 %
MDC_IDC_STAT_BRADY_AP_VS_PERCENT: 3.43 %
MDC_IDC_STAT_BRADY_AS_VP_PERCENT: 29.26 %
MDC_IDC_STAT_BRADY_AS_VS_PERCENT: 25.65 %
MDC_IDC_STAT_BRADY_DTM_END: NORMAL
MDC_IDC_STAT_BRADY_DTM_START: NORMAL
MDC_IDC_STAT_BRADY_RA_PERCENT_PACED: 45.06 %
MDC_IDC_STAT_BRADY_RV_PERCENT_PACED: 70.93 %
MDC_IDC_STAT_EPISODE_RECENT_COUNT: 0
MDC_IDC_STAT_EPISODE_RECENT_COUNT: 2
MDC_IDC_STAT_EPISODE_RECENT_COUNT_DTM_END: NORMAL
MDC_IDC_STAT_EPISODE_RECENT_COUNT_DTM_START: NORMAL
MDC_IDC_STAT_EPISODE_TOTAL_COUNT: 0
MDC_IDC_STAT_EPISODE_TOTAL_COUNT: 1
MDC_IDC_STAT_EPISODE_TOTAL_COUNT: 2
MDC_IDC_STAT_EPISODE_TOTAL_COUNT: 21
MDC_IDC_STAT_EPISODE_TOTAL_COUNT_DTM_END: NORMAL
MDC_IDC_STAT_EPISODE_TOTAL_COUNT_DTM_START: NORMAL
MDC_IDC_STAT_EPISODE_TYPE: NORMAL

## 2019-07-21 ENCOUNTER — ANCILLARY PROCEDURE (OUTPATIENT)
Dept: CARDIOLOGY | Facility: CLINIC | Age: 64
End: 2019-07-21
Attending: INTERNAL MEDICINE
Payer: MEDICARE

## 2019-07-21 DIAGNOSIS — I44.1 AV BLOCK, 2ND DEGREE: ICD-10-CM

## 2019-07-21 PROCEDURE — 93296 REM INTERROG EVL PM/IDS: CPT | Performed by: INTERNAL MEDICINE

## 2019-07-21 PROCEDURE — 93294 REM INTERROG EVL PM/LDLS PM: CPT | Performed by: INTERNAL MEDICINE

## 2019-07-26 LAB
MDC_IDC_EPISODE_DTM: NORMAL
MDC_IDC_EPISODE_DURATION: 102 S
MDC_IDC_EPISODE_DURATION: 108 S
MDC_IDC_EPISODE_DURATION: 109 S
MDC_IDC_EPISODE_DURATION: 118 S
MDC_IDC_EPISODE_DURATION: 118 S
MDC_IDC_EPISODE_DURATION: 122 S
MDC_IDC_EPISODE_DURATION: 142 S
MDC_IDC_EPISODE_DURATION: 164 S
MDC_IDC_EPISODE_DURATION: 166 S
MDC_IDC_EPISODE_DURATION: 168 S
MDC_IDC_EPISODE_DURATION: 168 S
MDC_IDC_EPISODE_DURATION: 173 S
MDC_IDC_EPISODE_DURATION: 181 S
MDC_IDC_EPISODE_DURATION: 182 S
MDC_IDC_EPISODE_DURATION: 182 S
MDC_IDC_EPISODE_DURATION: 184 S
MDC_IDC_EPISODE_DURATION: 196 S
MDC_IDC_EPISODE_DURATION: 233 S
MDC_IDC_EPISODE_DURATION: 24 S
MDC_IDC_EPISODE_DURATION: 289 S
MDC_IDC_EPISODE_DURATION: 3 S
MDC_IDC_EPISODE_DURATION: 3 S
MDC_IDC_EPISODE_DURATION: 30 S
MDC_IDC_EPISODE_DURATION: 33 S
MDC_IDC_EPISODE_DURATION: 336 S
MDC_IDC_EPISODE_DURATION: 34 S
MDC_IDC_EPISODE_DURATION: 34 S
MDC_IDC_EPISODE_DURATION: 38 S
MDC_IDC_EPISODE_DURATION: 39 S
MDC_IDC_EPISODE_DURATION: 4 S
MDC_IDC_EPISODE_DURATION: 4 S
MDC_IDC_EPISODE_DURATION: 4427 S
MDC_IDC_EPISODE_DURATION: 45 S
MDC_IDC_EPISODE_DURATION: 47 S
MDC_IDC_EPISODE_DURATION: 5 S
MDC_IDC_EPISODE_DURATION: 57 S
MDC_IDC_EPISODE_DURATION: 6 S
MDC_IDC_EPISODE_DURATION: 6 S
MDC_IDC_EPISODE_DURATION: 60 S
MDC_IDC_EPISODE_DURATION: 8 S
MDC_IDC_EPISODE_DURATION: 83 S
MDC_IDC_EPISODE_DURATION: 85 S
MDC_IDC_EPISODE_DURATION: 90 S
MDC_IDC_EPISODE_DURATION: 90 S
MDC_IDC_EPISODE_ID: 44
MDC_IDC_EPISODE_ID: 45
MDC_IDC_EPISODE_ID: 46
MDC_IDC_EPISODE_ID: 47
MDC_IDC_EPISODE_ID: 48
MDC_IDC_EPISODE_ID: 49
MDC_IDC_EPISODE_ID: 50
MDC_IDC_EPISODE_ID: 51
MDC_IDC_EPISODE_ID: 52
MDC_IDC_EPISODE_ID: 53
MDC_IDC_EPISODE_ID: 54
MDC_IDC_EPISODE_ID: 55
MDC_IDC_EPISODE_ID: 56
MDC_IDC_EPISODE_ID: 57
MDC_IDC_EPISODE_ID: 58
MDC_IDC_EPISODE_ID: 59
MDC_IDC_EPISODE_ID: 60
MDC_IDC_EPISODE_ID: 61
MDC_IDC_EPISODE_ID: 62
MDC_IDC_EPISODE_ID: 63
MDC_IDC_EPISODE_ID: 64
MDC_IDC_EPISODE_ID: 65
MDC_IDC_EPISODE_ID: 66
MDC_IDC_EPISODE_ID: 67
MDC_IDC_EPISODE_ID: 68
MDC_IDC_EPISODE_ID: 69
MDC_IDC_EPISODE_ID: 70
MDC_IDC_EPISODE_ID: 71
MDC_IDC_EPISODE_ID: 72
MDC_IDC_EPISODE_ID: 73
MDC_IDC_EPISODE_ID: 74
MDC_IDC_EPISODE_ID: 75
MDC_IDC_EPISODE_ID: 76
MDC_IDC_EPISODE_ID: 77
MDC_IDC_EPISODE_ID: 78
MDC_IDC_EPISODE_ID: 79
MDC_IDC_EPISODE_ID: 80
MDC_IDC_EPISODE_ID: 81
MDC_IDC_EPISODE_ID: 82
MDC_IDC_EPISODE_ID: 83
MDC_IDC_EPISODE_ID: 84
MDC_IDC_EPISODE_ID: 85
MDC_IDC_EPISODE_ID: 86
MDC_IDC_EPISODE_ID: 87
MDC_IDC_EPISODE_ID: 88
MDC_IDC_EPISODE_ID: 89
MDC_IDC_EPISODE_ID: 90
MDC_IDC_EPISODE_ID: 91
MDC_IDC_EPISODE_ID: 92
MDC_IDC_EPISODE_ID: 93
MDC_IDC_EPISODE_TYPE: NORMAL
MDC_IDC_LEAD_IMPLANT_DT: NORMAL
MDC_IDC_LEAD_IMPLANT_DT: NORMAL
MDC_IDC_LEAD_LOCATION: NORMAL
MDC_IDC_LEAD_LOCATION: NORMAL
MDC_IDC_LEAD_LOCATION_DETAIL_1: NORMAL
MDC_IDC_LEAD_LOCATION_DETAIL_1: NORMAL
MDC_IDC_LEAD_MFG: NORMAL
MDC_IDC_LEAD_MFG: NORMAL
MDC_IDC_LEAD_MODEL: NORMAL
MDC_IDC_LEAD_MODEL: NORMAL
MDC_IDC_LEAD_POLARITY_TYPE: NORMAL
MDC_IDC_LEAD_POLARITY_TYPE: NORMAL
MDC_IDC_LEAD_SERIAL: NORMAL
MDC_IDC_LEAD_SERIAL: NORMAL
MDC_IDC_MSMT_BATTERY_DTM: NORMAL
MDC_IDC_MSMT_BATTERY_REMAINING_LONGEVITY: 95 MO
MDC_IDC_MSMT_BATTERY_RRT_TRIGGER: 2.83
MDC_IDC_MSMT_BATTERY_STATUS: NORMAL
MDC_IDC_MSMT_BATTERY_VOLTAGE: 3.02 V
MDC_IDC_MSMT_LEADCHNL_RA_IMPEDANCE_VALUE: 361 OHM
MDC_IDC_MSMT_LEADCHNL_RA_IMPEDANCE_VALUE: 418 OHM
MDC_IDC_MSMT_LEADCHNL_RA_PACING_THRESHOLD_AMPLITUDE: 0.62 V
MDC_IDC_MSMT_LEADCHNL_RA_PACING_THRESHOLD_PULSEWIDTH: 0.4 MS
MDC_IDC_MSMT_LEADCHNL_RA_SENSING_INTR_AMPL: 1 MV
MDC_IDC_MSMT_LEADCHNL_RA_SENSING_INTR_AMPL: 1 MV
MDC_IDC_MSMT_LEADCHNL_RV_IMPEDANCE_VALUE: 380 OHM
MDC_IDC_MSMT_LEADCHNL_RV_IMPEDANCE_VALUE: 513 OHM
MDC_IDC_MSMT_LEADCHNL_RV_PACING_THRESHOLD_AMPLITUDE: 0.75 V
MDC_IDC_MSMT_LEADCHNL_RV_PACING_THRESHOLD_PULSEWIDTH: 0.4 MS
MDC_IDC_MSMT_LEADCHNL_RV_SENSING_INTR_AMPL: 11.75 MV
MDC_IDC_MSMT_LEADCHNL_RV_SENSING_INTR_AMPL: 11.75 MV
MDC_IDC_PG_IMPLANT_DTM: NORMAL
MDC_IDC_PG_MFG: NORMAL
MDC_IDC_PG_MODEL: NORMAL
MDC_IDC_PG_SERIAL: NORMAL
MDC_IDC_PG_TYPE: NORMAL
MDC_IDC_SESS_CLINIC_NAME: NORMAL
MDC_IDC_SESS_DTM: NORMAL
MDC_IDC_SESS_TYPE: NORMAL
MDC_IDC_SET_BRADY_AT_MODE_SWITCH_RATE: 171 {BEATS}/MIN
MDC_IDC_SET_BRADY_HYSTRATE: NORMAL
MDC_IDC_SET_BRADY_LOWRATE: 55 {BEATS}/MIN
MDC_IDC_SET_BRADY_MAX_SENSOR_RATE: 130 {BEATS}/MIN
MDC_IDC_SET_BRADY_MAX_TRACKING_RATE: 130 {BEATS}/MIN
MDC_IDC_SET_BRADY_MODE: NORMAL
MDC_IDC_SET_BRADY_PAV_DELAY_LOW: 300 MS
MDC_IDC_SET_BRADY_SAV_DELAY_LOW: 270 MS
MDC_IDC_SET_LEADCHNL_RA_PACING_AMPLITUDE: 1.5 V
MDC_IDC_SET_LEADCHNL_RA_PACING_ANODE_ELECTRODE_1: NORMAL
MDC_IDC_SET_LEADCHNL_RA_PACING_ANODE_LOCATION_1: NORMAL
MDC_IDC_SET_LEADCHNL_RA_PACING_CAPTURE_MODE: NORMAL
MDC_IDC_SET_LEADCHNL_RA_PACING_CATHODE_ELECTRODE_1: NORMAL
MDC_IDC_SET_LEADCHNL_RA_PACING_CATHODE_LOCATION_1: NORMAL
MDC_IDC_SET_LEADCHNL_RA_PACING_POLARITY: NORMAL
MDC_IDC_SET_LEADCHNL_RA_PACING_PULSEWIDTH: 0.4 MS
MDC_IDC_SET_LEADCHNL_RA_SENSING_ANODE_ELECTRODE_1: NORMAL
MDC_IDC_SET_LEADCHNL_RA_SENSING_ANODE_LOCATION_1: NORMAL
MDC_IDC_SET_LEADCHNL_RA_SENSING_CATHODE_ELECTRODE_1: NORMAL
MDC_IDC_SET_LEADCHNL_RA_SENSING_CATHODE_LOCATION_1: NORMAL
MDC_IDC_SET_LEADCHNL_RA_SENSING_POLARITY: NORMAL
MDC_IDC_SET_LEADCHNL_RA_SENSING_SENSITIVITY: 0.3 MV
MDC_IDC_SET_LEADCHNL_RV_PACING_AMPLITUDE: 2 V
MDC_IDC_SET_LEADCHNL_RV_PACING_ANODE_ELECTRODE_1: NORMAL
MDC_IDC_SET_LEADCHNL_RV_PACING_ANODE_LOCATION_1: NORMAL
MDC_IDC_SET_LEADCHNL_RV_PACING_CAPTURE_MODE: NORMAL
MDC_IDC_SET_LEADCHNL_RV_PACING_CATHODE_ELECTRODE_1: NORMAL
MDC_IDC_SET_LEADCHNL_RV_PACING_CATHODE_LOCATION_1: NORMAL
MDC_IDC_SET_LEADCHNL_RV_PACING_POLARITY: NORMAL
MDC_IDC_SET_LEADCHNL_RV_PACING_PULSEWIDTH: 0.4 MS
MDC_IDC_SET_LEADCHNL_RV_SENSING_ANODE_ELECTRODE_1: NORMAL
MDC_IDC_SET_LEADCHNL_RV_SENSING_ANODE_LOCATION_1: NORMAL
MDC_IDC_SET_LEADCHNL_RV_SENSING_CATHODE_ELECTRODE_1: NORMAL
MDC_IDC_SET_LEADCHNL_RV_SENSING_CATHODE_LOCATION_1: NORMAL
MDC_IDC_SET_LEADCHNL_RV_SENSING_POLARITY: NORMAL
MDC_IDC_SET_LEADCHNL_RV_SENSING_SENSITIVITY: 0.9 MV
MDC_IDC_SET_ZONE_DETECTION_INTERVAL: 350 MS
MDC_IDC_SET_ZONE_DETECTION_INTERVAL: 400 MS
MDC_IDC_SET_ZONE_TYPE: NORMAL
MDC_IDC_STAT_AT_BURDEN_PERCENT: 0.2 %
MDC_IDC_STAT_AT_DTM_END: NORMAL
MDC_IDC_STAT_AT_DTM_START: NORMAL
MDC_IDC_STAT_BRADY_AP_VP_PERCENT: 33.78 %
MDC_IDC_STAT_BRADY_AP_VS_PERCENT: 3.58 %
MDC_IDC_STAT_BRADY_AS_VP_PERCENT: 16.7 %
MDC_IDC_STAT_BRADY_AS_VS_PERCENT: 45.94 %
MDC_IDC_STAT_BRADY_DTM_END: NORMAL
MDC_IDC_STAT_BRADY_DTM_START: NORMAL
MDC_IDC_STAT_BRADY_RA_PERCENT_PACED: 37.18 %
MDC_IDC_STAT_BRADY_RV_PERCENT_PACED: 50.41 %
MDC_IDC_STAT_EPISODE_RECENT_COUNT: 0
MDC_IDC_STAT_EPISODE_RECENT_COUNT: 69
MDC_IDC_STAT_EPISODE_RECENT_COUNT_DTM_END: NORMAL
MDC_IDC_STAT_EPISODE_RECENT_COUNT_DTM_START: NORMAL
MDC_IDC_STAT_EPISODE_TOTAL_COUNT: 0
MDC_IDC_STAT_EPISODE_TOTAL_COUNT: 1
MDC_IDC_STAT_EPISODE_TOTAL_COUNT: 2
MDC_IDC_STAT_EPISODE_TOTAL_COUNT: 90
MDC_IDC_STAT_EPISODE_TOTAL_COUNT_DTM_END: NORMAL
MDC_IDC_STAT_EPISODE_TOTAL_COUNT_DTM_START: NORMAL
MDC_IDC_STAT_EPISODE_TYPE: NORMAL

## 2019-10-14 ENCOUNTER — ANCILLARY PROCEDURE (OUTPATIENT)
Dept: CARDIOLOGY | Facility: CLINIC | Age: 64
End: 2019-10-14
Attending: INTERNAL MEDICINE
Payer: MEDICARE

## 2019-10-14 VITALS
SYSTOLIC BLOOD PRESSURE: 137 MMHG | HEIGHT: 71 IN | WEIGHT: 244 LBS | DIASTOLIC BLOOD PRESSURE: 81 MMHG | HEART RATE: 77 BPM | BODY MASS INDEX: 34.16 KG/M2

## 2019-10-14 DIAGNOSIS — I48.0 PAROXYSMAL ATRIAL FIBRILLATION (H): ICD-10-CM

## 2019-10-14 DIAGNOSIS — Z95.0 CARDIAC PACEMAKER IN SITU: Primary | ICD-10-CM

## 2019-10-14 DIAGNOSIS — I44.1 AV BLOCK, 2ND DEGREE: ICD-10-CM

## 2019-10-14 PROCEDURE — 93280 PM DEVICE PROGR EVAL DUAL: CPT | Performed by: INTERNAL MEDICINE

## 2019-10-14 PROCEDURE — 99214 OFFICE O/P EST MOD 30 MIN: CPT | Performed by: INTERNAL MEDICINE

## 2019-10-14 ASSESSMENT — MIFFLIN-ST. JEOR: SCORE: 1913.59

## 2019-10-14 NOTE — PROGRESS NOTES
Service Date: 10/14/2019      HISTORY OF PRESENT ILLNESS:    I had the pleasure of seeing . Arias Manzo, a delightful 64-year-old gentleman with the following medical issues:   A.  Symptomatic 2:1 AV block with pacemaker implantation in 07/2017.   B.  Borderline LV dysfunction, EF 45%-50%.   C.  Hypertension.   D.  Paroxysmal atrial fibrillation, treated with metoprolol and apixaban.      Arias has continued to feel well over the past year.  He has not had awareness of atrial fibrillation recently.  No chest pain on exertion, unusual dyspnea, orthopnea, PND or lower extremity edema.      PHYSICAL EXAMINATION:   VITAL SIGNS:  Blood pressure 137/81, pulse 77 and regular, weight 110.7 kilos, height 180 cm.   GENERAL:  He is a pleasant man who is mildly overweight, in no distress.   NECK:  Supple without bruits.   LUNGS:  Clear.   CARDIOVASCULAR:  Normal JVP, regular rhythm, without gallop, murmur or rub.   ABDOMEN:  Soft, nontender, mildly obese.   EXTREMITIES:  No edema.      DIAGNOSTIC STUDIES:  Please see separate pacemaker interrogation report.        IMPRESSION:   1.  Symptomatic AV block.  The patient uses the device for ventricular pacing 70% to 80% of the time.  LV function is mildly reduced at 45%-50%.  He was supposed to have an echocardiogram before the visit, but this was not done so I have requested this to be performed in the near future.  He does not have CHF symptoms.   2.  Hypertension.  His blood pressure has been under good control at home.   3.  Paroxysmal atrial fibrillation.  AF burden continues to be low.  Minimal symptoms.  [id number]-[id number] score is 2 (age, hypertension).  Continue metoprolol and apixaban.       RECOMMENDATIONS:   A.  Continue current medications.   B.  Echocardiogram.   C.  Follow up with KIAN in [place] in 1 year.  We will see him sooner if he has problems in the interim.      It was my pleasure being involved in his care.        TREV JOHNSON MD, FACC           cc:    MD Estefani Irvin[address]  [address]             D: 10/14/2019   T: 10/14/2019   MT: TIAN      Name:     JOHN CHRISTIAN   MRN:      [id number]        Account:      [id number]   :      1955           Service Date: 10/14/2019      Document: [id number]

## 2019-10-14 NOTE — LETTER
10/14/2019      Leann G Hutchison, MD [facility] [address] [place] [address]      RE: Arias Manzo       Dear Colleague,    I had the pleasure of seeing Arias Manzo in the [facility].    Service Date: 10/14/2019      HISTORY OF PRESENT ILLNESS:    I had the pleasure of seeing Mr. Arias Manzo, a delightful 64-year-old gentleman with the following medical issues:   A.  Symptomatic 2:1 AV block with pacemaker implantation in 07/2017.   B.  Borderline LV dysfunction, EF 45%-50%.   C.  Hypertension.   D.  Paroxysmal atrial fibrillation, treated with metoprolol and apixaban.      Arias has continued to feel well over the past year.  He has not had awareness of atrial fibrillation recently.  No chest pain on exertion, unusual dyspnea, orthopnea, PND or lower extremity edema.      PHYSICAL EXAMINATION:   VITAL SIGNS:  Blood pressure 137/81, pulse 77 and regular, weight 110.7 kilos, height 180 cm.   GENERAL:  He is a pleasant man who is mildly overweight, in no distress.   NECK:  Supple without bruits.   LUNGS:  Clear.   CARDIOVASCULAR:  Normal JVP, regular rhythm, without gallop, murmur or rub.   ABDOMEN:  Soft, nontender, mildly obese.   EXTREMITIES:  No edema.      DIAGNOSTIC STUDIES:  Please see separate pacemaker interrogation report.        IMPRESSION:   1.  Symptomatic AV block.  The patient uses the device for ventricular pacing 70% to 80% of the time.  LV function is mildly reduced at 45%-50%.  He was supposed to have an echocardiogram before the visit, but this was not done so I have requested this to be performed in the near future.  He does not have CHF symptoms.   2.  Hypertension.  His blood pressure has been under good control at home.   3.  Paroxysmal atrial fibrillation.  AF burden continues to be low.  Minimal symptoms.  [id number] score is 2 (age, hypertension).  Continue metoprolol and apixaban.       RECOMMENDATIONS:   A.   Continue current medications.   B.  Echocardiogram.   C.  Follow up with KIAN in [place] in 1 year.  We will see him sooner if he has problems in the interim.      It was my pleasure being involved in his care.        TREV JOHNSON MD, [facility]           cc:   MD Estefani Irvin [facility]    [address] [place]  [address]             D: 10/14/2019   T: 10/14/2019   MT: TIAN      Name:     JOHN CHRISTIAN   MRN:      [id number]        Account:      [id number]   :      1955           Service Date: 10/14/2019      Document: [id number]           Outpatient Encounter Medications as of 10/14/2019   Medication Sig Dispense Refill     apixaban ANTICOAGULANT (ELIQUIS) 5 MG tablet Take 1 tablet (5 mg) by mouth 2 times daily 180 tablet 3     lisinopril-hydrochlorothiazide (PRINZIDE,ZESTORETIC) 20-25 MG per tablet Take 1 tablet by mouth daily       metoprolol succinate ER (TOPROL-XL) 25 MG 24 hr tablet Take 1 tablet (25 mg) by mouth daily 90 tablet 2     Sertraline HCl (ZOLOFT PO) Take 100 mg by mouth daily       simvastatin (ZOCOR) 20 MG tablet Take 20 mg by mouth daily       No facility-administered encounter medications on file as of 10/14/2019.        Again, thank you for allowing me to participate in the care of your patient.      Sincerely,    Trev Johnson MD     [facility]

## 2019-10-14 NOTE — PROGRESS NOTES
HPI and Plan:   See dictation    Orders Placed This Encounter   Procedures     Follow-Up with Cardiac Advanced Practice Provider       No orders of the defined types were placed in this encounter.      There are no discontinued medications.      Encounter Diagnosis   Name Primary?     Paroxysmal atrial fibrillation (H)        CURRENT MEDICATIONS:  Current Outpatient Medications   Medication Sig Dispense Refill     apixaban ANTICOAGULANT (ELIQUIS) 5 MG tablet Take 1 tablet (5 mg) by mouth 2 times daily 180 tablet 3     lisinopril-hydrochlorothiazide (PRINZIDE,ZESTORETIC) 20-25 MG per tablet Take 1 tablet by mouth daily       metoprolol succinate ER (TOPROL-XL) 25 MG 24 hr tablet Take 1 tablet (25 mg) by mouth daily 90 tablet 2     Sertraline HCl (ZOLOFT PO) Take 100 mg by mouth daily       simvastatin (ZOCOR) 20 MG tablet Take 20 mg by mouth daily         ALLERGIES     Allergies   Allergen Reactions     Flexeril [Cyclobenzaprine] GI Disturbance     Naprosyn [Naproxen] GI Disturbance     Penicillins      As child     Ranitidine GI Disturbance       PAST MEDICAL HISTORY:  Past Medical History:   Diagnosis Date     CPAP (continuous positive airway pressure) dependence      Depression      Gastro-oesophageal reflux disease      Hepatic steatosis      Hoarseness      Hyperlipidemia      Hypertension      IFG (impaired fasting glucose)      LAFB (left anterior fascicular block)      PONV (postoperative nausea and vomiting)      RBBB      Seizures (H)     as child     Sleep apnea     cpap     Uncomplicated asthma     denies asthma       PAST SURGICAL HISTORY:  Past Surgical History:   Procedure Laterality Date     ANKLE SURGERY       APPENDECTOMY       BACK SURGERY      lami x2     ENT SURGERY      throat granulomas     HERNIA REPAIR       INJECT BOTOX N/A 10/30/2014    Procedure: INJECT BOTOX;  Surgeon: Kalyn Negron MD;  Location: [facility] OR     INJECT BOTOX N/A 12/8/2016    Procedure: INJECT BOTOX;  Surgeon:  Kalyn Negron MD;  Location: [facility] OR     INJECT STEROID (LOCATION) N/A 2016    Procedure: INJECT STEROID (LOCATION);  Surgeon: Kalyn Negron MD;  Location: [facility] OR     LAMINECTOMY LUMBAR ONE LEVEL  3/5/2014    Procedure: LAMINECTOMY LUMBAR ONE LEVEL;  RIGHT L4-5 DISCECTOMY;  Surgeon: Rodrigo Ríos MD;  Location: [facility] OR     LASER CO2 LARYNGOSCOPY N/A 2016    Procedure: LASER CO2 LARYNGOSCOPY;  Surgeon: Kalyn Negron MD;  Location: [facility] OR     LASER CO2 LARYNGOSCOPY, COMPLEX Left 10/30/2014    Procedure: LASER CO2 LARYNGOSCOPY, COMPLEX;  Surgeon: Kalyn Negron MD;  Location: [facility] OR     ORTHOPEDIC SURGERY         FAMILY HISTORY:  Family History   Problem Relation Age of Onset     Cancer Father        SOCIAL HISTORY:  Social History     Socioeconomic History     Marital status:      Spouse name: None     Number of children: None     Years of education: None     Highest education level: None   Occupational History     None   Social Needs     Financial resource strain: None     Food insecurity:     Worry: None     Inability: None     Transportation needs:     Medical: None     Non-medical: None   Tobacco Use     Smoking status: Former Smoker     Years: 10.00     Types: Pipe     Start date: 10/10/1976     Last attempt to quit: 1985     Years since quittin.4     Smokeless tobacco: Never Used   Substance and Sexual Activity     Alcohol use: Yes     Comment: daily- 2 drinks /day     Drug use: No     Sexual activity: Yes     Partners: Female     Birth control/protection: Other   Lifestyle     Physical activity:     Days per week: None     Minutes per session: None     Stress: None   Relationships     Social connections:     Talks on phone: None     Gets together: None     Attends Confucianism service: None     Active member of club or organization: None     Attends meetings of clubs or organizations: None     Relationship status: None     Intimate partner  violence:     Fear of current or ex partner: None     Emotionally abused: None     Physically abused: None     Forced sexual activity: None   Other Topics Concern     Parent/sibling w/ CABG, MI or angioplasty before 65F 55M? Not Asked   Social History Narrative     None       Review of Systems:  Skin:  Negative       Eyes:  Positive for glasses    ENT:  Negative      Respiratory:  Positive for shortness of breath occ.   Cardiovascular:  Negative      Gastroenterology: Negative      Genitourinary:  Negative      Musculoskeletal:  Negative      Neurologic:  Negative      Psychiatric:  Positive for depression treated  Heme/Lymph/Imm:  Positive for allergies    Endocrine:  Negative        [id number]

## 2019-10-14 NOTE — LETTER
10/14/2019    Leann G Hutchison, MD [facility] [address] [place] [address]    RE: Arias Manzo       Dear Colleague,    I had the pleasure of seeing Arias Manzo in the [facility].    HPI and Plan:   See dictation    Orders Placed This Encounter   Procedures     Follow-Up with Cardiac Advanced Practice Provider       No orders of the defined types were placed in this encounter.      There are no discontinued medications.      Encounter Diagnosis   Name Primary?     Paroxysmal atrial fibrillation (H)        CURRENT MEDICATIONS:  Current Outpatient Medications   Medication Sig Dispense Refill     apixaban ANTICOAGULANT (ELIQUIS) 5 MG tablet Take 1 tablet (5 mg) by mouth 2 times daily 180 tablet 3     lisinopril-hydrochlorothiazide (PRINZIDE,ZESTORETIC) 20-25 MG per tablet Take 1 tablet by mouth daily       metoprolol succinate ER (TOPROL-XL) 25 MG 24 hr tablet Take 1 tablet (25 mg) by mouth daily 90 tablet 2     Sertraline HCl (ZOLOFT PO) Take 100 mg by mouth daily       simvastatin (ZOCOR) 20 MG tablet Take 20 mg by mouth daily         ALLERGIES     Allergies   Allergen Reactions     Flexeril [Cyclobenzaprine] GI Disturbance     Naprosyn [Naproxen] GI Disturbance     Penicillins      As child     Ranitidine GI Disturbance       PAST MEDICAL HISTORY:  Past Medical History:   Diagnosis Date     CPAP (continuous positive airway pressure) dependence      Depression      Gastro-oesophageal reflux disease      Hepatic steatosis      Hoarseness      Hyperlipidemia      Hypertension      IFG (impaired fasting glucose)      LAFB (left anterior fascicular block)      PONV (postoperative nausea and vomiting)      RBBB      Seizures (H)     as child     Sleep apnea     cpap     Uncomplicated asthma     denies asthma       PAST SURGICAL HISTORY:  Past Surgical History:   Procedure Laterality Date     ANKLE SURGERY       APPENDECTOMY       BACK SURGERY       lami x2     ENT SURGERY      throat granulomas     HERNIA REPAIR       INJECT BOTOX N/A 10/30/2014    Procedure: INJECT BOTOX;  Surgeon: Kalyn Negron MD;  Location: [facility]     INJECT BOTOX N/A 2016    Procedure: INJECT BOTOX;  Surgeon: Kalyn Negron MD;  [facility]     INJECT STEROID (LOCATION) N/A 2016    Procedure: INJECT STEROID (LOCATION);  Surgeon: Kalyn Negron MD;  [facility]     LAMINECTOMY LUMBAR ONE LEVEL  3/5/2014    Procedure: LAMINECTOMY LUMBAR ONE LEVEL;  RIGHT L4-5 DISCECTOMY;  Surgeon: Rodrigo Ríos MD;  Location: [facility] OR     LASER CO2 LARYNGOSCOPY N/A 2016    Procedure: LASER CO2 LARYNGOSCOPY;  Surgeon: Kalyn Negron MD;  [facility]     LASER CO2 LARYNGOSCOPY, COMPLEX Left 10/30/2014    Procedure: LASER CO2 LARYNGOSCOPY, COMPLEX;  Surgeon: Kalyn Negron MD;  Location: [facility] OR     ORTHOPEDIC SURGERY         FAMILY HISTORY:  Family History   Problem Relation Age of Onset     Cancer Father        SOCIAL HISTORY:  Social History     Socioeconomic History     Marital status:      Spouse name: None     Number of children: None     Years of education: None     Highest education level: None   Occupational History     None   Social Needs     Financial resource strain: None     Food insecurity:     Worry: None     Inability: None     Transportation needs:     Medical: None     Non-medical: None   Tobacco Use     Smoking status: Former Smoker     Years: 10.00     Types: Pipe     Start date: 10/10/1976     Last attempt to quit: 1985     Years since quittin.4     Smokeless tobacco: Never Used   Substance and Sexual Activity     Alcohol use: Yes     Comment: daily- 2 drinks /day     Drug use: No     Sexual activity: Yes     Partners: Female     Birth control/protection: Other   Lifestyle     Physical activity:     Days per week: None     Minutes per session: None     Stress: None   Relationships     Social  connections:     Talks on phone: None     Gets together: None     Attends [employer] service: None     Active member of club or organization: None     Attends meetings of clubs or organizations: None     Relationship status: None     Intimate partner violence:     Fear of current or ex partner: None     Emotionally abused: None     Physically abused: None     Forced sexual activity: None   Other Topics Concern     Parent/sibling w/ CABG, MI or angioplasty before 65F 55M? Not Asked   Social History Narrative     None       Review of Systems:  Skin:  Negative       Eyes:  Positive for glasses    ENT:  Negative      Respiratory:  Positive for shortness of breath occ.   Cardiovascular:  Negative      Gastroenterology: Negative      Genitourinary:  Negative      Musculoskeletal:  Negative      Neurologic:  Negative      Psychiatric:  Positive for depression treated  Heme/Lymph/Imm:  Positive for allergies    Endocrine:  Negative        [id number]        Thank you for allowing me to participate in the care of your patient.      Sincerely,     Miley Benavidez MD     [facility]    cc:   Miley Benavidez MD  [address]  [place] [address]

## 2019-11-04 ENCOUNTER — HOSPITAL ENCOUNTER (OUTPATIENT)
Dept: CARDIOLOGY | Facility: CLINIC | Age: 64
Discharge: HOME | End: 2019-11-04
Attending: INTERNAL MEDICINE | Admitting: INTERNAL MEDICINE
Payer: MEDICARE

## 2019-11-04 DIAGNOSIS — I48.0 PAROXYSMAL ATRIAL FIBRILLATION (H): ICD-10-CM

## 2019-11-04 LAB
MDC_IDC_EPISODE_DTM: NORMAL
MDC_IDC_EPISODE_DURATION: 100 S
MDC_IDC_EPISODE_DURATION: 116 S
MDC_IDC_EPISODE_DURATION: 123 S
MDC_IDC_EPISODE_DURATION: 165 S
MDC_IDC_EPISODE_DURATION: 4 S
MDC_IDC_EPISODE_DURATION: 5 S
MDC_IDC_EPISODE_DURATION: 57 S
MDC_IDC_EPISODE_DURATION: 59 S
MDC_IDC_EPISODE_DURATION: 6 S
MDC_IDC_EPISODE_DURATION: 7 S
MDC_IDC_EPISODE_DURATION: 85 S
MDC_IDC_EPISODE_ID: 100
MDC_IDC_EPISODE_ID: 101
MDC_IDC_EPISODE_ID: 102
MDC_IDC_EPISODE_ID: 103
MDC_IDC_EPISODE_ID: 104
MDC_IDC_EPISODE_ID: 105
MDC_IDC_EPISODE_ID: 106
MDC_IDC_EPISODE_ID: 107
MDC_IDC_EPISODE_ID: 108
MDC_IDC_EPISODE_ID: 109
MDC_IDC_EPISODE_ID: 94
MDC_IDC_EPISODE_ID: 95
MDC_IDC_EPISODE_ID: 96
MDC_IDC_EPISODE_ID: 97
MDC_IDC_EPISODE_ID: 98
MDC_IDC_EPISODE_ID: 99
MDC_IDC_EPISODE_TYPE: NORMAL
MDC_IDC_LEAD_IMPLANT_DT: NORMAL
MDC_IDC_LEAD_IMPLANT_DT: NORMAL
MDC_IDC_LEAD_LOCATION: NORMAL
MDC_IDC_LEAD_LOCATION: NORMAL
MDC_IDC_LEAD_LOCATION_DETAIL_1: NORMAL
MDC_IDC_LEAD_LOCATION_DETAIL_1: NORMAL
MDC_IDC_LEAD_MFG: NORMAL
MDC_IDC_LEAD_MFG: NORMAL
MDC_IDC_LEAD_MODEL: NORMAL
MDC_IDC_LEAD_MODEL: NORMAL
MDC_IDC_LEAD_POLARITY_TYPE: NORMAL
MDC_IDC_LEAD_POLARITY_TYPE: NORMAL
MDC_IDC_LEAD_SERIAL: NORMAL
MDC_IDC_LEAD_SERIAL: NORMAL
MDC_IDC_MSMT_BATTERY_DTM: NORMAL
MDC_IDC_MSMT_BATTERY_REMAINING_LONGEVITY: 93 MO
MDC_IDC_MSMT_BATTERY_RRT_TRIGGER: 2.83
MDC_IDC_MSMT_BATTERY_STATUS: NORMAL
MDC_IDC_MSMT_BATTERY_VOLTAGE: 3.01 V
MDC_IDC_MSMT_LEADCHNL_RA_IMPEDANCE_VALUE: 361 OHM
MDC_IDC_MSMT_LEADCHNL_RA_IMPEDANCE_VALUE: 418 OHM
MDC_IDC_MSMT_LEADCHNL_RA_PACING_THRESHOLD_AMPLITUDE: 0.5 V
MDC_IDC_MSMT_LEADCHNL_RA_PACING_THRESHOLD_PULSEWIDTH: 0.4 MS
MDC_IDC_MSMT_LEADCHNL_RA_SENSING_INTR_AMPL: 2 MV
MDC_IDC_MSMT_LEADCHNL_RA_SENSING_INTR_AMPL: 3.88 MV
MDC_IDC_MSMT_LEADCHNL_RV_IMPEDANCE_VALUE: 399 OHM
MDC_IDC_MSMT_LEADCHNL_RV_IMPEDANCE_VALUE: 456 OHM
MDC_IDC_MSMT_LEADCHNL_RV_PACING_THRESHOLD_AMPLITUDE: 0.5 V
MDC_IDC_MSMT_LEADCHNL_RV_PACING_THRESHOLD_PULSEWIDTH: 0.4 MS
MDC_IDC_MSMT_LEADCHNL_RV_SENSING_INTR_AMPL: 14 MV
MDC_IDC_MSMT_LEADCHNL_RV_SENSING_INTR_AMPL: 14.12 MV
MDC_IDC_PG_IMPLANT_DTM: NORMAL
MDC_IDC_PG_MFG: NORMAL
MDC_IDC_PG_MODEL: NORMAL
MDC_IDC_PG_SERIAL: NORMAL
MDC_IDC_PG_TYPE: NORMAL
MDC_IDC_SESS_CLINIC_NAME: NORMAL
MDC_IDC_SESS_DTM: NORMAL
MDC_IDC_SESS_TYPE: NORMAL
MDC_IDC_SET_BRADY_AT_MODE_SWITCH_RATE: 171 {BEATS}/MIN
MDC_IDC_SET_BRADY_HYSTRATE: NORMAL
MDC_IDC_SET_BRADY_LOWRATE: 55 {BEATS}/MIN
MDC_IDC_SET_BRADY_MAX_SENSOR_RATE: 130 {BEATS}/MIN
MDC_IDC_SET_BRADY_MAX_TRACKING_RATE: 130 {BEATS}/MIN
MDC_IDC_SET_BRADY_MODE: NORMAL
MDC_IDC_SET_BRADY_PAV_DELAY_LOW: 300 MS
MDC_IDC_SET_BRADY_SAV_DELAY_LOW: 270 MS
MDC_IDC_SET_LEADCHNL_RA_PACING_AMPLITUDE: 1.5 V
MDC_IDC_SET_LEADCHNL_RA_PACING_ANODE_ELECTRODE_1: NORMAL
MDC_IDC_SET_LEADCHNL_RA_PACING_ANODE_LOCATION_1: NORMAL
MDC_IDC_SET_LEADCHNL_RA_PACING_CAPTURE_MODE: NORMAL
MDC_IDC_SET_LEADCHNL_RA_PACING_CATHODE_ELECTRODE_1: NORMAL
MDC_IDC_SET_LEADCHNL_RA_PACING_CATHODE_LOCATION_1: NORMAL
MDC_IDC_SET_LEADCHNL_RA_PACING_POLARITY: NORMAL
MDC_IDC_SET_LEADCHNL_RA_PACING_PULSEWIDTH: 0.4 MS
MDC_IDC_SET_LEADCHNL_RA_SENSING_ANODE_ELECTRODE_1: NORMAL
MDC_IDC_SET_LEADCHNL_RA_SENSING_ANODE_LOCATION_1: NORMAL
MDC_IDC_SET_LEADCHNL_RA_SENSING_CATHODE_ELECTRODE_1: NORMAL
MDC_IDC_SET_LEADCHNL_RA_SENSING_CATHODE_LOCATION_1: NORMAL
MDC_IDC_SET_LEADCHNL_RA_SENSING_POLARITY: NORMAL
MDC_IDC_SET_LEADCHNL_RA_SENSING_SENSITIVITY: 0.3 MV
MDC_IDC_SET_LEADCHNL_RV_PACING_AMPLITUDE: 2 V
MDC_IDC_SET_LEADCHNL_RV_PACING_ANODE_ELECTRODE_1: NORMAL
MDC_IDC_SET_LEADCHNL_RV_PACING_ANODE_LOCATION_1: NORMAL
MDC_IDC_SET_LEADCHNL_RV_PACING_CAPTURE_MODE: NORMAL
MDC_IDC_SET_LEADCHNL_RV_PACING_CATHODE_ELECTRODE_1: NORMAL
MDC_IDC_SET_LEADCHNL_RV_PACING_CATHODE_LOCATION_1: NORMAL
MDC_IDC_SET_LEADCHNL_RV_PACING_POLARITY: NORMAL
MDC_IDC_SET_LEADCHNL_RV_PACING_PULSEWIDTH: 0.4 MS
MDC_IDC_SET_LEADCHNL_RV_SENSING_ANODE_ELECTRODE_1: NORMAL
MDC_IDC_SET_LEADCHNL_RV_SENSING_ANODE_LOCATION_1: NORMAL
MDC_IDC_SET_LEADCHNL_RV_SENSING_CATHODE_ELECTRODE_1: NORMAL
MDC_IDC_SET_LEADCHNL_RV_SENSING_CATHODE_LOCATION_1: NORMAL
MDC_IDC_SET_LEADCHNL_RV_SENSING_POLARITY: NORMAL
MDC_IDC_SET_LEADCHNL_RV_SENSING_SENSITIVITY: 0.9 MV
MDC_IDC_SET_ZONE_DETECTION_INTERVAL: 350 MS
MDC_IDC_SET_ZONE_DETECTION_INTERVAL: 400 MS
MDC_IDC_SET_ZONE_TYPE: NORMAL
MDC_IDC_STAT_AT_BURDEN_PERCENT: 0.1 %
MDC_IDC_STAT_AT_DTM_END: NORMAL
MDC_IDC_STAT_AT_DTM_START: NORMAL
MDC_IDC_STAT_BRADY_AP_VP_PERCENT: 36.22 %
MDC_IDC_STAT_BRADY_AP_VS_PERCENT: 3.37 %
MDC_IDC_STAT_BRADY_AS_VP_PERCENT: 28.61 %
MDC_IDC_STAT_BRADY_AS_VS_PERCENT: 31.79 %
MDC_IDC_STAT_BRADY_DTM_END: NORMAL
MDC_IDC_STAT_BRADY_DTM_START: NORMAL
MDC_IDC_STAT_BRADY_RA_PERCENT_PACED: 39.47 %
MDC_IDC_STAT_BRADY_RV_PERCENT_PACED: 64.8 %
MDC_IDC_STAT_EPISODE_RECENT_COUNT: 0
MDC_IDC_STAT_EPISODE_RECENT_COUNT: 94
MDC_IDC_STAT_EPISODE_RECENT_COUNT_DTM_END: NORMAL
MDC_IDC_STAT_EPISODE_RECENT_COUNT_DTM_START: NORMAL
MDC_IDC_STAT_EPISODE_TOTAL_COUNT: 0
MDC_IDC_STAT_EPISODE_TOTAL_COUNT: 1
MDC_IDC_STAT_EPISODE_TOTAL_COUNT: 106
MDC_IDC_STAT_EPISODE_TOTAL_COUNT: 2
MDC_IDC_STAT_EPISODE_TOTAL_COUNT_DTM_END: NORMAL
MDC_IDC_STAT_EPISODE_TOTAL_COUNT_DTM_START: NORMAL
MDC_IDC_STAT_EPISODE_TYPE: NORMAL

## 2019-11-04 PROCEDURE — 25500064 ZZH RX 255 OP 636: Performed by: INTERNAL MEDICINE

## 2019-11-04 PROCEDURE — 93306 TTE W/DOPPLER COMPLETE: CPT | Mod: 26 | Performed by: INTERNAL MEDICINE

## 2019-11-04 PROCEDURE — 40000264 ECHOCARDIOGRAM COMPLETE

## 2019-11-04 RX ADMIN — HUMAN ALBUMIN MICROSPHERES AND PERFLUTREN 3 ML: 10; .22 INJECTION, SOLUTION INTRAVENOUS at 08:00

## 2019-11-06 ENCOUNTER — TELEPHONE (OUTPATIENT)
Dept: CARDIOLOGY | Facility: CLINIC | Age: 64
End: 2019-11-06

## 2019-11-06 NOTE — TELEPHONE ENCOUNTER
Called patient and updated him with echo results as below.     ----- Message from Miley Benavidez MD sent at 11/5/2019  6:31 PM CST -----  Good news.  LV fxn is at least stable and perhaps a little better than previously, now 50-55%.  Everything else looks OK as well.  Please call.  COCO Kiran

## 2019-12-05 DIAGNOSIS — I48.0 PAROXYSMAL ATRIAL FIBRILLATION (H): ICD-10-CM

## 2020-01-21 ENCOUNTER — ANCILLARY PROCEDURE (OUTPATIENT)
Dept: CARDIOLOGY | Facility: CLINIC | Age: 65
End: 2020-01-21
Attending: INTERNAL MEDICINE
Payer: MEDICARE

## 2020-01-21 DIAGNOSIS — I44.1 AV BLOCK, 2ND DEGREE: ICD-10-CM

## 2020-01-21 DIAGNOSIS — Z95.0 CARDIAC PACEMAKER IN SITU: ICD-10-CM

## 2020-01-21 PROCEDURE — 93294 REM INTERROG EVL PM/LDLS PM: CPT | Performed by: INTERNAL MEDICINE

## 2020-01-21 PROCEDURE — 93296 REM INTERROG EVL PM/IDS: CPT | Performed by: INTERNAL MEDICINE

## 2020-02-03 LAB
MDC_IDC_EPISODE_DTM: NORMAL
MDC_IDC_EPISODE_DURATION: 115 S
MDC_IDC_EPISODE_ID: 110
MDC_IDC_EPISODE_TYPE: NORMAL
MDC_IDC_LEAD_IMPLANT_DT: NORMAL
MDC_IDC_LEAD_IMPLANT_DT: NORMAL
MDC_IDC_LEAD_LOCATION: NORMAL
MDC_IDC_LEAD_LOCATION: NORMAL
MDC_IDC_LEAD_LOCATION_DETAIL_1: NORMAL
MDC_IDC_LEAD_LOCATION_DETAIL_1: NORMAL
MDC_IDC_LEAD_MFG: NORMAL
MDC_IDC_LEAD_MFG: NORMAL
MDC_IDC_LEAD_MODEL: NORMAL
MDC_IDC_LEAD_MODEL: NORMAL
MDC_IDC_LEAD_POLARITY_TYPE: NORMAL
MDC_IDC_LEAD_POLARITY_TYPE: NORMAL
MDC_IDC_LEAD_SERIAL: NORMAL
MDC_IDC_LEAD_SERIAL: NORMAL
MDC_IDC_MSMT_BATTERY_DTM: NORMAL
MDC_IDC_MSMT_BATTERY_REMAINING_LONGEVITY: 86 MO
MDC_IDC_MSMT_BATTERY_RRT_TRIGGER: 2.83
MDC_IDC_MSMT_BATTERY_STATUS: NORMAL
MDC_IDC_MSMT_BATTERY_VOLTAGE: 3.01 V
MDC_IDC_MSMT_LEADCHNL_RA_IMPEDANCE_VALUE: 361 OHM
MDC_IDC_MSMT_LEADCHNL_RA_IMPEDANCE_VALUE: 418 OHM
MDC_IDC_MSMT_LEADCHNL_RA_PACING_THRESHOLD_AMPLITUDE: 0.5 V
MDC_IDC_MSMT_LEADCHNL_RA_PACING_THRESHOLD_PULSEWIDTH: 0.4 MS
MDC_IDC_MSMT_LEADCHNL_RA_SENSING_INTR_AMPL: 1.5 MV
MDC_IDC_MSMT_LEADCHNL_RA_SENSING_INTR_AMPL: 1.5 MV
MDC_IDC_MSMT_LEADCHNL_RV_IMPEDANCE_VALUE: 418 OHM
MDC_IDC_MSMT_LEADCHNL_RV_IMPEDANCE_VALUE: 551 OHM
MDC_IDC_MSMT_LEADCHNL_RV_PACING_THRESHOLD_AMPLITUDE: 0.62 V
MDC_IDC_MSMT_LEADCHNL_RV_PACING_THRESHOLD_PULSEWIDTH: 0.4 MS
MDC_IDC_MSMT_LEADCHNL_RV_SENSING_INTR_AMPL: 11.62 MV
MDC_IDC_MSMT_LEADCHNL_RV_SENSING_INTR_AMPL: 11.62 MV
MDC_IDC_PG_IMPLANT_DTM: NORMAL
MDC_IDC_PG_MFG: NORMAL
MDC_IDC_PG_MODEL: NORMAL
MDC_IDC_PG_SERIAL: NORMAL
MDC_IDC_PG_TYPE: NORMAL
MDC_IDC_SESS_CLINIC_NAME: NORMAL
MDC_IDC_SESS_DTM: NORMAL
MDC_IDC_SESS_TYPE: NORMAL
MDC_IDC_SET_BRADY_AT_MODE_SWITCH_RATE: 171 {BEATS}/MIN
MDC_IDC_SET_BRADY_HYSTRATE: NORMAL
MDC_IDC_SET_BRADY_LOWRATE: 55 {BEATS}/MIN
MDC_IDC_SET_BRADY_MAX_SENSOR_RATE: 130 {BEATS}/MIN
MDC_IDC_SET_BRADY_MAX_TRACKING_RATE: 130 {BEATS}/MIN
MDC_IDC_SET_BRADY_MODE: NORMAL
MDC_IDC_SET_BRADY_PAV_DELAY_LOW: 300 MS
MDC_IDC_SET_BRADY_SAV_DELAY_LOW: 270 MS
MDC_IDC_SET_LEADCHNL_RA_PACING_AMPLITUDE: 1.5 V
MDC_IDC_SET_LEADCHNL_RA_PACING_ANODE_ELECTRODE_1: NORMAL
MDC_IDC_SET_LEADCHNL_RA_PACING_ANODE_LOCATION_1: NORMAL
MDC_IDC_SET_LEADCHNL_RA_PACING_CAPTURE_MODE: NORMAL
MDC_IDC_SET_LEADCHNL_RA_PACING_CATHODE_ELECTRODE_1: NORMAL
MDC_IDC_SET_LEADCHNL_RA_PACING_CATHODE_LOCATION_1: NORMAL
MDC_IDC_SET_LEADCHNL_RA_PACING_POLARITY: NORMAL
MDC_IDC_SET_LEADCHNL_RA_PACING_PULSEWIDTH: 0.4 MS
MDC_IDC_SET_LEADCHNL_RA_SENSING_ANODE_ELECTRODE_1: NORMAL
MDC_IDC_SET_LEADCHNL_RA_SENSING_ANODE_LOCATION_1: NORMAL
MDC_IDC_SET_LEADCHNL_RA_SENSING_CATHODE_ELECTRODE_1: NORMAL
MDC_IDC_SET_LEADCHNL_RA_SENSING_CATHODE_LOCATION_1: NORMAL
MDC_IDC_SET_LEADCHNL_RA_SENSING_POLARITY: NORMAL
MDC_IDC_SET_LEADCHNL_RA_SENSING_SENSITIVITY: 0.3 MV
MDC_IDC_SET_LEADCHNL_RV_PACING_AMPLITUDE: 2 V
MDC_IDC_SET_LEADCHNL_RV_PACING_ANODE_ELECTRODE_1: NORMAL
MDC_IDC_SET_LEADCHNL_RV_PACING_ANODE_LOCATION_1: NORMAL
MDC_IDC_SET_LEADCHNL_RV_PACING_CAPTURE_MODE: NORMAL
MDC_IDC_SET_LEADCHNL_RV_PACING_CATHODE_ELECTRODE_1: NORMAL
MDC_IDC_SET_LEADCHNL_RV_PACING_CATHODE_LOCATION_1: NORMAL
MDC_IDC_SET_LEADCHNL_RV_PACING_POLARITY: NORMAL
MDC_IDC_SET_LEADCHNL_RV_PACING_PULSEWIDTH: 0.4 MS
MDC_IDC_SET_LEADCHNL_RV_SENSING_ANODE_ELECTRODE_1: NORMAL
MDC_IDC_SET_LEADCHNL_RV_SENSING_ANODE_LOCATION_1: NORMAL
MDC_IDC_SET_LEADCHNL_RV_SENSING_CATHODE_ELECTRODE_1: NORMAL
MDC_IDC_SET_LEADCHNL_RV_SENSING_CATHODE_LOCATION_1: NORMAL
MDC_IDC_SET_LEADCHNL_RV_SENSING_POLARITY: NORMAL
MDC_IDC_SET_LEADCHNL_RV_SENSING_SENSITIVITY: 0.9 MV
MDC_IDC_SET_ZONE_DETECTION_INTERVAL: 350 MS
MDC_IDC_SET_ZONE_DETECTION_INTERVAL: 400 MS
MDC_IDC_SET_ZONE_TYPE: NORMAL
MDC_IDC_STAT_AT_BURDEN_PERCENT: 0 %
MDC_IDC_STAT_AT_DTM_END: NORMAL
MDC_IDC_STAT_AT_DTM_START: NORMAL
MDC_IDC_STAT_BRADY_AP_VP_PERCENT: 66.76 %
MDC_IDC_STAT_BRADY_AP_VS_PERCENT: 0.01 %
MDC_IDC_STAT_BRADY_AS_VP_PERCENT: 33.21 %
MDC_IDC_STAT_BRADY_AS_VS_PERCENT: 0.01 %
MDC_IDC_STAT_BRADY_DTM_END: NORMAL
MDC_IDC_STAT_BRADY_DTM_START: NORMAL
MDC_IDC_STAT_BRADY_RA_PERCENT_PACED: 66.73 %
MDC_IDC_STAT_BRADY_RV_PERCENT_PACED: 99.96 %
MDC_IDC_STAT_EPISODE_RECENT_COUNT: 0
MDC_IDC_STAT_EPISODE_RECENT_COUNT: 1
MDC_IDC_STAT_EPISODE_RECENT_COUNT_DTM_END: NORMAL
MDC_IDC_STAT_EPISODE_RECENT_COUNT_DTM_START: NORMAL
MDC_IDC_STAT_EPISODE_TOTAL_COUNT: 0
MDC_IDC_STAT_EPISODE_TOTAL_COUNT: 1
MDC_IDC_STAT_EPISODE_TOTAL_COUNT: 107
MDC_IDC_STAT_EPISODE_TOTAL_COUNT: 2
MDC_IDC_STAT_EPISODE_TOTAL_COUNT_DTM_END: NORMAL
MDC_IDC_STAT_EPISODE_TOTAL_COUNT_DTM_START: NORMAL
MDC_IDC_STAT_EPISODE_TYPE: NORMAL

## 2020-03-26 ENCOUNTER — VIRTUAL VISIT (OUTPATIENT)
Dept: CARDIOLOGY | Facility: CLINIC | Age: 65
End: 2020-03-26
Attending: INTERNAL MEDICINE
Payer: MEDICARE

## 2020-03-26 VITALS
SYSTOLIC BLOOD PRESSURE: 116 MMHG | BODY MASS INDEX: 33.52 KG/M2 | DIASTOLIC BLOOD PRESSURE: 72 MMHG | HEART RATE: 73 BPM | WEIGHT: 240.2 LBS

## 2020-03-26 DIAGNOSIS — R06.09 DYSPNEA ON EXERTION: ICD-10-CM

## 2020-03-26 DIAGNOSIS — I44.30 HEART BLOCK, AV: ICD-10-CM

## 2020-03-26 PROCEDURE — 99441 ZZC PHYSICIAN TELEPHONE EVALUATION 5-10 MIN: CPT | Performed by: PHYSICIAN ASSISTANT

## 2020-03-26 NOTE — PROGRESS NOTES
"Arias Manzo is a 64 year old male who is being evaluated via a billable telephone visit.      The patient has been notified of following:     \"This telephone visit will be conducted via a call between you and your physician/provider. We have found that certain health care needs can be provided without the need for a physical exam.  This service lets us provide the care you need with a short phone conversation.  If a prescription is necessary we can send it directly to your pharmacy.  If lab work is needed we can place an order for that and you can then stop by our lab to have the test done at a later time.    If during the course of the call the physician/provider feels a telephone visit is not appropriate, you will not be charged for this service.\"    CC:  Routine follow-up     I have reviewed and updated the patient's Past Medical History, Social History, Family History and Medication List.    ALLERGIES:  Flexeril [cyclobenzaprine]; Naprosyn [naproxen]; Penicillins; and Ranitidine    VITALS (0920):  /72  HR 73   Weight 240#    GEN Ortiz    BRIEF HPI:  This 63 y/o M last saw Dr. Benavidez 10/2019 for his h/o:    1. 2:1 AVB s/p dual chamber Medtronic PPM 7/2017  2. Mild CM with most recent echo 11/2019 showing EF from 45 up to 50-55%  3. HTN  4. Paroxysmal AFib noted on device interrogations.On Eliquis for CHADSVASc 2 (HTN, age)    Dr. Benavidez saw him 10/2109 at which time he recommended annual follow-up with PPM check ([place]).  He was scheduled with me today.    INTERVAL HISTORY:  Really feeling good. No c/o CP, SOB, edema, orthopnea or PND. No palpitations. No bleeding issues on Eliquis.    Was recently sheetrocking his daughter's basement and was bit by a spider on the knee.  It swelled and was 'hot' but those things have improved. Minimal tenderness. No fever/chills or changes/edema to the rest of that leg.    Device interrogation 1/2020 showed 67% AP and 100%  in DDDR. 3 mode switches " <0.1% of the time, with longest episode of pAFib lasting ~2m. Controlled rates in AFib.    ASSESSMENT/PLAN:    1. 2:1 HB    Device interrogation as above     PLAN:    Routine device checks; annual device check ~10/2020. Will plan in-office visit at that time to consolidate trips ([place])    2. Hypertension    BP initially high when he checked it; came down to 116 systolic at home    Tolerating meds without issues     PLAN:    Continue meds      I have reviewed the note as documented above.  This accurately captures the substance of my conversation with the patient.        Phone call contact time  Call Started at 0917  Call Ended at 0925       MYA SlaughterAS

## 2020-04-29 DIAGNOSIS — I48.0 PAROXYSMAL ATRIAL FIBRILLATION (H): ICD-10-CM

## 2020-04-29 RX ORDER — METOPROLOL SUCCINATE 25 MG/1
25 TABLET, EXTENDED RELEASE ORAL DAILY
Qty: 90 TABLET | Refills: 1 | Status: SHIPPED | OUTPATIENT
Start: 2020-04-29 | End: 2020-11-12

## 2020-05-04 ENCOUNTER — ANCILLARY PROCEDURE (OUTPATIENT)
Dept: CARDIOLOGY | Facility: CLINIC | Age: 65
End: 2020-05-04
Attending: INTERNAL MEDICINE
Payer: MEDICARE

## 2020-05-04 DIAGNOSIS — I44.1 AV BLOCK, 2ND DEGREE: ICD-10-CM

## 2020-05-04 PROCEDURE — 93294 REM INTERROG EVL PM/LDLS PM: CPT | Performed by: INTERNAL MEDICINE

## 2020-05-04 PROCEDURE — 93296 REM INTERROG EVL PM/IDS: CPT | Performed by: INTERNAL MEDICINE

## 2020-05-12 LAB
MDC_IDC_EPISODE_DTM: NORMAL
MDC_IDC_EPISODE_DURATION: 114 S
MDC_IDC_EPISODE_DURATION: 150 S
MDC_IDC_EPISODE_DURATION: 162 S
MDC_IDC_EPISODE_DURATION: 195 S
MDC_IDC_EPISODE_DURATION: 198 S
MDC_IDC_EPISODE_DURATION: 216 S
MDC_IDC_EPISODE_DURATION: 59 S
MDC_IDC_EPISODE_DURATION: 68 S
MDC_IDC_EPISODE_DURATION: 81 S
MDC_IDC_EPISODE_DURATION: NORMAL S
MDC_IDC_EPISODE_ID: 127
MDC_IDC_EPISODE_ID: 128
MDC_IDC_EPISODE_ID: 129
MDC_IDC_EPISODE_ID: 130
MDC_IDC_EPISODE_ID: 131
MDC_IDC_EPISODE_ID: 132
MDC_IDC_EPISODE_ID: 133
MDC_IDC_EPISODE_ID: 134
MDC_IDC_EPISODE_ID: 135
MDC_IDC_EPISODE_ID: 136
MDC_IDC_EPISODE_TYPE: NORMAL
MDC_IDC_LEAD_IMPLANT_DT: NORMAL
MDC_IDC_LEAD_IMPLANT_DT: NORMAL
MDC_IDC_LEAD_LOCATION: NORMAL
MDC_IDC_LEAD_LOCATION: NORMAL
MDC_IDC_LEAD_LOCATION_DETAIL_1: NORMAL
MDC_IDC_LEAD_LOCATION_DETAIL_1: NORMAL
MDC_IDC_LEAD_MFG: NORMAL
MDC_IDC_LEAD_MFG: NORMAL
MDC_IDC_LEAD_MODEL: NORMAL
MDC_IDC_LEAD_MODEL: NORMAL
MDC_IDC_LEAD_POLARITY_TYPE: NORMAL
MDC_IDC_LEAD_POLARITY_TYPE: NORMAL
MDC_IDC_LEAD_SERIAL: NORMAL
MDC_IDC_LEAD_SERIAL: NORMAL
MDC_IDC_MSMT_BATTERY_DTM: NORMAL
MDC_IDC_MSMT_BATTERY_REMAINING_LONGEVITY: 80 MO
MDC_IDC_MSMT_BATTERY_RRT_TRIGGER: 2.83
MDC_IDC_MSMT_BATTERY_STATUS: NORMAL
MDC_IDC_MSMT_BATTERY_VOLTAGE: 3.01 V
MDC_IDC_MSMT_LEADCHNL_RA_IMPEDANCE_VALUE: 361 OHM
MDC_IDC_MSMT_LEADCHNL_RA_IMPEDANCE_VALUE: 418 OHM
MDC_IDC_MSMT_LEADCHNL_RA_PACING_THRESHOLD_AMPLITUDE: 0.5 V
MDC_IDC_MSMT_LEADCHNL_RA_PACING_THRESHOLD_PULSEWIDTH: 0.4 MS
MDC_IDC_MSMT_LEADCHNL_RA_SENSING_INTR_AMPL: 1.38 MV
MDC_IDC_MSMT_LEADCHNL_RA_SENSING_INTR_AMPL: 1.38 MV
MDC_IDC_MSMT_LEADCHNL_RV_IMPEDANCE_VALUE: 399 OHM
MDC_IDC_MSMT_LEADCHNL_RV_IMPEDANCE_VALUE: 494 OHM
MDC_IDC_MSMT_LEADCHNL_RV_PACING_THRESHOLD_AMPLITUDE: 0.5 V
MDC_IDC_MSMT_LEADCHNL_RV_PACING_THRESHOLD_PULSEWIDTH: 0.4 MS
MDC_IDC_MSMT_LEADCHNL_RV_SENSING_INTR_AMPL: 10.25 MV
MDC_IDC_MSMT_LEADCHNL_RV_SENSING_INTR_AMPL: 10.25 MV
MDC_IDC_PG_IMPLANT_DTM: NORMAL
MDC_IDC_PG_MFG: NORMAL
MDC_IDC_PG_MODEL: NORMAL
MDC_IDC_PG_SERIAL: NORMAL
MDC_IDC_PG_TYPE: NORMAL
MDC_IDC_SESS_CLINIC_NAME: NORMAL
MDC_IDC_SESS_DTM: NORMAL
MDC_IDC_SESS_TYPE: NORMAL
MDC_IDC_SET_BRADY_AT_MODE_SWITCH_RATE: 171 {BEATS}/MIN
MDC_IDC_SET_BRADY_HYSTRATE: NORMAL
MDC_IDC_SET_BRADY_LOWRATE: 55 {BEATS}/MIN
MDC_IDC_SET_BRADY_MAX_SENSOR_RATE: 130 {BEATS}/MIN
MDC_IDC_SET_BRADY_MAX_TRACKING_RATE: 130 {BEATS}/MIN
MDC_IDC_SET_BRADY_MODE: NORMAL
MDC_IDC_SET_BRADY_PAV_DELAY_LOW: 300 MS
MDC_IDC_SET_BRADY_SAV_DELAY_LOW: 270 MS
MDC_IDC_SET_LEADCHNL_RA_PACING_AMPLITUDE: 1.5 V
MDC_IDC_SET_LEADCHNL_RA_PACING_ANODE_ELECTRODE_1: NORMAL
MDC_IDC_SET_LEADCHNL_RA_PACING_ANODE_LOCATION_1: NORMAL
MDC_IDC_SET_LEADCHNL_RA_PACING_CAPTURE_MODE: NORMAL
MDC_IDC_SET_LEADCHNL_RA_PACING_CATHODE_ELECTRODE_1: NORMAL
MDC_IDC_SET_LEADCHNL_RA_PACING_CATHODE_LOCATION_1: NORMAL
MDC_IDC_SET_LEADCHNL_RA_PACING_POLARITY: NORMAL
MDC_IDC_SET_LEADCHNL_RA_PACING_PULSEWIDTH: 0.4 MS
MDC_IDC_SET_LEADCHNL_RA_SENSING_ANODE_ELECTRODE_1: NORMAL
MDC_IDC_SET_LEADCHNL_RA_SENSING_ANODE_LOCATION_1: NORMAL
MDC_IDC_SET_LEADCHNL_RA_SENSING_CATHODE_ELECTRODE_1: NORMAL
MDC_IDC_SET_LEADCHNL_RA_SENSING_CATHODE_LOCATION_1: NORMAL
MDC_IDC_SET_LEADCHNL_RA_SENSING_POLARITY: NORMAL
MDC_IDC_SET_LEADCHNL_RA_SENSING_SENSITIVITY: 0.3 MV
MDC_IDC_SET_LEADCHNL_RV_PACING_AMPLITUDE: 2 V
MDC_IDC_SET_LEADCHNL_RV_PACING_ANODE_ELECTRODE_1: NORMAL
MDC_IDC_SET_LEADCHNL_RV_PACING_ANODE_LOCATION_1: NORMAL
MDC_IDC_SET_LEADCHNL_RV_PACING_CAPTURE_MODE: NORMAL
MDC_IDC_SET_LEADCHNL_RV_PACING_CATHODE_ELECTRODE_1: NORMAL
MDC_IDC_SET_LEADCHNL_RV_PACING_CATHODE_LOCATION_1: NORMAL
MDC_IDC_SET_LEADCHNL_RV_PACING_POLARITY: NORMAL
MDC_IDC_SET_LEADCHNL_RV_PACING_PULSEWIDTH: 0.4 MS
MDC_IDC_SET_LEADCHNL_RV_SENSING_ANODE_ELECTRODE_1: NORMAL
MDC_IDC_SET_LEADCHNL_RV_SENSING_ANODE_LOCATION_1: NORMAL
MDC_IDC_SET_LEADCHNL_RV_SENSING_CATHODE_ELECTRODE_1: NORMAL
MDC_IDC_SET_LEADCHNL_RV_SENSING_CATHODE_LOCATION_1: NORMAL
MDC_IDC_SET_LEADCHNL_RV_SENSING_POLARITY: NORMAL
MDC_IDC_SET_LEADCHNL_RV_SENSING_SENSITIVITY: 0.9 MV
MDC_IDC_SET_ZONE_DETECTION_INTERVAL: 350 MS
MDC_IDC_SET_ZONE_DETECTION_INTERVAL: 400 MS
MDC_IDC_SET_ZONE_TYPE: NORMAL
MDC_IDC_STAT_AT_BURDEN_PERCENT: 0.7 %
MDC_IDC_STAT_AT_DTM_END: NORMAL
MDC_IDC_STAT_AT_DTM_START: NORMAL
MDC_IDC_STAT_BRADY_AP_VP_PERCENT: 65.36 %
MDC_IDC_STAT_BRADY_AP_VS_PERCENT: 0.27 %
MDC_IDC_STAT_BRADY_AS_VP_PERCENT: 34.29 %
MDC_IDC_STAT_BRADY_AS_VS_PERCENT: 0.08 %
MDC_IDC_STAT_BRADY_DTM_END: NORMAL
MDC_IDC_STAT_BRADY_DTM_START: NORMAL
MDC_IDC_STAT_BRADY_RA_PERCENT_PACED: 65.46 %
MDC_IDC_STAT_BRADY_RV_PERCENT_PACED: 99.67 %
MDC_IDC_STAT_EPISODE_RECENT_COUNT: 0
MDC_IDC_STAT_EPISODE_RECENT_COUNT: 10
MDC_IDC_STAT_EPISODE_RECENT_COUNT_DTM_END: NORMAL
MDC_IDC_STAT_EPISODE_RECENT_COUNT_DTM_START: NORMAL
MDC_IDC_STAT_EPISODE_TOTAL_COUNT: 0
MDC_IDC_STAT_EPISODE_TOTAL_COUNT: 1
MDC_IDC_STAT_EPISODE_TOTAL_COUNT: 133
MDC_IDC_STAT_EPISODE_TOTAL_COUNT: 2
MDC_IDC_STAT_EPISODE_TOTAL_COUNT_DTM_END: NORMAL
MDC_IDC_STAT_EPISODE_TOTAL_COUNT_DTM_START: NORMAL
MDC_IDC_STAT_EPISODE_TYPE: NORMAL

## 2020-08-10 ENCOUNTER — ANCILLARY PROCEDURE (OUTPATIENT)
Dept: CARDIOLOGY | Facility: CLINIC | Age: 65
End: 2020-08-10
Attending: INTERNAL MEDICINE
Payer: MEDICARE

## 2020-08-10 DIAGNOSIS — Z95.0 CARDIAC PACEMAKER IN SITU: ICD-10-CM

## 2020-08-10 PROCEDURE — 93294 REM INTERROG EVL PM/LDLS PM: CPT | Performed by: INTERNAL MEDICINE

## 2020-08-10 PROCEDURE — 93296 REM INTERROG EVL PM/IDS: CPT | Performed by: INTERNAL MEDICINE

## 2020-08-19 LAB
MDC_IDC_EPISODE_DTM: NORMAL
MDC_IDC_EPISODE_DURATION: 100 S
MDC_IDC_EPISODE_DURATION: 101 S
MDC_IDC_EPISODE_DURATION: 106 S
MDC_IDC_EPISODE_DURATION: 108 S
MDC_IDC_EPISODE_DURATION: 111 S
MDC_IDC_EPISODE_DURATION: 116 S
MDC_IDC_EPISODE_DURATION: 117 S
MDC_IDC_EPISODE_DURATION: 117 S
MDC_IDC_EPISODE_DURATION: 120 S
MDC_IDC_EPISODE_DURATION: 121 S
MDC_IDC_EPISODE_DURATION: 122 S
MDC_IDC_EPISODE_DURATION: 123 S
MDC_IDC_EPISODE_DURATION: 127 S
MDC_IDC_EPISODE_DURATION: 128 S
MDC_IDC_EPISODE_DURATION: 136 S
MDC_IDC_EPISODE_DURATION: 136 S
MDC_IDC_EPISODE_DURATION: 170 S
MDC_IDC_EPISODE_DURATION: 173 S
MDC_IDC_EPISODE_DURATION: 179 S
MDC_IDC_EPISODE_DURATION: 182 S
MDC_IDC_EPISODE_DURATION: 219 S
MDC_IDC_EPISODE_DURATION: 236 S
MDC_IDC_EPISODE_DURATION: 244 S
MDC_IDC_EPISODE_DURATION: 271 S
MDC_IDC_EPISODE_DURATION: 36 S
MDC_IDC_EPISODE_DURATION: 37 S
MDC_IDC_EPISODE_DURATION: 37 S
MDC_IDC_EPISODE_DURATION: 42 S
MDC_IDC_EPISODE_DURATION: 46 S
MDC_IDC_EPISODE_DURATION: 48 S
MDC_IDC_EPISODE_DURATION: 49 S
MDC_IDC_EPISODE_DURATION: 53 S
MDC_IDC_EPISODE_DURATION: 66 S
MDC_IDC_EPISODE_DURATION: 69 S
MDC_IDC_EPISODE_DURATION: 72 S
MDC_IDC_EPISODE_DURATION: 73 S
MDC_IDC_EPISODE_DURATION: 78 S
MDC_IDC_EPISODE_DURATION: 78 S
MDC_IDC_EPISODE_DURATION: 82 S
MDC_IDC_EPISODE_DURATION: 83 S
MDC_IDC_EPISODE_DURATION: 84 S
MDC_IDC_EPISODE_DURATION: 86 S
MDC_IDC_EPISODE_DURATION: 87 S
MDC_IDC_EPISODE_DURATION: 88 S
MDC_IDC_EPISODE_DURATION: 89 S
MDC_IDC_EPISODE_DURATION: 93 S
MDC_IDC_EPISODE_DURATION: 95 S
MDC_IDC_EPISODE_DURATION: 98 S
MDC_IDC_EPISODE_DURATION: 98 S
MDC_IDC_EPISODE_DURATION: 99 S
MDC_IDC_EPISODE_ID: 187
MDC_IDC_EPISODE_ID: 188
MDC_IDC_EPISODE_ID: 189
MDC_IDC_EPISODE_ID: 190
MDC_IDC_EPISODE_ID: 191
MDC_IDC_EPISODE_ID: 192
MDC_IDC_EPISODE_ID: 193
MDC_IDC_EPISODE_ID: 194
MDC_IDC_EPISODE_ID: 195
MDC_IDC_EPISODE_ID: 196
MDC_IDC_EPISODE_ID: 197
MDC_IDC_EPISODE_ID: 198
MDC_IDC_EPISODE_ID: 199
MDC_IDC_EPISODE_ID: 200
MDC_IDC_EPISODE_ID: 201
MDC_IDC_EPISODE_ID: 202
MDC_IDC_EPISODE_ID: 203
MDC_IDC_EPISODE_ID: 204
MDC_IDC_EPISODE_ID: 205
MDC_IDC_EPISODE_ID: 206
MDC_IDC_EPISODE_ID: 207
MDC_IDC_EPISODE_ID: 208
MDC_IDC_EPISODE_ID: 209
MDC_IDC_EPISODE_ID: 210
MDC_IDC_EPISODE_ID: 211
MDC_IDC_EPISODE_ID: 212
MDC_IDC_EPISODE_ID: 213
MDC_IDC_EPISODE_ID: 214
MDC_IDC_EPISODE_ID: 215
MDC_IDC_EPISODE_ID: 216
MDC_IDC_EPISODE_ID: 217
MDC_IDC_EPISODE_ID: 218
MDC_IDC_EPISODE_ID: 219
MDC_IDC_EPISODE_ID: 220
MDC_IDC_EPISODE_ID: 221
MDC_IDC_EPISODE_ID: 222
MDC_IDC_EPISODE_ID: 223
MDC_IDC_EPISODE_ID: 224
MDC_IDC_EPISODE_ID: 225
MDC_IDC_EPISODE_ID: 226
MDC_IDC_EPISODE_ID: 227
MDC_IDC_EPISODE_ID: 228
MDC_IDC_EPISODE_ID: 229
MDC_IDC_EPISODE_ID: 230
MDC_IDC_EPISODE_ID: 231
MDC_IDC_EPISODE_ID: 232
MDC_IDC_EPISODE_ID: 233
MDC_IDC_EPISODE_ID: 234
MDC_IDC_EPISODE_ID: 235
MDC_IDC_EPISODE_ID: 236
MDC_IDC_EPISODE_TYPE: NORMAL
MDC_IDC_LEAD_IMPLANT_DT: NORMAL
MDC_IDC_LEAD_IMPLANT_DT: NORMAL
MDC_IDC_LEAD_LOCATION: NORMAL
MDC_IDC_LEAD_LOCATION: NORMAL
MDC_IDC_LEAD_LOCATION_DETAIL_1: NORMAL
MDC_IDC_LEAD_LOCATION_DETAIL_1: NORMAL
MDC_IDC_LEAD_MFG: NORMAL
MDC_IDC_LEAD_MFG: NORMAL
MDC_IDC_LEAD_MODEL: NORMAL
MDC_IDC_LEAD_MODEL: NORMAL
MDC_IDC_LEAD_POLARITY_TYPE: NORMAL
MDC_IDC_LEAD_POLARITY_TYPE: NORMAL
MDC_IDC_LEAD_SERIAL: NORMAL
MDC_IDC_LEAD_SERIAL: NORMAL
MDC_IDC_MSMT_BATTERY_DTM: NORMAL
MDC_IDC_MSMT_BATTERY_REMAINING_LONGEVITY: 78 MO
MDC_IDC_MSMT_BATTERY_RRT_TRIGGER: 2.83
MDC_IDC_MSMT_BATTERY_STATUS: NORMAL
MDC_IDC_MSMT_BATTERY_VOLTAGE: 3.01 V
MDC_IDC_MSMT_LEADCHNL_RA_IMPEDANCE_VALUE: 361 OHM
MDC_IDC_MSMT_LEADCHNL_RA_IMPEDANCE_VALUE: 418 OHM
MDC_IDC_MSMT_LEADCHNL_RA_PACING_THRESHOLD_AMPLITUDE: 0.5 V
MDC_IDC_MSMT_LEADCHNL_RA_PACING_THRESHOLD_PULSEWIDTH: 0.4 MS
MDC_IDC_MSMT_LEADCHNL_RA_SENSING_INTR_AMPL: 2.12 MV
MDC_IDC_MSMT_LEADCHNL_RA_SENSING_INTR_AMPL: 2.12 MV
MDC_IDC_MSMT_LEADCHNL_RV_IMPEDANCE_VALUE: 437 OHM
MDC_IDC_MSMT_LEADCHNL_RV_IMPEDANCE_VALUE: 570 OHM
MDC_IDC_MSMT_LEADCHNL_RV_PACING_THRESHOLD_AMPLITUDE: 0.62 V
MDC_IDC_MSMT_LEADCHNL_RV_PACING_THRESHOLD_PULSEWIDTH: 0.4 MS
MDC_IDC_MSMT_LEADCHNL_RV_SENSING_INTR_AMPL: 10.75 MV
MDC_IDC_MSMT_LEADCHNL_RV_SENSING_INTR_AMPL: 10.75 MV
MDC_IDC_PG_IMPLANT_DTM: NORMAL
MDC_IDC_PG_MFG: NORMAL
MDC_IDC_PG_MODEL: NORMAL
MDC_IDC_PG_SERIAL: NORMAL
MDC_IDC_PG_TYPE: NORMAL
MDC_IDC_SESS_CLINIC_NAME: NORMAL
MDC_IDC_SESS_DTM: NORMAL
MDC_IDC_SESS_TYPE: NORMAL
MDC_IDC_SET_BRADY_AT_MODE_SWITCH_RATE: 171 {BEATS}/MIN
MDC_IDC_SET_BRADY_HYSTRATE: NORMAL
MDC_IDC_SET_BRADY_LOWRATE: 55 {BEATS}/MIN
MDC_IDC_SET_BRADY_MAX_SENSOR_RATE: 130 {BEATS}/MIN
MDC_IDC_SET_BRADY_MAX_TRACKING_RATE: 130 {BEATS}/MIN
MDC_IDC_SET_BRADY_MODE: NORMAL
MDC_IDC_SET_BRADY_PAV_DELAY_LOW: 300 MS
MDC_IDC_SET_BRADY_SAV_DELAY_LOW: 270 MS
MDC_IDC_SET_LEADCHNL_RA_PACING_AMPLITUDE: 1.5 V
MDC_IDC_SET_LEADCHNL_RA_PACING_ANODE_ELECTRODE_1: NORMAL
MDC_IDC_SET_LEADCHNL_RA_PACING_ANODE_LOCATION_1: NORMAL
MDC_IDC_SET_LEADCHNL_RA_PACING_CAPTURE_MODE: NORMAL
MDC_IDC_SET_LEADCHNL_RA_PACING_CATHODE_ELECTRODE_1: NORMAL
MDC_IDC_SET_LEADCHNL_RA_PACING_CATHODE_LOCATION_1: NORMAL
MDC_IDC_SET_LEADCHNL_RA_PACING_POLARITY: NORMAL
MDC_IDC_SET_LEADCHNL_RA_PACING_PULSEWIDTH: 0.4 MS
MDC_IDC_SET_LEADCHNL_RA_SENSING_ANODE_ELECTRODE_1: NORMAL
MDC_IDC_SET_LEADCHNL_RA_SENSING_ANODE_LOCATION_1: NORMAL
MDC_IDC_SET_LEADCHNL_RA_SENSING_CATHODE_ELECTRODE_1: NORMAL
MDC_IDC_SET_LEADCHNL_RA_SENSING_CATHODE_LOCATION_1: NORMAL
MDC_IDC_SET_LEADCHNL_RA_SENSING_POLARITY: NORMAL
MDC_IDC_SET_LEADCHNL_RA_SENSING_SENSITIVITY: 0.3 MV
MDC_IDC_SET_LEADCHNL_RV_PACING_AMPLITUDE: 2 V
MDC_IDC_SET_LEADCHNL_RV_PACING_ANODE_ELECTRODE_1: NORMAL
MDC_IDC_SET_LEADCHNL_RV_PACING_ANODE_LOCATION_1: NORMAL
MDC_IDC_SET_LEADCHNL_RV_PACING_CAPTURE_MODE: NORMAL
MDC_IDC_SET_LEADCHNL_RV_PACING_CATHODE_ELECTRODE_1: NORMAL
MDC_IDC_SET_LEADCHNL_RV_PACING_CATHODE_LOCATION_1: NORMAL
MDC_IDC_SET_LEADCHNL_RV_PACING_POLARITY: NORMAL
MDC_IDC_SET_LEADCHNL_RV_PACING_PULSEWIDTH: 0.4 MS
MDC_IDC_SET_LEADCHNL_RV_SENSING_ANODE_ELECTRODE_1: NORMAL
MDC_IDC_SET_LEADCHNL_RV_SENSING_ANODE_LOCATION_1: NORMAL
MDC_IDC_SET_LEADCHNL_RV_SENSING_CATHODE_ELECTRODE_1: NORMAL
MDC_IDC_SET_LEADCHNL_RV_SENSING_CATHODE_LOCATION_1: NORMAL
MDC_IDC_SET_LEADCHNL_RV_SENSING_POLARITY: NORMAL
MDC_IDC_SET_LEADCHNL_RV_SENSING_SENSITIVITY: 0.9 MV
MDC_IDC_SET_ZONE_DETECTION_INTERVAL: 350 MS
MDC_IDC_SET_ZONE_DETECTION_INTERVAL: 400 MS
MDC_IDC_SET_ZONE_TYPE: NORMAL
MDC_IDC_STAT_AT_BURDEN_PERCENT: 0.4 %
MDC_IDC_STAT_AT_DTM_END: NORMAL
MDC_IDC_STAT_AT_DTM_START: NORMAL
MDC_IDC_STAT_BRADY_AP_VP_PERCENT: 70.84 %
MDC_IDC_STAT_BRADY_AP_VS_PERCENT: 0.05 %
MDC_IDC_STAT_BRADY_AS_VP_PERCENT: 29.08 %
MDC_IDC_STAT_BRADY_AS_VS_PERCENT: 0.03 %
MDC_IDC_STAT_BRADY_DTM_END: NORMAL
MDC_IDC_STAT_BRADY_DTM_START: NORMAL
MDC_IDC_STAT_BRADY_RA_PERCENT_PACED: 70.72 %
MDC_IDC_STAT_BRADY_RV_PERCENT_PACED: 99.92 %
MDC_IDC_STAT_EPISODE_RECENT_COUNT: 0
MDC_IDC_STAT_EPISODE_RECENT_COUNT: 100
MDC_IDC_STAT_EPISODE_RECENT_COUNT_DTM_END: NORMAL
MDC_IDC_STAT_EPISODE_RECENT_COUNT_DTM_START: NORMAL
MDC_IDC_STAT_EPISODE_TOTAL_COUNT: 0
MDC_IDC_STAT_EPISODE_TOTAL_COUNT: 1
MDC_IDC_STAT_EPISODE_TOTAL_COUNT: 2
MDC_IDC_STAT_EPISODE_TOTAL_COUNT: 233
MDC_IDC_STAT_EPISODE_TOTAL_COUNT_DTM_END: NORMAL
MDC_IDC_STAT_EPISODE_TOTAL_COUNT_DTM_START: NORMAL
MDC_IDC_STAT_EPISODE_TYPE: NORMAL

## 2020-09-15 DIAGNOSIS — I48.0 PAROXYSMAL ATRIAL FIBRILLATION (H): ICD-10-CM

## 2020-10-25 NOTE — PROGRESS NOTES
"HPI:   This 66 y/o M sees Dr. Benavidez for his h/o:     1. 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 Mild CM with most recent echo 11/2019 showing EF from 45 up to 50-55%  3. HTN  4. Paroxysmal AFib noted on device interrogations.On Eliquis for CHADSVASc 2 (HTN, age)      I had a virtual visit with Arias 3/2020 at which time he was doing well. No changes were made and follow-up with his PPM check was rec'd.    Interval History:  Overall he's really done well, though notes some mild GARCIA. He feels \"out of shape.\" Denies orthopnea, PND. No CP, edema. No palpitations, lightheadedness, dizziness. No syncope.     No issues with medications. No bleeding on Eliquis. Remains unaware of his intermittent AFib with controlled rates.    Interestingly, EKG today showed  114 bpm! Completely asx'c - ?AT/intermittent atrial tracking with V pacing?    Recent Diagnostic Testing:  EKG today, which I overread,  114 bpm -?AT/intermittent AT with ?  Device interrogation today showed 67.6% AP and 99.9%  in DDDR 55/130. Noted to be in ST @ 113 bpm suggestive of PMT with CHB. Normal threshold, impedance and sensing. 0.2% AFib burden with controlled V rates in 50-90s. Asx'c. PVARP and AV delay (sensed and paced) adjusted to break PMT.   Device interrogation 8/2020 with 71% AP and >99%  in DDDR 55/130. <1% mode switching, longest episode 4.5 minutes with controlled rates  Echocardiogam 11/2019 showed EF 50-55%. No RWMA. RV OK. LA mild-mod dilated 4.5 cm index 43.0 ml/m2. Trace MR. 1+ TR with RVSP 21+RAP. Aortic sclerosis. Mild 1+ AI.  Nuclear Stress Test 2/2018 showed no ischemia    Assessment & Plan:    1. 2:1 Heart Block    Was able to get him into Device a little early today given his EKG findings. PVARP adjusted (details above) but per Dr. Benavidez, less likely to be PMT and thinks it's tracking an atrial tachycardia (not AFib)    PLAN:    Continue routine device interrogations    See us 1 year with EKG and annual PPM check "     Call if GARCIA doesn't improve/change      2. Paroxysmal AFib    Remains on Eliquis 5 mg BID and metoprolol XL 25 mg daily    PLAN:    Continue current meds given very low 2% of mode switching    3. HTN    On metoprolol XL 25 mg daily and lisinopril/HCTZ 20/25 daily    SBPs high now, but typically under much better control at home    PLAN:    Continue current meds    CALL if BP remains elevated - typically it's under much better control      Maria Elena Khan PA-C, MSPAS      Orders Placed This Encounter   Procedures     Follow-Up with Cardiac Advanced Practice Provider     EKG 12-lead complete w/read - Clinics (performed today)     EKG 12-lead complete w/read - Clinics (to be scheduled)     No orders of the defined types were placed in this encounter.    There are no discontinued medications.      Encounter Diagnoses   Name Primary?     Paroxysmal atrial fibrillation (H)      AV block, 2nd degree Yes     Cardiac pacemaker in situ        CURRENT MEDICATIONS:  Current Outpatient Medications   Medication Sig Dispense Refill     apixaban ANTICOAGULANT (ELIQUIS ANTICOAGULANT) 5 MG tablet Take 1 tablet (5 mg) by mouth 2 times daily 180 tablet 0     lisinopril-hydrochlorothiazide (PRINZIDE,ZESTORETIC) 20-25 MG per tablet Take 1 tablet by mouth daily       metoprolol succinate ER (TOPROL-XL) 25 MG 24 hr tablet Take 1 tablet (25 mg) by mouth daily 90 tablet 1     Sertraline HCl (ZOLOFT PO) Take 100 mg by mouth daily       simvastatin (ZOCOR) 20 MG tablet Take 20 mg by mouth daily         ALLERGIES     Allergies   Allergen Reactions     Flexeril [Cyclobenzaprine] GI Disturbance     Naprosyn [Naproxen] GI Disturbance     Penicillins      As child     Ranitidine GI Disturbance       PAST MEDICAL HISTORY:  Past Medical History:   Diagnosis Date     CPAP (continuous positive airway pressure) dependence      Depression      Gastro-oesophageal reflux disease      Hepatic steatosis      Hoarseness      Hyperlipidemia      Hypertension       IFG (impaired fasting glucose)      LAFB (left anterior fascicular block)      PONV (postoperative nausea and vomiting)      RBBB      Seizures (H)     as child     Sleep apnea     cpap     Uncomplicated asthma     denies asthma       PAST SURGICAL HISTORY:  Past Surgical History:   Procedure Laterality Date     ANKLE SURGERY       APPENDECTOMY       BACK SURGERY      lami x2     ENT SURGERY      throat granulomas     HERNIA REPAIR       INJECT BOTOX N/A 10/30/2014    Procedure: INJECT BOTOX;  Surgeon: Kalyn Negron MD;  Location: [facility] OR     INJECT BOTOX N/A 12/8/2016    Procedure: INJECT BOTOX;  Surgeon: Kalyn Negron MD;  [facility]     INJECT STEROID (LOCATION) N/A 12/8/2016    Procedure: INJECT STEROID (LOCATION);  Surgeon: Kalyn Negron MD;  [facility]     LAMINECTOMY LUMBAR ONE LEVEL  3/5/2014    Procedure: LAMINECTOMY LUMBAR ONE LEVEL;  RIGHT L4-5 DISCECTOMY;  Surgeon: Rodrigo Ríos MD;  Location: [facility] OR     LASER CO2 LARYNGOSCOPY N/A 12/8/2016    Procedure: LASER CO2 LARYNGOSCOPY;  Surgeon: Kalyn Negron MD;  [facility]     LASER CO2 LARYNGOSCOPY, COMPLEX Left 10/30/2014    Procedure: LASER CO2 LARYNGOSCOPY, COMPLEX;  Surgeon: Kalyn Negron MD;  Location: [facility]     ORTHOPEDIC SURGERY         FAMILY HISTORY:  Family History   Problem Relation Age of Onset     Cancer Father        SOCIAL HISTORY:  Social History     Socioeconomic History     Marital status:      Spouse name: None     Number of children: None     Years of education: None     Highest education level: None   Occupational History     None   Social Needs     Financial resource strain: None     Food insecurity     Worry: None     Inability: None     Transportation needs     Medical: None     Non-medical: None   Tobacco Use     Smoking status: Former Smoker     Years: 10.00     Types: Pipe     Start date: 10/10/1976     Quit date: 5/25/1985     Years since  quittin.4     Smokeless tobacco: Never Used   Substance and Sexual Activity     Alcohol use: Yes     Comment: daily- 2 drinks /day     Drug use: No     Sexual activity: Yes     Partners: Female     Birth control/protection: Other   Lifestyle     Physical activity     Days per week: None     Minutes per session: None     Stress: None   Relationships     Social connections     Talks on phone: None     Gets together: None     Attends Pentec[employer]al service: None     Active member of club or organization: None     Attends meetings of clubs or organizations: None     Relationship status: None     Intimate partner violence     Fear of current or ex partner: None     Emotionally abused: None     Physically abused: None     Forced sexual activity: None   Other Topics Concern     Parent/sibling w/ CABG, MI or angioplasty before 65F 55M? Not Asked   Social History Narrative     None       Review of Systems:  Skin:  Negative     Eyes:  Positive for cataracts  ENT:  Negative    Respiratory:  Positive for dyspnea on exertion  Cardiovascular:  Negative for;palpitations;chest pain;edema;fatigue;lightheadedness;dizziness Positive for;exercise intolerance  Gastroenterology: Negative for melena;hematochezia  Genitourinary:  Negative    Musculoskeletal:  Negative    Neurologic:  Negative    Psychiatric:  Positive for depression  Heme/Lymph/Imm:  Positive for allergies  Endocrine:  Negative      Physical Exam:  Vitals: BP (!) 137/90   Pulse 113   Wt 109.8 kg (242 lb)   BMI 33.77 kg/m      Constitutional:  cooperative, alert and oriented, well developed, well nourished, in no acute distress        Skin:  warm and dry to the touch, no apparent skin lesions or masses noted        Head:  normocephalic, no masses or lesions        Eyes:  pupils equal and round;conjunctivae and lids unremarkable;sclera white        ENT:  no pallor or cyanosis, dentition good        Neck:  JVP normal;carotid pulses are full and equal bilaterally         Chest:  normal breath sounds, clear to auscultation, normal A-P diameter, normal symmetry, normal respiratory excursion, no use of accessory muscles        Cardiac: regular rhythm;no murmurs, gallops or rubs detected tachycardic                  Vascular: pulses full and equal                                      Extremities and Back:  no edema;no deformities, clubbing, cyanosis, erythema observed        Neurological:  no gross motor deficits        Recent Lab Results:  LIPID RESULTS:  No results found for: CHOL, HDL, LDL, TRIG, CHOLHDLRATIO    LIVER ENZYME RESULTS:  Lab Results   Component Value Date    AST 23 08/16/2017    ALT 30 08/16/2017       CBC RESULTS:  Lab Results   Component Value Date    WBC 7.2 08/16/2017    RBC 4.38 (L) 08/16/2017    HGB 14.0 08/16/2017    HCT 40.4 08/16/2017    MCV 92 08/16/2017    MCH 32.0 08/16/2017    MCHC 34.7 08/16/2017    RDW 12.7 08/16/2017     08/16/2017       BMP RESULTS:  Lab Results   Component Value Date     08/16/2017    POTASSIUM 3.7 08/16/2017    CHLORIDE 105 08/16/2017    CO2 31 08/16/2017    ANIONGAP 4 08/16/2017     (H) 08/16/2017    BUN 18 08/16/2017    CR 1.04 08/16/2017    GFRESTIMATED 72 08/16/2017    GFRESTBLACK 87 08/16/2017    UZIEL 9.3 08/16/2017

## 2020-10-27 ENCOUNTER — OFFICE VISIT (OUTPATIENT)
Dept: CARDIOLOGY | Facility: CLINIC | Age: 65
End: 2020-10-27
Payer: MEDICARE

## 2020-10-27 ENCOUNTER — ANCILLARY PROCEDURE (OUTPATIENT)
Dept: CARDIOLOGY | Facility: CLINIC | Age: 65
End: 2020-10-27
Attending: INTERNAL MEDICINE
Payer: MEDICARE

## 2020-10-27 VITALS
DIASTOLIC BLOOD PRESSURE: 90 MMHG | WEIGHT: 242 LBS | HEART RATE: 113 BPM | SYSTOLIC BLOOD PRESSURE: 137 MMHG | BODY MASS INDEX: 33.77 KG/M2

## 2020-10-27 DIAGNOSIS — Z95.0 CARDIAC PACEMAKER IN SITU: Primary | ICD-10-CM

## 2020-10-27 DIAGNOSIS — I48.0 PAROXYSMAL ATRIAL FIBRILLATION (H): ICD-10-CM

## 2020-10-27 DIAGNOSIS — Z95.0 CARDIAC PACEMAKER IN SITU: ICD-10-CM

## 2020-10-27 DIAGNOSIS — I44.30 HEART BLOCK, AV: ICD-10-CM

## 2020-10-27 DIAGNOSIS — I44.1 AV BLOCK, 2ND DEGREE: Primary | ICD-10-CM

## 2020-10-27 PROCEDURE — 99214 OFFICE O/P EST MOD 30 MIN: CPT | Mod: 25 | Performed by: PHYSICIAN ASSISTANT

## 2020-10-27 PROCEDURE — 93000 ELECTROCARDIOGRAM COMPLETE: CPT | Performed by: PHYSICIAN ASSISTANT

## 2020-10-27 PROCEDURE — 93280 PM DEVICE PROGR EVAL DUAL: CPT | Performed by: INTERNAL MEDICINE

## 2020-10-27 NOTE — LETTER
"10/27/2020    Leann G Hutchison, MD [facility] [address] [place] [address]    RE: Arias Manzo       Dear Colleague,    I had the pleasure of seeing Arias Manzo in the [facility].    HPI:   This 66 y/o M sees Dr. Benavidez for his h/o:     1. 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 Mild CM with most recent echo 11/2019 showing EF from 45 up to 50-55%  3. HTN  4. Paroxysmal AFib noted on device interrogations.On Eliquis for CHADSVASc 2 (HTN, age)      I had a virtual visit with Arias 3/2020 at which time he was doing well. No changes were made and follow-up with his PPM check was rec'd.    Interval History:  Overall he's really done well, though notes some mild GARCIA. He feels \"out of shape.\" Denies orthopnea, PND. No CP, edema. No palpitations, lightheadedness, dizziness. No syncope.     No issues with medications. No bleeding on Eliquis. Remains unaware of his intermittent AFib with controlled rates.    Interestingly, EKG today showed  114 bpm! Completely asx'c - ?AT/intermittent atrial tracking with V pacing?    Recent Diagnostic Testing:  EKG today, which I overread,  114 bpm -?AT/intermittent AT with ?  Device interrogation today showed 67.6% AP and 99.9%  in DDDR 55/130. Noted to be in ST @ 113 bpm suggestive of PMT with CHB. Normal threshold, impedance and sensing. 0.2% AFib burden with controlled V rates in 50-90s. Asx'c. PVARP and AV delay (sensed and paced) adjusted to break PMT.   Device interrogation 8/2020 with 71% AP and >99%  in DDDR 55/130. <1% mode switching, longest episode 4.5 minutes with controlled rates  Echocardiogam 11/2019 showed EF 50-55%. No RWMA. RV OK. LA mild-mod dilated 4.5 cm index 43.0 ml/m2. Trace MR. 1+ TR with RVSP 21+RAP. Aortic sclerosis. Mild 1+ AI.  Nuclear Stress Test 2/2018 showed no ischemia    Assessment & Plan:    1. 2:1 Heart Block    Was able to get him into Device a little early " today given his EKG findings. PVARP adjusted (details above) but per Dr. Benavidez, less likely to be PMT and thinks it's tracking an atrial tachycardia (not AFib)    PLAN:    Continue routine device interrogations    See us 1 year with EKG and annual PPM check     Call if GARCIA doesn't improve/change      2. Paroxysmal AFib    Remains on Eliquis 5 mg BID and metoprolol XL 25 mg daily    PLAN:    Continue current meds given very low 2% of mode switching    3. HTN    On metoprolol XL 25 mg daily and lisinopril/HCTZ 20/25 daily    SBPs high now, but typically under much better control at home    PLAN:    Continue current meds    CALL if BP remains elevated - typically it's under much better control      Maria Elena Khan PA-C, MSPAS      Orders Placed This Encounter   Procedures     Follow-Up with Cardiac Advanced Practice Provider     EKG 12-lead complete w/read - Clinics (performed today)     EKG 12-lead complete w/read - Clinics (to be scheduled)     No orders of the defined types were placed in this encounter.    There are no discontinued medications.      Encounter Diagnoses   Name Primary?     Paroxysmal atrial fibrillation (H)      AV block, 2nd degree Yes     Cardiac pacemaker in situ        CURRENT MEDICATIONS:  Current Outpatient Medications   Medication Sig Dispense Refill     apixaban ANTICOAGULANT (ELIQUIS ANTICOAGULANT) 5 MG tablet Take 1 tablet (5 mg) by mouth 2 times daily 180 tablet 0     lisinopril-hydrochlorothiazide (PRINZIDE,ZESTORETIC) 20-25 MG per tablet Take 1 tablet by mouth daily       metoprolol succinate ER (TOPROL-XL) 25 MG 24 hr tablet Take 1 tablet (25 mg) by mouth daily 90 tablet 1     Sertraline HCl (ZOLOFT PO) Take 100 mg by mouth daily       simvastatin (ZOCOR) 20 MG tablet Take 20 mg by mouth daily         ALLERGIES     Allergies   Allergen Reactions     Flexeril [Cyclobenzaprine] GI Disturbance     Naprosyn [Naproxen] GI Disturbance     Penicillins      As child     Ranitidine GI Disturbance        PAST MEDICAL HISTORY:  Past Medical History:   Diagnosis Date     CPAP (continuous positive airway pressure) dependence      Depression      Gastro-oesophageal reflux disease      Hepatic steatosis      Hoarseness      Hyperlipidemia      Hypertension      IFG (impaired fasting glucose)      LAFB (left anterior fascicular block)      PONV (postoperative nausea and vomiting)      RBBB      Seizures (H)     as child     Sleep apnea     cpap     Uncomplicated asthma     denies asthma       PAST SURGICAL HISTORY:  Past Surgical History:   Procedure Laterality Date     ANKLE SURGERY       APPENDECTOMY       BACK SURGERY      lami x2     ENT SURGERY      throat granulomas     HERNIA REPAIR       INJECT BOTOX N/A 10/30/2014    Procedure: INJECT BOTOX;  Surgeon: Kalyn Negron MD;  Location: [facility]     INJECT BOTOX N/A 12/8/2016    Procedure: INJECT BOTOX;  Surgeon: Kalyn Negron MD;  [facility]     INJECT STEROID (LOCATION) N/A 12/8/2016    Procedure: INJECT STEROID (LOCATION);  Surgeon: Kalyn Negron MD;  [facility]     LAMINECTOMY LUMBAR ONE LEVEL  3/5/2014    Procedure: LAMINECTOMY LUMBAR ONE LEVEL;  RIGHT L4-5 DISCECTOMY;  Surgeon: Rodrigo Ríos MD;  Location: [facility] OR     LASER CO2 LARYNGOSCOPY N/A 12/8/2016    Procedure: LASER CO2 LARYNGOSCOPY;  Surgeon: Kalyn Negron MD;  [facility]     LASER CO2 LARYNGOSCOPY, COMPLEX Left 10/30/2014    Procedure: LASER CO2 LARYNGOSCOPY, COMPLEX;  Surgeon: Kalyn Negron MD;  Location: [facility] OR     ORTHOPEDIC SURGERY         FAMILY HISTORY:  Family History   Problem Relation Age of Onset     Cancer Father        SOCIAL HISTORY:  Social History     Socioeconomic History     Marital status:      Spouse name: None     Number of children: None     Years of education: None     Highest education level: None   Occupational History     None   Social Needs     Financial resource strain: None     Food  insecurity     Worry: None     Inability: None     Transportation needs     Medical: None     Non-medical: None   Tobacco Use     Smoking status: Former Smoker     Years: 10.00     Types: Pipe     Start date: 10/10/1976     Quit date: 1985     Years since quittin.4     Smokeless tobacco: Never Used   Substance and Sexual Activity     Alcohol use: Yes     Comment: daily- 2 drinks /day     Drug use: No     Sexual activity: Yes     Partners: Female     Birth control/protection: Other   Lifestyle     Physical activity     Days per week: None     Minutes per session: None     Stress: None   Relationships     Social connections     Talks on phone: None     Gets together: None     Attends [employer] service: None     Active member of club or organization: None     Attends meetings of clubs or organizations: None     Relationship status: None     Intimate partner violence     Fear of current or ex partner: None     Emotionally abused: None     Physically abused: None     Forced sexual activity: None   Other Topics Concern     Parent/sibling w/ CABG, MI or angioplasty before 65F 55M? Not Asked   Social History Narrative     None       Review of Systems:  Skin:  Negative     Eyes:  Positive for cataracts  ENT:  Negative    Respiratory:  Positive for dyspnea on exertion  Cardiovascular:  Negative for;palpitations;chest pain;edema;fatigue;lightheadedness;dizziness Positive for;exercise intolerance  Gastroenterology: Negative for melena;hematochezia  Genitourinary:  Negative    Musculoskeletal:  Negative    Neurologic:  Negative    Psychiatric:  Positive for depression  Heme/Lymph/Imm:  Positive for allergies  Endocrine:  Negative      Physical Exam:  Vitals: BP (!) 137/90   Pulse 113   Wt 109.8 kg (242 lb)   BMI 33.77 kg/m      Constitutional:  cooperative, alert and oriented, well developed, well nourished, in no acute distress        Skin:  warm and dry to the touch, no apparent skin lesions or masses noted         Head:  normocephalic, no masses or lesions        Eyes:  pupils equal and round;conjunctivae and lids unremarkable;sclera white        ENT:  no pallor or cyanosis, dentition good        Neck:  JVP normal;carotid pulses are full and equal bilaterally        Chest:  normal breath sounds, clear to auscultation, normal A-P diameter, normal symmetry, normal respiratory excursion, no use of accessory muscles        Cardiac: regular rhythm;no murmurs, gallops or rubs detected tachycardic                  Vascular: pulses full and equal                                      Extremities and Back:  no edema;no deformities, clubbing, cyanosis, erythema observed        Neurological:  no gross motor deficits        Recent Lab Results:  LIPID RESULTS:  No results found for: CHOL, HDL, LDL, TRIG, CHOLHDLRATIO    LIVER ENZYME RESULTS:  Lab Results   Component Value Date    AST 23 08/16/2017    ALT 30 08/16/2017       CBC RESULTS:  Lab Results   Component Value Date    WBC 7.2 08/16/2017    RBC 4.38 (L) 08/16/2017    HGB 14.0 08/16/2017    HCT 40.4 08/16/2017    MCV 92 08/16/2017    MCH 32.0 08/16/2017    MCHC 34.7 08/16/2017    RDW 12.7 08/16/2017     08/16/2017       BMP RESULTS:  Lab Results   Component Value Date     08/16/2017    POTASSIUM 3.7 08/16/2017    CHLORIDE 105 08/16/2017    CO2 31 08/16/2017    ANIONGAP 4 08/16/2017     (H) 08/16/2017    BUN 18 08/16/2017    CR 1.04 08/16/2017    GFRESTIMATED 72 08/16/2017    GFRESTBLACK 87 08/16/2017    UZIEL 9.3 08/16/2017                    Thank you for allowing me to participate in the care of your patient.      Sincerely,     SUSIE ParikhC     [facility]    cc:   Miley Benavidez MD  [address]  [place] [id number]

## 2020-10-27 NOTE — LETTER
"10/27/2020    Leann G Hutchison, MD [facility] [address] [place] [address]    RE: Arias Manzo       Dear Colleague,    I had the pleasure of seeing Arias Manzo in the [facility].    HPI:   This 66 y/o M sees Dr. Benavidez for his h/o:     1. 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 Mild CM with most recent echo 11/2019 showing EF from 45 up to 50-55%  3. HTN  4. Paroxysmal AFib noted on device interrogations.On Eliquis for CHADSVASc 2 (HTN, age)      I had a virtual visit with Arias 3/2020 at which time he was doing well. No changes were made and follow-up with his PPM check was rec'd.    Interval History:  Overall he's really done well, though notes some mild GARCIA. He feels \"out of shape.\" Denies orthopnea, PND. No CP, edema. No palpitations, lightheadedness, dizziness. No syncope.     No issues with medications. No bleeding on Eliquis. Remains unaware of his intermittent AFib with controlled rates.    Interestingly, EKG today showed  114 bpm! Completely asx'c - ?AT/intermittent atrial tracking with V pacing?    Recent Diagnostic Testing:  EKG today, which I overread,  114 bpm -?AT/intermittent AT with ?  Device interrogation today showed 67.6% AP and 99.9%  in DDDR 55/130. Noted to be in ST @ 113 bpm suggestive of PMT with CHB. Normal threshold, impedance and sensing. 0.2% AFib burden with controlled V rates in 50-90s. Asx'c. PVARP and AV delay (sensed and paced) adjusted to break PMT.   Device interrogation 8/2020 with 71% AP and >99%  in DDDR 55/130. <1% mode switching, longest episode 4.5 minutes with controlled rates  Echocardiogam 11/2019 showed EF 50-55%. No RWMA. RV OK. LA mild-mod dilated 4.5 cm index 43.0 ml/m2. Trace MR. 1+ TR with RVSP 21+RAP. Aortic sclerosis. Mild 1+ AI.  Nuclear Stress Test 2/2018 showed no ischemia    Assessment & Plan:    1. 2:1 Heart Block    Was able to get him into Device a little early " today given his EKG findings. PVARP adjusted (details above) but per Dr. Benavidez, less likely to be PMT and thinks it's tracking an atrial tachycardia (not AFib)    PLAN:    Continue routine device interrogations    See us 1 year with EKG and annual PPM check     Call if GARCIA doesn't improve/change      2. Paroxysmal AFib    Remains on Eliquis 5 mg BID and metoprolol XL 25 mg daily    PLAN:    Continue current meds given very low 2% of mode switching    3. HTN    On metoprolol XL 25 mg daily and lisinopril/HCTZ 20/25 daily    SBPs high now, but typically under much better control at home    PLAN:    Continue current meds    CALL if BP remains elevated - typically it's under much better control      Maria Elena Khan PA-C, MSPAS      Orders Placed This Encounter   Procedures     Follow-Up with Cardiac Advanced Practice Provider     EKG 12-lead complete w/read - Clinics (performed today)     EKG 12-lead complete w/read - Clinics (to be scheduled)     No orders of the defined types were placed in this encounter.    There are no discontinued medications.      Encounter Diagnoses   Name Primary?     Paroxysmal atrial fibrillation (H)      AV block, 2nd degree Yes     Cardiac pacemaker in situ        CURRENT MEDICATIONS:  Current Outpatient Medications   Medication Sig Dispense Refill     apixaban ANTICOAGULANT (ELIQUIS ANTICOAGULANT) 5 MG tablet Take 1 tablet (5 mg) by mouth 2 times daily 180 tablet 0     lisinopril-hydrochlorothiazide (PRINZIDE,ZESTORETIC) 20-25 MG per tablet Take 1 tablet by mouth daily       metoprolol succinate ER (TOPROL-XL) 25 MG 24 hr tablet Take 1 tablet (25 mg) by mouth daily 90 tablet 1     Sertraline HCl (ZOLOFT PO) Take 100 mg by mouth daily       simvastatin (ZOCOR) 20 MG tablet Take 20 mg by mouth daily         ALLERGIES     Allergies   Allergen Reactions     Flexeril [Cyclobenzaprine] GI Disturbance     Naprosyn [Naproxen] GI Disturbance     Penicillins      As child     Ranitidine GI Disturbance        PAST MEDICAL HISTORY:  Past Medical History:   Diagnosis Date     CPAP (continuous positive airway pressure) dependence      Depression      Gastro-oesophageal reflux disease      Hepatic steatosis      Hoarseness      Hyperlipidemia      Hypertension      IFG (impaired fasting glucose)      LAFB (left anterior fascicular block)      PONV (postoperative nausea and vomiting)      RBBB      Seizures (H)     as child     Sleep apnea     cpap     Uncomplicated asthma     denies asthma       PAST SURGICAL HISTORY:  Past Surgical History:   Procedure Laterality Date     ANKLE SURGERY       APPENDECTOMY       BACK SURGERY      lami x2     ENT SURGERY      throat granulomas     HERNIA REPAIR       INJECT BOTOX N/A 10/30/2014    Procedure: INJECT BOTOX;  Surgeon: Kalyn Negron MD;  Location: [facility]     INJECT BOTOX N/A 12/8/2016    Procedure: INJECT BOTOX;  Surgeon: Kalyn Negron MD;  [facility]     INJECT STEROID (LOCATION) N/A 12/8/2016    Procedure: INJECT STEROID (LOCATION);  Surgeon: Kalyn Negron MD;  [facility]     LAMINECTOMY LUMBAR ONE LEVEL  3/5/2014    Procedure: LAMINECTOMY LUMBAR ONE LEVEL;  RIGHT L4-5 DISCECTOMY;  Surgeon: Rodrigo Ríos MD;  Location: [facility]     LASER CO2 LARYNGOSCOPY N/A 12/8/2016    Procedure: LASER CO2 LARYNGOSCOPY;  Surgeon: Kalyn Negron MD;  [facility]     LASER CO2 LARYNGOSCOPY, COMPLEX Left 10/30/2014    Procedure: LASER CO2 LARYNGOSCOPY, COMPLEX;  Surgeon: Kalyn Negron MD;  Location: [facility] OR     ORTHOPEDIC SURGERY         FAMILY HISTORY:  Family History   Problem Relation Age of Onset     Cancer Father        SOCIAL HISTORY:  Social History     Socioeconomic History     Marital status:      Spouse name: None     Number of children: None     Years of education: None     Highest education level: None   Occupational History     None   Social Needs     Financial resource strain: None     Food  insecurity     Worry: None     Inability: None     Transportation needs     Medical: None     Non-medical: None   Tobacco Use     Smoking status: Former Smoker     Years: 10.00     Types: Pipe     Start date: 10/10/1976     Quit date: 1985     Years since quittin.4     Smokeless tobacco: Never Used   Substance and Sexual Activity     Alcohol use: Yes     Comment: daily- 2 drinks /day     Drug use: No     Sexual activity: Yes     Partners: Female     Birth control/protection: Other   Lifestyle     Physical activity     Days per week: None     Minutes per session: None     Stress: None   Relationships     Social connections     Talks on phone: None     Gets together: None     Attends [employer] service: None     Active member of club or organization: None     Attends meetings of clubs or organizations: None     Relationship status: None     Intimate partner violence     Fear of current or ex partner: None     Emotionally abused: None     Physically abused: None     Forced sexual activity: None   Other Topics Concern     Parent/sibling w/ CABG, MI or angioplasty before 65F 55M? Not Asked   Social History Narrative     None       Review of Systems:  Skin:  Negative     Eyes:  Positive for cataracts  ENT:  Negative    Respiratory:  Positive for dyspnea on exertion  Cardiovascular:  Negative for;palpitations;chest pain;edema;fatigue;lightheadedness;dizziness Positive for;exercise intolerance  Gastroenterology: Negative for melena;hematochezia  Genitourinary:  Negative    Musculoskeletal:  Negative    Neurologic:  Negative    Psychiatric:  Positive for depression  Heme/Lymph/Imm:  Positive for allergies  Endocrine:  Negative      Physical Exam:  Vitals: BP (!) 137/90   Pulse 113   Wt 109.8 kg (242 lb)   BMI 33.77 kg/m      Constitutional:  cooperative, alert and oriented, well developed, well nourished, in no acute distress        Skin:  warm and dry to the touch, no apparent skin lesions or masses noted         Head:  normocephalic, no masses or lesions        Eyes:  pupils equal and round;conjunctivae and lids unremarkable;sclera white        ENT:  no pallor or cyanosis, dentition good        Neck:  JVP normal;carotid pulses are full and equal bilaterally        Chest:  normal breath sounds, clear to auscultation, normal A-P diameter, normal symmetry, normal respiratory excursion, no use of accessory muscles        Cardiac: regular rhythm;no murmurs, gallops or rubs detected tachycardic                  Vascular: pulses full and equal                                      Extremities and Back:  no edema;no deformities, clubbing, cyanosis, erythema observed        Neurological:  no gross motor deficits        Recent Lab Results:  LIPID RESULTS:  No results found for: CHOL, HDL, LDL, TRIG, CHOLHDLRATIO    LIVER ENZYME RESULTS:  Lab Results   Component Value Date    AST 23 08/16/2017    ALT 30 08/16/2017       CBC RESULTS:  Lab Results   Component Value Date    WBC 7.2 08/16/2017    RBC 4.38 (L) 08/16/2017    HGB 14.0 08/16/2017    HCT 40.4 08/16/2017    MCV 92 08/16/2017    MCH 32.0 08/16/2017    MCHC 34.7 08/16/2017    RDW 12.7 08/16/2017     08/16/2017       BMP RESULTS:  Lab Results   Component Value Date     08/16/2017    POTASSIUM 3.7 08/16/2017    CHLORIDE 105 08/16/2017    CO2 31 08/16/2017    ANIONGAP 4 08/16/2017     (H) 08/16/2017    BUN 18 08/16/2017    CR 1.04 08/16/2017    GFRESTIMATED 72 08/16/2017    GFRESTBLACK 87 08/16/2017    UZIEL 9.3 08/16/2017            Thank you for allowing me to participate in the care of your patient.    Sincerely,     Melani Khan PA-C     [facility]

## 2020-10-27 NOTE — PATIENT INSTRUCTIONS
"Arias - it was nice to see you today!    1. Reviewed that you feel \"out of shape\"  2. Reviewed that EKG showed abnl rhythm - Lewis from Device [facility] fixed it by adjust pacer    PLAN:  1. See us back 1 year with annual pacer check but CALL if your breathing gets worse or if you have any questions/concerns OR if BP remains up there    [phone]  "

## 2020-10-27 NOTE — PROGRESS NOTES
Medtronic Advisa (D) Pacemaker Device Check  Patient seen in clinic for device evaluation and iterative programming.   AP: 67.6 %    : 99.9 %    Mode: DDDR     Underlying Rhythm: Patient presented in ST 113bpm suggestive of PMT with CHB with no junctional escape at VVI 30. Patient notes increased shortness of breath, did not notice elevated heart rate until sitting down during device check     Heart Rate: Stable with good variability     Sensing: RA WNL. RV unable to obtain     Pacing Threshold: Stable     Impedance: Stable     Battery Status: 6 years     Device Site: Well healed     Atrial Arrhythmia: ***    Ventricular Arrhythmia: 0    Setting Change: PVARP changed from 350ms to   Care Plan: Patients measured PVARP in clinic today 240ms-270ms. Ran patient VVI 90 and A sensed event followed  with atrial rate of 82bpm. PVARP and Sensed and Paced AV delay adjusted to break PMT. With programming changes patient underlying SR 80s with CHB with no junctional escape at VVI 30. Patient stated feeling well and no longer felt fast heart rates. Updated Maria Elena Khan. Asked patient     I have reviewed and interpreted the device interrogation, settings, programming and nurse's summary. The device is functioning within normal device parameters. I agree with the current findings, assessment and plan.

## 2020-10-31 LAB
MDC_IDC_EPISODE_DTM: NORMAL
MDC_IDC_EPISODE_DURATION: 105 S
MDC_IDC_EPISODE_DURATION: 110 S
MDC_IDC_EPISODE_DURATION: 115 S
MDC_IDC_EPISODE_DURATION: 118 S
MDC_IDC_EPISODE_DURATION: 119 S
MDC_IDC_EPISODE_DURATION: 126 S
MDC_IDC_EPISODE_DURATION: 137 S
MDC_IDC_EPISODE_DURATION: 141 S
MDC_IDC_EPISODE_DURATION: 159 S
MDC_IDC_EPISODE_DURATION: 192 S
MDC_IDC_EPISODE_DURATION: 218 S
MDC_IDC_EPISODE_DURATION: 22 S
MDC_IDC_EPISODE_DURATION: 234 S
MDC_IDC_EPISODE_DURATION: 26 S
MDC_IDC_EPISODE_DURATION: 29 S
MDC_IDC_EPISODE_DURATION: 292 S
MDC_IDC_EPISODE_DURATION: 31 S
MDC_IDC_EPISODE_DURATION: 31 S
MDC_IDC_EPISODE_DURATION: 33 S
MDC_IDC_EPISODE_DURATION: 33 S
MDC_IDC_EPISODE_DURATION: 34 S
MDC_IDC_EPISODE_DURATION: 34 S
MDC_IDC_EPISODE_DURATION: 35 S
MDC_IDC_EPISODE_DURATION: 36 S
MDC_IDC_EPISODE_DURATION: 37 S
MDC_IDC_EPISODE_DURATION: 38 S
MDC_IDC_EPISODE_DURATION: 39 S
MDC_IDC_EPISODE_DURATION: 41 S
MDC_IDC_EPISODE_DURATION: 42 S
MDC_IDC_EPISODE_DURATION: 43 S
MDC_IDC_EPISODE_DURATION: 44 S
MDC_IDC_EPISODE_DURATION: 45 S
MDC_IDC_EPISODE_DURATION: 45 S
MDC_IDC_EPISODE_DURATION: 49 S
MDC_IDC_EPISODE_DURATION: 52 S
MDC_IDC_EPISODE_DURATION: 52 S
MDC_IDC_EPISODE_DURATION: 54 S
MDC_IDC_EPISODE_DURATION: 59 S
MDC_IDC_EPISODE_DURATION: 62 S
MDC_IDC_EPISODE_DURATION: 63 S
MDC_IDC_EPISODE_DURATION: 63 S
MDC_IDC_EPISODE_DURATION: 78 S
MDC_IDC_EPISODE_DURATION: 79 S
MDC_IDC_EPISODE_DURATION: 83 S
MDC_IDC_EPISODE_DURATION: 92 S
MDC_IDC_EPISODE_DURATION: 97 S
MDC_IDC_EPISODE_ID: 260
MDC_IDC_EPISODE_ID: 261
MDC_IDC_EPISODE_ID: 262
MDC_IDC_EPISODE_ID: 263
MDC_IDC_EPISODE_ID: 264
MDC_IDC_EPISODE_ID: 265
MDC_IDC_EPISODE_ID: 266
MDC_IDC_EPISODE_ID: 267
MDC_IDC_EPISODE_ID: 268
MDC_IDC_EPISODE_ID: 269
MDC_IDC_EPISODE_ID: 270
MDC_IDC_EPISODE_ID: 271
MDC_IDC_EPISODE_ID: 272
MDC_IDC_EPISODE_ID: 273
MDC_IDC_EPISODE_ID: 274
MDC_IDC_EPISODE_ID: 275
MDC_IDC_EPISODE_ID: 276
MDC_IDC_EPISODE_ID: 277
MDC_IDC_EPISODE_ID: 278
MDC_IDC_EPISODE_ID: 279
MDC_IDC_EPISODE_ID: 280
MDC_IDC_EPISODE_ID: 281
MDC_IDC_EPISODE_ID: 282
MDC_IDC_EPISODE_ID: 283
MDC_IDC_EPISODE_ID: 284
MDC_IDC_EPISODE_ID: 285
MDC_IDC_EPISODE_ID: 286
MDC_IDC_EPISODE_ID: 287
MDC_IDC_EPISODE_ID: 288
MDC_IDC_EPISODE_ID: 289
MDC_IDC_EPISODE_ID: 290
MDC_IDC_EPISODE_ID: 291
MDC_IDC_EPISODE_ID: 292
MDC_IDC_EPISODE_ID: 293
MDC_IDC_EPISODE_ID: 294
MDC_IDC_EPISODE_ID: 295
MDC_IDC_EPISODE_ID: 296
MDC_IDC_EPISODE_ID: 297
MDC_IDC_EPISODE_ID: 298
MDC_IDC_EPISODE_ID: 299
MDC_IDC_EPISODE_ID: 300
MDC_IDC_EPISODE_ID: 301
MDC_IDC_EPISODE_ID: 302
MDC_IDC_EPISODE_ID: 303
MDC_IDC_EPISODE_ID: 304
MDC_IDC_EPISODE_ID: 305
MDC_IDC_EPISODE_ID: 306
MDC_IDC_EPISODE_ID: 307
MDC_IDC_EPISODE_ID: 308
MDC_IDC_EPISODE_ID: 309
MDC_IDC_EPISODE_TYPE: NORMAL
MDC_IDC_LEAD_IMPLANT_DT: NORMAL
MDC_IDC_LEAD_IMPLANT_DT: NORMAL
MDC_IDC_LEAD_LOCATION: NORMAL
MDC_IDC_LEAD_LOCATION: NORMAL
MDC_IDC_LEAD_LOCATION_DETAIL_1: NORMAL
MDC_IDC_LEAD_LOCATION_DETAIL_1: NORMAL
MDC_IDC_LEAD_MFG: NORMAL
MDC_IDC_LEAD_MFG: NORMAL
MDC_IDC_LEAD_MODEL: NORMAL
MDC_IDC_LEAD_MODEL: NORMAL
MDC_IDC_LEAD_POLARITY_TYPE: NORMAL
MDC_IDC_LEAD_POLARITY_TYPE: NORMAL
MDC_IDC_LEAD_SERIAL: NORMAL
MDC_IDC_LEAD_SERIAL: NORMAL
MDC_IDC_MSMT_BATTERY_DTM: NORMAL
MDC_IDC_MSMT_BATTERY_REMAINING_LONGEVITY: 77 MO
MDC_IDC_MSMT_BATTERY_RRT_TRIGGER: 2.83
MDC_IDC_MSMT_BATTERY_STATUS: NORMAL
MDC_IDC_MSMT_BATTERY_VOLTAGE: 3.01 V
MDC_IDC_MSMT_LEADCHNL_RA_IMPEDANCE_VALUE: 361 OHM
MDC_IDC_MSMT_LEADCHNL_RA_IMPEDANCE_VALUE: 437 OHM
MDC_IDC_MSMT_LEADCHNL_RA_PACING_THRESHOLD_AMPLITUDE: 0.5 V
MDC_IDC_MSMT_LEADCHNL_RA_PACING_THRESHOLD_PULSEWIDTH: 0.4 MS
MDC_IDC_MSMT_LEADCHNL_RA_SENSING_INTR_AMPL: 0.88 MV
MDC_IDC_MSMT_LEADCHNL_RA_SENSING_INTR_AMPL: 4.88 MV
MDC_IDC_MSMT_LEADCHNL_RV_IMPEDANCE_VALUE: 418 OHM
MDC_IDC_MSMT_LEADCHNL_RV_IMPEDANCE_VALUE: 589 OHM
MDC_IDC_MSMT_LEADCHNL_RV_PACING_THRESHOLD_AMPLITUDE: 0.62 V
MDC_IDC_MSMT_LEADCHNL_RV_PACING_THRESHOLD_PULSEWIDTH: 0.4 MS
MDC_IDC_MSMT_LEADCHNL_RV_SENSING_INTR_AMPL: 11.25 MV
MDC_IDC_MSMT_LEADCHNL_RV_SENSING_INTR_AMPL: 11.25 MV
MDC_IDC_PG_IMPLANT_DTM: NORMAL
MDC_IDC_PG_MFG: NORMAL
MDC_IDC_PG_MODEL: NORMAL
MDC_IDC_PG_SERIAL: NORMAL
MDC_IDC_PG_TYPE: NORMAL
MDC_IDC_SESS_CLINIC_NAME: NORMAL
MDC_IDC_SESS_DTM: NORMAL
MDC_IDC_SESS_TYPE: NORMAL
MDC_IDC_SET_BRADY_AT_MODE_SWITCH_RATE: 171 {BEATS}/MIN
MDC_IDC_SET_BRADY_HYSTRATE: NORMAL
MDC_IDC_SET_BRADY_LOWRATE: 55 {BEATS}/MIN
MDC_IDC_SET_BRADY_MAX_SENSOR_RATE: 130 {BEATS}/MIN
MDC_IDC_SET_BRADY_MAX_TRACKING_RATE: 130 {BEATS}/MIN
MDC_IDC_SET_BRADY_MODE: NORMAL
MDC_IDC_SET_BRADY_PAV_DELAY_LOW: 150 MS
MDC_IDC_SET_BRADY_SAV_DELAY_LOW: 150 MS
MDC_IDC_SET_LEADCHNL_RA_PACING_AMPLITUDE: 1.5 V
MDC_IDC_SET_LEADCHNL_RA_PACING_ANODE_ELECTRODE_1: NORMAL
MDC_IDC_SET_LEADCHNL_RA_PACING_ANODE_LOCATION_1: NORMAL
MDC_IDC_SET_LEADCHNL_RA_PACING_CAPTURE_MODE: NORMAL
MDC_IDC_SET_LEADCHNL_RA_PACING_CATHODE_ELECTRODE_1: NORMAL
MDC_IDC_SET_LEADCHNL_RA_PACING_CATHODE_LOCATION_1: NORMAL
MDC_IDC_SET_LEADCHNL_RA_PACING_POLARITY: NORMAL
MDC_IDC_SET_LEADCHNL_RA_PACING_PULSEWIDTH: 0.4 MS
MDC_IDC_SET_LEADCHNL_RA_SENSING_ANODE_ELECTRODE_1: NORMAL
MDC_IDC_SET_LEADCHNL_RA_SENSING_ANODE_LOCATION_1: NORMAL
MDC_IDC_SET_LEADCHNL_RA_SENSING_CATHODE_ELECTRODE_1: NORMAL
MDC_IDC_SET_LEADCHNL_RA_SENSING_CATHODE_LOCATION_1: NORMAL
MDC_IDC_SET_LEADCHNL_RA_SENSING_POLARITY: NORMAL
MDC_IDC_SET_LEADCHNL_RA_SENSING_SENSITIVITY: 0.3 MV
MDC_IDC_SET_LEADCHNL_RV_PACING_AMPLITUDE: 2 V
MDC_IDC_SET_LEADCHNL_RV_PACING_ANODE_ELECTRODE_1: NORMAL
MDC_IDC_SET_LEADCHNL_RV_PACING_ANODE_LOCATION_1: NORMAL
MDC_IDC_SET_LEADCHNL_RV_PACING_CAPTURE_MODE: NORMAL
MDC_IDC_SET_LEADCHNL_RV_PACING_CATHODE_ELECTRODE_1: NORMAL
MDC_IDC_SET_LEADCHNL_RV_PACING_CATHODE_LOCATION_1: NORMAL
MDC_IDC_SET_LEADCHNL_RV_PACING_POLARITY: NORMAL
MDC_IDC_SET_LEADCHNL_RV_PACING_PULSEWIDTH: 0.4 MS
MDC_IDC_SET_LEADCHNL_RV_SENSING_ANODE_ELECTRODE_1: NORMAL
MDC_IDC_SET_LEADCHNL_RV_SENSING_ANODE_LOCATION_1: NORMAL
MDC_IDC_SET_LEADCHNL_RV_SENSING_CATHODE_ELECTRODE_1: NORMAL
MDC_IDC_SET_LEADCHNL_RV_SENSING_CATHODE_LOCATION_1: NORMAL
MDC_IDC_SET_LEADCHNL_RV_SENSING_POLARITY: NORMAL
MDC_IDC_SET_LEADCHNL_RV_SENSING_SENSITIVITY: 0.9 MV
MDC_IDC_SET_ZONE_DETECTION_INTERVAL: 350 MS
MDC_IDC_SET_ZONE_DETECTION_INTERVAL: 400 MS
MDC_IDC_SET_ZONE_TYPE: NORMAL
MDC_IDC_STAT_AT_BURDEN_PERCENT: 0.2 %
MDC_IDC_STAT_AT_DTM_END: NORMAL
MDC_IDC_STAT_AT_DTM_START: NORMAL
MDC_IDC_STAT_BRADY_AP_VP_PERCENT: 67.63 %
MDC_IDC_STAT_BRADY_AP_VS_PERCENT: 0.07 %
MDC_IDC_STAT_BRADY_AS_VP_PERCENT: 32.26 %
MDC_IDC_STAT_BRADY_AS_VS_PERCENT: 0.04 %
MDC_IDC_STAT_BRADY_DTM_END: NORMAL
MDC_IDC_STAT_BRADY_DTM_START: NORMAL
MDC_IDC_STAT_BRADY_RA_PERCENT_PACED: 67.56 %
MDC_IDC_STAT_BRADY_RV_PERCENT_PACED: 99.89 %
MDC_IDC_STAT_EPISODE_RECENT_COUNT: 0
MDC_IDC_STAT_EPISODE_RECENT_COUNT: 200
MDC_IDC_STAT_EPISODE_RECENT_COUNT_DTM_END: NORMAL
MDC_IDC_STAT_EPISODE_RECENT_COUNT_DTM_START: NORMAL
MDC_IDC_STAT_EPISODE_TOTAL_COUNT: 0
MDC_IDC_STAT_EPISODE_TOTAL_COUNT: 1
MDC_IDC_STAT_EPISODE_TOTAL_COUNT: 2
MDC_IDC_STAT_EPISODE_TOTAL_COUNT: 306
MDC_IDC_STAT_EPISODE_TOTAL_COUNT_DTM_END: NORMAL
MDC_IDC_STAT_EPISODE_TOTAL_COUNT_DTM_START: NORMAL
MDC_IDC_STAT_EPISODE_TYPE: NORMAL

## 2020-11-12 DIAGNOSIS — I48.0 PAROXYSMAL ATRIAL FIBRILLATION (H): ICD-10-CM

## 2020-11-12 RX ORDER — METOPROLOL SUCCINATE 25 MG/1
25 TABLET, EXTENDED RELEASE ORAL DAILY
Qty: 90 TABLET | Refills: 3 | Status: SHIPPED | OUTPATIENT
Start: 2020-11-12 | End: 2021-11-22

## 2020-12-04 DIAGNOSIS — I48.0 PAROXYSMAL ATRIAL FIBRILLATION (H): ICD-10-CM

## 2020-12-04 NOTE — TELEPHONE ENCOUNTER
Received refill request for:  Eliquis  Last OV was: 10/27/2020 with MYA King  Labs/EKG: n/a  F/U scheduled: orders in Epic for 10/2021  New script sent to: [facility]

## 2021-02-01 ENCOUNTER — ANCILLARY PROCEDURE (OUTPATIENT)
Dept: CARDIOLOGY | Facility: CLINIC | Age: 66
End: 2021-02-01
Attending: INTERNAL MEDICINE
Payer: MEDICARE

## 2021-02-01 DIAGNOSIS — I44.30 HEART BLOCK, AV: ICD-10-CM

## 2021-02-01 DIAGNOSIS — Z95.0 CARDIAC PACEMAKER IN SITU: ICD-10-CM

## 2021-02-01 PROCEDURE — 93296 REM INTERROG EVL PM/IDS: CPT | Performed by: INTERNAL MEDICINE

## 2021-02-01 PROCEDURE — 93294 REM INTERROG EVL PM/LDLS PM: CPT | Performed by: INTERNAL MEDICINE

## 2021-02-04 ENCOUNTER — DOCUMENTATION ONLY (OUTPATIENT)
Dept: CARDIOLOGY | Facility: CLINIC | Age: 66
End: 2021-02-04

## 2021-02-04 NOTE — PROGRESS NOTES
Called Arias to find out how he was doing after last device check. In 10/2020, noted to be in PMT vs AT tracking and PVARP adjusted.  He'd c/o GARCIA but thought it was d/t deconditioning.    No evidence of this on the 2/1 device check.    Asked him to call back with update.

## 2021-02-05 NOTE — PROGRESS NOTES
Pt called and LM yesterday afternoon at 1652 and left message for Maria Elena that with the adjustments he is breathing much better.  Will make Maria Elena aware. Dom

## 2021-02-05 NOTE — PROGRESS NOTES
Called pt - confirmed he's doing much better and BPs are good.    Will see us as planned 10/2021 but will call if issues

## 2021-02-09 LAB
MDC_IDC_EPISODE_DTM: NORMAL
MDC_IDC_EPISODE_DURATION: 102 S
MDC_IDC_EPISODE_DURATION: 1109 S
MDC_IDC_EPISODE_DURATION: 127 S
MDC_IDC_EPISODE_DURATION: 130 S
MDC_IDC_EPISODE_DURATION: 131 S
MDC_IDC_EPISODE_DURATION: 134 S
MDC_IDC_EPISODE_DURATION: 1340 S
MDC_IDC_EPISODE_DURATION: 138 S
MDC_IDC_EPISODE_DURATION: 141 S
MDC_IDC_EPISODE_DURATION: 175 S
MDC_IDC_EPISODE_DURATION: 183 S
MDC_IDC_EPISODE_DURATION: 186 S
MDC_IDC_EPISODE_DURATION: 194 S
MDC_IDC_EPISODE_DURATION: 198 S
MDC_IDC_EPISODE_DURATION: 211 S
MDC_IDC_EPISODE_DURATION: 220 S
MDC_IDC_EPISODE_DURATION: 23 S
MDC_IDC_EPISODE_DURATION: 236 S
MDC_IDC_EPISODE_DURATION: 245 S
MDC_IDC_EPISODE_DURATION: 255 S
MDC_IDC_EPISODE_DURATION: 258 S
MDC_IDC_EPISODE_DURATION: 26 S
MDC_IDC_EPISODE_DURATION: 265 S
MDC_IDC_EPISODE_DURATION: 265 S
MDC_IDC_EPISODE_DURATION: 281 S
MDC_IDC_EPISODE_DURATION: 29 S
MDC_IDC_EPISODE_DURATION: 29 S
MDC_IDC_EPISODE_DURATION: 313 S
MDC_IDC_EPISODE_DURATION: 34 S
MDC_IDC_EPISODE_DURATION: 341 S
MDC_IDC_EPISODE_DURATION: 371 S
MDC_IDC_EPISODE_DURATION: 39 S
MDC_IDC_EPISODE_DURATION: 40 S
MDC_IDC_EPISODE_DURATION: 41 S
MDC_IDC_EPISODE_DURATION: 42 S
MDC_IDC_EPISODE_DURATION: 43 S
MDC_IDC_EPISODE_DURATION: 4469 S
MDC_IDC_EPISODE_DURATION: 449 S
MDC_IDC_EPISODE_DURATION: 4879 S
MDC_IDC_EPISODE_DURATION: 520 S
MDC_IDC_EPISODE_DURATION: 521 S
MDC_IDC_EPISODE_DURATION: 529 S
MDC_IDC_EPISODE_DURATION: 544 S
MDC_IDC_EPISODE_DURATION: 576 S
MDC_IDC_EPISODE_DURATION: 615 S
MDC_IDC_EPISODE_DURATION: 62 S
MDC_IDC_EPISODE_DURATION: 748 S
MDC_IDC_EPISODE_DURATION: 76 S
MDC_IDC_EPISODE_DURATION: 84 S
MDC_IDC_EPISODE_DURATION: 845 S
MDC_IDC_EPISODE_ID: 383
MDC_IDC_EPISODE_ID: 384
MDC_IDC_EPISODE_ID: 385
MDC_IDC_EPISODE_ID: 386
MDC_IDC_EPISODE_ID: 387
MDC_IDC_EPISODE_ID: 388
MDC_IDC_EPISODE_ID: 389
MDC_IDC_EPISODE_ID: 390
MDC_IDC_EPISODE_ID: 391
MDC_IDC_EPISODE_ID: 392
MDC_IDC_EPISODE_ID: 393
MDC_IDC_EPISODE_ID: 394
MDC_IDC_EPISODE_ID: 395
MDC_IDC_EPISODE_ID: 396
MDC_IDC_EPISODE_ID: 397
MDC_IDC_EPISODE_ID: 398
MDC_IDC_EPISODE_ID: 399
MDC_IDC_EPISODE_ID: 400
MDC_IDC_EPISODE_ID: 401
MDC_IDC_EPISODE_ID: 402
MDC_IDC_EPISODE_ID: 403
MDC_IDC_EPISODE_ID: 404
MDC_IDC_EPISODE_ID: 405
MDC_IDC_EPISODE_ID: 406
MDC_IDC_EPISODE_ID: 407
MDC_IDC_EPISODE_ID: 408
MDC_IDC_EPISODE_ID: 409
MDC_IDC_EPISODE_ID: 410
MDC_IDC_EPISODE_ID: 411
MDC_IDC_EPISODE_ID: 412
MDC_IDC_EPISODE_ID: 413
MDC_IDC_EPISODE_ID: 414
MDC_IDC_EPISODE_ID: 415
MDC_IDC_EPISODE_ID: 416
MDC_IDC_EPISODE_ID: 417
MDC_IDC_EPISODE_ID: 418
MDC_IDC_EPISODE_ID: 419
MDC_IDC_EPISODE_ID: 420
MDC_IDC_EPISODE_ID: 421
MDC_IDC_EPISODE_ID: 422
MDC_IDC_EPISODE_ID: 423
MDC_IDC_EPISODE_ID: 424
MDC_IDC_EPISODE_ID: 425
MDC_IDC_EPISODE_ID: 426
MDC_IDC_EPISODE_ID: 427
MDC_IDC_EPISODE_ID: 428
MDC_IDC_EPISODE_ID: 429
MDC_IDC_EPISODE_ID: 430
MDC_IDC_EPISODE_ID: 431
MDC_IDC_EPISODE_ID: 432
MDC_IDC_EPISODE_TYPE: NORMAL
MDC_IDC_LEAD_IMPLANT_DT: NORMAL
MDC_IDC_LEAD_IMPLANT_DT: NORMAL
MDC_IDC_LEAD_LOCATION: NORMAL
MDC_IDC_LEAD_LOCATION: NORMAL
MDC_IDC_LEAD_LOCATION_DETAIL_1: NORMAL
MDC_IDC_LEAD_LOCATION_DETAIL_1: NORMAL
MDC_IDC_LEAD_MFG: NORMAL
MDC_IDC_LEAD_MFG: NORMAL
MDC_IDC_LEAD_MODEL: NORMAL
MDC_IDC_LEAD_MODEL: NORMAL
MDC_IDC_LEAD_POLARITY_TYPE: NORMAL
MDC_IDC_LEAD_POLARITY_TYPE: NORMAL
MDC_IDC_LEAD_SERIAL: NORMAL
MDC_IDC_LEAD_SERIAL: NORMAL
MDC_IDC_MSMT_BATTERY_DTM: NORMAL
MDC_IDC_MSMT_BATTERY_REMAINING_LONGEVITY: 76 MO
MDC_IDC_MSMT_BATTERY_RRT_TRIGGER: 2.83
MDC_IDC_MSMT_BATTERY_STATUS: NORMAL
MDC_IDC_MSMT_BATTERY_VOLTAGE: 3.01 V
MDC_IDC_MSMT_LEADCHNL_RA_IMPEDANCE_VALUE: 361 OHM
MDC_IDC_MSMT_LEADCHNL_RA_IMPEDANCE_VALUE: 418 OHM
MDC_IDC_MSMT_LEADCHNL_RA_PACING_THRESHOLD_AMPLITUDE: 0.5 V
MDC_IDC_MSMT_LEADCHNL_RA_PACING_THRESHOLD_PULSEWIDTH: 0.4 MS
MDC_IDC_MSMT_LEADCHNL_RA_SENSING_INTR_AMPL: 0.75 MV
MDC_IDC_MSMT_LEADCHNL_RA_SENSING_INTR_AMPL: 0.75 MV
MDC_IDC_MSMT_LEADCHNL_RV_IMPEDANCE_VALUE: 418 OHM
MDC_IDC_MSMT_LEADCHNL_RV_IMPEDANCE_VALUE: 532 OHM
MDC_IDC_MSMT_LEADCHNL_RV_PACING_THRESHOLD_AMPLITUDE: 0.5 V
MDC_IDC_MSMT_LEADCHNL_RV_PACING_THRESHOLD_PULSEWIDTH: 0.4 MS
MDC_IDC_MSMT_LEADCHNL_RV_SENSING_INTR_AMPL: 18.25 MV
MDC_IDC_MSMT_LEADCHNL_RV_SENSING_INTR_AMPL: 18.25 MV
MDC_IDC_PG_IMPLANT_DTM: NORMAL
MDC_IDC_PG_MFG: NORMAL
MDC_IDC_PG_MODEL: NORMAL
MDC_IDC_PG_SERIAL: NORMAL
MDC_IDC_PG_TYPE: NORMAL
MDC_IDC_SESS_CLINIC_NAME: NORMAL
MDC_IDC_SESS_DTM: NORMAL
MDC_IDC_SESS_TYPE: NORMAL
MDC_IDC_SET_BRADY_AT_MODE_SWITCH_RATE: 171 {BEATS}/MIN
MDC_IDC_SET_BRADY_HYSTRATE: NORMAL
MDC_IDC_SET_BRADY_LOWRATE: 55 {BEATS}/MIN
MDC_IDC_SET_BRADY_MAX_SENSOR_RATE: 130 {BEATS}/MIN
MDC_IDC_SET_BRADY_MAX_TRACKING_RATE: 130 {BEATS}/MIN
MDC_IDC_SET_BRADY_MODE: NORMAL
MDC_IDC_SET_BRADY_PAV_DELAY_LOW: 150 MS
MDC_IDC_SET_BRADY_SAV_DELAY_LOW: 150 MS
MDC_IDC_SET_LEADCHNL_RA_PACING_AMPLITUDE: 1.5 V
MDC_IDC_SET_LEADCHNL_RA_PACING_ANODE_ELECTRODE_1: NORMAL
MDC_IDC_SET_LEADCHNL_RA_PACING_ANODE_LOCATION_1: NORMAL
MDC_IDC_SET_LEADCHNL_RA_PACING_CAPTURE_MODE: NORMAL
MDC_IDC_SET_LEADCHNL_RA_PACING_CATHODE_ELECTRODE_1: NORMAL
MDC_IDC_SET_LEADCHNL_RA_PACING_CATHODE_LOCATION_1: NORMAL
MDC_IDC_SET_LEADCHNL_RA_PACING_POLARITY: NORMAL
MDC_IDC_SET_LEADCHNL_RA_PACING_PULSEWIDTH: 0.4 MS
MDC_IDC_SET_LEADCHNL_RA_SENSING_ANODE_ELECTRODE_1: NORMAL
MDC_IDC_SET_LEADCHNL_RA_SENSING_ANODE_LOCATION_1: NORMAL
MDC_IDC_SET_LEADCHNL_RA_SENSING_CATHODE_ELECTRODE_1: NORMAL
MDC_IDC_SET_LEADCHNL_RA_SENSING_CATHODE_LOCATION_1: NORMAL
MDC_IDC_SET_LEADCHNL_RA_SENSING_POLARITY: NORMAL
MDC_IDC_SET_LEADCHNL_RA_SENSING_SENSITIVITY: 0.3 MV
MDC_IDC_SET_LEADCHNL_RV_PACING_AMPLITUDE: 2 V
MDC_IDC_SET_LEADCHNL_RV_PACING_ANODE_ELECTRODE_1: NORMAL
MDC_IDC_SET_LEADCHNL_RV_PACING_ANODE_LOCATION_1: NORMAL
MDC_IDC_SET_LEADCHNL_RV_PACING_CAPTURE_MODE: NORMAL
MDC_IDC_SET_LEADCHNL_RV_PACING_CATHODE_ELECTRODE_1: NORMAL
MDC_IDC_SET_LEADCHNL_RV_PACING_CATHODE_LOCATION_1: NORMAL
MDC_IDC_SET_LEADCHNL_RV_PACING_POLARITY: NORMAL
MDC_IDC_SET_LEADCHNL_RV_PACING_PULSEWIDTH: 0.4 MS
MDC_IDC_SET_LEADCHNL_RV_SENSING_ANODE_ELECTRODE_1: NORMAL
MDC_IDC_SET_LEADCHNL_RV_SENSING_ANODE_LOCATION_1: NORMAL
MDC_IDC_SET_LEADCHNL_RV_SENSING_CATHODE_ELECTRODE_1: NORMAL
MDC_IDC_SET_LEADCHNL_RV_SENSING_CATHODE_LOCATION_1: NORMAL
MDC_IDC_SET_LEADCHNL_RV_SENSING_POLARITY: NORMAL
MDC_IDC_SET_LEADCHNL_RV_SENSING_SENSITIVITY: 0.9 MV
MDC_IDC_SET_ZONE_DETECTION_INTERVAL: 350 MS
MDC_IDC_SET_ZONE_DETECTION_INTERVAL: 400 MS
MDC_IDC_SET_ZONE_TYPE: NORMAL
MDC_IDC_STAT_AT_BURDEN_PERCENT: 1.2 %
MDC_IDC_STAT_AT_DTM_END: NORMAL
MDC_IDC_STAT_AT_DTM_START: NORMAL
MDC_IDC_STAT_BRADY_AP_VP_PERCENT: 66.93 %
MDC_IDC_STAT_BRADY_AP_VS_PERCENT: 0 %
MDC_IDC_STAT_BRADY_AS_VP_PERCENT: 33.07 %
MDC_IDC_STAT_BRADY_AS_VS_PERCENT: 0 %
MDC_IDC_STAT_BRADY_DTM_END: NORMAL
MDC_IDC_STAT_BRADY_DTM_START: NORMAL
MDC_IDC_STAT_BRADY_RA_PERCENT_PACED: 66.64 %
MDC_IDC_STAT_BRADY_RV_PERCENT_PACED: 99.99 %
MDC_IDC_STAT_EPISODE_RECENT_COUNT: 0
MDC_IDC_STAT_EPISODE_RECENT_COUNT: 123
MDC_IDC_STAT_EPISODE_RECENT_COUNT_DTM_END: NORMAL
MDC_IDC_STAT_EPISODE_RECENT_COUNT_DTM_START: NORMAL
MDC_IDC_STAT_EPISODE_TOTAL_COUNT: 0
MDC_IDC_STAT_EPISODE_TOTAL_COUNT: 1
MDC_IDC_STAT_EPISODE_TOTAL_COUNT: 2
MDC_IDC_STAT_EPISODE_TOTAL_COUNT: 429
MDC_IDC_STAT_EPISODE_TOTAL_COUNT_DTM_END: NORMAL
MDC_IDC_STAT_EPISODE_TOTAL_COUNT_DTM_START: NORMAL
MDC_IDC_STAT_EPISODE_TYPE: NORMAL

## 2021-04-07 ENCOUNTER — TELEPHONE (OUTPATIENT)
Dept: CARDIOLOGY | Facility: CLINIC | Age: 66
End: 2021-04-07

## 2021-04-07 NOTE — TELEPHONE ENCOUNTER
Pt called and LM that he has not been taking his Cartia (Diltiazem) and wondering if he should restart.  Attempted to call pt back and had to LM that according to Epic, pt is not on the Cartia and it looks that it was added to pt med list and then crossed off in 2016. Dom

## 2021-05-24 ENCOUNTER — ANCILLARY PROCEDURE (OUTPATIENT)
Dept: CARDIOLOGY | Facility: CLINIC | Age: 66
End: 2021-05-24
Attending: INTERNAL MEDICINE
Payer: MEDICARE

## 2021-05-24 DIAGNOSIS — Z95.0 CARDIAC PACEMAKER IN SITU: ICD-10-CM

## 2021-05-24 PROCEDURE — 93296 REM INTERROG EVL PM/IDS: CPT | Performed by: INTERNAL MEDICINE

## 2021-05-24 PROCEDURE — 93294 REM INTERROG EVL PM/LDLS PM: CPT | Performed by: INTERNAL MEDICINE

## 2021-05-27 LAB
MDC_IDC_EPISODE_DTM: NORMAL
MDC_IDC_EPISODE_DURATION: 103 S
MDC_IDC_EPISODE_DURATION: 106 S
MDC_IDC_EPISODE_DURATION: 107 S
MDC_IDC_EPISODE_DURATION: 108 S
MDC_IDC_EPISODE_DURATION: 114 S
MDC_IDC_EPISODE_DURATION: 118 S
MDC_IDC_EPISODE_DURATION: 125 S
MDC_IDC_EPISODE_DURATION: 126 S
MDC_IDC_EPISODE_DURATION: 127 S
MDC_IDC_EPISODE_DURATION: 128 S
MDC_IDC_EPISODE_DURATION: 130 S
MDC_IDC_EPISODE_DURATION: 140 S
MDC_IDC_EPISODE_DURATION: 141 S
MDC_IDC_EPISODE_DURATION: 148 S
MDC_IDC_EPISODE_DURATION: 149 S
MDC_IDC_EPISODE_DURATION: 153 S
MDC_IDC_EPISODE_DURATION: 1583 S
MDC_IDC_EPISODE_DURATION: 161 S
MDC_IDC_EPISODE_DURATION: 167 S
MDC_IDC_EPISODE_DURATION: 169 S
MDC_IDC_EPISODE_DURATION: 171 S
MDC_IDC_EPISODE_DURATION: 173 S
MDC_IDC_EPISODE_DURATION: 175 S
MDC_IDC_EPISODE_DURATION: 184 S
MDC_IDC_EPISODE_DURATION: 187 S
MDC_IDC_EPISODE_DURATION: 191 S
MDC_IDC_EPISODE_DURATION: 1940 S
MDC_IDC_EPISODE_DURATION: 198 S
MDC_IDC_EPISODE_DURATION: 198 S
MDC_IDC_EPISODE_DURATION: 217 S
MDC_IDC_EPISODE_DURATION: 228 S
MDC_IDC_EPISODE_DURATION: 233 S
MDC_IDC_EPISODE_DURATION: 237 S
MDC_IDC_EPISODE_DURATION: 237 S
MDC_IDC_EPISODE_DURATION: 258 S
MDC_IDC_EPISODE_DURATION: 261 S
MDC_IDC_EPISODE_DURATION: 280 S
MDC_IDC_EPISODE_DURATION: 3218 S
MDC_IDC_EPISODE_DURATION: 35 S
MDC_IDC_EPISODE_DURATION: 36 S
MDC_IDC_EPISODE_DURATION: 4749 S
MDC_IDC_EPISODE_DURATION: 481 S
MDC_IDC_EPISODE_DURATION: 50 S
MDC_IDC_EPISODE_DURATION: 55 S
MDC_IDC_EPISODE_DURATION: 56 S
MDC_IDC_EPISODE_DURATION: 63 S
MDC_IDC_EPISODE_DURATION: 65 S
MDC_IDC_EPISODE_DURATION: 77 S
MDC_IDC_EPISODE_DURATION: 78 S
MDC_IDC_EPISODE_DURATION: 97 S
MDC_IDC_EPISODE_ID: 491
MDC_IDC_EPISODE_ID: 492
MDC_IDC_EPISODE_ID: 493
MDC_IDC_EPISODE_ID: 494
MDC_IDC_EPISODE_ID: 495
MDC_IDC_EPISODE_ID: 496
MDC_IDC_EPISODE_ID: 497
MDC_IDC_EPISODE_ID: 498
MDC_IDC_EPISODE_ID: 499
MDC_IDC_EPISODE_ID: 500
MDC_IDC_EPISODE_ID: 501
MDC_IDC_EPISODE_ID: 502
MDC_IDC_EPISODE_ID: 503
MDC_IDC_EPISODE_ID: 504
MDC_IDC_EPISODE_ID: 505
MDC_IDC_EPISODE_ID: 506
MDC_IDC_EPISODE_ID: 507
MDC_IDC_EPISODE_ID: 508
MDC_IDC_EPISODE_ID: 509
MDC_IDC_EPISODE_ID: 510
MDC_IDC_EPISODE_ID: 511
MDC_IDC_EPISODE_ID: 512
MDC_IDC_EPISODE_ID: 513
MDC_IDC_EPISODE_ID: 514
MDC_IDC_EPISODE_ID: 515
MDC_IDC_EPISODE_ID: 516
MDC_IDC_EPISODE_ID: 517
MDC_IDC_EPISODE_ID: 518
MDC_IDC_EPISODE_ID: 519
MDC_IDC_EPISODE_ID: 520
MDC_IDC_EPISODE_ID: 521
MDC_IDC_EPISODE_ID: 522
MDC_IDC_EPISODE_ID: 523
MDC_IDC_EPISODE_ID: 524
MDC_IDC_EPISODE_ID: 525
MDC_IDC_EPISODE_ID: 526
MDC_IDC_EPISODE_ID: 527
MDC_IDC_EPISODE_ID: 528
MDC_IDC_EPISODE_ID: 529
MDC_IDC_EPISODE_ID: 530
MDC_IDC_EPISODE_ID: 531
MDC_IDC_EPISODE_ID: 532
MDC_IDC_EPISODE_ID: 533
MDC_IDC_EPISODE_ID: 534
MDC_IDC_EPISODE_ID: 535
MDC_IDC_EPISODE_ID: 536
MDC_IDC_EPISODE_ID: 537
MDC_IDC_EPISODE_ID: 538
MDC_IDC_EPISODE_ID: 539
MDC_IDC_EPISODE_ID: 540
MDC_IDC_EPISODE_ID: 541
MDC_IDC_EPISODE_TYPE: NORMAL
MDC_IDC_LEAD_IMPLANT_DT: NORMAL
MDC_IDC_LEAD_IMPLANT_DT: NORMAL
MDC_IDC_LEAD_LOCATION: NORMAL
MDC_IDC_LEAD_LOCATION: NORMAL
MDC_IDC_LEAD_LOCATION_DETAIL_1: NORMAL
MDC_IDC_LEAD_LOCATION_DETAIL_1: NORMAL
MDC_IDC_LEAD_MFG: NORMAL
MDC_IDC_LEAD_MFG: NORMAL
MDC_IDC_LEAD_MODEL: NORMAL
MDC_IDC_LEAD_MODEL: NORMAL
MDC_IDC_LEAD_POLARITY_TYPE: NORMAL
MDC_IDC_LEAD_POLARITY_TYPE: NORMAL
MDC_IDC_LEAD_SERIAL: NORMAL
MDC_IDC_LEAD_SERIAL: NORMAL
MDC_IDC_MSMT_BATTERY_DTM: NORMAL
MDC_IDC_MSMT_BATTERY_REMAINING_LONGEVITY: 67 MO
MDC_IDC_MSMT_BATTERY_RRT_TRIGGER: 2.83
MDC_IDC_MSMT_BATTERY_STATUS: NORMAL
MDC_IDC_MSMT_BATTERY_VOLTAGE: 3 V
MDC_IDC_MSMT_LEADCHNL_RA_IMPEDANCE_VALUE: 361 OHM
MDC_IDC_MSMT_LEADCHNL_RA_IMPEDANCE_VALUE: 418 OHM
MDC_IDC_MSMT_LEADCHNL_RA_PACING_THRESHOLD_AMPLITUDE: 0.5 V
MDC_IDC_MSMT_LEADCHNL_RA_PACING_THRESHOLD_PULSEWIDTH: 0.4 MS
MDC_IDC_MSMT_LEADCHNL_RA_SENSING_INTR_AMPL: 4.25 MV
MDC_IDC_MSMT_LEADCHNL_RA_SENSING_INTR_AMPL: 4.25 MV
MDC_IDC_MSMT_LEADCHNL_RV_IMPEDANCE_VALUE: 418 OHM
MDC_IDC_MSMT_LEADCHNL_RV_IMPEDANCE_VALUE: 551 OHM
MDC_IDC_MSMT_LEADCHNL_RV_PACING_THRESHOLD_AMPLITUDE: 0.62 V
MDC_IDC_MSMT_LEADCHNL_RV_PACING_THRESHOLD_PULSEWIDTH: 0.4 MS
MDC_IDC_MSMT_LEADCHNL_RV_SENSING_INTR_AMPL: 6.62 MV
MDC_IDC_MSMT_LEADCHNL_RV_SENSING_INTR_AMPL: 6.62 MV
MDC_IDC_PG_IMPLANT_DTM: NORMAL
MDC_IDC_PG_MFG: NORMAL
MDC_IDC_PG_MODEL: NORMAL
MDC_IDC_PG_SERIAL: NORMAL
MDC_IDC_PG_TYPE: NORMAL
MDC_IDC_SESS_CLINIC_NAME: NORMAL
MDC_IDC_SESS_DTM: NORMAL
MDC_IDC_SESS_TYPE: NORMAL
MDC_IDC_SET_BRADY_AT_MODE_SWITCH_RATE: 171 {BEATS}/MIN
MDC_IDC_SET_BRADY_HYSTRATE: NORMAL
MDC_IDC_SET_BRADY_LOWRATE: 55 {BEATS}/MIN
MDC_IDC_SET_BRADY_MAX_SENSOR_RATE: 130 {BEATS}/MIN
MDC_IDC_SET_BRADY_MAX_TRACKING_RATE: 130 {BEATS}/MIN
MDC_IDC_SET_BRADY_MODE: NORMAL
MDC_IDC_SET_BRADY_PAV_DELAY_LOW: 150 MS
MDC_IDC_SET_BRADY_SAV_DELAY_LOW: 150 MS
MDC_IDC_SET_LEADCHNL_RA_PACING_AMPLITUDE: 1.5 V
MDC_IDC_SET_LEADCHNL_RA_PACING_ANODE_ELECTRODE_1: NORMAL
MDC_IDC_SET_LEADCHNL_RA_PACING_ANODE_LOCATION_1: NORMAL
MDC_IDC_SET_LEADCHNL_RA_PACING_CAPTURE_MODE: NORMAL
MDC_IDC_SET_LEADCHNL_RA_PACING_CATHODE_ELECTRODE_1: NORMAL
MDC_IDC_SET_LEADCHNL_RA_PACING_CATHODE_LOCATION_1: NORMAL
MDC_IDC_SET_LEADCHNL_RA_PACING_POLARITY: NORMAL
MDC_IDC_SET_LEADCHNL_RA_PACING_PULSEWIDTH: 0.4 MS
MDC_IDC_SET_LEADCHNL_RA_SENSING_ANODE_ELECTRODE_1: NORMAL
MDC_IDC_SET_LEADCHNL_RA_SENSING_ANODE_LOCATION_1: NORMAL
MDC_IDC_SET_LEADCHNL_RA_SENSING_CATHODE_ELECTRODE_1: NORMAL
MDC_IDC_SET_LEADCHNL_RA_SENSING_CATHODE_LOCATION_1: NORMAL
MDC_IDC_SET_LEADCHNL_RA_SENSING_POLARITY: NORMAL
MDC_IDC_SET_LEADCHNL_RA_SENSING_SENSITIVITY: 0.3 MV
MDC_IDC_SET_LEADCHNL_RV_PACING_AMPLITUDE: 2 V
MDC_IDC_SET_LEADCHNL_RV_PACING_ANODE_ELECTRODE_1: NORMAL
MDC_IDC_SET_LEADCHNL_RV_PACING_ANODE_LOCATION_1: NORMAL
MDC_IDC_SET_LEADCHNL_RV_PACING_CAPTURE_MODE: NORMAL
MDC_IDC_SET_LEADCHNL_RV_PACING_CATHODE_ELECTRODE_1: NORMAL
MDC_IDC_SET_LEADCHNL_RV_PACING_CATHODE_LOCATION_1: NORMAL
MDC_IDC_SET_LEADCHNL_RV_PACING_POLARITY: NORMAL
MDC_IDC_SET_LEADCHNL_RV_PACING_PULSEWIDTH: 0.4 MS
MDC_IDC_SET_LEADCHNL_RV_SENSING_ANODE_ELECTRODE_1: NORMAL
MDC_IDC_SET_LEADCHNL_RV_SENSING_ANODE_LOCATION_1: NORMAL
MDC_IDC_SET_LEADCHNL_RV_SENSING_CATHODE_ELECTRODE_1: NORMAL
MDC_IDC_SET_LEADCHNL_RV_SENSING_CATHODE_LOCATION_1: NORMAL
MDC_IDC_SET_LEADCHNL_RV_SENSING_POLARITY: NORMAL
MDC_IDC_SET_LEADCHNL_RV_SENSING_SENSITIVITY: 0.9 MV
MDC_IDC_SET_ZONE_DETECTION_INTERVAL: 350 MS
MDC_IDC_SET_ZONE_DETECTION_INTERVAL: 400 MS
MDC_IDC_SET_ZONE_TYPE: NORMAL
MDC_IDC_STAT_AT_BURDEN_PERCENT: 1.2 %
MDC_IDC_STAT_AT_DTM_END: NORMAL
MDC_IDC_STAT_AT_DTM_START: NORMAL
MDC_IDC_STAT_BRADY_AP_VP_PERCENT: 53.5 %
MDC_IDC_STAT_BRADY_AP_VS_PERCENT: 0 %
MDC_IDC_STAT_BRADY_AS_VP_PERCENT: 46.49 %
MDC_IDC_STAT_BRADY_AS_VS_PERCENT: 0.01 %
MDC_IDC_STAT_BRADY_DTM_END: NORMAL
MDC_IDC_STAT_BRADY_DTM_START: NORMAL
MDC_IDC_STAT_BRADY_RA_PERCENT_PACED: 53.28 %
MDC_IDC_STAT_BRADY_RV_PERCENT_PACED: 99.98 %
MDC_IDC_STAT_EPISODE_RECENT_COUNT: 0
MDC_IDC_STAT_EPISODE_RECENT_COUNT: 109
MDC_IDC_STAT_EPISODE_RECENT_COUNT_DTM_END: NORMAL
MDC_IDC_STAT_EPISODE_RECENT_COUNT_DTM_START: NORMAL
MDC_IDC_STAT_EPISODE_TOTAL_COUNT: 0
MDC_IDC_STAT_EPISODE_TOTAL_COUNT: 1
MDC_IDC_STAT_EPISODE_TOTAL_COUNT: 2
MDC_IDC_STAT_EPISODE_TOTAL_COUNT: 538
MDC_IDC_STAT_EPISODE_TOTAL_COUNT_DTM_END: NORMAL
MDC_IDC_STAT_EPISODE_TOTAL_COUNT_DTM_START: NORMAL
MDC_IDC_STAT_EPISODE_TYPE: NORMAL

## 2021-08-30 ENCOUNTER — ANCILLARY PROCEDURE (OUTPATIENT)
Dept: CARDIOLOGY | Facility: CLINIC | Age: 66
End: 2021-08-30
Attending: INTERNAL MEDICINE
Payer: MEDICARE

## 2021-08-30 DIAGNOSIS — Z95.0 CARDIAC PACEMAKER IN SITU: ICD-10-CM

## 2021-08-30 PROCEDURE — 93296 REM INTERROG EVL PM/IDS: CPT | Performed by: INTERNAL MEDICINE

## 2021-08-30 PROCEDURE — 93294 REM INTERROG EVL PM/LDLS PM: CPT | Performed by: INTERNAL MEDICINE

## 2021-09-14 LAB
MDC_IDC_EPISODE_DTM: NORMAL
MDC_IDC_EPISODE_DURATION: 113 S
MDC_IDC_EPISODE_DURATION: 115 S
MDC_IDC_EPISODE_DURATION: 125 S
MDC_IDC_EPISODE_DURATION: 1445 S
MDC_IDC_EPISODE_DURATION: 146 S
MDC_IDC_EPISODE_DURATION: 155 S
MDC_IDC_EPISODE_DURATION: 157 S
MDC_IDC_EPISODE_DURATION: 161 S
MDC_IDC_EPISODE_DURATION: 180 S
MDC_IDC_EPISODE_DURATION: 183 S
MDC_IDC_EPISODE_DURATION: 186 S
MDC_IDC_EPISODE_DURATION: 196 S
MDC_IDC_EPISODE_DURATION: 203 S
MDC_IDC_EPISODE_DURATION: 214 S
MDC_IDC_EPISODE_DURATION: 218 S
MDC_IDC_EPISODE_DURATION: 236 S
MDC_IDC_EPISODE_DURATION: 238 S
MDC_IDC_EPISODE_DURATION: 245 S
MDC_IDC_EPISODE_DURATION: 253 S
MDC_IDC_EPISODE_DURATION: 257 S
MDC_IDC_EPISODE_DURATION: 267 S
MDC_IDC_EPISODE_DURATION: 279 S
MDC_IDC_EPISODE_DURATION: 285 S
MDC_IDC_EPISODE_DURATION: 308 S
MDC_IDC_EPISODE_DURATION: 312 S
MDC_IDC_EPISODE_DURATION: 331 S
MDC_IDC_EPISODE_DURATION: 334 S
MDC_IDC_EPISODE_DURATION: 36 S
MDC_IDC_EPISODE_DURATION: 37 S
MDC_IDC_EPISODE_DURATION: 394 S
MDC_IDC_EPISODE_DURATION: 41 S
MDC_IDC_EPISODE_DURATION: 412 S
MDC_IDC_EPISODE_DURATION: 42 S
MDC_IDC_EPISODE_DURATION: 42 S
MDC_IDC_EPISODE_DURATION: 441 S
MDC_IDC_EPISODE_DURATION: 448 S
MDC_IDC_EPISODE_DURATION: 46 S
MDC_IDC_EPISODE_DURATION: 50 S
MDC_IDC_EPISODE_DURATION: 51 S
MDC_IDC_EPISODE_DURATION: 541 S
MDC_IDC_EPISODE_DURATION: 573 S
MDC_IDC_EPISODE_DURATION: 574 S
MDC_IDC_EPISODE_DURATION: 615 S
MDC_IDC_EPISODE_DURATION: 6164 S
MDC_IDC_EPISODE_DURATION: 62 S
MDC_IDC_EPISODE_DURATION: 63 S
MDC_IDC_EPISODE_DURATION: 672 S
MDC_IDC_EPISODE_DURATION: 92 S
MDC_IDC_EPISODE_DURATION: 956 S
MDC_IDC_EPISODE_DURATION: 97 S
MDC_IDC_EPISODE_ID: 1200
MDC_IDC_EPISODE_ID: 1201
MDC_IDC_EPISODE_ID: 1202
MDC_IDC_EPISODE_ID: 1203
MDC_IDC_EPISODE_ID: 1204
MDC_IDC_EPISODE_ID: 1205
MDC_IDC_EPISODE_ID: 1206
MDC_IDC_EPISODE_ID: 1207
MDC_IDC_EPISODE_ID: 1208
MDC_IDC_EPISODE_ID: 1209
MDC_IDC_EPISODE_ID: 1210
MDC_IDC_EPISODE_ID: 1211
MDC_IDC_EPISODE_ID: 1212
MDC_IDC_EPISODE_ID: 1213
MDC_IDC_EPISODE_ID: 1214
MDC_IDC_EPISODE_ID: 1215
MDC_IDC_EPISODE_ID: 1216
MDC_IDC_EPISODE_ID: 1217
MDC_IDC_EPISODE_ID: 1218
MDC_IDC_EPISODE_ID: 1219
MDC_IDC_EPISODE_ID: 1220
MDC_IDC_EPISODE_ID: 1221
MDC_IDC_EPISODE_ID: 1222
MDC_IDC_EPISODE_ID: 1223
MDC_IDC_EPISODE_ID: 1224
MDC_IDC_EPISODE_ID: 1225
MDC_IDC_EPISODE_ID: 1226
MDC_IDC_EPISODE_ID: 1227
MDC_IDC_EPISODE_ID: 1228
MDC_IDC_EPISODE_ID: 1229
MDC_IDC_EPISODE_ID: 1230
MDC_IDC_EPISODE_ID: 1231
MDC_IDC_EPISODE_ID: 1232
MDC_IDC_EPISODE_ID: 1233
MDC_IDC_EPISODE_ID: 1234
MDC_IDC_EPISODE_ID: 1235
MDC_IDC_EPISODE_ID: 1236
MDC_IDC_EPISODE_ID: 1237
MDC_IDC_EPISODE_ID: 1238
MDC_IDC_EPISODE_ID: 1239
MDC_IDC_EPISODE_ID: 1240
MDC_IDC_EPISODE_ID: 1241
MDC_IDC_EPISODE_ID: 1242
MDC_IDC_EPISODE_ID: 1243
MDC_IDC_EPISODE_ID: 1244
MDC_IDC_EPISODE_ID: 1245
MDC_IDC_EPISODE_ID: 1246
MDC_IDC_EPISODE_ID: 1247
MDC_IDC_EPISODE_ID: 1248
MDC_IDC_EPISODE_ID: 1249
MDC_IDC_EPISODE_TYPE: NORMAL
MDC_IDC_LEAD_IMPLANT_DT: NORMAL
MDC_IDC_LEAD_IMPLANT_DT: NORMAL
MDC_IDC_LEAD_LOCATION: NORMAL
MDC_IDC_LEAD_LOCATION: NORMAL
MDC_IDC_LEAD_LOCATION_DETAIL_1: NORMAL
MDC_IDC_LEAD_LOCATION_DETAIL_1: NORMAL
MDC_IDC_LEAD_MFG: NORMAL
MDC_IDC_LEAD_MFG: NORMAL
MDC_IDC_LEAD_MODEL: NORMAL
MDC_IDC_LEAD_MODEL: NORMAL
MDC_IDC_LEAD_POLARITY_TYPE: NORMAL
MDC_IDC_LEAD_POLARITY_TYPE: NORMAL
MDC_IDC_LEAD_SERIAL: NORMAL
MDC_IDC_LEAD_SERIAL: NORMAL
MDC_IDC_MSMT_BATTERY_DTM: NORMAL
MDC_IDC_MSMT_BATTERY_REMAINING_LONGEVITY: 66 MO
MDC_IDC_MSMT_BATTERY_RRT_TRIGGER: 2.83
MDC_IDC_MSMT_BATTERY_STATUS: NORMAL
MDC_IDC_MSMT_BATTERY_VOLTAGE: 3 V
MDC_IDC_MSMT_LEADCHNL_RA_IMPEDANCE_VALUE: 361 OHM
MDC_IDC_MSMT_LEADCHNL_RA_IMPEDANCE_VALUE: 418 OHM
MDC_IDC_MSMT_LEADCHNL_RA_PACING_THRESHOLD_AMPLITUDE: 0.5 V
MDC_IDC_MSMT_LEADCHNL_RA_PACING_THRESHOLD_PULSEWIDTH: 0.4 MS
MDC_IDC_MSMT_LEADCHNL_RA_SENSING_INTR_AMPL: 1.62 MV
MDC_IDC_MSMT_LEADCHNL_RA_SENSING_INTR_AMPL: 1.62 MV
MDC_IDC_MSMT_LEADCHNL_RV_IMPEDANCE_VALUE: 456 OHM
MDC_IDC_MSMT_LEADCHNL_RV_IMPEDANCE_VALUE: 570 OHM
MDC_IDC_MSMT_LEADCHNL_RV_PACING_THRESHOLD_AMPLITUDE: 0.5 V
MDC_IDC_MSMT_LEADCHNL_RV_PACING_THRESHOLD_PULSEWIDTH: 0.4 MS
MDC_IDC_MSMT_LEADCHNL_RV_SENSING_INTR_AMPL: 15.5 MV
MDC_IDC_MSMT_LEADCHNL_RV_SENSING_INTR_AMPL: 15.5 MV
MDC_IDC_PG_IMPLANT_DTM: NORMAL
MDC_IDC_PG_MFG: NORMAL
MDC_IDC_PG_MODEL: NORMAL
MDC_IDC_PG_SERIAL: NORMAL
MDC_IDC_PG_TYPE: NORMAL
MDC_IDC_SESS_CLINIC_NAME: NORMAL
MDC_IDC_SESS_DTM: NORMAL
MDC_IDC_SESS_TYPE: NORMAL
MDC_IDC_SET_BRADY_AT_MODE_SWITCH_RATE: 171 {BEATS}/MIN
MDC_IDC_SET_BRADY_HYSTRATE: NORMAL
MDC_IDC_SET_BRADY_LOWRATE: 55 {BEATS}/MIN
MDC_IDC_SET_BRADY_MAX_SENSOR_RATE: 130 {BEATS}/MIN
MDC_IDC_SET_BRADY_MAX_TRACKING_RATE: 130 {BEATS}/MIN
MDC_IDC_SET_BRADY_MODE: NORMAL
MDC_IDC_SET_BRADY_PAV_DELAY_LOW: 150 MS
MDC_IDC_SET_BRADY_SAV_DELAY_LOW: 150 MS
MDC_IDC_SET_LEADCHNL_RA_PACING_AMPLITUDE: 1.5 V
MDC_IDC_SET_LEADCHNL_RA_PACING_ANODE_ELECTRODE_1: NORMAL
MDC_IDC_SET_LEADCHNL_RA_PACING_ANODE_LOCATION_1: NORMAL
MDC_IDC_SET_LEADCHNL_RA_PACING_CAPTURE_MODE: NORMAL
MDC_IDC_SET_LEADCHNL_RA_PACING_CATHODE_ELECTRODE_1: NORMAL
MDC_IDC_SET_LEADCHNL_RA_PACING_CATHODE_LOCATION_1: NORMAL
MDC_IDC_SET_LEADCHNL_RA_PACING_POLARITY: NORMAL
MDC_IDC_SET_LEADCHNL_RA_PACING_PULSEWIDTH: 0.4 MS
MDC_IDC_SET_LEADCHNL_RA_SENSING_ANODE_ELECTRODE_1: NORMAL
MDC_IDC_SET_LEADCHNL_RA_SENSING_ANODE_LOCATION_1: NORMAL
MDC_IDC_SET_LEADCHNL_RA_SENSING_CATHODE_ELECTRODE_1: NORMAL
MDC_IDC_SET_LEADCHNL_RA_SENSING_CATHODE_LOCATION_1: NORMAL
MDC_IDC_SET_LEADCHNL_RA_SENSING_POLARITY: NORMAL
MDC_IDC_SET_LEADCHNL_RA_SENSING_SENSITIVITY: 0.3 MV
MDC_IDC_SET_LEADCHNL_RV_PACING_AMPLITUDE: 2 V
MDC_IDC_SET_LEADCHNL_RV_PACING_ANODE_ELECTRODE_1: NORMAL
MDC_IDC_SET_LEADCHNL_RV_PACING_ANODE_LOCATION_1: NORMAL
MDC_IDC_SET_LEADCHNL_RV_PACING_CAPTURE_MODE: NORMAL
MDC_IDC_SET_LEADCHNL_RV_PACING_CATHODE_ELECTRODE_1: NORMAL
MDC_IDC_SET_LEADCHNL_RV_PACING_CATHODE_LOCATION_1: NORMAL
MDC_IDC_SET_LEADCHNL_RV_PACING_POLARITY: NORMAL
MDC_IDC_SET_LEADCHNL_RV_PACING_PULSEWIDTH: 0.4 MS
MDC_IDC_SET_LEADCHNL_RV_SENSING_ANODE_ELECTRODE_1: NORMAL
MDC_IDC_SET_LEADCHNL_RV_SENSING_ANODE_LOCATION_1: NORMAL
MDC_IDC_SET_LEADCHNL_RV_SENSING_CATHODE_ELECTRODE_1: NORMAL
MDC_IDC_SET_LEADCHNL_RV_SENSING_CATHODE_LOCATION_1: NORMAL
MDC_IDC_SET_LEADCHNL_RV_SENSING_POLARITY: NORMAL
MDC_IDC_SET_LEADCHNL_RV_SENSING_SENSITIVITY: 0.9 MV
MDC_IDC_SET_ZONE_DETECTION_INTERVAL: 350 MS
MDC_IDC_SET_ZONE_DETECTION_INTERVAL: 400 MS
MDC_IDC_SET_ZONE_TYPE: NORMAL
MDC_IDC_STAT_AT_BURDEN_PERCENT: 4.8 %
MDC_IDC_STAT_AT_DTM_END: NORMAL
MDC_IDC_STAT_AT_DTM_START: NORMAL
MDC_IDC_STAT_BRADY_AP_VP_PERCENT: 55.48 %
MDC_IDC_STAT_BRADY_AP_VS_PERCENT: 0.01 %
MDC_IDC_STAT_BRADY_AS_VP_PERCENT: 44.47 %
MDC_IDC_STAT_BRADY_AS_VS_PERCENT: 0.04 %
MDC_IDC_STAT_BRADY_DTM_END: NORMAL
MDC_IDC_STAT_BRADY_DTM_START: NORMAL
MDC_IDC_STAT_BRADY_RA_PERCENT_PACED: 54.48 %
MDC_IDC_STAT_BRADY_RV_PERCENT_PACED: 99.89 %
MDC_IDC_STAT_EPISODE_RECENT_COUNT: 0
MDC_IDC_STAT_EPISODE_RECENT_COUNT: 708
MDC_IDC_STAT_EPISODE_RECENT_COUNT_DTM_END: NORMAL
MDC_IDC_STAT_EPISODE_RECENT_COUNT_DTM_START: NORMAL
MDC_IDC_STAT_EPISODE_TOTAL_COUNT: 0
MDC_IDC_STAT_EPISODE_TOTAL_COUNT: 1
MDC_IDC_STAT_EPISODE_TOTAL_COUNT: 1246
MDC_IDC_STAT_EPISODE_TOTAL_COUNT: 2
MDC_IDC_STAT_EPISODE_TOTAL_COUNT_DTM_END: NORMAL
MDC_IDC_STAT_EPISODE_TOTAL_COUNT_DTM_START: NORMAL
MDC_IDC_STAT_EPISODE_TYPE: NORMAL

## 2021-11-18 NOTE — PROGRESS NOTES
"[facility]    I had the pleasure of seeing Arias when he came for annual follow-up.  This 66 year old sees Dr. Benavidez for his history of:    1. 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 Mild CM with most recent echo 11/2019 showing EF from 45% up to 50-55%  3. HTN  4. Paroxysmal AFib noted on device interrogations.On Eliquis for CHADSVASc 2 (HTN, age)       I saw Arias 10/2020 at which time he felt he was doing well, with just mild GARCIA. Interestingly, EKG showed  114 bpm and Dr. Benavidez felt this was tracking an AT. PVARP was adjusted and when we called him to check in, he was really doing well after his PPM adjustments.  Routine follow-up rec'd.    Interval History:  Stopped walking routinely d/t L heel tendonitis, which has curtailed this. When he was walking, no c/o CP, SOB.     Still with varicose veins but no issues with bleeding/aching. No change in mild LE edema. No orthopnea, PND. Notes weight going up b/c eating too much and too much alcohol.    No c/o dizziness, palpitations. No issues with PPM site.    Checks BPs routinely at home and they're typically 130s. Reviewed BPs at most recent MD appts and they've been high 130s/mid 140s.    No issues with bleeding. He's been taking Eliquis 5 mg but taking both tablets at once, NOT BID!    VITALS:  Vitals: BP (!) 147/82 (BP Location: Right arm, Patient Position: Sitting)   Pulse 76   Ht 1.803 m (5' 10.98\")   Wt 113.2 kg (249 lb 9.6 oz)   BMI 34.83 kg/m      Diagnostic Testing:  EKG today, which I overread, showed ASVP 60 bpm  Device interrogation today, showed 55% AP and 99%  in DDDR . Underlying was SR with CHB. 3.2% AFib since 10/2020, longest episode 8h.   Echocardiogam 11/2019 showed EF 50-55%. No RWMA. RV OK. LA mild-mod dilated 4.5 cm index 43.0 ml/m2. Trace MR. 1+ TR with RVSP 21+RAP. Aortic sclerosis. Mild 1+ AI.  Nuclear Stress Test 2/2018 showed no ischemia      Plan:  1. Take Eliquis as Rx'd 5 mg BID  2. No " EtOH  3. Stop metoprolol XL 25 mg daily  4. Start Coreg 6.25 mg BID  5. Call in 2 weeks with update  6. See me in 1 month    Assessment/Plan:    1. Atrial Arrhythmias, EF 50-55%    Has known pAFib and has been found to have an AT as well    Device interrogation as above    Remains on Eliquis for CHADSVASc 2 (HTN, age)    PLAN:    Take Eliquis as Rx'd 5 mg BID    Will switch low dose Metoprolol XL to carvedilol given pAFib and HTN    Routine device follow-up     2. HTN    Remains on lisinopril/HCTZ 20/25 daily and metoprolol Xl 25 mg daily    BPs too high at MD's appts over the course of 2021. Notes weight gain and lack of exercise have likely contributed    PLAN:    Stop Metoprolol XL 25 mg daily    Start Coreg 6.25 mg BID    Call me in 2 weeks to get update. Expect will have to increase Coreg dose    See me 1 month and bring list of BPs along with cuff    Limit EtOH    Maria Elena Khan PA-C, MSPAS      Orders Placed This Encounter   Procedures     Follow-Up with Cardiac Advanced Practice Provider     EKG 12-lead complete w/read - Clinics (performed today)     Orders Placed This Encounter   Medications     carvedilol (COREG) 6.25 MG tablet     Sig: Take 1 tablet (6.25 mg) by mouth 2 times daily (with meals)     Dispense:  60 tablet     Refill:  2     Replaces metoprolol XL     Medications Discontinued During This Encounter   Medication Reason     metoprolol succinate ER (TOPROL-XL) 25 MG 24 hr tablet          Encounter Diagnoses   Name Primary?     AV block, 2nd degree      Cardiac pacemaker in situ      Dyspnea on exertion Yes     Hypertension, unspecified type        CURRENT MEDICATIONS:  Current Outpatient Medications   Medication Sig Dispense Refill     apixaban ANTICOAGULANT (ELIQUIS ANTICOAGULANT) 5 MG tablet Take 1 tablet (5 mg) by mouth 2 times daily 180 tablet 3     carvedilol (COREG) 6.25 MG tablet Take 1 tablet (6.25 mg) by mouth 2 times daily (with meals) 60 tablet 2     lisinopril-hydrochlorothiazide  "(PRINZIDE,ZESTORETIC) 20-25 MG per tablet Take 1 tablet by mouth daily       Sertraline HCl (ZOLOFT PO) Take 100 mg by mouth daily       simvastatin (ZOCOR) 20 MG tablet Take 20 mg by mouth daily         ALLERGIES     Allergies   Allergen Reactions     Flexeril [Cyclobenzaprine] GI Disturbance     Naprosyn [Naproxen] GI Disturbance     Penicillins      As child     Ranitidine GI Disturbance         Review of Systems:  Skin:  Negative     Eyes:  Positive for cataracts  ENT:  Negative    Respiratory:  Positive for dyspnea on exertion  Cardiovascular:  Negative for;palpitations;chest pain;edema;fatigue;lightheadedness;dizziness Positive for;exercise intolerance  Gastroenterology: Negative for melena;hematochezia  Genitourinary:  Negative    Musculoskeletal:  Negative    Neurologic:  Negative    Psychiatric:  Positive for depression  Heme/Lymph/Imm:  Positive for allergies  Endocrine:  Negative      Physical Exam:  Vitals: BP (!) 147/82 (BP Location: Right arm, Patient Position: Sitting)   Pulse 76   Ht 1.803 m (5' 10.98\")   Wt 113.2 kg (249 lb 9.6 oz)   BMI 34.83 kg/m      Constitutional:  cooperative, alert and oriented, well developed, well nourished, in no acute distress        Skin:  warm and dry to the touch, no apparent skin lesions or masses noted        Head:  normocephalic, no masses or lesions        Eyes:  pupils equal and round;conjunctivae and lids unremarkable;sclera white        ENT:  no pallor or cyanosis, dentition good        Neck:  JVP normal;carotid pulses are full and equal bilaterally        Chest:  normal breath sounds, clear to auscultation, normal A-P diameter, normal symmetry, normal respiratory excursion, no use of accessory muscles        Cardiac: regular rhythm;no murmurs, gallops or rubs detected                  Abdomen:           Vascular: pulses full and equal                                      Extremities and Back:  no edema;no deformities, clubbing, cyanosis, erythema observed   "      Neurological:  no gross motor deficits            PAST MEDICAL HISTORY:  Past Medical History:   Diagnosis Date     CPAP (continuous positive airway pressure) dependence      Depression      Gastro-oesophageal reflux disease      Hepatic steatosis      Hoarseness      Hyperlipidemia      Hypertension      IFG (impaired fasting glucose)      LAFB (left anterior fascicular block)      PONV (postoperative nausea and vomiting)      RBBB      Seizures (H)     as child     Sleep apnea     cpap     Uncomplicated asthma     denies asthma       PAST SURGICAL HISTORY:  Past Surgical History:   Procedure Laterality Date     ANKLE SURGERY       APPENDECTOMY       BACK SURGERY      lami x2     ENT SURGERY      throat granulomas     HERNIA REPAIR       INJECT BOTOX N/A 10/30/2014    Procedure: INJECT BOTOX;  Surgeon: Kalyn Negron MD;  Location: [facility]     INJECT BOTOX N/A 12/8/2016    Procedure: INJECT BOTOX;  Surgeon: Kalyn Negron MD;  [facility]     INJECT STEROID (LOCATION) N/A 12/8/2016    Procedure: INJECT STEROID (LOCATION);  Surgeon: Kalyn Negron MD;  [facility]     LAMINECTOMY LUMBAR ONE LEVEL  3/5/2014    Procedure: LAMINECTOMY LUMBAR ONE LEVEL;  RIGHT L4-5 DISCECTOMY;  Surgeon: Rodrigo Ríos MD;  Location: [facility] OR     LASER CO2 LARYNGOSCOPY N/A 12/8/2016    Procedure: LASER CO2 LARYNGOSCOPY;  Surgeon: Kalyn Negron MD;  [facility]     LASER CO2 LARYNGOSCOPY, COMPLEX Left 10/30/2014    Procedure: LASER CO2 LARYNGOSCOPY, COMPLEX;  Surgeon: Kalyn Negron MD;  Location: [facility] OR     ORTHOPEDIC SURGERY         FAMILY HISTORY:  Family History   Problem Relation Age of Onset     Cancer Father        SOCIAL HISTORY:  Social History     Socioeconomic History     Marital status:      Spouse name: None     Number of children: None     Years of education: None     Highest education level: None   Occupational History     None   Tobacco Use      Smoking status: Former Smoker     Years: 10.00     Types: Pipe     Start date: 10/10/1976     Quit date: 1985     Years since quittin.5     Smokeless tobacco: Never Used   Substance and Sexual Activity     Alcohol use: Yes     Comment: daily- 2 drinks /day     Drug use: No     Sexual activity: Yes     Partners: Female     Birth control/protection: Other   Other Topics Concern     Parent/sibling w/ CABG, MI or angioplasty before 65F 55M? Not Asked   Social History Narrative     None     Social Determinants of Health     Financial Resource Strain: Not on file   Food Insecurity: Not on file   Transportation Needs: Not on file   Physical Activity: Not on file   Stress: Not on file   Social Connections: Not on file   Intimate Partner Violence: Not on file   Housing Stability: Not on file

## 2021-11-22 ENCOUNTER — OFFICE VISIT (OUTPATIENT)
Dept: CARDIOLOGY | Facility: CLINIC | Age: 66
End: 2021-11-22
Payer: MEDICARE

## 2021-11-22 ENCOUNTER — ANCILLARY PROCEDURE (OUTPATIENT)
Dept: CARDIOLOGY | Facility: CLINIC | Age: 66
End: 2021-11-22
Attending: PHYSICIAN ASSISTANT
Payer: MEDICARE

## 2021-11-22 VITALS
WEIGHT: 249.6 LBS | HEART RATE: 76 BPM | SYSTOLIC BLOOD PRESSURE: 147 MMHG | BODY MASS INDEX: 34.94 KG/M2 | HEIGHT: 71 IN | DIASTOLIC BLOOD PRESSURE: 82 MMHG

## 2021-11-22 DIAGNOSIS — I10 HYPERTENSION, UNSPECIFIED TYPE: ICD-10-CM

## 2021-11-22 DIAGNOSIS — Z95.0 CARDIAC PACEMAKER IN SITU: ICD-10-CM

## 2021-11-22 DIAGNOSIS — R06.09 DYSPNEA ON EXERTION: Primary | ICD-10-CM

## 2021-11-22 DIAGNOSIS — I44.1 AV BLOCK, 2ND DEGREE: ICD-10-CM

## 2021-11-22 PROCEDURE — 93280 PM DEVICE PROGR EVAL DUAL: CPT | Performed by: INTERNAL MEDICINE

## 2021-11-22 PROCEDURE — 93000 ELECTROCARDIOGRAM COMPLETE: CPT | Performed by: PHYSICIAN ASSISTANT

## 2021-11-22 PROCEDURE — 99214 OFFICE O/P EST MOD 30 MIN: CPT | Mod: 25 | Performed by: PHYSICIAN ASSISTANT

## 2021-11-22 RX ORDER — CARVEDILOL 6.25 MG/1
6.25 TABLET ORAL 2 TIMES DAILY WITH MEALS
Qty: 60 TABLET | Refills: 2 | Status: SHIPPED | OUTPATIENT
Start: 2021-11-22 | End: 2021-12-06

## 2021-11-22 ASSESSMENT — MIFFLIN-ST. JEOR: SCORE: 1928.99

## 2021-11-22 NOTE — LETTER
"11/22/2021    Leann G Hutchison, MD [facility] [place] [address] [place] [address]    RE: Arias Manzo       Dear Colleague,    I had the pleasure of seeing Arias Manzo in the [facility].    [facility]    I had the pleasure of seeing Arias when he came for annual follow-up.  This 66 year old sees Dr. Benavidez for his history of:    1. 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 Mild CM with most recent echo 11/2019 showing EF from 45% up to 50-55%  3. HTN  4. Paroxysmal AFib noted on device interrogations.On Eliquis for CHADSVASc 2 (HTN, age)       I saw Arias 10/2020 at which time he felt he was doing well, with just mild GARCIA. Interestingly, EKG showed  114 bpm and Dr. Benavidez felt this was tracking an AT. PVARP was adjusted and when we called him to check in, he was really doing well after his PPM adjustments.  Routine follow-up rec'd.    Interval History:  Stopped walking routinely d/t L heel tendonitis, which has curtailed this. When he was walking, no c/o CP, SOB.     Still with varicose veins but no issues with bleeding/aching. No change in mild LE edema. No orthopnea, PND. Notes weight going up b/c eating too much and too much alcohol.    No c/o dizziness, palpitations. No issues with PPM site.    Checks BPs routinely at home and they're typically 130s. Reviewed BPs at most recent MD appts and they've been high 130s/mid 140s.    No issues with bleeding. He's been taking Eliquis 5 mg but taking both tablets at once, NOT BID!    VITALS:  Vitals: BP (!) 147/82 (BP Location: Right arm, Patient Position: Sitting)   Pulse 76   Ht 1.803 m (5' 10.98\")   Wt 113.2 kg (249 lb 9.6 oz)   BMI 34.83 kg/m      Diagnostic Testing:  EKG today, which I overread, showed ASVP 60 bpm  Device interrogation today, showed 55% AP and 99%  in DDDR . Underlying was SR with CHB. 3.2% AFib since 10/2020, longest " episode 8h.   Echocardiogam 11/2019 showed EF 50-55%. No RWMA. RV OK. LA mild-mod dilated 4.5 cm index 43.0 ml/m2. Trace MR. 1+ TR with RVSP 21+RAP. Aortic sclerosis. Mild 1+ AI.  Nuclear Stress Test 2/2018 showed no ischemia      Plan:  1. Take Eliquis as Rx'd 5 mg BID  2. No EtOH  3. Stop metoprolol XL 25 mg daily  4. Start Coreg 6.25 mg BID  5. Call in 2 weeks with update  6. See me in 1 month    Assessment/Plan:    1. Atrial Arrhythmias, EF 50-55%    Has known pAFib and has been found to have an AT as well    Device interrogation as above    Remains on Eliquis for CHADSVASc 2 (HTN, age)    PLAN:    Take Eliquis as Rx'd 5 mg BID    Will switch low dose Metoprolol XL to carvedilol given pAFib and HTN    Routine device follow-up     2. HTN    Remains on lisinopril/HCTZ 20/25 daily and metoprolol Xl 25 mg daily    BPs too high at MD's appts over the course of 2021. Notes weight gain and lack of exercise have likely contributed    PLAN:    Stop Metoprolol XL 25 mg daily    Start Coreg 6.25 mg BID    Call me in 2 weeks to get update. Expect will have to increase Coreg dose    See me 1 month and bring list of BPs along with cuff    Limit EtOH    Maria Elena Khan PA-C, MSPAS      Orders Placed This Encounter   Procedures     Follow-Up with Cardiac Advanced Practice Provider     EKG 12-lead complete w/read - Clinics (performed today)     Orders Placed This Encounter   Medications     carvedilol (COREG) 6.25 MG tablet     Sig: Take 1 tablet (6.25 mg) by mouth 2 times daily (with meals)     Dispense:  60 tablet     Refill:  2     Replaces metoprolol XL     Medications Discontinued During This Encounter   Medication Reason     metoprolol succinate ER (TOPROL-XL) 25 MG 24 hr tablet          Encounter Diagnoses   Name Primary?     AV block, 2nd degree      Cardiac pacemaker in situ      Dyspnea on exertion Yes     Hypertension, unspecified type        CURRENT MEDICATIONS:  Current Outpatient Medications   Medication Sig Dispense  "Refill     apixaban ANTICOAGULANT (ELIQUIS ANTICOAGULANT) 5 MG tablet Take 1 tablet (5 mg) by mouth 2 times daily 180 tablet 3     carvedilol (COREG) 6.25 MG tablet Take 1 tablet (6.25 mg) by mouth 2 times daily (with meals) 60 tablet 2     lisinopril-hydrochlorothiazide (PRINZIDE,ZESTORETIC) 20-25 MG per tablet Take 1 tablet by mouth daily       Sertraline HCl (ZOLOFT PO) Take 100 mg by mouth daily       simvastatin (ZOCOR) 20 MG tablet Take 20 mg by mouth daily         ALLERGIES     Allergies   Allergen Reactions     Flexeril [Cyclobenzaprine] GI Disturbance     Naprosyn [Naproxen] GI Disturbance     Penicillins      As child     Ranitidine GI Disturbance         Review of Systems:  Skin:  Negative     Eyes:  Positive for cataracts  ENT:  Negative    Respiratory:  Positive for dyspnea on exertion  Cardiovascular:  Negative for;palpitations;chest pain;edema;fatigue;lightheadedness;dizziness Positive for;exercise intolerance  Gastroenterology: Negative for melena;hematochezia  Genitourinary:  Negative    Musculoskeletal:  Negative    Neurologic:  Negative    Psychiatric:  Positive for depression  Heme/Lymph/Imm:  Positive for allergies  Endocrine:  Negative      Physical Exam:  Vitals: BP (!) 147/82 (BP Location: Right arm, Patient Position: Sitting)   Pulse 76   Ht 1.803 m (5' 10.98\")   Wt 113.2 kg (249 lb 9.6 oz)   BMI 34.83 kg/m      Constitutional:  cooperative, alert and oriented, well developed, well nourished, in no acute distress        Skin:  warm and dry to the touch, no apparent skin lesions or masses noted        Head:  normocephalic, no masses or lesions        Eyes:  pupils equal and round;conjunctivae and lids unremarkable;sclera white        ENT:  no pallor or cyanosis, dentition good        Neck:  JVP normal;carotid pulses are full and equal bilaterally        Chest:  normal breath sounds, clear to auscultation, normal A-P diameter, normal symmetry, normal respiratory excursion, no use of " accessory muscles        Cardiac: regular rhythm;no murmurs, gallops or rubs detected                  Abdomen:           Vascular: pulses full and equal                                      Extremities and Back:  no edema;no deformities, clubbing, cyanosis, erythema observed        Neurological:  no gross motor deficits            PAST MEDICAL HISTORY:  Past Medical History:   Diagnosis Date     CPAP (continuous positive airway pressure) dependence      Depression      Gastro-oesophageal reflux disease      Hepatic steatosis      Hoarseness      Hyperlipidemia      Hypertension      IFG (impaired fasting glucose)      LAFB (left anterior fascicular block)      PONV (postoperative nausea and vomiting)      RBBB      Seizures (H)     as child     Sleep apnea     cpap     Uncomplicated asthma     denies asthma       PAST SURGICAL HISTORY:  Past Surgical History:   Procedure Laterality Date     ANKLE SURGERY       APPENDECTOMY       BACK SURGERY      lami x2     ENT SURGERY      throat granulomas     HERNIA REPAIR       INJECT BOTOX N/A 10/30/2014    Procedure: INJECT BOTOX;  Surgeon: Kalyn Negron MD;  Location: [facility]     INJECT BOTOX N/A 12/8/2016    Procedure: INJECT BOTOX;  Surgeon: Kalyn Negron MD;  [facility]     INJECT STEROID (LOCATION) N/A 12/8/2016    Procedure: INJECT STEROID (LOCATION);  Surgeon: Kalyn Negron MD;  [facility]     LAMINECTOMY LUMBAR ONE LEVEL  3/5/2014    Procedure: LAMINECTOMY LUMBAR ONE LEVEL;  RIGHT L4-5 DISCECTOMY;  Surgeon: Rodrigo Ríos MD;  Location: [facility]     LASER CO2 LARYNGOSCOPY N/A 12/8/2016    Procedure: LASER CO2 LARYNGOSCOPY;  Surgeon: Kalyn Negron MD;  [facility]     LASER CO2 LARYNGOSCOPY, COMPLEX Left 10/30/2014    Procedure: LASER CO2 LARYNGOSCOPY, COMPLEX;  Surgeon: Kalyn Negron MD;  Location: [facility] OR     ORTHOPEDIC SURGERY         FAMILY HISTORY:  Family History   Problem Relation Age of  Onset     Cancer Father        SOCIAL HISTORY:  Social History     Socioeconomic History     Marital status:      Spouse name: None     Number of children: None     Years of education: None     Highest education level: None   Occupational History     None   Tobacco Use     Smoking status: Former Smoker     Years: 10.00     Types: Pipe     Start date: 10/10/1976     Quit date: 1985     Years since quittin.5     Smokeless tobacco: Never Used   Substance and Sexual Activity     Alcohol use: Yes     Comment: daily- 2 drinks /day     Drug use: No     Sexual activity: Yes     Partners: Female     Birth control/protection: Other   Other Topics Concern     Parent/sibling w/ CABG, MI or angioplasty before 65F 55M? Not Asked   Social History Narrative     None     Social Determinants of Health     Financial Resource Strain: Not on file   Food Insecurity: Not on file   Transportation Needs: Not on file   Physical Activity: Not on file   Stress: Not on file   Social Connections: Not on file   Intimate Partner Violence: Not on file   Housing Stability: Not on file       Thank you for allowing me to participate in the care of your patient.    Sincerely,     Melani Khan PA-C   [facility]  cc: Melani Khan PA-C  [address]  [place] [address]

## 2021-11-22 NOTE — PATIENT INSTRUCTIONS
Arias - it was great to see you today!    1. I'm glad you're doing so well!  2. Reviewed less exercise due to heel tendonitis but when you've been active, no issues with chest pain      PLAN:  1. OK to hold Eliquis x 3 days before colonoscopy. Restart day after procedure unless they tell you differently    2. Have to take Eliquis 5 mg TWICE a day to get stroke protection  3. Stop metoprolol as not helping BP very much. START carvedilol 6.25 mg TWICE a day    4. Week of 12/6, call with update on BPs on this new med. May have to decrease or increase the carvedilol    5. See me back late Dec to check BPs again. Bring in the list of BPs and the cuff so we can check accuracy    6. Avoid alcohol - this will improve weight, save money, and should improve BP!    CALL if issues prior! [phone]

## 2021-11-24 LAB
MDC_IDC_EPISODE_DTM: NORMAL
MDC_IDC_EPISODE_DURATION: 105 S
MDC_IDC_EPISODE_DURATION: 107 S
MDC_IDC_EPISODE_DURATION: 108 S
MDC_IDC_EPISODE_DURATION: 114 S
MDC_IDC_EPISODE_DURATION: 114 S
MDC_IDC_EPISODE_DURATION: 115 S
MDC_IDC_EPISODE_DURATION: 123 S
MDC_IDC_EPISODE_DURATION: 124 S
MDC_IDC_EPISODE_DURATION: 127 S
MDC_IDC_EPISODE_DURATION: 129 S
MDC_IDC_EPISODE_DURATION: 130 S
MDC_IDC_EPISODE_DURATION: 141 S
MDC_IDC_EPISODE_DURATION: 144 S
MDC_IDC_EPISODE_DURATION: 144 S
MDC_IDC_EPISODE_DURATION: 146 S
MDC_IDC_EPISODE_DURATION: 158 S
MDC_IDC_EPISODE_DURATION: 162 S
MDC_IDC_EPISODE_DURATION: 186 S
MDC_IDC_EPISODE_DURATION: 186 S
MDC_IDC_EPISODE_DURATION: 188 S
MDC_IDC_EPISODE_DURATION: 191 S
MDC_IDC_EPISODE_DURATION: 192 S
MDC_IDC_EPISODE_DURATION: 196 S
MDC_IDC_EPISODE_DURATION: 204 S
MDC_IDC_EPISODE_DURATION: 212 S
MDC_IDC_EPISODE_DURATION: 212 S
MDC_IDC_EPISODE_DURATION: 224 S
MDC_IDC_EPISODE_DURATION: 254 S
MDC_IDC_EPISODE_DURATION: 261 S
MDC_IDC_EPISODE_DURATION: 268 S
MDC_IDC_EPISODE_DURATION: 299 S
MDC_IDC_EPISODE_DURATION: 312 S
MDC_IDC_EPISODE_DURATION: 329 S
MDC_IDC_EPISODE_DURATION: 333 S
MDC_IDC_EPISODE_DURATION: 335 S
MDC_IDC_EPISODE_DURATION: 36 S
MDC_IDC_EPISODE_DURATION: 381 S
MDC_IDC_EPISODE_DURATION: 40 S
MDC_IDC_EPISODE_DURATION: 440 S
MDC_IDC_EPISODE_DURATION: 467 S
MDC_IDC_EPISODE_DURATION: 49 S
MDC_IDC_EPISODE_DURATION: 524 S
MDC_IDC_EPISODE_DURATION: 56 S
MDC_IDC_EPISODE_DURATION: 57 S
MDC_IDC_EPISODE_DURATION: 58 S
MDC_IDC_EPISODE_DURATION: 671 S
MDC_IDC_EPISODE_DURATION: 76 S
MDC_IDC_EPISODE_DURATION: 79 S
MDC_IDC_EPISODE_DURATION: 90 S
MDC_IDC_EPISODE_DURATION: 98 S
MDC_IDC_EPISODE_ID: 2688
MDC_IDC_EPISODE_ID: 2689
MDC_IDC_EPISODE_ID: 2690
MDC_IDC_EPISODE_ID: 2691
MDC_IDC_EPISODE_ID: 2692
MDC_IDC_EPISODE_ID: 2693
MDC_IDC_EPISODE_ID: 2694
MDC_IDC_EPISODE_ID: 2695
MDC_IDC_EPISODE_ID: 2696
MDC_IDC_EPISODE_ID: 2697
MDC_IDC_EPISODE_ID: 2698
MDC_IDC_EPISODE_ID: 2699
MDC_IDC_EPISODE_ID: 2700
MDC_IDC_EPISODE_ID: 2701
MDC_IDC_EPISODE_ID: 2702
MDC_IDC_EPISODE_ID: 2703
MDC_IDC_EPISODE_ID: 2704
MDC_IDC_EPISODE_ID: 2705
MDC_IDC_EPISODE_ID: 2706
MDC_IDC_EPISODE_ID: 2707
MDC_IDC_EPISODE_ID: 2708
MDC_IDC_EPISODE_ID: 2709
MDC_IDC_EPISODE_ID: 2710
MDC_IDC_EPISODE_ID: 2711
MDC_IDC_EPISODE_ID: 2712
MDC_IDC_EPISODE_ID: 2713
MDC_IDC_EPISODE_ID: 2714
MDC_IDC_EPISODE_ID: 2715
MDC_IDC_EPISODE_ID: 2716
MDC_IDC_EPISODE_ID: 2717
MDC_IDC_EPISODE_ID: 2718
MDC_IDC_EPISODE_ID: 2719
MDC_IDC_EPISODE_ID: 2720
MDC_IDC_EPISODE_ID: 2721
MDC_IDC_EPISODE_ID: 2722
MDC_IDC_EPISODE_ID: 2723
MDC_IDC_EPISODE_ID: 2724
MDC_IDC_EPISODE_ID: 2725
MDC_IDC_EPISODE_ID: 2726
MDC_IDC_EPISODE_ID: 2727
MDC_IDC_EPISODE_ID: 2728
MDC_IDC_EPISODE_ID: 2729
MDC_IDC_EPISODE_ID: 2730
MDC_IDC_EPISODE_ID: 2731
MDC_IDC_EPISODE_ID: 2732
MDC_IDC_EPISODE_ID: 2733
MDC_IDC_EPISODE_ID: 2734
MDC_IDC_EPISODE_ID: 2735
MDC_IDC_EPISODE_ID: 2736
MDC_IDC_EPISODE_ID: 2737
MDC_IDC_EPISODE_TYPE: NORMAL
MDC_IDC_LEAD_IMPLANT_DT: NORMAL
MDC_IDC_LEAD_IMPLANT_DT: NORMAL
MDC_IDC_LEAD_LOCATION: NORMAL
MDC_IDC_LEAD_LOCATION: NORMAL
MDC_IDC_LEAD_LOCATION_DETAIL_1: NORMAL
MDC_IDC_LEAD_LOCATION_DETAIL_1: NORMAL
MDC_IDC_LEAD_MFG: NORMAL
MDC_IDC_LEAD_MFG: NORMAL
MDC_IDC_LEAD_MODEL: NORMAL
MDC_IDC_LEAD_MODEL: NORMAL
MDC_IDC_LEAD_POLARITY_TYPE: NORMAL
MDC_IDC_LEAD_POLARITY_TYPE: NORMAL
MDC_IDC_LEAD_SERIAL: NORMAL
MDC_IDC_LEAD_SERIAL: NORMAL
MDC_IDC_MSMT_BATTERY_DTM: NORMAL
MDC_IDC_MSMT_BATTERY_REMAINING_LONGEVITY: 62 MO
MDC_IDC_MSMT_BATTERY_RRT_TRIGGER: 2.83
MDC_IDC_MSMT_BATTERY_STATUS: NORMAL
MDC_IDC_MSMT_BATTERY_VOLTAGE: 3 V
MDC_IDC_MSMT_LEADCHNL_RA_IMPEDANCE_VALUE: 361 OHM
MDC_IDC_MSMT_LEADCHNL_RA_IMPEDANCE_VALUE: 418 OHM
MDC_IDC_MSMT_LEADCHNL_RA_PACING_THRESHOLD_AMPLITUDE: 0.5 V
MDC_IDC_MSMT_LEADCHNL_RA_PACING_THRESHOLD_PULSEWIDTH: 0.4 MS
MDC_IDC_MSMT_LEADCHNL_RA_SENSING_INTR_AMPL: 1.12 MV
MDC_IDC_MSMT_LEADCHNL_RA_SENSING_INTR_AMPL: 2.12 MV
MDC_IDC_MSMT_LEADCHNL_RV_IMPEDANCE_VALUE: 418 OHM
MDC_IDC_MSMT_LEADCHNL_RV_IMPEDANCE_VALUE: 551 OHM
MDC_IDC_MSMT_LEADCHNL_RV_PACING_THRESHOLD_AMPLITUDE: 0.5 V
MDC_IDC_MSMT_LEADCHNL_RV_PACING_THRESHOLD_PULSEWIDTH: 0.4 MS
MDC_IDC_MSMT_LEADCHNL_RV_SENSING_INTR_AMPL: 6.25 MV
MDC_IDC_MSMT_LEADCHNL_RV_SENSING_INTR_AMPL: 6.25 MV
MDC_IDC_PG_IMPLANT_DTM: NORMAL
MDC_IDC_PG_MFG: NORMAL
MDC_IDC_PG_MODEL: NORMAL
MDC_IDC_PG_SERIAL: NORMAL
MDC_IDC_PG_TYPE: NORMAL
MDC_IDC_SESS_CLINIC_NAME: NORMAL
MDC_IDC_SESS_DTM: NORMAL
MDC_IDC_SESS_TYPE: NORMAL
MDC_IDC_SET_BRADY_AT_MODE_SWITCH_RATE: 171 {BEATS}/MIN
MDC_IDC_SET_BRADY_HYSTRATE: NORMAL
MDC_IDC_SET_BRADY_LOWRATE: 55 {BEATS}/MIN
MDC_IDC_SET_BRADY_MAX_SENSOR_RATE: 130 {BEATS}/MIN
MDC_IDC_SET_BRADY_MAX_TRACKING_RATE: 130 {BEATS}/MIN
MDC_IDC_SET_BRADY_MODE: NORMAL
MDC_IDC_SET_BRADY_PAV_DELAY_LOW: 150 MS
MDC_IDC_SET_BRADY_SAV_DELAY_LOW: 150 MS
MDC_IDC_SET_LEADCHNL_RA_PACING_AMPLITUDE: 1.5 V
MDC_IDC_SET_LEADCHNL_RA_PACING_ANODE_ELECTRODE_1: NORMAL
MDC_IDC_SET_LEADCHNL_RA_PACING_ANODE_LOCATION_1: NORMAL
MDC_IDC_SET_LEADCHNL_RA_PACING_CAPTURE_MODE: NORMAL
MDC_IDC_SET_LEADCHNL_RA_PACING_CATHODE_ELECTRODE_1: NORMAL
MDC_IDC_SET_LEADCHNL_RA_PACING_CATHODE_LOCATION_1: NORMAL
MDC_IDC_SET_LEADCHNL_RA_PACING_POLARITY: NORMAL
MDC_IDC_SET_LEADCHNL_RA_PACING_PULSEWIDTH: 0.4 MS
MDC_IDC_SET_LEADCHNL_RA_SENSING_ANODE_ELECTRODE_1: NORMAL
MDC_IDC_SET_LEADCHNL_RA_SENSING_ANODE_LOCATION_1: NORMAL
MDC_IDC_SET_LEADCHNL_RA_SENSING_CATHODE_ELECTRODE_1: NORMAL
MDC_IDC_SET_LEADCHNL_RA_SENSING_CATHODE_LOCATION_1: NORMAL
MDC_IDC_SET_LEADCHNL_RA_SENSING_POLARITY: NORMAL
MDC_IDC_SET_LEADCHNL_RA_SENSING_SENSITIVITY: 0.3 MV
MDC_IDC_SET_LEADCHNL_RV_PACING_AMPLITUDE: 2 V
MDC_IDC_SET_LEADCHNL_RV_PACING_ANODE_ELECTRODE_1: NORMAL
MDC_IDC_SET_LEADCHNL_RV_PACING_ANODE_LOCATION_1: NORMAL
MDC_IDC_SET_LEADCHNL_RV_PACING_CAPTURE_MODE: NORMAL
MDC_IDC_SET_LEADCHNL_RV_PACING_CATHODE_ELECTRODE_1: NORMAL
MDC_IDC_SET_LEADCHNL_RV_PACING_CATHODE_LOCATION_1: NORMAL
MDC_IDC_SET_LEADCHNL_RV_PACING_POLARITY: NORMAL
MDC_IDC_SET_LEADCHNL_RV_PACING_PULSEWIDTH: 0.4 MS
MDC_IDC_SET_LEADCHNL_RV_SENSING_ANODE_ELECTRODE_1: NORMAL
MDC_IDC_SET_LEADCHNL_RV_SENSING_ANODE_LOCATION_1: NORMAL
MDC_IDC_SET_LEADCHNL_RV_SENSING_CATHODE_ELECTRODE_1: NORMAL
MDC_IDC_SET_LEADCHNL_RV_SENSING_CATHODE_LOCATION_1: NORMAL
MDC_IDC_SET_LEADCHNL_RV_SENSING_POLARITY: NORMAL
MDC_IDC_SET_LEADCHNL_RV_SENSING_SENSITIVITY: 0.9 MV
MDC_IDC_SET_ZONE_DETECTION_INTERVAL: 350 MS
MDC_IDC_SET_ZONE_DETECTION_INTERVAL: 400 MS
MDC_IDC_SET_ZONE_TYPE: NORMAL
MDC_IDC_STAT_AT_BURDEN_PERCENT: 3.2 %
MDC_IDC_STAT_AT_DTM_END: NORMAL
MDC_IDC_STAT_AT_DTM_START: NORMAL
MDC_IDC_STAT_BRADY_AP_VP_PERCENT: 56.3 %
MDC_IDC_STAT_BRADY_AP_VS_PERCENT: 0 %
MDC_IDC_STAT_BRADY_AS_VP_PERCENT: 43.69 %
MDC_IDC_STAT_BRADY_AS_VS_PERCENT: 0.01 %
MDC_IDC_STAT_BRADY_DTM_END: NORMAL
MDC_IDC_STAT_BRADY_DTM_START: NORMAL
MDC_IDC_STAT_BRADY_RA_PERCENT_PACED: 55.62 %
MDC_IDC_STAT_BRADY_RV_PERCENT_PACED: 99.96 %
MDC_IDC_STAT_EPISODE_RECENT_COUNT: 0
MDC_IDC_STAT_EPISODE_RECENT_COUNT: 2428
MDC_IDC_STAT_EPISODE_RECENT_COUNT_DTM_END: NORMAL
MDC_IDC_STAT_EPISODE_RECENT_COUNT_DTM_START: NORMAL
MDC_IDC_STAT_EPISODE_TOTAL_COUNT: 0
MDC_IDC_STAT_EPISODE_TOTAL_COUNT: 1
MDC_IDC_STAT_EPISODE_TOTAL_COUNT: 2
MDC_IDC_STAT_EPISODE_TOTAL_COUNT: 2734
MDC_IDC_STAT_EPISODE_TOTAL_COUNT_DTM_END: NORMAL
MDC_IDC_STAT_EPISODE_TOTAL_COUNT_DTM_START: NORMAL
MDC_IDC_STAT_EPISODE_TYPE: NORMAL

## 2021-12-06 ENCOUNTER — TELEPHONE (OUTPATIENT)
Dept: CARDIOLOGY | Facility: CLINIC | Age: 66
End: 2021-12-06
Payer: MEDICARE

## 2021-12-06 DIAGNOSIS — I44.1 AV BLOCK, 2ND DEGREE: ICD-10-CM

## 2021-12-06 DIAGNOSIS — R06.09 DYSPNEA ON EXERTION: ICD-10-CM

## 2021-12-06 DIAGNOSIS — Z95.0 CARDIAC PACEMAKER IN SITU: ICD-10-CM

## 2021-12-06 RX ORDER — CARVEDILOL 6.25 MG/1
6.25 TABLET ORAL 2 TIMES DAILY WITH MEALS
Qty: 180 TABLET | Refills: 2 | Status: SHIPPED | OUTPATIENT
Start: 2021-12-06 | End: 2021-12-28

## 2021-12-06 NOTE — TELEPHONE ENCOUNTER
Overall BPs looks really good! No changes needed; 90 day Rx sent (#180/3 refills) to [facility] in [place].    Recommend to keep EtOH to 0-2 drinks/day    See me 12/28 as planned - bring BP cuff in to ensure it's accurate    Magno Arredondo

## 2021-12-06 NOTE — TELEPHONE ENCOUNTER
Pt LM that he was calling with an update after med change with Maria Elean and to provide BP's. Attempted to contact pt. LM for pt to call back. Dom

## 2021-12-06 NOTE — TELEPHONE ENCOUNTER
Pt BP's as requested by Maria Elena Khan PA-C after changing to Carvedilol 6.25 mg bid on 11/22. Pt had to wait for pharmacy to fill the prescription, thus the delayed start date:    11/28: 131/80  121/73  11/29: 121/91  117/82  11/30: 122/81  123/76  12/1:   122/76  123/76  12/4:  114/79  12/5:  119/80  12/6:  144/8/ 1/2 later 145/85 (Pt states that he had a lot of ETOH the night before)     Pt states that if he is to continue on the current dose would like refill sent for 90 days. Message to Maria Elena to review. [email or web address]

## 2021-12-07 ENCOUNTER — TELEPHONE (OUTPATIENT)
Dept: CARDIOLOGY | Facility: CLINIC | Age: 66
End: 2021-12-07
Payer: MEDICARE

## 2021-12-07 DIAGNOSIS — I48.0 PAROXYSMAL ATRIAL FIBRILLATION (H): ICD-10-CM

## 2021-12-07 NOTE — TELEPHONE ENCOUNTER
Message left for patient to review recommendations per CHAI Saunders.   Awaiting return call.  CUCO Kelly

## 2021-12-08 NOTE — TELEPHONE ENCOUNTER
Pt called back and made aware that Maria Elena said that overall his BP's looked really good. No changes to medications and refill that was requested was sent.  Pt told that he was to keep his ETOH drinks to 0-2 per day.  Pt will see Maria Elena on 1/28 at 1515 and then will leave for [place] after this. Dom.

## 2021-12-25 NOTE — PROGRESS NOTES
"[facility]    I had the pleasure of seeing Arias when he came for follow up of HTN.  This 66 year old sees Dr. Benavidez for his history of:    1. 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 Mild CM with most recent echo 11/2019 showing EF from 45% up to 50-55%  3. HTN  4. Paroxysmal AFib noted on device interrogations.On Eliquis for CHADSVASc 2 (HTN, age)       I saw Arias just last month 11/22 at which time we noted BPs were high in the 140s. He'd not been able to walk as much d/t L heel tendonitis. Weight was up d/t this and increased caloric intake.  I switched metoprolol XL 25 mg daily to Coreg 6.25 mg BID and reminded him that Eliquis was to be taken BID, not both tabs at once.    Interval History:  Since switching to Coreg 6.25 mg BID, he has really noted improved BPs, ranging 100s-130s after meds, with majority in 120s. No dizziness or lightheadedness though notes he does feel he \"naps more\" with this medication.    Nothing else has changed since our visit 11/22 - no CP, SOB. No prolonged palpitations. No dizziness, lightheadedness. Fell multiple times ~2 weeks ago while trying to get something out of the garage/shed b/c kept slipping on ice. He may have hit his head but \"nothing bad.\" He feels he injured his shoulder and ribs. The wait was too long at his PCP's office so ended up leaving without being seen. Since then, sxs of shoulder and rib pain continue to improve. Confirms increasing naps did not just occur post fall. No vision changes, headache. Feels he's mentating clearly.     VITALS:  Vitals: /78 (BP Location: Left arm, Cuff Size: Adult Large)   Pulse 84   Ht 1.803 m (5' 11\")   Wt 114.1 kg (251 lb 8 oz)   BMI 35.08 kg/m      Diagnostic Testing:  EKG 11/22/2021, showed ASVP 60 bpm  Device interrogation today, showed 55% AP and 99%  in DDDR . Underlying was SR with CHB. 3.2% AFib since 10/2020, longest episode 8h.   Echocardiogam 11/2019 showed EF 50-55%. No " RWMA. RV OK. LA mild-mod dilated 4.5 cm index 43.0 ml/m2. Trace MR. 1+ TR with RVSP 21+RAP. Aortic sclerosis. Mild 1+ AI.  Nuclear Stress Test 2/2018 showed no ischemia        Plan:  6 m follow-up     Assessment/Plan:    1. HTN    Since switching Metoprolol XL 25 to Coreg 6.25 mg BID, BP has really improved. May be napping more with this but overall tolerating really well    Remains on lisinopril/HCTZ 20/25 as well    PLAN:    No change needed    See us back in 6 m and to bring BP cuff along to check accuracy      2. Atrial Quentin    Has known pAFib as well as AT    Last device interrogation as above    Remains on Eliquis 5 mg BID for CHADSVASc 2 (HTN, age)    PLAN:    Continue routine device interrogations    No evidence of head trauma/neuro deficits on limited neuro exam after his multiple falls slipping on ice        Maria Elena Khan PA-C, MSPAS      Orders Placed This Encounter   Procedures     Follow-Up with Cardiac Advanced Practice Provider     Orders Placed This Encounter   Medications     carvedilol (COREG) 6.25 MG tablet     Sig: Take 1 tablet (6.25 mg) by mouth 2 times daily (with meals)     Dispense:  180 tablet     Refill:  3     Keep on file until pt needs (just had this filled 11/2021)     Medications Discontinued During This Encounter   Medication Reason     carvedilol (COREG) 6.25 MG tablet          Encounter Diagnoses   Name Primary?     AV block, 2nd degree      Cardiac pacemaker in situ      Dyspnea on exertion        CURRENT MEDICATIONS:  Current Outpatient Medications   Medication Sig Dispense Refill     apixaban ANTICOAGULANT (ELIQUIS ANTICOAGULANT) 5 MG tablet Take 1 tablet (5 mg) by mouth 2 times daily 180 tablet 3     carvedilol (COREG) 6.25 MG tablet Take 1 tablet (6.25 mg) by mouth 2 times daily (with meals) 180 tablet 3     lisinopril-hydrochlorothiazide (PRINZIDE,ZESTORETIC) 20-25 MG per tablet Take 1 tablet by mouth daily       Sertraline HCl (ZOLOFT PO) Take 100 mg by mouth daily    "    simvastatin (ZOCOR) 20 MG tablet Take 20 mg by mouth daily         ALLERGIES     Allergies   Allergen Reactions     Flexeril [Cyclobenzaprine] GI Disturbance     Naprosyn [Naproxen] GI Disturbance     Penicillins      As child     Ranitidine GI Disturbance         Review of Systems:  Skin:  Negative     Eyes:  Positive for cataracts  ENT:  Negative    Respiratory:  Positive for dyspnea on exertion  Cardiovascular:  Negative for;palpitations;chest pain;edema;fatigue;lightheadedness;dizziness Positive for;exercise intolerance  Gastroenterology: Negative for melena;hematochezia  Genitourinary:  Negative    Musculoskeletal:  Negative    Neurologic:  Negative    Psychiatric:  Positive for depression  Heme/Lymph/Imm:  Positive for allergies  Endocrine:  Negative      Physical Exam:  Vitals: /78 (BP Location: Left arm, Cuff Size: Adult Large)   Pulse 84   Ht 1.803 m (5' 11\")   Wt 114.1 kg (251 lb 8 oz)   BMI 35.08 kg/m      Constitutional:  cooperative, alert and oriented, well developed, well nourished, in no acute distress        Skin:  not assessed this visit        Head:  not assessed this visit        Eyes:  not assessed this visit        ENT:  not assessed this visit        Neck:  not assessed this visit        Chest:  normal breath sounds, clear to auscultation, normal A-P diameter, normal symmetry, normal respiratory excursion, no use of accessory muscles;sternum intact to deep palpation and cough        Cardiac: regular rhythm;no murmurs, gallops or rubs detected                  Abdomen:  not assessed this visit        Vascular: not assessed this visit                                      Extremities and Back:  not assessed this visit        Neurological:  no gross motor deficits            PAST MEDICAL HISTORY:  Past Medical History:   Diagnosis Date     CPAP (continuous positive airway pressure) dependence      Depression      Gastro-oesophageal reflux disease      Hepatic steatosis      " Hoarseness      Hyperlipidemia      Hypertension      IFG (impaired fasting glucose)      LAFB (left anterior fascicular block)      PONV (postoperative nausea and vomiting)      RBBB      Seizures (H)     as child     Sleep apnea     cpap     Uncomplicated asthma     denies asthma       PAST SURGICAL HISTORY:  Past Surgical History:   Procedure Laterality Date     ANKLE SURGERY       APPENDECTOMY       BACK SURGERY      lami x2     ENT SURGERY      throat granulomas     HERNIA REPAIR       INJECT BOTOX N/A 10/30/2014    Procedure: INJECT BOTOX;  Surgeon: Kalyn Negron MD;  [facility]: [facility]     INJECT BOTOX N/A 2016    Procedure: INJECT BOTOX;  Surgeon: Kalyn Negron MD;  [facility]     INJECT STEROID (LOCATION) N/A 2016    Procedure: INJECT STEROID (LOCATION);  Surgeon: Klayn Negron MD;  [facility]     LAMINECTOMY LUMBAR ONE LEVEL  3/5/2014    Procedure: LAMINECTOMY LUMBAR ONE LEVEL;  RIGHT L4-5 DISCECTOMY;  Surgeon: Rodrigo Ríos MD;  Location: [facility]     LASER CO2 LARYNGOSCOPY N/A 2016    Procedure: LASER CO2 LARYNGOSCOPY;  Surgeon: Kalyn Negron MD;  [facility]     LASER CO2 LARYNGOSCOPY, COMPLEX Left 10/30/2014    Procedure: LASER CO2 LARYNGOSCOPY, COMPLEX;  Surgeon: Kalyn Negron MD;  Location: [facility] OR     ORTHOPEDIC SURGERY         FAMILY HISTORY:  Family History   Problem Relation Age of Onset     Cancer Father        SOCIAL HISTORY:  Social History     Socioeconomic History     Marital status:      Spouse name: None     Number of children: None     Years of education: None     Highest education level: None   Occupational History     None   Tobacco Use     Smoking status: Former Smoker     Years: 10.00     Types: Pipe     Start date: 10/10/1976     Quit date: 1985     Years since quittin.6     Smokeless tobacco: Never Used   Substance and Sexual Activity     Alcohol use: Yes     Comment: daily- 2  drinks /day     Drug use: No     Sexual activity: Yes     Partners: Female     Birth control/protection: Other   Other Topics Concern     Parent/sibling w/ CABG, MI or angioplasty before 65F 55M? Not Asked   Social History Narrative     None     Social Determinants of Health     Financial Resource Strain: Not on file   Food Insecurity: Not on file   Transportation Needs: Not on file   Physical Activity: Not on file   Stress: Not on file   Social Connections: Not on file   Intimate Partner Violence: Not on file   Housing Stability: Not on file

## 2021-12-28 ENCOUNTER — OFFICE VISIT (OUTPATIENT)
Dept: CARDIOLOGY | Facility: CLINIC | Age: 66
End: 2021-12-28
Attending: PHYSICIAN ASSISTANT
Payer: MEDICARE

## 2021-12-28 VITALS
SYSTOLIC BLOOD PRESSURE: 131 MMHG | BODY MASS INDEX: 35.21 KG/M2 | HEIGHT: 71 IN | DIASTOLIC BLOOD PRESSURE: 78 MMHG | WEIGHT: 251.5 LBS | HEART RATE: 84 BPM

## 2021-12-28 DIAGNOSIS — R06.09 DYSPNEA ON EXERTION: ICD-10-CM

## 2021-12-28 DIAGNOSIS — Z95.0 CARDIAC PACEMAKER IN SITU: ICD-10-CM

## 2021-12-28 DIAGNOSIS — I44.1 AV BLOCK, 2ND DEGREE: ICD-10-CM

## 2021-12-28 PROCEDURE — 99212 OFFICE O/P EST SF 10 MIN: CPT | Performed by: PHYSICIAN ASSISTANT

## 2021-12-28 RX ORDER — CARVEDILOL 6.25 MG/1
6.25 TABLET ORAL 2 TIMES DAILY WITH MEALS
Qty: 180 TABLET | Refills: 3 | Status: SHIPPED | OUTPATIENT
Start: 2021-12-28 | End: 2022-11-21

## 2021-12-28 ASSESSMENT — MIFFLIN-ST. JEOR: SCORE: 1937.93

## 2021-12-28 NOTE — LETTER
"12/28/2021    Leann G Hutchison, MD [facility] [place] [address] [place] [address]    RE: Arias Manzo       Dear Colleague,     I had the pleasure of seeing Arias Manzo in the [facility].  [facility]    I had the pleasure of seeing Arias when he came for follow up of HTN.  This 66 year old sees Dr. Benavidez for his history of:    1. 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 Mild CM with most recent echo 11/2019 showing EF from 45% up to 50-55%  3. HTN  4. Paroxysmal AFib noted on device interrogations.On Eliquis for CHADSVASc 2 (HTN, age)       I saw Arias just last month 11/22 at which time we noted BPs were high in the 140s. He'd not been able to walk as much d/t L heel tendonitis. Weight was up d/t this and increased caloric intake.  I switched metoprolol XL 25 mg daily to Coreg 6.25 mg BID and reminded him that Eliquis was to be taken BID, not both tabs at once.    Interval History:  Since switching to Coreg 6.25 mg BID, he has really noted improved BPs, ranging 100s-130s after meds, with majority in 120s. No dizziness or lightheadedness though notes he does feel he \"naps more\" with this medication.    Nothing else has changed since our visit 11/22 - no CP, SOB. No prolonged palpitations. No dizziness, lightheadedness. Fell multiple times ~2 weeks ago while trying to get something out of the garage/shed b/c kept slipping on ice. He may have hit his head but \"nothing bad.\" He feels he injured his shoulder and ribs. The wait was too long at his PCP's office so ended up leaving without being seen. Since then, sxs of shoulder and rib pain continue to improve. Confirms increasing naps did not just occur post fall. No vision changes, headache. Feels he's mentating clearly.     VITALS:  Vitals: /78 (BP Location: Left arm, Cuff Size: Adult Large)   Pulse 84   Ht 1.803 m (5' 11\")   Wt 114.1 kg (251 lb 8 oz)   BMI 35.08 kg/m  "     Diagnostic Testing:  EKG 11/22/2021, showed ASVP 60 bpm  Device interrogation today, showed 55% AP and 99%  in DDDR . Underlying was SR with CHB. 3.2% AFib since 10/2020, longest episode 8h.   Echocardiogam 11/2019 showed EF 50-55%. No RWMA. RV OK. LA mild-mod dilated 4.5 cm index 43.0 ml/m2. Trace MR. 1+ TR with RVSP 21+RAP. Aortic sclerosis. Mild 1+ AI.  Nuclear Stress Test 2/2018 showed no ischemia        Plan:  6 m follow-up     Assessment/Plan:    1. HTN    Since switching Metoprolol XL 25 to Coreg 6.25 mg BID, BP has really improved. May be napping more with this but overall tolerating really well    Remains on lisinopril/HCTZ 20/25 as well    PLAN:    No change needed    See us back in 6 m and to bring BP cuff along to check accuracy      2. Atrial Arrhyhthmias    Has known pAFib as well as AT    Last device interrogation as above    Remains on Eliquis 5 mg BID for CHADSVASc 2 (HTN, age)    PLAN:    Continue routine device interrogations    No evidence of head trauma/neuro deficits on limited neuro exam after his multiple falls slipping on ice        Maria Elena Khan PA-C, MSPAS      Orders Placed This Encounter   Procedures     Follow-Up with Cardiac Advanced Practice Provider     Orders Placed This Encounter   Medications     carvedilol (COREG) 6.25 MG tablet     Sig: Take 1 tablet (6.25 mg) by mouth 2 times daily (with meals)     Dispense:  180 tablet     Refill:  3     Keep on file until pt needs (just had this filled 11/2021)     Medications Discontinued During This Encounter   Medication Reason     carvedilol (COREG) 6.25 MG tablet          Encounter Diagnoses   Name Primary?     AV block, 2nd degree      Cardiac pacemaker in situ      Dyspnea on exertion        CURRENT MEDICATIONS:  Current Outpatient Medications   Medication Sig Dispense Refill     apixaban ANTICOAGULANT (ELIQUIS ANTICOAGULANT) 5 MG tablet Take 1 tablet (5 mg) by mouth 2 times daily 180 tablet 3     carvedilol (COREG) 6.25 MG  "tablet Take 1 tablet (6.25 mg) by mouth 2 times daily (with meals) 180 tablet 3     lisinopril-hydrochlorothiazide (PRINZIDE,ZESTORETIC) 20-25 MG per tablet Take 1 tablet by mouth daily       Sertraline HCl (ZOLOFT PO) Take 100 mg by mouth daily       simvastatin (ZOCOR) 20 MG tablet Take 20 mg by mouth daily         ALLERGIES     Allergies   Allergen Reactions     Flexeril [Cyclobenzaprine] GI Disturbance     Naprosyn [Naproxen] GI Disturbance     Penicillins      As child     Ranitidine GI Disturbance         Review of Systems:  Skin:  Negative     Eyes:  Positive for cataracts  ENT:  Negative    Respiratory:  Positive for dyspnea on exertion  Cardiovascular:  Negative for;palpitations;chest pain;edema;fatigue;lightheadedness;dizziness Positive for;exercise intolerance  Gastroenterology: Negative for melena;hematochezia  Genitourinary:  Negative    Musculoskeletal:  Negative    Neurologic:  Negative    Psychiatric:  Positive for depression  Heme/Lymph/Imm:  Positive for allergies  Endocrine:  Negative      Physical Exam:  Vitals: /78 (BP Location: Left arm, Cuff Size: Adult Large)   Pulse 84   Ht 1.803 m (5' 11\")   Wt 114.1 kg (251 lb 8 oz)   BMI 35.08 kg/m      Constitutional:  cooperative, alert and oriented, well developed, well nourished, in no acute distress        Skin:  not assessed this visit        Head:  not assessed this visit        Eyes:  not assessed this visit        ENT:  not assessed this visit        Neck:  not assessed this visit        Chest:  normal breath sounds, clear to auscultation, normal A-P diameter, normal symmetry, normal respiratory excursion, no use of accessory muscles;sternum intact to deep palpation and cough        Cardiac: regular rhythm;no murmurs, gallops or rubs detected                  Abdomen:  not assessed this visit        Vascular: not assessed this visit                                      Extremities and Back:  not assessed this visit        Neurological:  " no gross motor deficits            PAST MEDICAL HISTORY:  Past Medical History:   Diagnosis Date     CPAP (continuous positive airway pressure) dependence      Depression      Gastro-oesophageal reflux disease      Hepatic steatosis      Hoarseness      Hyperlipidemia      Hypertension      IFG (impaired fasting glucose)      LAFB (left anterior fascicular block)      PONV (postoperative nausea and vomiting)      RBBB      Seizures (H)     as child     Sleep apnea     cpap     Uncomplicated asthma     denies asthma       PAST SURGICAL HISTORY:  Past Surgical History:   Procedure Laterality Date     ANKLE SURGERY       APPENDECTOMY       BACK SURGERY      lami x2     ENT SURGERY      throat granulomas     HERNIA REPAIR       INJECT BOTOX N/A 10/30/2014    Procedure: INJECT BOTOX;  Surgeon: Kalyn Negron MD;  [facility]     INJECT BOTOX N/A 12/8/2016    Procedure: INJECT BOTOX;  Surgeon: Kalyn Negron MD;  [facility]     INJECT STEROID (LOCATION) N/A 12/8/2016    Procedure: INJECT STEROID (LOCATION);  Surgeon: Kalyn Negron MD;  [facility]     LAMINECTOMY LUMBAR ONE LEVEL  3/5/2014    Procedure: LAMINECTOMY LUMBAR ONE LEVEL;  RIGHT L4-5 DISCECTOMY;  Surgeon: Rodrigo Ríos MD;  Location: [facility]     LASER CO2 LARYNGOSCOPY N/A 12/8/2016    Procedure: LASER CO2 LARYNGOSCOPY;  Surgeon: Kalyn Negron MD;  [facility]     LASER CO2 LARYNGOSCOPY, COMPLEX Left 10/30/2014    Procedure: LASER CO2 LARYNGOSCOPY, COMPLEX;  Surgeon: Kalyn Negron MD;  Location: [facility]     ORTHOPEDIC SURGERY         FAMILY HISTORY:  Family History   Problem Relation Age of Onset     Cancer Father        SOCIAL HISTORY:  Social History     Socioeconomic History     Marital status:      Spouse name: None     Number of children: None     Years of education: None     Highest education level: None   Occupational History     None   Tobacco Use     Smoking status:  Former Smoker     Years: 10.00     Types: Pipe     Start date: 10/10/1976     Quit date: 1985     Years since quittin.6     Smokeless tobacco: Never Used   Substance and Sexual Activity     Alcohol use: Yes     Comment: daily- 2 drinks /day     Drug use: No     Sexual activity: Yes     Partners: Female     Birth control/protection: Other   Other Topics Concern     Parent/sibling w/ CABG, MI or angioplasty before 65F 55M? Not Asked   Social History Narrative     None     Social Determinants of Health     Financial Resource Strain: Not on file   Food Insecurity: Not on file   Transportation Needs: Not on file   Physical Activity: Not on file   Stress: Not on file   Social Connections: Not on file   Intimate Partner Violence: Not on file   Housing Stability: Not on file         Thank you for allowing me to participate in the care of your patient.      Sincerely,     Melani Khan PA-C     [facility]  cc:   Melani Khan PA-C  [address]  [place] [address]

## 2021-12-28 NOTE — PATIENT INSTRUCTIONS
Arias - so good to see you today!    1. Coreg looks like it's working well to control BPs - overall BP average is 120s  2. Wonder if increased napping is due to the new med?? Keep watching.    PLAN:  1. No changes  2. Let us know if any issues before your next appt ~6/2022! Bring BP cuff in so we can check

## 2022-02-28 ENCOUNTER — ANCILLARY PROCEDURE (OUTPATIENT)
Dept: CARDIOLOGY | Facility: CLINIC | Age: 67
End: 2022-02-28
Attending: INTERNAL MEDICINE
Payer: MEDICARE

## 2022-02-28 DIAGNOSIS — Z95.0 PACEMAKER: ICD-10-CM

## 2022-02-28 PROCEDURE — 93296 REM INTERROG EVL PM/IDS: CPT | Performed by: INTERNAL MEDICINE

## 2022-02-28 PROCEDURE — 93294 REM INTERROG EVL PM/LDLS PM: CPT | Performed by: INTERNAL MEDICINE

## 2022-03-01 LAB
MDC_IDC_EPISODE_DTM: NORMAL
MDC_IDC_EPISODE_DURATION: 1069 S
MDC_IDC_EPISODE_DURATION: 109 S
MDC_IDC_EPISODE_DURATION: 1112 S
MDC_IDC_EPISODE_DURATION: 122 S
MDC_IDC_EPISODE_DURATION: 1279 S
MDC_IDC_EPISODE_DURATION: 129 S
MDC_IDC_EPISODE_DURATION: 1349 S
MDC_IDC_EPISODE_DURATION: 137 S
MDC_IDC_EPISODE_DURATION: 139 S
MDC_IDC_EPISODE_DURATION: 145 S
MDC_IDC_EPISODE_DURATION: 147 S
MDC_IDC_EPISODE_DURATION: 16 S
MDC_IDC_EPISODE_DURATION: 166 S
MDC_IDC_EPISODE_DURATION: 171 S
MDC_IDC_EPISODE_DURATION: 177 S
MDC_IDC_EPISODE_DURATION: 179 S
MDC_IDC_EPISODE_DURATION: 180 S
MDC_IDC_EPISODE_DURATION: 1931 S
MDC_IDC_EPISODE_DURATION: 1950 S
MDC_IDC_EPISODE_DURATION: 220 S
MDC_IDC_EPISODE_DURATION: 222 S
MDC_IDC_EPISODE_DURATION: 248 S
MDC_IDC_EPISODE_DURATION: 294 S
MDC_IDC_EPISODE_DURATION: 336 S
MDC_IDC_EPISODE_DURATION: 34 S
MDC_IDC_EPISODE_DURATION: 3462 S
MDC_IDC_EPISODE_DURATION: 36 S
MDC_IDC_EPISODE_DURATION: 362 S
MDC_IDC_EPISODE_DURATION: 38 S
MDC_IDC_EPISODE_DURATION: 396 S
MDC_IDC_EPISODE_DURATION: 41 S
MDC_IDC_EPISODE_DURATION: 439 S
MDC_IDC_EPISODE_DURATION: 45 S
MDC_IDC_EPISODE_DURATION: 454 S
MDC_IDC_EPISODE_DURATION: 463 S
MDC_IDC_EPISODE_DURATION: 463 S
MDC_IDC_EPISODE_DURATION: 515 S
MDC_IDC_EPISODE_DURATION: 53 S
MDC_IDC_EPISODE_DURATION: 54 S
MDC_IDC_EPISODE_DURATION: 55 S
MDC_IDC_EPISODE_DURATION: 567 S
MDC_IDC_EPISODE_DURATION: 602 S
MDC_IDC_EPISODE_DURATION: 624 S
MDC_IDC_EPISODE_DURATION: 707 S
MDC_IDC_EPISODE_DURATION: 71 S
MDC_IDC_EPISODE_DURATION: 795 S
MDC_IDC_EPISODE_DURATION: 855 S
MDC_IDC_EPISODE_DURATION: 890 S
MDC_IDC_EPISODE_DURATION: 924 S
MDC_IDC_EPISODE_DURATION: 93 S
MDC_IDC_EPISODE_ID: 4187
MDC_IDC_EPISODE_ID: 4188
MDC_IDC_EPISODE_ID: 4189
MDC_IDC_EPISODE_ID: 4190
MDC_IDC_EPISODE_ID: 4191
MDC_IDC_EPISODE_ID: 4192
MDC_IDC_EPISODE_ID: 4193
MDC_IDC_EPISODE_ID: 4194
MDC_IDC_EPISODE_ID: 4195
MDC_IDC_EPISODE_ID: 4196
MDC_IDC_EPISODE_ID: 4197
MDC_IDC_EPISODE_ID: 4198
MDC_IDC_EPISODE_ID: 4199
MDC_IDC_EPISODE_ID: 4200
MDC_IDC_EPISODE_ID: 4201
MDC_IDC_EPISODE_ID: 4202
MDC_IDC_EPISODE_ID: 4203
MDC_IDC_EPISODE_ID: 4204
MDC_IDC_EPISODE_ID: 4205
MDC_IDC_EPISODE_ID: 4206
MDC_IDC_EPISODE_ID: 4207
MDC_IDC_EPISODE_ID: 4208
MDC_IDC_EPISODE_ID: 4209
MDC_IDC_EPISODE_ID: 4210
MDC_IDC_EPISODE_ID: 4211
MDC_IDC_EPISODE_ID: 4212
MDC_IDC_EPISODE_ID: 4213
MDC_IDC_EPISODE_ID: 4214
MDC_IDC_EPISODE_ID: 4215
MDC_IDC_EPISODE_ID: 4216
MDC_IDC_EPISODE_ID: 4217
MDC_IDC_EPISODE_ID: 4218
MDC_IDC_EPISODE_ID: 4219
MDC_IDC_EPISODE_ID: 4220
MDC_IDC_EPISODE_ID: 4221
MDC_IDC_EPISODE_ID: 4222
MDC_IDC_EPISODE_ID: 4223
MDC_IDC_EPISODE_ID: 4224
MDC_IDC_EPISODE_ID: 4225
MDC_IDC_EPISODE_ID: 4226
MDC_IDC_EPISODE_ID: 4227
MDC_IDC_EPISODE_ID: 4228
MDC_IDC_EPISODE_ID: 4229
MDC_IDC_EPISODE_ID: 4230
MDC_IDC_EPISODE_ID: 4231
MDC_IDC_EPISODE_ID: 4232
MDC_IDC_EPISODE_ID: 4233
MDC_IDC_EPISODE_ID: 4234
MDC_IDC_EPISODE_ID: 4235
MDC_IDC_EPISODE_ID: 4236
MDC_IDC_EPISODE_TYPE: NORMAL
MDC_IDC_LEAD_IMPLANT_DT: NORMAL
MDC_IDC_LEAD_IMPLANT_DT: NORMAL
MDC_IDC_LEAD_LOCATION: NORMAL
MDC_IDC_LEAD_LOCATION: NORMAL
MDC_IDC_LEAD_LOCATION_DETAIL_1: NORMAL
MDC_IDC_LEAD_LOCATION_DETAIL_1: NORMAL
MDC_IDC_LEAD_MFG: NORMAL
MDC_IDC_LEAD_MFG: NORMAL
MDC_IDC_LEAD_MODEL: NORMAL
MDC_IDC_LEAD_MODEL: NORMAL
MDC_IDC_LEAD_POLARITY_TYPE: NORMAL
MDC_IDC_LEAD_POLARITY_TYPE: NORMAL
MDC_IDC_LEAD_SERIAL: NORMAL
MDC_IDC_LEAD_SERIAL: NORMAL
MDC_IDC_MSMT_BATTERY_DTM: NORMAL
MDC_IDC_MSMT_BATTERY_REMAINING_LONGEVITY: 59 MO
MDC_IDC_MSMT_BATTERY_RRT_TRIGGER: 2.83
MDC_IDC_MSMT_BATTERY_STATUS: NORMAL
MDC_IDC_MSMT_BATTERY_VOLTAGE: 2.99 V
MDC_IDC_MSMT_LEADCHNL_RA_IMPEDANCE_VALUE: 399 OHM
MDC_IDC_MSMT_LEADCHNL_RA_IMPEDANCE_VALUE: 437 OHM
MDC_IDC_MSMT_LEADCHNL_RA_PACING_THRESHOLD_AMPLITUDE: 0.5 V
MDC_IDC_MSMT_LEADCHNL_RA_PACING_THRESHOLD_PULSEWIDTH: 0.4 MS
MDC_IDC_MSMT_LEADCHNL_RA_SENSING_INTR_AMPL: 3.5 MV
MDC_IDC_MSMT_LEADCHNL_RA_SENSING_INTR_AMPL: 3.5 MV
MDC_IDC_MSMT_LEADCHNL_RV_IMPEDANCE_VALUE: 437 OHM
MDC_IDC_MSMT_LEADCHNL_RV_IMPEDANCE_VALUE: 551 OHM
MDC_IDC_MSMT_LEADCHNL_RV_PACING_THRESHOLD_AMPLITUDE: 0.62 V
MDC_IDC_MSMT_LEADCHNL_RV_PACING_THRESHOLD_PULSEWIDTH: 0.4 MS
MDC_IDC_MSMT_LEADCHNL_RV_SENSING_INTR_AMPL: 18.12 MV
MDC_IDC_MSMT_LEADCHNL_RV_SENSING_INTR_AMPL: 18.12 MV
MDC_IDC_PG_IMPLANT_DTM: NORMAL
MDC_IDC_PG_MFG: NORMAL
MDC_IDC_PG_MODEL: NORMAL
MDC_IDC_PG_SERIAL: NORMAL
MDC_IDC_PG_TYPE: NORMAL
MDC_IDC_SESS_CLINIC_NAME: NORMAL
MDC_IDC_SESS_DTM: NORMAL
MDC_IDC_SESS_TYPE: NORMAL
MDC_IDC_SET_BRADY_AT_MODE_SWITCH_RATE: 171 {BEATS}/MIN
MDC_IDC_SET_BRADY_HYSTRATE: NORMAL
MDC_IDC_SET_BRADY_LOWRATE: 55 {BEATS}/MIN
MDC_IDC_SET_BRADY_MAX_SENSOR_RATE: 130 {BEATS}/MIN
MDC_IDC_SET_BRADY_MAX_TRACKING_RATE: 130 {BEATS}/MIN
MDC_IDC_SET_BRADY_MODE: NORMAL
MDC_IDC_SET_BRADY_PAV_DELAY_LOW: 150 MS
MDC_IDC_SET_BRADY_SAV_DELAY_LOW: 150 MS
MDC_IDC_SET_LEADCHNL_RA_PACING_AMPLITUDE: 1.5 V
MDC_IDC_SET_LEADCHNL_RA_PACING_ANODE_ELECTRODE_1: NORMAL
MDC_IDC_SET_LEADCHNL_RA_PACING_ANODE_LOCATION_1: NORMAL
MDC_IDC_SET_LEADCHNL_RA_PACING_CAPTURE_MODE: NORMAL
MDC_IDC_SET_LEADCHNL_RA_PACING_CATHODE_ELECTRODE_1: NORMAL
MDC_IDC_SET_LEADCHNL_RA_PACING_CATHODE_LOCATION_1: NORMAL
MDC_IDC_SET_LEADCHNL_RA_PACING_POLARITY: NORMAL
MDC_IDC_SET_LEADCHNL_RA_PACING_PULSEWIDTH: 0.4 MS
MDC_IDC_SET_LEADCHNL_RA_SENSING_ANODE_ELECTRODE_1: NORMAL
MDC_IDC_SET_LEADCHNL_RA_SENSING_ANODE_LOCATION_1: NORMAL
MDC_IDC_SET_LEADCHNL_RA_SENSING_CATHODE_ELECTRODE_1: NORMAL
MDC_IDC_SET_LEADCHNL_RA_SENSING_CATHODE_LOCATION_1: NORMAL
MDC_IDC_SET_LEADCHNL_RA_SENSING_POLARITY: NORMAL
MDC_IDC_SET_LEADCHNL_RA_SENSING_SENSITIVITY: 0.3 MV
MDC_IDC_SET_LEADCHNL_RV_PACING_AMPLITUDE: 2 V
MDC_IDC_SET_LEADCHNL_RV_PACING_ANODE_ELECTRODE_1: NORMAL
MDC_IDC_SET_LEADCHNL_RV_PACING_ANODE_LOCATION_1: NORMAL
MDC_IDC_SET_LEADCHNL_RV_PACING_CAPTURE_MODE: NORMAL
MDC_IDC_SET_LEADCHNL_RV_PACING_CATHODE_ELECTRODE_1: NORMAL
MDC_IDC_SET_LEADCHNL_RV_PACING_CATHODE_LOCATION_1: NORMAL
MDC_IDC_SET_LEADCHNL_RV_PACING_POLARITY: NORMAL
MDC_IDC_SET_LEADCHNL_RV_PACING_PULSEWIDTH: 0.4 MS
MDC_IDC_SET_LEADCHNL_RV_SENSING_ANODE_ELECTRODE_1: NORMAL
MDC_IDC_SET_LEADCHNL_RV_SENSING_ANODE_LOCATION_1: NORMAL
MDC_IDC_SET_LEADCHNL_RV_SENSING_CATHODE_ELECTRODE_1: NORMAL
MDC_IDC_SET_LEADCHNL_RV_SENSING_CATHODE_LOCATION_1: NORMAL
MDC_IDC_SET_LEADCHNL_RV_SENSING_POLARITY: NORMAL
MDC_IDC_SET_LEADCHNL_RV_SENSING_SENSITIVITY: 0.9 MV
MDC_IDC_SET_ZONE_DETECTION_INTERVAL: 350 MS
MDC_IDC_SET_ZONE_DETECTION_INTERVAL: 400 MS
MDC_IDC_SET_ZONE_TYPE: NORMAL
MDC_IDC_STAT_AT_BURDEN_PERCENT: 5.8 %
MDC_IDC_STAT_AT_DTM_END: NORMAL
MDC_IDC_STAT_AT_DTM_START: NORMAL
MDC_IDC_STAT_BRADY_AP_VP_PERCENT: 44.56 %
MDC_IDC_STAT_BRADY_AP_VS_PERCENT: 0 %
MDC_IDC_STAT_BRADY_AS_VP_PERCENT: 55.43 %
MDC_IDC_STAT_BRADY_AS_VS_PERCENT: 0.01 %
MDC_IDC_STAT_BRADY_DTM_END: NORMAL
MDC_IDC_STAT_BRADY_DTM_START: NORMAL
MDC_IDC_STAT_BRADY_RA_PERCENT_PACED: 43.53 %
MDC_IDC_STAT_BRADY_RV_PERCENT_PACED: 99.98 %
MDC_IDC_STAT_EPISODE_RECENT_COUNT: 0
MDC_IDC_STAT_EPISODE_RECENT_COUNT: 1499
MDC_IDC_STAT_EPISODE_RECENT_COUNT_DTM_END: NORMAL
MDC_IDC_STAT_EPISODE_RECENT_COUNT_DTM_START: NORMAL
MDC_IDC_STAT_EPISODE_TOTAL_COUNT: 0
MDC_IDC_STAT_EPISODE_TOTAL_COUNT: 1
MDC_IDC_STAT_EPISODE_TOTAL_COUNT: 2
MDC_IDC_STAT_EPISODE_TOTAL_COUNT: 4233
MDC_IDC_STAT_EPISODE_TOTAL_COUNT_DTM_END: NORMAL
MDC_IDC_STAT_EPISODE_TOTAL_COUNT_DTM_START: NORMAL
MDC_IDC_STAT_EPISODE_TYPE: NORMAL

## 2022-06-06 ENCOUNTER — TELEPHONE (OUTPATIENT)
Dept: CARDIOLOGY | Facility: CLINIC | Age: 67
End: 2022-06-06

## 2022-06-06 ENCOUNTER — ANCILLARY PROCEDURE (OUTPATIENT)
Dept: CARDIOLOGY | Facility: CLINIC | Age: 67
End: 2022-06-06
Attending: INTERNAL MEDICINE
Payer: MEDICARE

## 2022-06-06 DIAGNOSIS — Z95.0 CARDIAC PACEMAKER IN SITU: Primary | ICD-10-CM

## 2022-06-06 DIAGNOSIS — I47.29 NSVT (NONSUSTAINED VENTRICULAR TACHYCARDIA) (H): Primary | ICD-10-CM

## 2022-06-06 DIAGNOSIS — Z95.0 CARDIAC PACEMAKER IN SITU: ICD-10-CM

## 2022-06-06 PROCEDURE — 93296 REM INTERROG EVL PM/IDS: CPT | Performed by: INTERNAL MEDICINE

## 2022-06-06 PROCEDURE — 93294 REM INTERROG EVL PM/LDLS PM: CPT | Performed by: INTERNAL MEDICINE

## 2022-06-06 NOTE — TELEPHONE ENCOUNTER
Dr. Benavidez,  Your Pt had another run of NSVT on his remote check today. The last episode he had was in 2018 that lasted 16 beats, so I wanted to send this recent episode to you. Episode lasted 10 beats, rates 165-190 bpm. Episode occurred 5/19/22 at 12:25 am. EF-50-55% (2019) Any changes? Thank you for your time!           Medtronic Advisa (D) Remote PPM Device Check  AP: 33.1%   : 100%  Mode: DDDR 55/130  Presenting Rhythm: AP/  Heart Rate: Adequate heart rates per histogram   Sensing: stable   Pacing Threshold: stable   Impedance: stable   Battery Status: 3.5-6 years remaining (4.5 years)  Atrial Arrhythmia: 181 mode switch episodes comprising 5.6% of the time. EGMs for review show AT/AF longest episode logged lasting [id number], ventricular rates controlled. Taking Eliquis.  Ventricular Arrhythmia: 1 ventricular high rate logged. EGM for review shows Vs>As for NSVT lasting 10 beats, rates 165-190 bpm. Episode occurred 5/19/2022 at 12:25 am. EF: 50-55% (2019) Will send off to Dr. Benavidez as pts last episode of NSVT occurred in 2018.     Care Plan: F/U carelink ppm in 3 months. F/U with Dr. Starkey scheduled for 6/28/22. Gave pt results over the phone. CLAY Cordero    I have reviewed and interpreted the device interrogation, settings, programming and nurse's summary. The device is functioning within normal device parameters. I agree with the current findings, assessment and plan.

## 2022-06-06 NOTE — TELEPHONE ENCOUNTER
Asymptomatic NSVT noted on pacemaker interrogation.  He is already on a beta-blocker.  He had a normal nuclear stress test in 2018.  Last echo- was in 2019, please repeat now.  If EF is stable, nothing further.    Thx, DI

## 2022-06-07 LAB
MDC_IDC_EPISODE_DTM: NORMAL
MDC_IDC_EPISODE_DURATION: 108 S
MDC_IDC_EPISODE_DURATION: 111 S
MDC_IDC_EPISODE_DURATION: 117 S
MDC_IDC_EPISODE_DURATION: 129 S
MDC_IDC_EPISODE_DURATION: 129 S
MDC_IDC_EPISODE_DURATION: 1311 S
MDC_IDC_EPISODE_DURATION: 137 S
MDC_IDC_EPISODE_DURATION: 144 S
MDC_IDC_EPISODE_DURATION: 153 S
MDC_IDC_EPISODE_DURATION: 157 S
MDC_IDC_EPISODE_DURATION: 160 S
MDC_IDC_EPISODE_DURATION: 163 S
MDC_IDC_EPISODE_DURATION: 170 S
MDC_IDC_EPISODE_DURATION: 1839 S
MDC_IDC_EPISODE_DURATION: 185 S
MDC_IDC_EPISODE_DURATION: 194 S
MDC_IDC_EPISODE_DURATION: 205 S
MDC_IDC_EPISODE_DURATION: 219 S
MDC_IDC_EPISODE_DURATION: 220 S
MDC_IDC_EPISODE_DURATION: 223 S
MDC_IDC_EPISODE_DURATION: 241 S
MDC_IDC_EPISODE_DURATION: 246 S
MDC_IDC_EPISODE_DURATION: 252 S
MDC_IDC_EPISODE_DURATION: 255 S
MDC_IDC_EPISODE_DURATION: 27 S
MDC_IDC_EPISODE_DURATION: 274 S
MDC_IDC_EPISODE_DURATION: 285 S
MDC_IDC_EPISODE_DURATION: 288 S
MDC_IDC_EPISODE_DURATION: 299 S
MDC_IDC_EPISODE_DURATION: 3 S
MDC_IDC_EPISODE_DURATION: 307 S
MDC_IDC_EPISODE_DURATION: 325 S
MDC_IDC_EPISODE_DURATION: 36 S
MDC_IDC_EPISODE_DURATION: 37 S
MDC_IDC_EPISODE_DURATION: 40 S
MDC_IDC_EPISODE_DURATION: 402 S
MDC_IDC_EPISODE_DURATION: 4229 S
MDC_IDC_EPISODE_DURATION: 439 S
MDC_IDC_EPISODE_DURATION: 446 S
MDC_IDC_EPISODE_DURATION: 50 S
MDC_IDC_EPISODE_DURATION: 500 S
MDC_IDC_EPISODE_DURATION: 500 S
MDC_IDC_EPISODE_DURATION: 545 S
MDC_IDC_EPISODE_DURATION: 630 S
MDC_IDC_EPISODE_DURATION: 650 S
MDC_IDC_EPISODE_DURATION: 68 S
MDC_IDC_EPISODE_DURATION: 71 S
MDC_IDC_EPISODE_DURATION: 733 S
MDC_IDC_EPISODE_DURATION: 75 S
MDC_IDC_EPISODE_DURATION: 80 S
MDC_IDC_EPISODE_DURATION: 97 S
MDC_IDC_EPISODE_ID: 5670
MDC_IDC_EPISODE_ID: 5800
MDC_IDC_EPISODE_ID: 5801
MDC_IDC_EPISODE_ID: 5802
MDC_IDC_EPISODE_ID: 5803
MDC_IDC_EPISODE_ID: 5804
MDC_IDC_EPISODE_ID: 5805
MDC_IDC_EPISODE_ID: 5806
MDC_IDC_EPISODE_ID: 5807
MDC_IDC_EPISODE_ID: 5808
MDC_IDC_EPISODE_ID: 5809
MDC_IDC_EPISODE_ID: 5810
MDC_IDC_EPISODE_ID: 5811
MDC_IDC_EPISODE_ID: 5812
MDC_IDC_EPISODE_ID: 5813
MDC_IDC_EPISODE_ID: 5814
MDC_IDC_EPISODE_ID: 5815
MDC_IDC_EPISODE_ID: 5816
MDC_IDC_EPISODE_ID: 5817
MDC_IDC_EPISODE_ID: 5818
MDC_IDC_EPISODE_ID: 5819
MDC_IDC_EPISODE_ID: 5820
MDC_IDC_EPISODE_ID: 5821
MDC_IDC_EPISODE_ID: 5822
MDC_IDC_EPISODE_ID: 5823
MDC_IDC_EPISODE_ID: 5824
MDC_IDC_EPISODE_ID: 5825
MDC_IDC_EPISODE_ID: 5826
MDC_IDC_EPISODE_ID: 5827
MDC_IDC_EPISODE_ID: 5828
MDC_IDC_EPISODE_ID: 5829
MDC_IDC_EPISODE_ID: 5830
MDC_IDC_EPISODE_ID: 5831
MDC_IDC_EPISODE_ID: 5832
MDC_IDC_EPISODE_ID: 5833
MDC_IDC_EPISODE_ID: 5834
MDC_IDC_EPISODE_ID: 5835
MDC_IDC_EPISODE_ID: 5836
MDC_IDC_EPISODE_ID: 5837
MDC_IDC_EPISODE_ID: 5838
MDC_IDC_EPISODE_ID: 5839
MDC_IDC_EPISODE_ID: 5840
MDC_IDC_EPISODE_ID: 5841
MDC_IDC_EPISODE_ID: 5842
MDC_IDC_EPISODE_ID: 5843
MDC_IDC_EPISODE_ID: 5844
MDC_IDC_EPISODE_ID: 5845
MDC_IDC_EPISODE_ID: 5846
MDC_IDC_EPISODE_ID: 5847
MDC_IDC_EPISODE_ID: 5848
MDC_IDC_EPISODE_ID: 5849
MDC_IDC_EPISODE_TYPE: NORMAL
MDC_IDC_LEAD_IMPLANT_DT: NORMAL
MDC_IDC_LEAD_IMPLANT_DT: NORMAL
MDC_IDC_LEAD_LOCATION: NORMAL
MDC_IDC_LEAD_LOCATION: NORMAL
MDC_IDC_LEAD_LOCATION_DETAIL_1: NORMAL
MDC_IDC_LEAD_LOCATION_DETAIL_1: NORMAL
MDC_IDC_LEAD_MFG: NORMAL
MDC_IDC_LEAD_MFG: NORMAL
MDC_IDC_LEAD_MODEL: NORMAL
MDC_IDC_LEAD_MODEL: NORMAL
MDC_IDC_LEAD_POLARITY_TYPE: NORMAL
MDC_IDC_LEAD_POLARITY_TYPE: NORMAL
MDC_IDC_LEAD_SERIAL: NORMAL
MDC_IDC_LEAD_SERIAL: NORMAL
MDC_IDC_MSMT_BATTERY_DTM: NORMAL
MDC_IDC_MSMT_BATTERY_REMAINING_LONGEVITY: 59 MO
MDC_IDC_MSMT_BATTERY_RRT_TRIGGER: 2.83
MDC_IDC_MSMT_BATTERY_STATUS: NORMAL
MDC_IDC_MSMT_BATTERY_VOLTAGE: 2.99 V
MDC_IDC_MSMT_LEADCHNL_RA_IMPEDANCE_VALUE: 380 OHM
MDC_IDC_MSMT_LEADCHNL_RA_IMPEDANCE_VALUE: 437 OHM
MDC_IDC_MSMT_LEADCHNL_RA_PACING_THRESHOLD_AMPLITUDE: 0.5 V
MDC_IDC_MSMT_LEADCHNL_RA_PACING_THRESHOLD_PULSEWIDTH: 0.4 MS
MDC_IDC_MSMT_LEADCHNL_RA_SENSING_INTR_AMPL: 1 MV
MDC_IDC_MSMT_LEADCHNL_RA_SENSING_INTR_AMPL: 1 MV
MDC_IDC_MSMT_LEADCHNL_RV_IMPEDANCE_VALUE: 456 OHM
MDC_IDC_MSMT_LEADCHNL_RV_IMPEDANCE_VALUE: 589 OHM
MDC_IDC_MSMT_LEADCHNL_RV_PACING_THRESHOLD_AMPLITUDE: 0.62 V
MDC_IDC_MSMT_LEADCHNL_RV_PACING_THRESHOLD_PULSEWIDTH: 0.4 MS
MDC_IDC_MSMT_LEADCHNL_RV_SENSING_INTR_AMPL: 6.5 MV
MDC_IDC_MSMT_LEADCHNL_RV_SENSING_INTR_AMPL: 6.5 MV
MDC_IDC_PG_IMPLANT_DTM: NORMAL
MDC_IDC_PG_MFG: NORMAL
MDC_IDC_PG_MODEL: NORMAL
MDC_IDC_PG_SERIAL: NORMAL
MDC_IDC_PG_TYPE: NORMAL
MDC_IDC_SESS_CLINIC_NAME: NORMAL
MDC_IDC_SESS_DTM: NORMAL
MDC_IDC_SESS_TYPE: NORMAL
MDC_IDC_SET_BRADY_AT_MODE_SWITCH_RATE: 171 {BEATS}/MIN
MDC_IDC_SET_BRADY_HYSTRATE: NORMAL
MDC_IDC_SET_BRADY_LOWRATE: 55 {BEATS}/MIN
MDC_IDC_SET_BRADY_MAX_SENSOR_RATE: 130 {BEATS}/MIN
MDC_IDC_SET_BRADY_MAX_TRACKING_RATE: 130 {BEATS}/MIN
MDC_IDC_SET_BRADY_MODE: NORMAL
MDC_IDC_SET_BRADY_PAV_DELAY_LOW: 150 MS
MDC_IDC_SET_BRADY_SAV_DELAY_LOW: 150 MS
MDC_IDC_SET_LEADCHNL_RA_PACING_AMPLITUDE: 1.5 V
MDC_IDC_SET_LEADCHNL_RA_PACING_ANODE_ELECTRODE_1: NORMAL
MDC_IDC_SET_LEADCHNL_RA_PACING_ANODE_LOCATION_1: NORMAL
MDC_IDC_SET_LEADCHNL_RA_PACING_CAPTURE_MODE: NORMAL
MDC_IDC_SET_LEADCHNL_RA_PACING_CATHODE_ELECTRODE_1: NORMAL
MDC_IDC_SET_LEADCHNL_RA_PACING_CATHODE_LOCATION_1: NORMAL
MDC_IDC_SET_LEADCHNL_RA_PACING_POLARITY: NORMAL
MDC_IDC_SET_LEADCHNL_RA_PACING_PULSEWIDTH: 0.4 MS
MDC_IDC_SET_LEADCHNL_RA_SENSING_ANODE_ELECTRODE_1: NORMAL
MDC_IDC_SET_LEADCHNL_RA_SENSING_ANODE_LOCATION_1: NORMAL
MDC_IDC_SET_LEADCHNL_RA_SENSING_CATHODE_ELECTRODE_1: NORMAL
MDC_IDC_SET_LEADCHNL_RA_SENSING_CATHODE_LOCATION_1: NORMAL
MDC_IDC_SET_LEADCHNL_RA_SENSING_POLARITY: NORMAL
MDC_IDC_SET_LEADCHNL_RA_SENSING_SENSITIVITY: 0.3 MV
MDC_IDC_SET_LEADCHNL_RV_PACING_AMPLITUDE: 2 V
MDC_IDC_SET_LEADCHNL_RV_PACING_ANODE_ELECTRODE_1: NORMAL
MDC_IDC_SET_LEADCHNL_RV_PACING_ANODE_LOCATION_1: NORMAL
MDC_IDC_SET_LEADCHNL_RV_PACING_CAPTURE_MODE: NORMAL
MDC_IDC_SET_LEADCHNL_RV_PACING_CATHODE_ELECTRODE_1: NORMAL
MDC_IDC_SET_LEADCHNL_RV_PACING_CATHODE_LOCATION_1: NORMAL
MDC_IDC_SET_LEADCHNL_RV_PACING_POLARITY: NORMAL
MDC_IDC_SET_LEADCHNL_RV_PACING_PULSEWIDTH: 0.4 MS
MDC_IDC_SET_LEADCHNL_RV_SENSING_ANODE_ELECTRODE_1: NORMAL
MDC_IDC_SET_LEADCHNL_RV_SENSING_ANODE_LOCATION_1: NORMAL
MDC_IDC_SET_LEADCHNL_RV_SENSING_CATHODE_ELECTRODE_1: NORMAL
MDC_IDC_SET_LEADCHNL_RV_SENSING_CATHODE_LOCATION_1: NORMAL
MDC_IDC_SET_LEADCHNL_RV_SENSING_POLARITY: NORMAL
MDC_IDC_SET_LEADCHNL_RV_SENSING_SENSITIVITY: 0.9 MV
MDC_IDC_SET_ZONE_DETECTION_INTERVAL: 350 MS
MDC_IDC_SET_ZONE_DETECTION_INTERVAL: 400 MS
MDC_IDC_SET_ZONE_TYPE: NORMAL
MDC_IDC_STAT_AT_BURDEN_PERCENT: 5.6 %
MDC_IDC_STAT_AT_DTM_END: NORMAL
MDC_IDC_STAT_AT_DTM_START: NORMAL
MDC_IDC_STAT_BRADY_AP_VP_PERCENT: 32.99 %
MDC_IDC_STAT_BRADY_AP_VS_PERCENT: 0 %
MDC_IDC_STAT_BRADY_AS_VP_PERCENT: 66.99 %
MDC_IDC_STAT_BRADY_AS_VS_PERCENT: 0.01 %
MDC_IDC_STAT_BRADY_DTM_END: NORMAL
MDC_IDC_STAT_BRADY_DTM_START: NORMAL
MDC_IDC_STAT_BRADY_RA_PERCENT_PACED: 32.2 %
MDC_IDC_STAT_BRADY_RV_PERCENT_PACED: 99.98 %
MDC_IDC_STAT_EPISODE_RECENT_COUNT: 0
MDC_IDC_STAT_EPISODE_RECENT_COUNT: 0
MDC_IDC_STAT_EPISODE_RECENT_COUNT: 1
MDC_IDC_STAT_EPISODE_RECENT_COUNT: 181
MDC_IDC_STAT_EPISODE_RECENT_COUNT_DTM_END: NORMAL
MDC_IDC_STAT_EPISODE_RECENT_COUNT_DTM_START: NORMAL
MDC_IDC_STAT_EPISODE_TOTAL_COUNT: 0
MDC_IDC_STAT_EPISODE_TOTAL_COUNT: 2
MDC_IDC_STAT_EPISODE_TOTAL_COUNT: 2
MDC_IDC_STAT_EPISODE_TOTAL_COUNT: 5845
MDC_IDC_STAT_EPISODE_TOTAL_COUNT_DTM_END: NORMAL
MDC_IDC_STAT_EPISODE_TOTAL_COUNT_DTM_START: NORMAL
MDC_IDC_STAT_EPISODE_TYPE: NORMAL

## 2022-06-07 NOTE — TELEPHONE ENCOUNTER
Called pt and explained the plan for echo. Pt states understanding.     Entered order for echo. Sent message to scheduling to call pt and set it up.

## 2022-06-09 ENCOUNTER — HOSPITAL ENCOUNTER (OUTPATIENT)
Dept: CARDIOLOGY | Facility: CLINIC | Age: 67
Discharge: HOME | End: 2022-06-09
Admitting: INTERNAL MEDICINE
Payer: MEDICARE

## 2022-06-09 DIAGNOSIS — I47.29 NSVT (NONSUSTAINED VENTRICULAR TACHYCARDIA) (H): ICD-10-CM

## 2022-06-09 LAB
3D LVEF ECHO: NORMAL
LVEF ECHO: NORMAL

## 2022-06-09 PROCEDURE — 93306 TTE W/DOPPLER COMPLETE: CPT | Mod: 26 | Performed by: INTERNAL MEDICINE

## 2022-06-09 PROCEDURE — 255N000002 HC RX 255 OP 636

## 2022-06-09 PROCEDURE — 999N000208 ECHOCARDIOGRAM COMPLETE

## 2022-06-09 RX ADMIN — HUMAN ALBUMIN MICROSPHERES AND PERFLUTREN 9 ML: 10; .22 INJECTION, SOLUTION INTRAVENOUS at 08:27

## 2022-06-13 ENCOUNTER — TELEPHONE (OUTPATIENT)
Dept: CARDIOLOGY | Facility: CLINIC | Age: 67
End: 2022-06-13
Payer: MEDICARE

## 2022-06-13 NOTE — TELEPHONE ENCOUNTER
----- Message from Miley Benavidez MD sent at 6/10/2022  9:50 AM CDT -----  LV function is low normal, EF estimated 54%.  The echocardiogram was performed because of NSVT seen on his pacemaker.  Would do nothing further at this time.  Please update Peter.  COCO    Called and reviewed results of echocardiogram as outlined by Dr. Benavidez above.  Patient verbalized understanding and appreciation for call.     CUCO Boyd

## 2022-06-22 ENCOUNTER — OFFICE VISIT (OUTPATIENT)
Dept: CARDIOLOGY | Facility: CLINIC | Age: 67
End: 2022-06-22
Attending: PHYSICIAN ASSISTANT
Payer: MEDICARE

## 2022-06-22 VITALS
SYSTOLIC BLOOD PRESSURE: 137 MMHG | HEIGHT: 71 IN | OXYGEN SATURATION: 95 % | WEIGHT: 237 LBS | HEART RATE: 75 BPM | BODY MASS INDEX: 33.18 KG/M2 | DIASTOLIC BLOOD PRESSURE: 84 MMHG

## 2022-06-22 DIAGNOSIS — I47.29 NSVT (NONSUSTAINED VENTRICULAR TACHYCARDIA) (H): Primary | ICD-10-CM

## 2022-06-22 DIAGNOSIS — I20.89 STABLE ANGINA PECTORIS (H): ICD-10-CM

## 2022-06-22 DIAGNOSIS — R06.09 DYSPNEA ON EXERTION: ICD-10-CM

## 2022-06-22 DIAGNOSIS — I44.1 AV BLOCK, 2ND DEGREE: ICD-10-CM

## 2022-06-22 DIAGNOSIS — Z95.0 CARDIAC PACEMAKER IN SITU: ICD-10-CM

## 2022-06-22 PROCEDURE — 99214 OFFICE O/P EST MOD 30 MIN: CPT | Performed by: INTERNAL MEDICINE

## 2022-06-22 RX ORDER — PREDNISONE 20 MG/1
7 TABLET ORAL DAILY
COMMUNITY
Start: 2022-06-15 | End: 2023-12-15

## 2022-06-22 NOTE — PROGRESS NOTES
HPI and Plan:   See dictation [id number]      Orders Placed This Encounter   Medications     predniSONE (DELTASONE) 20 MG tablet     Sig: Take 30 mg by mouth daily Starting at 30 mg daily and will wean off.       There are no discontinued medications.      Encounter Diagnoses   Name Primary?     AV block, 2nd degree      Cardiac pacemaker in situ      Dyspnea on exertion      NSVT (nonsustained ventricular tachycardia) (H)      Nonspecific abnormal electrocardiogram (ECG) (EKG)      Stable angina pectoris (H) Yes       CURRENT MEDICATIONS:  Current Outpatient Medications   Medication Sig Dispense Refill     apixaban ANTICOAGULANT (ELIQUIS ANTICOAGULANT) 5 MG tablet Take 1 tablet (5 mg) by mouth 2 times daily 180 tablet 3     carvedilol (COREG) 6.25 MG tablet Take 1 tablet (6.25 mg) by mouth 2 times daily (with meals) 180 tablet 3     lisinopril-hydrochlorothiazide (PRINZIDE,ZESTORETIC) 20-25 MG per tablet Take 1 tablet by mouth daily       predniSONE (DELTASONE) 20 MG tablet Take 30 mg by mouth daily Starting at 30 mg daily and will wean off.       Sertraline HCl (ZOLOFT PO) Take 100 mg by mouth daily       simvastatin (ZOCOR) 20 MG tablet Take 20 mg by mouth daily         ALLERGIES     Allergies   Allergen Reactions     Flexeril [Cyclobenzaprine] GI Disturbance     Naprosyn [Naproxen] GI Disturbance     Penicillins      As child     Ranitidine GI Disturbance       PAST MEDICAL HISTORY:  Past Medical History:   Diagnosis Date     CPAP (continuous positive airway pressure) dependence      Depression      Gastro-oesophageal reflux disease      Hepatic steatosis      Hoarseness      Hyperlipidemia      Hypertension      IFG (impaired fasting glucose)      LAFB (left anterior fascicular block)      PONV (postoperative nausea and vomiting)      RBBB      Seizures (H)     as child     Sleep apnea     cpap     Uncomplicated asthma     denies asthma       PAST SURGICAL HISTORY:  Past Surgical History:   Procedure Laterality  Date     ANKLE SURGERY       APPENDECTOMY       BACK SURGERY      lami x2     ENT SURGERY      throat granulomas     HERNIA REPAIR       INJECT BOTOX N/A 10/30/2014    Procedure: INJECT BOTOX;  Surgeon: Kalyn Negron MD;  [facility]     INJECT BOTOX N/A 2016    Procedure: INJECT BOTOX;  Surgeon: Kalyn Negron MD;  [facility]     INJECT STEROID (LOCATION) N/A 2016    Procedure: INJECT STEROID (LOCATION);  Surgeon: Kalyn Negron MD;  [facility]     LAMINECTOMY LUMBAR ONE LEVEL  3/5/2014    Procedure: LAMINECTOMY LUMBAR ONE LEVEL;  RIGHT L4-5 DISCECTOMY;  Surgeon: Rodrigo Ríos MD;  Location: [facility] OR     LASER CO2 LARYNGOSCOPY N/A 2016    Procedure: LASER CO2 LARYNGOSCOPY;  Surgeon: Kalyn Negron MD;  [facility]     LASER CO2 LARYNGOSCOPY, COMPLEX Left 10/30/2014    Procedure: LASER CO2 LARYNGOSCOPY, COMPLEX;  Surgeon: Kalyn Negron MD;  Location: [facility]     ORTHOPEDIC SURGERY         FAMILY HISTORY:  Family History   Problem Relation Age of Onset     Cancer Father        SOCIAL HISTORY:  Social History     Socioeconomic History     Marital status:      Spouse name: None     Number of children: None     Years of education: None     Highest education level: None   Tobacco Use     Smoking status: Former Smoker     Years: 10.00     Types: Pipe     Start date: 10/10/1976     Quit date: 1985     Years since quittin.1     Smokeless tobacco: Never Used   Substance and Sexual Activity     Alcohol use: Yes     Comment: daily- 2 drinks /day     Drug use: No     Sexual activity: Yes     Partners: Female     Birth control/protection: Other         CC  Melani Khan PA-C  [address]  [place]  [place] [address]

## 2022-06-22 NOTE — PROGRESS NOTES
Service Date: 06/22/2022    HISTORY OF PRESENT ILLNESS:    I had the pleasure of seeing . Arias Manzo, a delightful 67-year-old gentleman, in followup of AV block and NSVT.    Arias has the following chronic medical issues:    1. Symptomatic 2:1 AV block with pacemaker implantation in 07/2017.  2.  Borderline LV dysfunction, EF most recently 50%-55%.  3.  Hypertension.  4.  Paroxysmal atrial fibrillation, on metoprolol and apixaban.  [id number]  score of 2.  5.  Recent diagnosis of polymyalgia rheumatica.  On prednisone.  6.  NSVT was recorded by his pacemaker recently.      About 1 month ago, he started having shoulder and upper arm aches.  He was diagnosed with polymyalgia rheumatica.  His initial symptoms were so bad that he could hardly get out of bed.  He is doing much better on prednisone.  He is followed by a rheumatologist.    He also mentioned rather predictable chest pressure with moderately heavy exertion.  He says that regular walking on flat or gentle uphill surface does not cause this, but when he goes up several flights of stairs or walks fast, he develops chest pressure.  This sensation does not make him stop.      PHYSICAL EXAMINATION:    VITAL SIGNS:  Blood pressure 137/84, heart rate 75 and regular.  BMI 33, weight 107 kg.  GENERAL:  He is an extremely pleasant gentleman in no distress.  NECK:  Supple without bruits.  LUNGS:  Clear.  HEART:  Regular rhythm, no gallop, murmur or rub.  ABDOMEN:  Soft, nontender.  EXTREMITIES:  No edema.      DIAGNOSTIC STUDIES:    - Recent labs:  Potassium 4.4, sodium 142, BUN 23, creatinine 0.88, hematocrit 39.6%.  - Most recent device interrogation on 06/06/2022 showed approximate estimated battery life of 3.5 to 6 years, paroxysmal atrial fibrillation with burden 5.6%, 2 episodes of NSVT.  - Echocardiogram was completed on 06/09/2022.  I reviewed this study today.  It showed EF 50%-55% with borderline global LV hypokinesis, normal RV, technically  difficult study.      IMPRESSION:    1.  Second-degree AV block.  He has a well-functioning permanent pacemaker.  2.  Chest tightness during exertion.  NSVT.  I believe further evaluation is in order given these symptoms/findings.  I have recommended coronary CTA.     RECOMMENDATIONS:    1.  Coronary CTA.  2.  Continue current medications.    3.  Routine device clinic follow-up.    It was my pleasure seeing John today.   If all is well, we will plan to see him again in 1 year.  In the interim, we will communicate after the results of his coronary CTA become available.    Total time spent today was 30 minutes.      Miley Benavidez MD, [facility]      cc:   Elisa Dougherty  [facility]  [address] [place] [address]     D: 2022   T: 2022   MT: RYAN    Name:     JOHN CHRISTIAN  MRN:      [id number]        Account:      [id number]   :      1955           Service Date: 2022       Document: [id number]

## 2022-07-07 ENCOUNTER — HOSPITAL ENCOUNTER (OUTPATIENT)
Dept: CARDIOLOGY | Facility: CLINIC | Age: 67
Discharge: HOME | End: 2022-07-07
Attending: INTERNAL MEDICINE
Payer: MEDICARE

## 2022-07-07 VITALS — DIASTOLIC BLOOD PRESSURE: 79 MMHG | HEART RATE: 62 BPM | SYSTOLIC BLOOD PRESSURE: 137 MMHG

## 2022-07-07 DIAGNOSIS — R06.09 DYSPNEA ON EXERTION: ICD-10-CM

## 2022-07-07 DIAGNOSIS — I20.89 STABLE ANGINA PECTORIS (H): ICD-10-CM

## 2022-07-07 DIAGNOSIS — I47.29 NSVT (NONSUSTAINED VENTRICULAR TACHYCARDIA) (H): ICD-10-CM

## 2022-07-07 PROCEDURE — 75574 CT ANGIO HRT W/3D IMAGE: CPT | Mod: 26 | Performed by: INTERNAL MEDICINE

## 2022-07-07 PROCEDURE — G1010 CDSM STANSON: HCPCS | Performed by: INTERNAL MEDICINE

## 2022-07-07 PROCEDURE — 250N000013 HC RX MED GY IP 250 OP 250 PS 637: Performed by: INTERNAL MEDICINE

## 2022-07-07 PROCEDURE — 250N000011 HC RX IP 250 OP 636: Performed by: INTERNAL MEDICINE

## 2022-07-07 PROCEDURE — 75574 CT ANGIO HRT W/3D IMAGE: CPT | Mod: ME

## 2022-07-07 PROCEDURE — G1010 CDSM STANSON: HCPCS

## 2022-07-07 RX ORDER — METHYLPREDNISOLONE SODIUM SUCCINATE 125 MG/2ML
125 INJECTION, POWDER, LYOPHILIZED, FOR SOLUTION INTRAMUSCULAR; INTRAVENOUS
Status: DISCONTINUED | OUTPATIENT
Start: 2022-07-07 | End: 2022-07-08 | Stop reason: HOSPADM

## 2022-07-07 RX ORDER — DIPHENHYDRAMINE HCL 25 MG
25 CAPSULE ORAL
Status: DISCONTINUED | OUTPATIENT
Start: 2022-07-07 | End: 2022-07-08 | Stop reason: HOSPADM

## 2022-07-07 RX ORDER — DIPHENHYDRAMINE HYDROCHLORIDE 50 MG/ML
25-50 INJECTION INTRAMUSCULAR; INTRAVENOUS
Status: DISCONTINUED | OUTPATIENT
Start: 2022-07-07 | End: 2022-07-08 | Stop reason: HOSPADM

## 2022-07-07 RX ORDER — IOPAMIDOL 755 MG/ML
500 INJECTION, SOLUTION INTRAVASCULAR ONCE
Status: COMPLETED | OUTPATIENT
Start: 2022-07-07 | End: 2022-07-07

## 2022-07-07 RX ORDER — ONDANSETRON 2 MG/ML
4 INJECTION INTRAMUSCULAR; INTRAVENOUS
Status: DISCONTINUED | OUTPATIENT
Start: 2022-07-07 | End: 2022-07-08 | Stop reason: HOSPADM

## 2022-07-07 RX ORDER — NITROGLYCERIN 0.4 MG/1
0.4 TABLET SUBLINGUAL
Status: DISCONTINUED | OUTPATIENT
Start: 2022-07-07 | End: 2022-07-08 | Stop reason: HOSPADM

## 2022-07-07 RX ORDER — DILTIAZEM HCL 60 MG
120 TABLET ORAL
Status: DISCONTINUED | OUTPATIENT
Start: 2022-07-07 | End: 2022-07-08 | Stop reason: HOSPADM

## 2022-07-07 RX ORDER — METOPROLOL TARTRATE 1 MG/ML
5-15 INJECTION, SOLUTION INTRAVENOUS
Status: DISCONTINUED | OUTPATIENT
Start: 2022-07-07 | End: 2022-07-08 | Stop reason: HOSPADM

## 2022-07-07 RX ORDER — DILTIAZEM HYDROCHLORIDE 5 MG/ML
10-15 INJECTION INTRAVENOUS
Status: DISCONTINUED | OUTPATIENT
Start: 2022-07-07 | End: 2022-07-08 | Stop reason: HOSPADM

## 2022-07-07 RX ORDER — METOPROLOL TARTRATE 25 MG/1
25-100 TABLET, FILM COATED ORAL
Status: COMPLETED | OUTPATIENT
Start: 2022-07-07 | End: 2022-07-07

## 2022-07-07 RX ORDER — IVABRADINE 5 MG/1
5-15 TABLET, FILM COATED ORAL
Status: DISCONTINUED | OUTPATIENT
Start: 2022-07-07 | End: 2022-07-08 | Stop reason: HOSPADM

## 2022-07-07 RX ORDER — ACYCLOVIR 200 MG/1
0-1 CAPSULE ORAL
Status: DISCONTINUED | OUTPATIENT
Start: 2022-07-07 | End: 2022-07-08 | Stop reason: HOSPADM

## 2022-07-07 RX ADMIN — METOPROLOL TARTRATE 25 MG: 25 TABLET, FILM COATED ORAL at 08:53

## 2022-07-07 RX ADMIN — NITROGLYCERIN 0.4 MG: 0.4 TABLET SUBLINGUAL at 09:58

## 2022-07-07 RX ADMIN — IOPAMIDOL 120 ML: 755 INJECTION, SOLUTION INTRAVENOUS at 10:08

## 2022-07-11 ENCOUNTER — TELEPHONE (OUTPATIENT)
Dept: CARDIOLOGY | Facility: CLINIC | Age: 67
End: 2022-07-11

## 2022-07-11 NOTE — TELEPHONE ENCOUNTER
----- Message from Miley Benavidez MD sent at 7/11/2022  3:17 PM CDT -----  Good news, he only has mild CAD and coronary calcification is 47th percentile, average for age.  Please let him know.  DI

## 2022-07-11 NOTE — TELEPHONE ENCOUNTER
Pt updated with CTA results. No further questions at this time. Gave direct line in case any come up. [facility]ones

## 2022-09-12 ENCOUNTER — ANCILLARY PROCEDURE (OUTPATIENT)
Dept: CARDIOLOGY | Facility: CLINIC | Age: 67
End: 2022-09-12
Attending: INTERNAL MEDICINE
Payer: MEDICARE

## 2022-09-12 DIAGNOSIS — Z95.0 CARDIAC PACEMAKER IN SITU: Primary | ICD-10-CM

## 2022-09-12 DIAGNOSIS — Z95.0 CARDIAC PACEMAKER IN SITU: ICD-10-CM

## 2022-09-12 DIAGNOSIS — I44.1 AV BLOCK, 2ND DEGREE: ICD-10-CM

## 2022-09-12 PROCEDURE — 93294 REM INTERROG EVL PM/LDLS PM: CPT | Performed by: INTERNAL MEDICINE

## 2022-09-12 PROCEDURE — 93296 REM INTERROG EVL PM/IDS: CPT | Performed by: INTERNAL MEDICINE

## 2022-09-15 LAB
MDC_IDC_EPISODE_DTM: NORMAL
MDC_IDC_EPISODE_DURATION: 106 S
MDC_IDC_EPISODE_DURATION: 109 S
MDC_IDC_EPISODE_DURATION: 1098 S
MDC_IDC_EPISODE_DURATION: 1168 S
MDC_IDC_EPISODE_DURATION: 1176 S
MDC_IDC_EPISODE_DURATION: 125 S
MDC_IDC_EPISODE_DURATION: 126 S
MDC_IDC_EPISODE_DURATION: 127 S
MDC_IDC_EPISODE_DURATION: 148 S
MDC_IDC_EPISODE_DURATION: 151 S
MDC_IDC_EPISODE_DURATION: 152 S
MDC_IDC_EPISODE_DURATION: 154 S
MDC_IDC_EPISODE_DURATION: 163 S
MDC_IDC_EPISODE_DURATION: 180 S
MDC_IDC_EPISODE_DURATION: 195 S
MDC_IDC_EPISODE_DURATION: 204 S
MDC_IDC_EPISODE_DURATION: 213 S
MDC_IDC_EPISODE_DURATION: 218 S
MDC_IDC_EPISODE_DURATION: 245 S
MDC_IDC_EPISODE_DURATION: 261 S
MDC_IDC_EPISODE_DURATION: 28 S
MDC_IDC_EPISODE_DURATION: 281 S
MDC_IDC_EPISODE_DURATION: 296 S
MDC_IDC_EPISODE_DURATION: 297 S
MDC_IDC_EPISODE_DURATION: 306 S
MDC_IDC_EPISODE_DURATION: 326 S
MDC_IDC_EPISODE_DURATION: 333 S
MDC_IDC_EPISODE_DURATION: 39 S
MDC_IDC_EPISODE_DURATION: 39 S
MDC_IDC_EPISODE_DURATION: 399 S
MDC_IDC_EPISODE_DURATION: 421 S
MDC_IDC_EPISODE_DURATION: 423 S
MDC_IDC_EPISODE_DURATION: 49 S
MDC_IDC_EPISODE_DURATION: 51 S
MDC_IDC_EPISODE_DURATION: 5437 S
MDC_IDC_EPISODE_DURATION: 55 S
MDC_IDC_EPISODE_DURATION: 574 S
MDC_IDC_EPISODE_DURATION: 58 S
MDC_IDC_EPISODE_DURATION: 583 S
MDC_IDC_EPISODE_DURATION: 605 S
MDC_IDC_EPISODE_DURATION: 65 S
MDC_IDC_EPISODE_DURATION: 70 S
MDC_IDC_EPISODE_DURATION: 70 S
MDC_IDC_EPISODE_DURATION: 715 S
MDC_IDC_EPISODE_DURATION: 738 S
MDC_IDC_EPISODE_DURATION: 78 S
MDC_IDC_EPISODE_DURATION: 789 S
MDC_IDC_EPISODE_DURATION: 794 S
MDC_IDC_EPISODE_DURATION: 84 S
MDC_IDC_EPISODE_DURATION: 92 S
MDC_IDC_EPISODE_ID: 6985
MDC_IDC_EPISODE_ID: 6986
MDC_IDC_EPISODE_ID: 6987
MDC_IDC_EPISODE_ID: 6988
MDC_IDC_EPISODE_ID: 6989
MDC_IDC_EPISODE_ID: 6990
MDC_IDC_EPISODE_ID: 6991
MDC_IDC_EPISODE_ID: 6992
MDC_IDC_EPISODE_ID: 6993
MDC_IDC_EPISODE_ID: 6994
MDC_IDC_EPISODE_ID: 6995
MDC_IDC_EPISODE_ID: 6996
MDC_IDC_EPISODE_ID: 6997
MDC_IDC_EPISODE_ID: 6998
MDC_IDC_EPISODE_ID: 6999
MDC_IDC_EPISODE_ID: 7000
MDC_IDC_EPISODE_ID: 7001
MDC_IDC_EPISODE_ID: 7002
MDC_IDC_EPISODE_ID: 7003
MDC_IDC_EPISODE_ID: 7004
MDC_IDC_EPISODE_ID: 7005
MDC_IDC_EPISODE_ID: 7006
MDC_IDC_EPISODE_ID: 7007
MDC_IDC_EPISODE_ID: 7008
MDC_IDC_EPISODE_ID: 7009
MDC_IDC_EPISODE_ID: 7010
MDC_IDC_EPISODE_ID: 7011
MDC_IDC_EPISODE_ID: 7012
MDC_IDC_EPISODE_ID: 7013
MDC_IDC_EPISODE_ID: 7014
MDC_IDC_EPISODE_ID: 7015
MDC_IDC_EPISODE_ID: 7016
MDC_IDC_EPISODE_ID: 7017
MDC_IDC_EPISODE_ID: 7018
MDC_IDC_EPISODE_ID: 7019
MDC_IDC_EPISODE_ID: 7020
MDC_IDC_EPISODE_ID: 7021
MDC_IDC_EPISODE_ID: 7022
MDC_IDC_EPISODE_ID: 7023
MDC_IDC_EPISODE_ID: 7024
MDC_IDC_EPISODE_ID: 7025
MDC_IDC_EPISODE_ID: 7026
MDC_IDC_EPISODE_ID: 7027
MDC_IDC_EPISODE_ID: 7028
MDC_IDC_EPISODE_ID: 7029
MDC_IDC_EPISODE_ID: 7030
MDC_IDC_EPISODE_ID: 7031
MDC_IDC_EPISODE_ID: 7032
MDC_IDC_EPISODE_ID: 7033
MDC_IDC_EPISODE_ID: 7034
MDC_IDC_EPISODE_ID: 7035
MDC_IDC_EPISODE_TYPE: NORMAL
MDC_IDC_LEAD_IMPLANT_DT: NORMAL
MDC_IDC_LEAD_IMPLANT_DT: NORMAL
MDC_IDC_LEAD_LOCATION: NORMAL
MDC_IDC_LEAD_LOCATION: NORMAL
MDC_IDC_LEAD_LOCATION_DETAIL_1: NORMAL
MDC_IDC_LEAD_LOCATION_DETAIL_1: NORMAL
MDC_IDC_LEAD_MFG: NORMAL
MDC_IDC_LEAD_MFG: NORMAL
MDC_IDC_LEAD_MODEL: NORMAL
MDC_IDC_LEAD_MODEL: NORMAL
MDC_IDC_LEAD_POLARITY_TYPE: NORMAL
MDC_IDC_LEAD_POLARITY_TYPE: NORMAL
MDC_IDC_LEAD_SERIAL: NORMAL
MDC_IDC_LEAD_SERIAL: NORMAL
MDC_IDC_MSMT_BATTERY_DTM: NORMAL
MDC_IDC_MSMT_BATTERY_REMAINING_LONGEVITY: 50 MO
MDC_IDC_MSMT_BATTERY_RRT_TRIGGER: 2.83
MDC_IDC_MSMT_BATTERY_STATUS: NORMAL
MDC_IDC_MSMT_BATTERY_VOLTAGE: 2.99 V
MDC_IDC_MSMT_LEADCHNL_RA_IMPEDANCE_VALUE: 361 OHM
MDC_IDC_MSMT_LEADCHNL_RA_IMPEDANCE_VALUE: 418 OHM
MDC_IDC_MSMT_LEADCHNL_RA_PACING_THRESHOLD_AMPLITUDE: 0.38 V
MDC_IDC_MSMT_LEADCHNL_RA_PACING_THRESHOLD_PULSEWIDTH: 0.4 MS
MDC_IDC_MSMT_LEADCHNL_RA_SENSING_INTR_AMPL: 1.5 MV
MDC_IDC_MSMT_LEADCHNL_RA_SENSING_INTR_AMPL: 1.5 MV
MDC_IDC_MSMT_LEADCHNL_RV_IMPEDANCE_VALUE: 437 OHM
MDC_IDC_MSMT_LEADCHNL_RV_IMPEDANCE_VALUE: 570 OHM
MDC_IDC_MSMT_LEADCHNL_RV_PACING_THRESHOLD_AMPLITUDE: 0.62 V
MDC_IDC_MSMT_LEADCHNL_RV_PACING_THRESHOLD_PULSEWIDTH: 0.4 MS
MDC_IDC_MSMT_LEADCHNL_RV_SENSING_INTR_AMPL: 14.88 MV
MDC_IDC_MSMT_LEADCHNL_RV_SENSING_INTR_AMPL: 14.88 MV
MDC_IDC_PG_IMPLANT_DTM: NORMAL
MDC_IDC_PG_MFG: NORMAL
MDC_IDC_PG_MODEL: NORMAL
MDC_IDC_PG_SERIAL: NORMAL
MDC_IDC_PG_TYPE: NORMAL
MDC_IDC_SESS_CLINIC_NAME: NORMAL
MDC_IDC_SESS_DTM: NORMAL
MDC_IDC_SESS_TYPE: NORMAL
MDC_IDC_SET_BRADY_AT_MODE_SWITCH_RATE: 171 {BEATS}/MIN
MDC_IDC_SET_BRADY_HYSTRATE: NORMAL
MDC_IDC_SET_BRADY_LOWRATE: 55 {BEATS}/MIN
MDC_IDC_SET_BRADY_MAX_SENSOR_RATE: 130 {BEATS}/MIN
MDC_IDC_SET_BRADY_MAX_TRACKING_RATE: 130 {BEATS}/MIN
MDC_IDC_SET_BRADY_MODE: NORMAL
MDC_IDC_SET_BRADY_PAV_DELAY_LOW: 150 MS
MDC_IDC_SET_BRADY_SAV_DELAY_LOW: 150 MS
MDC_IDC_SET_LEADCHNL_RA_PACING_AMPLITUDE: 1.5 V
MDC_IDC_SET_LEADCHNL_RA_PACING_ANODE_ELECTRODE_1: NORMAL
MDC_IDC_SET_LEADCHNL_RA_PACING_ANODE_LOCATION_1: NORMAL
MDC_IDC_SET_LEADCHNL_RA_PACING_CAPTURE_MODE: NORMAL
MDC_IDC_SET_LEADCHNL_RA_PACING_CATHODE_ELECTRODE_1: NORMAL
MDC_IDC_SET_LEADCHNL_RA_PACING_CATHODE_LOCATION_1: NORMAL
MDC_IDC_SET_LEADCHNL_RA_PACING_POLARITY: NORMAL
MDC_IDC_SET_LEADCHNL_RA_PACING_PULSEWIDTH: 0.4 MS
MDC_IDC_SET_LEADCHNL_RA_SENSING_ANODE_ELECTRODE_1: NORMAL
MDC_IDC_SET_LEADCHNL_RA_SENSING_ANODE_LOCATION_1: NORMAL
MDC_IDC_SET_LEADCHNL_RA_SENSING_CATHODE_ELECTRODE_1: NORMAL
MDC_IDC_SET_LEADCHNL_RA_SENSING_CATHODE_LOCATION_1: NORMAL
MDC_IDC_SET_LEADCHNL_RA_SENSING_POLARITY: NORMAL
MDC_IDC_SET_LEADCHNL_RA_SENSING_SENSITIVITY: 0.3 MV
MDC_IDC_SET_LEADCHNL_RV_PACING_AMPLITUDE: 2 V
MDC_IDC_SET_LEADCHNL_RV_PACING_ANODE_ELECTRODE_1: NORMAL
MDC_IDC_SET_LEADCHNL_RV_PACING_ANODE_LOCATION_1: NORMAL
MDC_IDC_SET_LEADCHNL_RV_PACING_CAPTURE_MODE: NORMAL
MDC_IDC_SET_LEADCHNL_RV_PACING_CATHODE_ELECTRODE_1: NORMAL
MDC_IDC_SET_LEADCHNL_RV_PACING_CATHODE_LOCATION_1: NORMAL
MDC_IDC_SET_LEADCHNL_RV_PACING_POLARITY: NORMAL
MDC_IDC_SET_LEADCHNL_RV_PACING_PULSEWIDTH: 0.4 MS
MDC_IDC_SET_LEADCHNL_RV_SENSING_ANODE_ELECTRODE_1: NORMAL
MDC_IDC_SET_LEADCHNL_RV_SENSING_ANODE_LOCATION_1: NORMAL
MDC_IDC_SET_LEADCHNL_RV_SENSING_CATHODE_ELECTRODE_1: NORMAL
MDC_IDC_SET_LEADCHNL_RV_SENSING_CATHODE_LOCATION_1: NORMAL
MDC_IDC_SET_LEADCHNL_RV_SENSING_POLARITY: NORMAL
MDC_IDC_SET_LEADCHNL_RV_SENSING_SENSITIVITY: 0.9 MV
MDC_IDC_SET_ZONE_DETECTION_INTERVAL: 350 MS
MDC_IDC_SET_ZONE_DETECTION_INTERVAL: 400 MS
MDC_IDC_SET_ZONE_TYPE: NORMAL
MDC_IDC_STAT_AT_BURDEN_PERCENT: 7.2 %
MDC_IDC_STAT_AT_DTM_END: NORMAL
MDC_IDC_STAT_AT_DTM_START: NORMAL
MDC_IDC_STAT_BRADY_AP_VP_PERCENT: 39.04 %
MDC_IDC_STAT_BRADY_AP_VS_PERCENT: 0 %
MDC_IDC_STAT_BRADY_AS_VP_PERCENT: 60.94 %
MDC_IDC_STAT_BRADY_AS_VS_PERCENT: 0.02 %
MDC_IDC_STAT_BRADY_DTM_END: NORMAL
MDC_IDC_STAT_BRADY_DTM_START: NORMAL
MDC_IDC_STAT_BRADY_RA_PERCENT_PACED: 37.43 %
MDC_IDC_STAT_BRADY_RV_PERCENT_PACED: 99.96 %
MDC_IDC_STAT_EPISODE_RECENT_COUNT: 0
MDC_IDC_STAT_EPISODE_RECENT_COUNT: 1186
MDC_IDC_STAT_EPISODE_RECENT_COUNT_DTM_END: NORMAL
MDC_IDC_STAT_EPISODE_RECENT_COUNT_DTM_START: NORMAL
MDC_IDC_STAT_EPISODE_TOTAL_COUNT: 0
MDC_IDC_STAT_EPISODE_TOTAL_COUNT: 2
MDC_IDC_STAT_EPISODE_TOTAL_COUNT: 2
MDC_IDC_STAT_EPISODE_TOTAL_COUNT: 7031
MDC_IDC_STAT_EPISODE_TOTAL_COUNT_DTM_END: NORMAL
MDC_IDC_STAT_EPISODE_TOTAL_COUNT_DTM_START: NORMAL
MDC_IDC_STAT_EPISODE_TYPE: NORMAL

## 2022-10-06 ENCOUNTER — TELEPHONE (OUTPATIENT)
Dept: CARDIOLOGY | Facility: CLINIC | Age: 67
End: 2022-10-06

## 2022-10-06 DIAGNOSIS — I48.0 PAROXYSMAL ATRIAL FIBRILLATION (H): ICD-10-CM

## 2022-10-06 NOTE — TELEPHONE ENCOUNTER
[facility] Call Center    Phone Message    May a detailed message be left on voicemail: yes     Reason for Call: Medication Refill Request    Has the patient contacted the pharmacy for the refill? Yes   Name of medication being requested: apixaban ANTICOAGULANT (ELIQUIS ANTICOAGULANT) 5 MG tablet  Provider who prescribed the medication: Maria Elena Khan  Pharmacy: [id number] [place], [place] - [address]    Date medication is needed: 10/7 PT only has 2 days left          Action Taken: Other: Cardiology    Travel Screening: Not Applicable

## 2022-11-21 DIAGNOSIS — Z95.0 CARDIAC PACEMAKER IN SITU: ICD-10-CM

## 2022-11-21 DIAGNOSIS — R06.09 DYSPNEA ON EXERTION: ICD-10-CM

## 2022-11-21 DIAGNOSIS — I44.1 AV BLOCK, 2ND DEGREE: ICD-10-CM

## 2022-11-21 RX ORDER — CARVEDILOL 6.25 MG/1
6.25 TABLET ORAL 2 TIMES DAILY WITH MEALS
Qty: 180 TABLET | Refills: 1 | Status: SHIPPED | OUTPATIENT
Start: 2022-11-21 | End: 2023-09-07

## 2022-12-14 ENCOUNTER — ANCILLARY PROCEDURE (OUTPATIENT)
Dept: CARDIOLOGY | Facility: CLINIC | Age: 67
End: 2022-12-14
Attending: INTERNAL MEDICINE
Payer: MEDICARE

## 2022-12-14 DIAGNOSIS — I44.1 AV BLOCK, 2ND DEGREE: ICD-10-CM

## 2022-12-14 DIAGNOSIS — Z95.0 CARDIAC PACEMAKER IN SITU: ICD-10-CM

## 2022-12-14 LAB
MDC_IDC_EPISODE_DTM: NORMAL
MDC_IDC_EPISODE_DURATION: 101 S
MDC_IDC_EPISODE_DURATION: 104 S
MDC_IDC_EPISODE_DURATION: 108 S
MDC_IDC_EPISODE_DURATION: 109 S
MDC_IDC_EPISODE_DURATION: 115 S
MDC_IDC_EPISODE_DURATION: 1207 S
MDC_IDC_EPISODE_DURATION: 122 S
MDC_IDC_EPISODE_DURATION: 129 S
MDC_IDC_EPISODE_DURATION: 132 S
MDC_IDC_EPISODE_DURATION: 132 S
MDC_IDC_EPISODE_DURATION: 135 S
MDC_IDC_EPISODE_DURATION: 137 S
MDC_IDC_EPISODE_DURATION: 144 S
MDC_IDC_EPISODE_DURATION: 151 S
MDC_IDC_EPISODE_DURATION: 157 S
MDC_IDC_EPISODE_DURATION: 160 S
MDC_IDC_EPISODE_DURATION: 186 S
MDC_IDC_EPISODE_DURATION: 186 S
MDC_IDC_EPISODE_DURATION: 1869 S
MDC_IDC_EPISODE_DURATION: 194 S
MDC_IDC_EPISODE_DURATION: 195 S
MDC_IDC_EPISODE_DURATION: 207 S
MDC_IDC_EPISODE_DURATION: 214 S
MDC_IDC_EPISODE_DURATION: 2236 S
MDC_IDC_EPISODE_DURATION: 234 S
MDC_IDC_EPISODE_DURATION: 250 S
MDC_IDC_EPISODE_DURATION: 264 S
MDC_IDC_EPISODE_DURATION: 279 S
MDC_IDC_EPISODE_DURATION: 293 S
MDC_IDC_EPISODE_DURATION: 3551 S
MDC_IDC_EPISODE_DURATION: 37 S
MDC_IDC_EPISODE_DURATION: 37 S
MDC_IDC_EPISODE_DURATION: 3833 S
MDC_IDC_EPISODE_DURATION: 429 S
MDC_IDC_EPISODE_DURATION: 456 S
MDC_IDC_EPISODE_DURATION: 46 S
MDC_IDC_EPISODE_DURATION: 51 S
MDC_IDC_EPISODE_DURATION: 64 S
MDC_IDC_EPISODE_DURATION: 66 S
MDC_IDC_EPISODE_DURATION: 68 S
MDC_IDC_EPISODE_DURATION: 746 S
MDC_IDC_EPISODE_DURATION: 76 S
MDC_IDC_EPISODE_DURATION: 80 S
MDC_IDC_EPISODE_DURATION: 80 S
MDC_IDC_EPISODE_DURATION: 8001 S
MDC_IDC_EPISODE_DURATION: 86 S
MDC_IDC_EPISODE_DURATION: 88 S
MDC_IDC_EPISODE_DURATION: 90 S
MDC_IDC_EPISODE_DURATION: 92 S
MDC_IDC_EPISODE_DURATION: 98 S
MDC_IDC_EPISODE_ID: 8044
MDC_IDC_EPISODE_ID: 8045
MDC_IDC_EPISODE_ID: 8046
MDC_IDC_EPISODE_ID: 8047
MDC_IDC_EPISODE_ID: 8048
MDC_IDC_EPISODE_ID: 8049
MDC_IDC_EPISODE_ID: 8050
MDC_IDC_EPISODE_ID: 8051
MDC_IDC_EPISODE_ID: 8052
MDC_IDC_EPISODE_ID: 8053
MDC_IDC_EPISODE_ID: 8054
MDC_IDC_EPISODE_ID: 8055
MDC_IDC_EPISODE_ID: 8056
MDC_IDC_EPISODE_ID: 8057
MDC_IDC_EPISODE_ID: 8058
MDC_IDC_EPISODE_ID: 8059
MDC_IDC_EPISODE_ID: 8060
MDC_IDC_EPISODE_ID: 8061
MDC_IDC_EPISODE_ID: 8062
MDC_IDC_EPISODE_ID: 8063
MDC_IDC_EPISODE_ID: 8064
MDC_IDC_EPISODE_ID: 8065
MDC_IDC_EPISODE_ID: 8066
MDC_IDC_EPISODE_ID: 8067
MDC_IDC_EPISODE_ID: 8068
MDC_IDC_EPISODE_ID: 8069
MDC_IDC_EPISODE_ID: 8070
MDC_IDC_EPISODE_ID: 8071
MDC_IDC_EPISODE_ID: 8072
MDC_IDC_EPISODE_ID: 8073
MDC_IDC_EPISODE_ID: 8074
MDC_IDC_EPISODE_ID: 8075
MDC_IDC_EPISODE_ID: 8076
MDC_IDC_EPISODE_ID: 8077
MDC_IDC_EPISODE_ID: 8078
MDC_IDC_EPISODE_ID: 8079
MDC_IDC_EPISODE_ID: 8080
MDC_IDC_EPISODE_ID: 8081
MDC_IDC_EPISODE_ID: 8082
MDC_IDC_EPISODE_ID: 8083
MDC_IDC_EPISODE_ID: 8084
MDC_IDC_EPISODE_ID: 8085
MDC_IDC_EPISODE_ID: 8086
MDC_IDC_EPISODE_ID: 8087
MDC_IDC_EPISODE_ID: 8088
MDC_IDC_EPISODE_ID: 8089
MDC_IDC_EPISODE_ID: 8090
MDC_IDC_EPISODE_ID: 8091
MDC_IDC_EPISODE_ID: 8092
MDC_IDC_EPISODE_ID: 8093
MDC_IDC_EPISODE_TYPE: NORMAL
MDC_IDC_LEAD_IMPLANT_DT: NORMAL
MDC_IDC_LEAD_IMPLANT_DT: NORMAL
MDC_IDC_LEAD_LOCATION: NORMAL
MDC_IDC_LEAD_LOCATION: NORMAL
MDC_IDC_LEAD_LOCATION_DETAIL_1: NORMAL
MDC_IDC_LEAD_LOCATION_DETAIL_1: NORMAL
MDC_IDC_LEAD_MFG: NORMAL
MDC_IDC_LEAD_MFG: NORMAL
MDC_IDC_LEAD_MODEL: NORMAL
MDC_IDC_LEAD_MODEL: NORMAL
MDC_IDC_LEAD_POLARITY_TYPE: NORMAL
MDC_IDC_LEAD_POLARITY_TYPE: NORMAL
MDC_IDC_LEAD_SERIAL: NORMAL
MDC_IDC_LEAD_SERIAL: NORMAL
MDC_IDC_MSMT_BATTERY_DTM: NORMAL
MDC_IDC_MSMT_BATTERY_REMAINING_LONGEVITY: 54 MO
MDC_IDC_MSMT_BATTERY_RRT_TRIGGER: 2.83
MDC_IDC_MSMT_BATTERY_STATUS: NORMAL
MDC_IDC_MSMT_BATTERY_VOLTAGE: 2.99 V
MDC_IDC_MSMT_LEADCHNL_RA_IMPEDANCE_VALUE: 380 OHM
MDC_IDC_MSMT_LEADCHNL_RA_IMPEDANCE_VALUE: 437 OHM
MDC_IDC_MSMT_LEADCHNL_RA_PACING_THRESHOLD_AMPLITUDE: 0.5 V
MDC_IDC_MSMT_LEADCHNL_RA_PACING_THRESHOLD_PULSEWIDTH: 0.4 MS
MDC_IDC_MSMT_LEADCHNL_RA_SENSING_INTR_AMPL: 1.25 MV
MDC_IDC_MSMT_LEADCHNL_RA_SENSING_INTR_AMPL: 1.62 MV
MDC_IDC_MSMT_LEADCHNL_RV_IMPEDANCE_VALUE: 437 OHM
MDC_IDC_MSMT_LEADCHNL_RV_IMPEDANCE_VALUE: 589 OHM
MDC_IDC_MSMT_LEADCHNL_RV_PACING_THRESHOLD_AMPLITUDE: 0.5 V
MDC_IDC_MSMT_LEADCHNL_RV_PACING_THRESHOLD_PULSEWIDTH: 0.4 MS
MDC_IDC_MSMT_LEADCHNL_RV_SENSING_INTR_AMPL: 16.75 MV
MDC_IDC_MSMT_LEADCHNL_RV_SENSING_INTR_AMPL: 16.75 MV
MDC_IDC_PG_IMPLANT_DTM: NORMAL
MDC_IDC_PG_MFG: NORMAL
MDC_IDC_PG_MODEL: NORMAL
MDC_IDC_PG_SERIAL: NORMAL
MDC_IDC_PG_TYPE: NORMAL
MDC_IDC_SESS_CLINIC_NAME: NORMAL
MDC_IDC_SESS_DTM: NORMAL
MDC_IDC_SESS_TYPE: NORMAL
MDC_IDC_SET_BRADY_AT_MODE_SWITCH_RATE: 171 {BEATS}/MIN
MDC_IDC_SET_BRADY_HYSTRATE: NORMAL
MDC_IDC_SET_BRADY_LOWRATE: 55 {BEATS}/MIN
MDC_IDC_SET_BRADY_MAX_SENSOR_RATE: 130 {BEATS}/MIN
MDC_IDC_SET_BRADY_MAX_TRACKING_RATE: 130 {BEATS}/MIN
MDC_IDC_SET_BRADY_MODE: NORMAL
MDC_IDC_SET_BRADY_PAV_DELAY_LOW: 150 MS
MDC_IDC_SET_BRADY_SAV_DELAY_LOW: 150 MS
MDC_IDC_SET_LEADCHNL_RA_PACING_AMPLITUDE: 1.5 V
MDC_IDC_SET_LEADCHNL_RA_PACING_ANODE_ELECTRODE_1: NORMAL
MDC_IDC_SET_LEADCHNL_RA_PACING_ANODE_LOCATION_1: NORMAL
MDC_IDC_SET_LEADCHNL_RA_PACING_CAPTURE_MODE: NORMAL
MDC_IDC_SET_LEADCHNL_RA_PACING_CATHODE_ELECTRODE_1: NORMAL
MDC_IDC_SET_LEADCHNL_RA_PACING_CATHODE_LOCATION_1: NORMAL
MDC_IDC_SET_LEADCHNL_RA_PACING_POLARITY: NORMAL
MDC_IDC_SET_LEADCHNL_RA_PACING_PULSEWIDTH: 0.4 MS
MDC_IDC_SET_LEADCHNL_RA_SENSING_ANODE_ELECTRODE_1: NORMAL
MDC_IDC_SET_LEADCHNL_RA_SENSING_ANODE_LOCATION_1: NORMAL
MDC_IDC_SET_LEADCHNL_RA_SENSING_CATHODE_ELECTRODE_1: NORMAL
MDC_IDC_SET_LEADCHNL_RA_SENSING_CATHODE_LOCATION_1: NORMAL
MDC_IDC_SET_LEADCHNL_RA_SENSING_POLARITY: NORMAL
MDC_IDC_SET_LEADCHNL_RA_SENSING_SENSITIVITY: 0.3 MV
MDC_IDC_SET_LEADCHNL_RV_PACING_AMPLITUDE: 2 V
MDC_IDC_SET_LEADCHNL_RV_PACING_ANODE_ELECTRODE_1: NORMAL
MDC_IDC_SET_LEADCHNL_RV_PACING_ANODE_LOCATION_1: NORMAL
MDC_IDC_SET_LEADCHNL_RV_PACING_CAPTURE_MODE: NORMAL
MDC_IDC_SET_LEADCHNL_RV_PACING_CATHODE_ELECTRODE_1: NORMAL
MDC_IDC_SET_LEADCHNL_RV_PACING_CATHODE_LOCATION_1: NORMAL
MDC_IDC_SET_LEADCHNL_RV_PACING_POLARITY: NORMAL
MDC_IDC_SET_LEADCHNL_RV_PACING_PULSEWIDTH: 0.4 MS
MDC_IDC_SET_LEADCHNL_RV_SENSING_ANODE_ELECTRODE_1: NORMAL
MDC_IDC_SET_LEADCHNL_RV_SENSING_ANODE_LOCATION_1: NORMAL
MDC_IDC_SET_LEADCHNL_RV_SENSING_CATHODE_ELECTRODE_1: NORMAL
MDC_IDC_SET_LEADCHNL_RV_SENSING_CATHODE_LOCATION_1: NORMAL
MDC_IDC_SET_LEADCHNL_RV_SENSING_POLARITY: NORMAL
MDC_IDC_SET_LEADCHNL_RV_SENSING_SENSITIVITY: 0.9 MV
MDC_IDC_SET_ZONE_DETECTION_INTERVAL: 350 MS
MDC_IDC_SET_ZONE_DETECTION_INTERVAL: 400 MS
MDC_IDC_SET_ZONE_TYPE: NORMAL
MDC_IDC_STAT_AT_BURDEN_PERCENT: 6.1 %
MDC_IDC_STAT_AT_DTM_END: NORMAL
MDC_IDC_STAT_AT_DTM_START: NORMAL
MDC_IDC_STAT_BRADY_AP_VP_PERCENT: 43.68 %
MDC_IDC_STAT_BRADY_AP_VS_PERCENT: 0 %
MDC_IDC_STAT_BRADY_AS_VP_PERCENT: 56.29 %
MDC_IDC_STAT_BRADY_AS_VS_PERCENT: 0.02 %
MDC_IDC_STAT_BRADY_DTM_END: NORMAL
MDC_IDC_STAT_BRADY_DTM_START: NORMAL
MDC_IDC_STAT_BRADY_RA_PERCENT_PACED: 42.36 %
MDC_IDC_STAT_BRADY_RV_PERCENT_PACED: 99.89 %
MDC_IDC_STAT_EPISODE_RECENT_COUNT: 0
MDC_IDC_STAT_EPISODE_RECENT_COUNT: 0
MDC_IDC_STAT_EPISODE_RECENT_COUNT: 1
MDC_IDC_STAT_EPISODE_RECENT_COUNT: 5355
MDC_IDC_STAT_EPISODE_RECENT_COUNT_DTM_END: NORMAL
MDC_IDC_STAT_EPISODE_RECENT_COUNT_DTM_START: NORMAL
MDC_IDC_STAT_EPISODE_TOTAL_COUNT: 0
MDC_IDC_STAT_EPISODE_TOTAL_COUNT: 2
MDC_IDC_STAT_EPISODE_TOTAL_COUNT: 2
MDC_IDC_STAT_EPISODE_TOTAL_COUNT: 8089
MDC_IDC_STAT_EPISODE_TOTAL_COUNT_DTM_END: NORMAL
MDC_IDC_STAT_EPISODE_TOTAL_COUNT_DTM_START: NORMAL
MDC_IDC_STAT_EPISODE_TYPE: NORMAL

## 2022-12-14 PROCEDURE — 93280 PM DEVICE PROGR EVAL DUAL: CPT | Performed by: INTERNAL MEDICINE

## 2023-02-18 ENCOUNTER — HEALTH MAINTENANCE LETTER (OUTPATIENT)
Age: 68
End: 2023-02-18

## 2023-03-01 DIAGNOSIS — I48.0 PAROXYSMAL ATRIAL FIBRILLATION (H): ICD-10-CM

## 2023-03-15 ENCOUNTER — ANCILLARY PROCEDURE (OUTPATIENT)
Dept: CARDIOLOGY | Facility: CLINIC | Age: 68
End: 2023-03-15
Attending: INTERNAL MEDICINE
Payer: COMMERCIAL

## 2023-03-15 DIAGNOSIS — Z95.0 CARDIAC PACEMAKER IN SITU: ICD-10-CM

## 2023-03-15 PROCEDURE — 93296 REM INTERROG EVL PM/IDS: CPT | Performed by: INTERNAL MEDICINE

## 2023-03-15 PROCEDURE — 93294 REM INTERROG EVL PM/LDLS PM: CPT | Performed by: INTERNAL MEDICINE

## 2023-03-17 LAB
MDC_IDC_EPISODE_DTM: NORMAL
MDC_IDC_EPISODE_DURATION: 1 S
MDC_IDC_EPISODE_DURATION: 106 S
MDC_IDC_EPISODE_DURATION: 107 S
MDC_IDC_EPISODE_DURATION: 108 S
MDC_IDC_EPISODE_DURATION: 115 S
MDC_IDC_EPISODE_DURATION: 116 S
MDC_IDC_EPISODE_DURATION: 125 S
MDC_IDC_EPISODE_DURATION: 127 S
MDC_IDC_EPISODE_DURATION: 129 S
MDC_IDC_EPISODE_DURATION: 138 S
MDC_IDC_EPISODE_DURATION: 143 S
MDC_IDC_EPISODE_DURATION: 145 S
MDC_IDC_EPISODE_DURATION: 152 S
MDC_IDC_EPISODE_DURATION: 152 S
MDC_IDC_EPISODE_DURATION: 157 S
MDC_IDC_EPISODE_DURATION: 189 S
MDC_IDC_EPISODE_DURATION: 205 S
MDC_IDC_EPISODE_DURATION: 2097 S
MDC_IDC_EPISODE_DURATION: 232 S
MDC_IDC_EPISODE_DURATION: 235 S
MDC_IDC_EPISODE_DURATION: 243 S
MDC_IDC_EPISODE_DURATION: 250 S
MDC_IDC_EPISODE_DURATION: 2582 S
MDC_IDC_EPISODE_DURATION: 289 S
MDC_IDC_EPISODE_DURATION: 291 S
MDC_IDC_EPISODE_DURATION: 3171 S
MDC_IDC_EPISODE_DURATION: 32 S
MDC_IDC_EPISODE_DURATION: 379 S
MDC_IDC_EPISODE_DURATION: 40 S
MDC_IDC_EPISODE_DURATION: 41 S
MDC_IDC_EPISODE_DURATION: 4216 S
MDC_IDC_EPISODE_DURATION: 44 S
MDC_IDC_EPISODE_DURATION: 49 S
MDC_IDC_EPISODE_DURATION: 59 S
MDC_IDC_EPISODE_DURATION: 592 S
MDC_IDC_EPISODE_DURATION: 60 S
MDC_IDC_EPISODE_DURATION: 60 S
MDC_IDC_EPISODE_DURATION: 63 S
MDC_IDC_EPISODE_DURATION: 65 S
MDC_IDC_EPISODE_DURATION: 66 S
MDC_IDC_EPISODE_DURATION: 660 S
MDC_IDC_EPISODE_DURATION: 67 S
MDC_IDC_EPISODE_DURATION: 77 S
MDC_IDC_EPISODE_DURATION: 78 S
MDC_IDC_EPISODE_DURATION: 79 S
MDC_IDC_EPISODE_DURATION: 798 S
MDC_IDC_EPISODE_DURATION: 86 S
MDC_IDC_EPISODE_DURATION: 86 S
MDC_IDC_EPISODE_DURATION: 87 S
MDC_IDC_EPISODE_DURATION: 93 S
MDC_IDC_EPISODE_DURATION: 96 S
MDC_IDC_EPISODE_ID: 8646
MDC_IDC_EPISODE_ID: 8833
MDC_IDC_EPISODE_ID: 8834
MDC_IDC_EPISODE_ID: 8835
MDC_IDC_EPISODE_ID: 8836
MDC_IDC_EPISODE_ID: 8837
MDC_IDC_EPISODE_ID: 8838
MDC_IDC_EPISODE_ID: 8839
MDC_IDC_EPISODE_ID: 8840
MDC_IDC_EPISODE_ID: 8841
MDC_IDC_EPISODE_ID: 8842
MDC_IDC_EPISODE_ID: 8843
MDC_IDC_EPISODE_ID: 8844
MDC_IDC_EPISODE_ID: 8845
MDC_IDC_EPISODE_ID: 8846
MDC_IDC_EPISODE_ID: 8847
MDC_IDC_EPISODE_ID: 8848
MDC_IDC_EPISODE_ID: 8849
MDC_IDC_EPISODE_ID: 8850
MDC_IDC_EPISODE_ID: 8851
MDC_IDC_EPISODE_ID: 8852
MDC_IDC_EPISODE_ID: 8853
MDC_IDC_EPISODE_ID: 8854
MDC_IDC_EPISODE_ID: 8855
MDC_IDC_EPISODE_ID: 8856
MDC_IDC_EPISODE_ID: 8857
MDC_IDC_EPISODE_ID: 8858
MDC_IDC_EPISODE_ID: 8859
MDC_IDC_EPISODE_ID: 8860
MDC_IDC_EPISODE_ID: 8861
MDC_IDC_EPISODE_ID: 8862
MDC_IDC_EPISODE_ID: 8863
MDC_IDC_EPISODE_ID: 8864
MDC_IDC_EPISODE_ID: 8865
MDC_IDC_EPISODE_ID: 8866
MDC_IDC_EPISODE_ID: 8867
MDC_IDC_EPISODE_ID: 8868
MDC_IDC_EPISODE_ID: 8869
MDC_IDC_EPISODE_ID: 8870
MDC_IDC_EPISODE_ID: 8871
MDC_IDC_EPISODE_ID: 8872
MDC_IDC_EPISODE_ID: 8873
MDC_IDC_EPISODE_ID: 8874
MDC_IDC_EPISODE_ID: 8875
MDC_IDC_EPISODE_ID: 8876
MDC_IDC_EPISODE_ID: 8877
MDC_IDC_EPISODE_ID: 8878
MDC_IDC_EPISODE_ID: 8879
MDC_IDC_EPISODE_ID: 8880
MDC_IDC_EPISODE_ID: 8881
MDC_IDC_EPISODE_ID: 8882
MDC_IDC_EPISODE_TYPE: NORMAL
MDC_IDC_LEAD_IMPLANT_DT: NORMAL
MDC_IDC_LEAD_IMPLANT_DT: NORMAL
MDC_IDC_LEAD_LOCATION: NORMAL
MDC_IDC_LEAD_LOCATION: NORMAL
MDC_IDC_LEAD_LOCATION_DETAIL_1: NORMAL
MDC_IDC_LEAD_LOCATION_DETAIL_1: NORMAL
MDC_IDC_LEAD_MFG: NORMAL
MDC_IDC_LEAD_MFG: NORMAL
MDC_IDC_LEAD_MODEL: NORMAL
MDC_IDC_LEAD_MODEL: NORMAL
MDC_IDC_LEAD_POLARITY_TYPE: NORMAL
MDC_IDC_LEAD_POLARITY_TYPE: NORMAL
MDC_IDC_LEAD_SERIAL: NORMAL
MDC_IDC_LEAD_SERIAL: NORMAL
MDC_IDC_MSMT_BATTERY_DTM: NORMAL
MDC_IDC_MSMT_BATTERY_REMAINING_LONGEVITY: 48 MO
MDC_IDC_MSMT_BATTERY_RRT_TRIGGER: 2.83
MDC_IDC_MSMT_BATTERY_STATUS: NORMAL
MDC_IDC_MSMT_BATTERY_VOLTAGE: 2.98 V
MDC_IDC_MSMT_LEADCHNL_RA_IMPEDANCE_VALUE: 380 OHM
MDC_IDC_MSMT_LEADCHNL_RA_IMPEDANCE_VALUE: 418 OHM
MDC_IDC_MSMT_LEADCHNL_RA_PACING_THRESHOLD_AMPLITUDE: 0.5 V
MDC_IDC_MSMT_LEADCHNL_RA_PACING_THRESHOLD_PULSEWIDTH: 0.4 MS
MDC_IDC_MSMT_LEADCHNL_RA_SENSING_INTR_AMPL: 0.62 MV
MDC_IDC_MSMT_LEADCHNL_RA_SENSING_INTR_AMPL: 0.62 MV
MDC_IDC_MSMT_LEADCHNL_RV_IMPEDANCE_VALUE: 418 OHM
MDC_IDC_MSMT_LEADCHNL_RV_IMPEDANCE_VALUE: 532 OHM
MDC_IDC_MSMT_LEADCHNL_RV_PACING_THRESHOLD_AMPLITUDE: 0.5 V
MDC_IDC_MSMT_LEADCHNL_RV_PACING_THRESHOLD_PULSEWIDTH: 0.4 MS
MDC_IDC_MSMT_LEADCHNL_RV_SENSING_INTR_AMPL: 19.62 MV
MDC_IDC_MSMT_LEADCHNL_RV_SENSING_INTR_AMPL: 19.62 MV
MDC_IDC_PG_IMPLANT_DTM: NORMAL
MDC_IDC_PG_MFG: NORMAL
MDC_IDC_PG_MODEL: NORMAL
MDC_IDC_PG_SERIAL: NORMAL
MDC_IDC_PG_TYPE: NORMAL
MDC_IDC_SESS_CLINIC_NAME: NORMAL
MDC_IDC_SESS_DTM: NORMAL
MDC_IDC_SESS_TYPE: NORMAL
MDC_IDC_SET_BRADY_AT_MODE_SWITCH_RATE: 171 {BEATS}/MIN
MDC_IDC_SET_BRADY_HYSTRATE: NORMAL
MDC_IDC_SET_BRADY_LOWRATE: 55 {BEATS}/MIN
MDC_IDC_SET_BRADY_MAX_SENSOR_RATE: 130 {BEATS}/MIN
MDC_IDC_SET_BRADY_MAX_TRACKING_RATE: 130 {BEATS}/MIN
MDC_IDC_SET_BRADY_MODE: NORMAL
MDC_IDC_SET_BRADY_PAV_DELAY_LOW: 150 MS
MDC_IDC_SET_BRADY_SAV_DELAY_LOW: 150 MS
MDC_IDC_SET_LEADCHNL_RA_PACING_AMPLITUDE: 1.5 V
MDC_IDC_SET_LEADCHNL_RA_PACING_ANODE_ELECTRODE_1: NORMAL
MDC_IDC_SET_LEADCHNL_RA_PACING_ANODE_LOCATION_1: NORMAL
MDC_IDC_SET_LEADCHNL_RA_PACING_CAPTURE_MODE: NORMAL
MDC_IDC_SET_LEADCHNL_RA_PACING_CATHODE_ELECTRODE_1: NORMAL
MDC_IDC_SET_LEADCHNL_RA_PACING_CATHODE_LOCATION_1: NORMAL
MDC_IDC_SET_LEADCHNL_RA_PACING_POLARITY: NORMAL
MDC_IDC_SET_LEADCHNL_RA_PACING_PULSEWIDTH: 0.4 MS
MDC_IDC_SET_LEADCHNL_RA_SENSING_ANODE_ELECTRODE_1: NORMAL
MDC_IDC_SET_LEADCHNL_RA_SENSING_ANODE_LOCATION_1: NORMAL
MDC_IDC_SET_LEADCHNL_RA_SENSING_CATHODE_ELECTRODE_1: NORMAL
MDC_IDC_SET_LEADCHNL_RA_SENSING_CATHODE_LOCATION_1: NORMAL
MDC_IDC_SET_LEADCHNL_RA_SENSING_POLARITY: NORMAL
MDC_IDC_SET_LEADCHNL_RA_SENSING_SENSITIVITY: 0.3 MV
MDC_IDC_SET_LEADCHNL_RV_PACING_AMPLITUDE: 2 V
MDC_IDC_SET_LEADCHNL_RV_PACING_ANODE_ELECTRODE_1: NORMAL
MDC_IDC_SET_LEADCHNL_RV_PACING_ANODE_LOCATION_1: NORMAL
MDC_IDC_SET_LEADCHNL_RV_PACING_CAPTURE_MODE: NORMAL
MDC_IDC_SET_LEADCHNL_RV_PACING_CATHODE_ELECTRODE_1: NORMAL
MDC_IDC_SET_LEADCHNL_RV_PACING_CATHODE_LOCATION_1: NORMAL
MDC_IDC_SET_LEADCHNL_RV_PACING_POLARITY: NORMAL
MDC_IDC_SET_LEADCHNL_RV_PACING_PULSEWIDTH: 0.4 MS
MDC_IDC_SET_LEADCHNL_RV_SENSING_ANODE_ELECTRODE_1: NORMAL
MDC_IDC_SET_LEADCHNL_RV_SENSING_ANODE_LOCATION_1: NORMAL
MDC_IDC_SET_LEADCHNL_RV_SENSING_CATHODE_ELECTRODE_1: NORMAL
MDC_IDC_SET_LEADCHNL_RV_SENSING_CATHODE_LOCATION_1: NORMAL
MDC_IDC_SET_LEADCHNL_RV_SENSING_POLARITY: NORMAL
MDC_IDC_SET_LEADCHNL_RV_SENSING_SENSITIVITY: 0.9 MV
MDC_IDC_SET_ZONE_DETECTION_INTERVAL: 350 MS
MDC_IDC_SET_ZONE_DETECTION_INTERVAL: 400 MS
MDC_IDC_SET_ZONE_TYPE: NORMAL
MDC_IDC_STAT_AT_BURDEN_PERCENT: 4.6 %
MDC_IDC_STAT_AT_DTM_END: NORMAL
MDC_IDC_STAT_AT_DTM_START: NORMAL
MDC_IDC_STAT_BRADY_AP_VP_PERCENT: 42.16 %
MDC_IDC_STAT_BRADY_AP_VS_PERCENT: 0 %
MDC_IDC_STAT_BRADY_AS_VP_PERCENT: 57.83 %
MDC_IDC_STAT_BRADY_AS_VS_PERCENT: 0.01 %
MDC_IDC_STAT_BRADY_DTM_END: NORMAL
MDC_IDC_STAT_BRADY_DTM_START: NORMAL
MDC_IDC_STAT_BRADY_RA_PERCENT_PACED: 41.13 %
MDC_IDC_STAT_BRADY_RV_PERCENT_PACED: 99.98 %
MDC_IDC_STAT_EPISODE_RECENT_COUNT: 0
MDC_IDC_STAT_EPISODE_RECENT_COUNT: 0
MDC_IDC_STAT_EPISODE_RECENT_COUNT: 1
MDC_IDC_STAT_EPISODE_RECENT_COUNT: 788
MDC_IDC_STAT_EPISODE_RECENT_COUNT_DTM_END: NORMAL
MDC_IDC_STAT_EPISODE_RECENT_COUNT_DTM_START: NORMAL
MDC_IDC_STAT_EPISODE_TOTAL_COUNT: 0
MDC_IDC_STAT_EPISODE_TOTAL_COUNT: 2
MDC_IDC_STAT_EPISODE_TOTAL_COUNT: 3
MDC_IDC_STAT_EPISODE_TOTAL_COUNT: 8877
MDC_IDC_STAT_EPISODE_TOTAL_COUNT_DTM_END: NORMAL
MDC_IDC_STAT_EPISODE_TOTAL_COUNT_DTM_START: NORMAL
MDC_IDC_STAT_EPISODE_TYPE: NORMAL

## 2023-05-22 NOTE — PROGRESS NOTES
"[facility]    I had the pleasure of seeing Arias when he came for follow up of HTN and heart block.  This 68 year old sees Dr. Benavidez for his history of:    1. 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 Mild CM with most recent echo 11/2019 showing EF from 45% up to 50-55%  3. HTN  4. Paroxysmal AFib noted on device interrogations.On Eliquis for CHADSVASc 2 (HTN, age)   5.  NSVT noted on device interrogations  6.  Polymyalgia rheumatica - dx'd 5/2022  7.  Significant alcohol use - 20-25 drinks/week      Our Last Visit:  I saw Arias 12/2021 at which time he was doing well after switching metoprolol XL to carvedilol.  BPs were under excellent control.  6-month follow-up recommended.    Interval History:  He saw Dr. Benavidez 6/2022 and had recently been diagnosed with polymyalgia rheumatica and was placed on prednisone.  He was feeling much better from a joint perspective, but noted chest discomfort with moderately heavy exertion.  Coronary CTA recommended given NSVT noted on device interrogation and the symptoms.    CTA 7/2022 showed just mild CAD and CAC with score 98.4, which placed him in the 47th percentile based on age/gender matched controls.  No changes were made.    Today's Visit:  Overall, Arias is feeling well.  He is pleased that he can do more this year now that he is on prednisone, but has noted increased \"munchiness\" and notes significant weight gain since he has been on this.  He was able to golf yesterday, which she was quite pleased about.    Denies chest pain, pressure, tightness.  Notes occasional palpitations and feels that he knows when he is in atrial fibrillation.  Reviewed last device interrogation 3/2023 indicating 4.8% AF burden, longest episode 70 minutes with controlled VR.  He thinks his lightheadedness is slightly worse when he is in atrial fibrillation, but otherwise notes this only when standing up quickly.    BPs at home 120-130/80.    Reviewed his CTA from " "last year, and discussed efforts to keep his coronary calcification/atherosclerotic burden down as he ages.  He sees Dr. Viera routinely, and has upcoming labs 9/2023.    VITALS:  Vitals: /78   Pulse 77   Resp 17   Ht 1.803 m (5' 11\")   Wt 115.7 kg (255 lb)   SpO2 98%   BMI 35.57 kg/m      Diagnostic Testing:  EKG today, which I overread, showed AsVp 63 bpm  Device interrogation 3/2023, showed 42% AP and 100%  in DDDR 55/130.  4 years battery remaining (3-4.5 years) 4.6% mode switching with EGM's consistent with atrial fibrillation.  Longest episode 70 minutes with controlled VR.  1 episode NSVT x 6 beats @ 175-230 bpm.  Asymptomatic.  CTA heart 7/2022 calcium score 98.4, placing him in 47th percentile when compared to age/gender matched controls.  Nonobstructive CAD with mild oLM, mild p/mLAD, mild pOM1 and nondominant RCA.  Normal aorta. Non-cardiac portion nonsignificant  Echocardiogam 11/2019 showed EF 50-55%. No RWMA. RV OK. LA mild-mod dilated 4.5 cm index 43.0 ml/m2. Trace MR. 1+ TR with RVSP 21+RAP. Aortic sclerosis. Mild 1+ AI.  Nuclear Stress Test 2/2018 showed no ischemia      Plan:  1.  No alcohol  2.  See me back 12/2023 at time of next in-office device interrogation  3.  Limit processed/ultra processed food for LDL goal <100 mg/dL    Assessment/Plan:    1. HTN    Currently on lisinopril/HCTZ 20/25 and carvedilol 6.25 BID    BP at home looks great, a little higher here    Notes that BP has not gone up at home despite increased weight after starting prednisone therapy    BMP at PCPs office 3/2023 showed normal electrolytes and renal function    PLAN:    Continue current medications    See me back 12/2023 at time of next in-office device interrogation    Limit alcohol to help bring BP down.  He states that he is a \"all or nothing\" celsa, so really just needs to stop drinking entirely.  He had very good success with this in the past when a psychologist wrote a paper prescription to say \"no " "drinking alcohol\" and I provided him with 1 of these today given increased calorie burden, elevated glucose on last fasting BMP 3/2023 (124 mg/dL), atrial fibrillation and h/o VT.    2. Atrial arrhythmias    Known history of paroxysmal AFib and AT    Last device interrogation continues to show episodes of this, lasting up to~1h.  V rates controlled on carvedilol 6.25 BID.  He feels like he notes when he is in atrial fibrillation as he feels more lightheadedness.  Confirms no falls    Remains on Eliquis for [id number] 2 (HTN, age)    PLAN:        Continue routine device interrogations    No alcohol as noted above    3. CAD    This was mild, noted on CTA 7/2022 in the setting of chest discomfort and NSVT on device interrogation    Remains on BB and ACE therapy.  On statin.    Last LDL 9/2022 was 141 mg/dL.  It looks as though he was taking simvastatin at that time    PLAN:    Reviewed LDL goal <100 mg/dL given known CAD.  I have recommended he cut out processed/ultra processed food, which he snacks on when he drinks alcohol    Continue routine follow-up with Dr. Viera for lipid mgmt        Maria Elena Khan PA-C, [facility]      Orders Placed This Encounter   Procedures     EKG 12-lead complete w/read - Clinics (performed today)     No orders of the defined types were placed in this encounter.    There are no discontinued medications.      Encounter Diagnosis   Name Primary?     NSVT (nonsustained ventricular tachycardia) (H)        CURRENT MEDICATIONS:  Current Outpatient Medications   Medication Sig Dispense Refill     apixaban ANTICOAGULANT (ELIQUIS ANTICOAGULANT) 5 MG tablet Take 1 tablet (5 mg) by mouth 2 times daily 180 tablet 0     carvedilol (COREG) 6.25 MG tablet Take 1 tablet (6.25 mg) by mouth 2 times daily (with meals) 180 tablet 1     lisinopril-hydrochlorothiazide (PRINZIDE,ZESTORETIC) 20-25 MG per tablet Take 1 tablet by mouth daily       predniSONE (DELTASONE) 20 MG tablet Take 7 mg by mouth daily Starting " "at 30 mg daily and will wean off.       Sertraline HCl (ZOLOFT PO) Take 100 mg by mouth daily       simvastatin (ZOCOR) 20 MG tablet Take 20 mg by mouth daily         ALLERGIES     Allergies   Allergen Reactions     Flexeril [Cyclobenzaprine] GI Disturbance     Naprosyn [Naproxen] GI Disturbance     Penicillins      As child     Ranitidine GI Disturbance         Review of Systems:  Skin:  Negative     Eyes:  Positive for cataracts  ENT:  Negative    Respiratory:  Positive for dyspnea on exertion  Cardiovascular:  Negative for;palpitations;chest pain;edema;fatigue;lightheadedness;dizziness Positive for;exercise intolerance  Gastroenterology: Negative for melena;hematochezia  Genitourinary:  Negative    Musculoskeletal:  Negative back pain;neck pain  Neurologic:  Negative numbness or tingling of hands  Psychiatric:  Positive for depression  Heme/Lymph/Imm:  Positive for allergies  Endocrine:  Negative      Physical Exam:  Vitals: /78   Pulse 77   Resp 17   Ht 1.803 m (5' 11\")   Wt 115.7 kg (255 lb)   SpO2 98%   BMI 35.57 kg/m      Constitutional:  cooperative, alert and oriented, well developed, well nourished, in no acute distress        Skin:  warm and dry to the touch, no apparent skin lesions or masses noted        Head:  normocephalic, no masses or lesions        Eyes:  pupils equal and round;conjunctivae and lids unremarkable;sclera white        ENT:  no pallor or cyanosis, dentition good        Neck:  JVP normal        Chest:  normal breath sounds, clear to auscultation, normal A-P diameter, normal symmetry, normal respiratory excursion, no use of accessory muscles        Cardiac: regular rhythm;no murmurs, gallops or rubs detected                  Abdomen:  abdomen soft obese      Vascular: pulses full and equal                                      Extremities and Back:  no edema;no deformities, clubbing, cyanosis, erythema observed        Neurological:  no gross motor deficits            PAST " MEDICAL HISTORY:  Past Medical History:   Diagnosis Date     CPAP (continuous positive airway pressure) dependence      Depression      Gastro-oesophageal reflux disease      Hepatic steatosis      Hoarseness      Hyperlipidemia      Hypertension      IFG (impaired fasting glucose)      LAFB (left anterior fascicular block)      PONV (postoperative nausea and vomiting)      RBBB      Seizures (H)     as child     Sleep apnea     cpap     Uncomplicated asthma     denies asthma       PAST SURGICAL HISTORY:  Past Surgical History:   Procedure Laterality Date     ANKLE SURGERY       APPENDECTOMY       BACK SURGERY      lami x2     ENT SURGERY      throat granulomas     HERNIA REPAIR       INJECT BOTOX N/A 10/30/2014    Procedure: INJECT BOTOX;  Surgeon: Kalyn Negron MD;  Location: [facility] OR     INJECT BOTOX N/A 2016    Procedure: INJECT BOTOX;  Surgeon: Kalyn Negron MD;  Location: [facility]     INJECT STEROID (LOCATION) N/A 2016    Procedure: INJECT STEROID (LOCATION);  Surgeon: Kalyn Negron MD;  Location: [facility] OR     LAMINECTOMY LUMBAR ONE LEVEL  3/5/2014    Procedure: LAMINECTOMY LUMBAR ONE LEVEL;  RIGHT L4-5 DISCECTOMY;  Surgeon: Rodrigo Ríos MD;  Location: [facility] OR     LASER CO2 LARYNGOSCOPY N/A 2016    Procedure: LASER CO2 LARYNGOSCOPY;  Surgeon: Kalyn Negron MD;  Location: [facility] OR     LASER CO2 LARYNGOSCOPY, COMPLEX Left 10/30/2014    Procedure: LASER CO2 LARYNGOSCOPY, COMPLEX;  Surgeon: Kalyn Negron MD;  Location: [facility] OR     ORTHOPEDIC SURGERY         FAMILY HISTORY:  Family History   Problem Relation Age of Onset     Cancer Father        SOCIAL HISTORY:  Social History     Socioeconomic History     Marital status:    Tobacco Use     Smoking status: Former     Types: Pipe     Start date: 10/10/1976     Quit date: 1985     Years since quittin.0     Smokeless tobacco: Never   Substance and Sexual Activity     Alcohol use: Yes      Comment: daily- 2 drinks /day     Drug use: No     Sexual activity: Yes     Partners: Female     Birth control/protection: Other

## 2023-05-25 ENCOUNTER — OFFICE VISIT (OUTPATIENT)
Dept: CARDIOLOGY | Facility: CLINIC | Age: 68
End: 2023-05-25
Attending: INTERNAL MEDICINE
Payer: COMMERCIAL

## 2023-05-25 VITALS
SYSTOLIC BLOOD PRESSURE: 136 MMHG | OXYGEN SATURATION: 98 % | DIASTOLIC BLOOD PRESSURE: 78 MMHG | RESPIRATION RATE: 17 BRPM | HEART RATE: 77 BPM | WEIGHT: 255 LBS | BODY MASS INDEX: 35.7 KG/M2 | HEIGHT: 71 IN

## 2023-05-25 DIAGNOSIS — I48.0 PAROXYSMAL ATRIAL FIBRILLATION (H): Primary | ICD-10-CM

## 2023-05-25 DIAGNOSIS — I47.29 NSVT (NONSUSTAINED VENTRICULAR TACHYCARDIA) (H): ICD-10-CM

## 2023-05-25 PROCEDURE — 93000 ELECTROCARDIOGRAM COMPLETE: CPT | Performed by: PHYSICIAN ASSISTANT

## 2023-05-25 PROCEDURE — 99214 OFFICE O/P EST MOD 30 MIN: CPT | Performed by: PHYSICIAN ASSISTANT

## 2023-05-25 NOTE — PATIENT INSTRUCTIONS
"Arias - it was good to see you today!    Reviewed that prednisone is tapering off - so glad it's helping the joints!  Reviewed CT scan last year showed some calcification - still good #s but want to keep good  Noted still with AFib that you can tell  BPs good at home!    PLAN:  Keep BP down  Increase exercise and limit processed/ultra-processed food to keep saturated fat down! Goal LDL <100 mg/dL when you check it with Dr. Viera this Fall  See us back 1 year but CALL if issues!  RECOMMEND NO ALCOHOL given \"all or none\" in your life!  See us back 12/2023 at time of in-office device check to cut down on trips!    [phone]  "

## 2023-05-25 NOTE — LETTER
"5/25/2023    Leann G Hutchison, MD [facility] [place] [address] [place] [address]    RE: Arias Manzo       Dear Colleague,     I had the pleasure of seeing Arias Manzo in the [facility].  [facility]    I had the pleasure of seeing Arias when he came for follow up of HTN and heart block.  This 68 year old sees Dr. Benavidez for his history of:    1. 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 Mild CM with most recent echo 11/2019 showing EF from 45% up to 50-55%  3. HTN  4. Paroxysmal AFib noted on device interrogations.On Eliquis for CHADSVASc 2 (HTN, age)   5.  NSVT noted on device interrogations  6.  Polymyalgia rheumatica - dx'd 5/2022  7.  Significant alcohol use - 20-25 drinks/week      Our Last Visit:  I saw Arias 12/2021 at which time he was doing well after switching metoprolol XL to carvedilol.  BPs were under excellent control.  6-month follow-up recommended.    Interval History:  He saw Dr. Benavidez 6/2022 and had recently been diagnosed with polymyalgia rheumatica and was placed on prednisone.  He was feeling much better from a joint perspective, but noted chest discomfort with moderately heavy exertion.  Coronary CTA recommended given NSVT noted on device interrogation and the symptoms.    CTA 7/2022 showed just mild CAD and CAC with score 98.4, which placed him in the 47th percentile based on age/gender matched controls.  No changes were made.    Today's Visit:  Overall, Arias is feeling well.  He is pleased that he can do more this year now that he is on prednisone, but has noted increased \"munchiness\" and notes significant weight gain since he has been on this.  He was able to golf yesterday, which she was quite pleased about.    Denies chest pain, pressure, tightness.  Notes occasional palpitations and feels that he knows when he is in atrial fibrillation.  Reviewed last device interrogation 3/2023 indicating 4.8% AF burden, " "longest episode 70 minutes with controlled VR.  He thinks his lightheadedness is slightly worse when he is in atrial fibrillation, but otherwise notes this only when standing up quickly.    BPs at home 120-130/80.    Reviewed his CTA from last year, and discussed efforts to keep his coronary calcification/atherosclerotic burden down as he ages.  He sees Dr. Viera routinely, and has upcoming labs 9/2023.    VITALS:  Vitals: /78   Pulse 77   Resp 17   Ht 1.803 m (5' 11\")   Wt 115.7 kg (255 lb)   SpO2 98%   BMI 35.57 kg/m      Diagnostic Testing:  EKG today, which I overread, showed AsVp 63 bpm  Device interrogation 3/2023, showed 42% AP and 100%  in DDDR 55/130.  4 years battery remaining (3-4.5 years) 4.6% mode switching with EGM's consistent with atrial fibrillation.  Longest episode 70 minutes with controlled VR.  1 episode NSVT x 6 beats @ 175-230 bpm.  Asymptomatic.  CTA heart 7/2022 calcium score 98.4, placing him in 47th percentile when compared to age/gender matched controls.  Nonobstructive CAD with mild oLM, mild p/mLAD, mild pOM1 and nondominant RCA.  Normal aorta. Non-cardiac portion nonsignificant  Echocardiogam 11/2019 showed EF 50-55%. No RWMA. RV OK. LA mild-mod dilated 4.5 cm index 43.0 ml/m2. Trace MR. 1+ TR with RVSP 21+RAP. Aortic sclerosis. Mild 1+ AI.  Nuclear Stress Test 2/2018 showed no ischemia      Plan:  1.  No alcohol  2.  See me back 12/2023 at time of next in-office device interrogation  3.  Limit processed/ultra processed food for LDL goal <100 mg/dL    Assessment/Plan:    HTN  Currently on lisinopril/HCTZ 20/25 and carvedilol 6.25 BID  BP at home looks great, a little higher here  Notes that BP has not gone up at home despite increased weight after starting prednisone therapy  BMP at PCPs office 3/2023 showed normal electrolytes and renal function    PLAN:  Continue current medications  See me back 12/2023 at time of next in-office device interrogation  Limit alcohol " "to help bring BP down.  He states that he is a \"all or nothing\" celsa, so really just needs to stop drinking entirely.  He had very good success with this in the past when a psychologist wrote a paper prescription to say \"no drinking alcohol\" and I provided him with 1 of these today given increased calorie burden, elevated glucose on last fasting BMP 3/2023 (124 mg/dL), atrial fibrillation and h/o VT.    Atrial arrhythmias  Known history of paroxysmal AFib and AT  Last device interrogation continues to show episodes of this, lasting up to~1h.  V rates controlled on carvedilol 6.25 BID.  He feels like he notes when he is in atrial fibrillation as he feels more lightheadedness.  Confirms no falls  Remains on Eliquis for PDA1BC3-VDLd 2 (HTN, age)    PLAN:    Continue routine device interrogations  No alcohol as noted above    CAD  This was mild, noted on CTA 7/2022 in the setting of chest discomfort and NSVT on device interrogation  Remains on BB and ACE therapy.  On statin.  Last LDL 9/2022 was 141 mg/dL.  It looks as though he was taking simvastatin at that time    PLAN:  Reviewed LDL goal <100 mg/dL given known CAD.  I have recommended he cut out processed/ultra processed food, which he snacks on when he drinks alcohol  Continue routine follow-up with Dr. Viera for lipid mgmt        Maria Elena Khan PA-C, MSPAS      Orders Placed This Encounter   Procedures    EKG 12-lead complete w/read - Clinics (performed today)     No orders of the defined types were placed in this encounter.    There are no discontinued medications.      Encounter Diagnosis   Name Primary?    NSVT (nonsustained ventricular tachycardia) (H)        CURRENT MEDICATIONS:  Current Outpatient Medications   Medication Sig Dispense Refill    apixaban ANTICOAGULANT (ELIQUIS ANTICOAGULANT) 5 MG tablet Take 1 tablet (5 mg) by mouth 2 times daily 180 tablet 0    carvedilol (COREG) 6.25 MG tablet Take 1 tablet (6.25 mg) by mouth 2 times daily (with meals) 180 " "tablet 1    lisinopril-hydrochlorothiazide (PRINZIDE,ZESTORETIC) 20-25 MG per tablet Take 1 tablet by mouth daily      predniSONE (DELTASONE) 20 MG tablet Take 7 mg by mouth daily Starting at 30 mg daily and will wean off.      Sertraline HCl (ZOLOFT PO) Take 100 mg by mouth daily      simvastatin (ZOCOR) 20 MG tablet Take 20 mg by mouth daily         ALLERGIES     Allergies   Allergen Reactions    Flexeril [Cyclobenzaprine] GI Disturbance    Naprosyn [Naproxen] GI Disturbance    Penicillins      As child    Ranitidine GI Disturbance         Review of Systems:  Skin:  Negative     Eyes:  Positive for cataracts  ENT:  Negative    Respiratory:  Positive for dyspnea on exertion  Cardiovascular:  Negative for;palpitations;chest pain;edema;fatigue;lightheadedness;dizziness Positive for;exercise intolerance  Gastroenterology: Negative for melena;hematochezia  Genitourinary:  Negative    Musculoskeletal:  Negative back pain;neck pain  Neurologic:  Negative numbness or tingling of hands  Psychiatric:  Positive for depression  Heme/Lymph/Imm:  Positive for allergies  Endocrine:  Negative      Physical Exam:  Vitals: /78   Pulse 77   Resp 17   Ht 1.803 m (5' 11\")   Wt 115.7 kg (255 lb)   SpO2 98%   BMI 35.57 kg/m      Constitutional:  cooperative, alert and oriented, well developed, well nourished, in no acute distress        Skin:  warm and dry to the touch, no apparent skin lesions or masses noted        Head:  normocephalic, no masses or lesions        Eyes:  pupils equal and round;conjunctivae and lids unremarkable;sclera white        ENT:  no pallor or cyanosis, dentition good        Neck:  JVP normal        Chest:  normal breath sounds, clear to auscultation, normal A-P diameter, normal symmetry, normal respiratory excursion, no use of accessory muscles        Cardiac: regular rhythm;no murmurs, gallops or rubs detected                  Abdomen:  abdomen soft obese      Vascular: pulses full and equal        "                               Extremities and Back:  no edema;no deformities, clubbing, cyanosis, erythema observed        Neurological:  no gross motor deficits           PAST MEDICAL HISTORY:  Past Medical History:   Diagnosis Date    CPAP (continuous positive airway pressure) dependence     Depression     Gastro-oesophageal reflux disease     Hepatic steatosis     Hoarseness     Hyperlipidemia     Hypertension     IFG (impaired fasting glucose)     LAFB (left anterior fascicular block)     PONV (postoperative nausea and vomiting)     RBBB     Seizures (H)     as child    Sleep apnea     cpap    Uncomplicated asthma     denies asthma       PAST SURGICAL HISTORY:  Past Surgical History:   Procedure Laterality Date    ANKLE SURGERY      APPENDECTOMY      BACK SURGERY      lami x2    ENT SURGERY      throat granulomas    HERNIA REPAIR      INJECT BOTOX N/A 10/30/2014    Procedure: INJECT BOTOX;  Surgeon: Kalyn Negron MD;  Location: [facility] OR    INJECT BOTOX N/A 12/8/2016    Procedure: INJECT BOTOX;  Surgeon: Kalyn Negron MD;  Location: [facility]    INJECT STEROID (LOCATION) N/A 12/8/2016    Procedure: INJECT STEROID (LOCATION);  Surgeon: Kalyn Negron MD;  Location: [facility] OR    LAMINECTOMY LUMBAR ONE LEVEL  3/5/2014    Procedure: LAMINECTOMY LUMBAR ONE LEVEL;  RIGHT L4-5 DISCECTOMY;  Surgeon: Rodrigo Ríos MD;  Location: [facility] OR    LASER CO2 LARYNGOSCOPY N/A 12/8/2016    Procedure: LASER CO2 LARYNGOSCOPY;  Surgeon: Kalyn Negron MD;  Location: [facility] OR    LASER CO2 LARYNGOSCOPY, COMPLEX Left 10/30/2014    Procedure: LASER CO2 LARYNGOSCOPY, COMPLEX;  Surgeon: Kalyn Negron MD;  Location: [facility] OR    ORTHOPEDIC SURGERY         FAMILY HISTORY:  Family History   Problem Relation Age of Onset    Cancer Father        SOCIAL HISTORY:  Social History     Socioeconomic History    Marital status:    Tobacco Use    Smoking status: Former     Types: Pipe     Start date:  10/10/1976     Quit date: 1985     Years since quittin.0    Smokeless tobacco: Never   Substance and Sexual Activity    Alcohol use: Yes     Comment: daily- 2 drinks /day    Drug use: No    Sexual activity: Yes     Partners: Female     Birth control/protection: Other            Thank you for allowing me to participate in the care of your patient.      Sincerely,   Melani Khan PA-C   [facility]  cc:   Miley Benavidez MD  [address] [facility] [address]  [place] [address]

## 2023-05-30 DIAGNOSIS — I48.0 PAROXYSMAL ATRIAL FIBRILLATION (H): ICD-10-CM

## 2023-06-05 ENCOUNTER — TELEPHONE (OUTPATIENT)
Dept: CARDIOLOGY | Facility: CLINIC | Age: 68
End: 2023-06-05
Payer: COMMERCIAL

## 2023-06-05 NOTE — TELEPHONE ENCOUNTER
"Patient has Medicare D through [facility].  Patient's drug costs have exceeded $4660, and as such will now pay a 25% coinsurance on all covered drugs.  (Also called the \"coverage gap\" or \"donut hole.\")      Anticoagulant pricing in this phase:  --Xarelto and Eliquis are identically priced at $151/mo (with only a few dollars difference between pharmacies).  --Dabigatran (generic Pradaxa) is slightly cheaper at $103/mo (however, this is a Tier 4 non-preferred medication, so it will be more ($175/mo) than Eliquis/Xarelto ($47/mo) when the plan resets on 1/1/24)  --Jantoven (warfarin) is $3.50/mo.    [employer] offers Xarelto through the mail to all patients in the coverage gap, regardless of income, for $85/mo or $240/3 mo.  Patient can enroll in this program at [employer] or by calling [employer] ([phone]).      Please reconsult if income-based resources for free or discounted medication are needed.    Kailey Jones  Pharmacy Technician/Liaison, Discharge Pharmacy   [phone] (voice or text)  [email or web address]        "

## 2023-06-05 NOTE — TELEPHONE ENCOUNTER
6/5/23 Spoke w pt who stated the cost of his Eliquis has recently gone up , he thinks he may be in the donut hole and is wondering if there is a generic or other anticoagulant he could be on.  Explained there is no generic for Eliquis but will check with pharmacy liaison on whether he truly is in donut hole / if there is another anticoagulant that would be a better price for him and call him back  Pt voiced understanding and agreement with plan.   Jazmín

## 2023-06-05 NOTE — TELEPHONE ENCOUNTER
6/5/23 Spoke w pt an explained information from Pharmacy Liaison.  He is interested in reaching out to [facility] to find out more about the mail order program for 85.00/month   Phone # given, pt will call back if this is what he decides to do, will need to change rx from Eliquis to Xarelacosta [place] 354 pm

## 2023-06-05 NOTE — TELEPHONE ENCOUNTER
Health Call Center    Phone Message    May a detailed message be left on voicemail: yes     Reason for Call: Medication Question or concern regarding medication   Prescription Clarification  Name of Medication: apixaban ANTICOAGULANT (ELIQUIS ANTICOAGULANT) 5 MG tablet  Prescribing Provider: Bill   Pharmacy: [id number] [place], [place] - [address]   What on the order needs clarification? Patient called requesting to speak with Maria Elena Khan. Would like to discuss being prescribed a generic version of this medication. Patient states the medication has become too expensive. Please call patient back to further discuss, thank you.    Action Taken: Message routed to:  Other: Cardiology    Travel Screening: Not Applicable     Thank you!  [facility]

## 2023-06-21 ENCOUNTER — ANCILLARY PROCEDURE (OUTPATIENT)
Dept: CARDIOLOGY | Facility: CLINIC | Age: 68
End: 2023-06-21
Attending: INTERNAL MEDICINE
Payer: COMMERCIAL

## 2023-06-21 DIAGNOSIS — Z95.0 CARDIAC PACEMAKER IN SITU: ICD-10-CM

## 2023-06-21 PROCEDURE — 93294 REM INTERROG EVL PM/LDLS PM: CPT | Performed by: INTERNAL MEDICINE

## 2023-06-21 PROCEDURE — 93296 REM INTERROG EVL PM/IDS: CPT | Performed by: INTERNAL MEDICINE

## 2023-06-28 LAB
MDC_IDC_EPISODE_DTM: NORMAL
MDC_IDC_EPISODE_DURATION: 109 S
MDC_IDC_EPISODE_DURATION: 1169 S
MDC_IDC_EPISODE_DURATION: 1261 S
MDC_IDC_EPISODE_DURATION: 1410 S
MDC_IDC_EPISODE_DURATION: 143 S
MDC_IDC_EPISODE_DURATION: 155 S
MDC_IDC_EPISODE_DURATION: 1638 S
MDC_IDC_EPISODE_DURATION: 168 S
MDC_IDC_EPISODE_DURATION: 174 S
MDC_IDC_EPISODE_DURATION: 184 S
MDC_IDC_EPISODE_DURATION: 1955 S
MDC_IDC_EPISODE_DURATION: 198 S
MDC_IDC_EPISODE_DURATION: 208 S
MDC_IDC_EPISODE_DURATION: 208 S
MDC_IDC_EPISODE_DURATION: 2255 S
MDC_IDC_EPISODE_DURATION: 258 S
MDC_IDC_EPISODE_DURATION: 267 S
MDC_IDC_EPISODE_DURATION: 275 S
MDC_IDC_EPISODE_DURATION: 304 S
MDC_IDC_EPISODE_DURATION: 3223 S
MDC_IDC_EPISODE_DURATION: 341 S
MDC_IDC_EPISODE_DURATION: 348 S
MDC_IDC_EPISODE_DURATION: 350 S
MDC_IDC_EPISODE_DURATION: 36 S
MDC_IDC_EPISODE_DURATION: 39 S
MDC_IDC_EPISODE_DURATION: 470 S
MDC_IDC_EPISODE_DURATION: 48 S
MDC_IDC_EPISODE_DURATION: 49 S
MDC_IDC_EPISODE_DURATION: 494 S
MDC_IDC_EPISODE_DURATION: 495 S
MDC_IDC_EPISODE_DURATION: 50 S
MDC_IDC_EPISODE_DURATION: 526 S
MDC_IDC_EPISODE_DURATION: 54 S
MDC_IDC_EPISODE_DURATION: 540 S
MDC_IDC_EPISODE_DURATION: 57 S
MDC_IDC_EPISODE_DURATION: 578 S
MDC_IDC_EPISODE_DURATION: 633 S
MDC_IDC_EPISODE_DURATION: 64 S
MDC_IDC_EPISODE_DURATION: 720 S
MDC_IDC_EPISODE_DURATION: 725 S
MDC_IDC_EPISODE_DURATION: 75 S
MDC_IDC_EPISODE_DURATION: 76 S
MDC_IDC_EPISODE_DURATION: 77 S
MDC_IDC_EPISODE_DURATION: 819 S
MDC_IDC_EPISODE_DURATION: 82 S
MDC_IDC_EPISODE_DURATION: 825 S
MDC_IDC_EPISODE_DURATION: 837 S
MDC_IDC_EPISODE_DURATION: 8985 S
MDC_IDC_EPISODE_DURATION: 955 S
MDC_IDC_EPISODE_DURATION: 983 S
MDC_IDC_EPISODE_ID: NORMAL
MDC_IDC_EPISODE_TYPE: NORMAL
MDC_IDC_LEAD_IMPLANT_DT: NORMAL
MDC_IDC_LEAD_IMPLANT_DT: NORMAL
MDC_IDC_LEAD_LOCATION: NORMAL
MDC_IDC_LEAD_LOCATION: NORMAL
MDC_IDC_LEAD_LOCATION_DETAIL_1: NORMAL
MDC_IDC_LEAD_LOCATION_DETAIL_1: NORMAL
MDC_IDC_LEAD_MFG: NORMAL
MDC_IDC_LEAD_MFG: NORMAL
MDC_IDC_LEAD_MODEL: NORMAL
MDC_IDC_LEAD_MODEL: NORMAL
MDC_IDC_LEAD_POLARITY_TYPE: NORMAL
MDC_IDC_LEAD_POLARITY_TYPE: NORMAL
MDC_IDC_LEAD_SERIAL: NORMAL
MDC_IDC_LEAD_SERIAL: NORMAL
MDC_IDC_MSMT_BATTERY_DTM: NORMAL
MDC_IDC_MSMT_BATTERY_REMAINING_LONGEVITY: 43 MO
MDC_IDC_MSMT_BATTERY_RRT_TRIGGER: 2.83
MDC_IDC_MSMT_BATTERY_STATUS: NORMAL
MDC_IDC_MSMT_BATTERY_VOLTAGE: 2.97 V
MDC_IDC_MSMT_LEADCHNL_RA_IMPEDANCE_VALUE: 380 OHM
MDC_IDC_MSMT_LEADCHNL_RA_IMPEDANCE_VALUE: 418 OHM
MDC_IDC_MSMT_LEADCHNL_RA_PACING_THRESHOLD_AMPLITUDE: 0.5 V
MDC_IDC_MSMT_LEADCHNL_RA_PACING_THRESHOLD_PULSEWIDTH: 0.4 MS
MDC_IDC_MSMT_LEADCHNL_RA_SENSING_INTR_AMPL: 0.5 MV
MDC_IDC_MSMT_LEADCHNL_RA_SENSING_INTR_AMPL: 0.5 MV
MDC_IDC_MSMT_LEADCHNL_RV_IMPEDANCE_VALUE: 399 OHM
MDC_IDC_MSMT_LEADCHNL_RV_IMPEDANCE_VALUE: 513 OHM
MDC_IDC_MSMT_LEADCHNL_RV_PACING_THRESHOLD_AMPLITUDE: 0.62 V
MDC_IDC_MSMT_LEADCHNL_RV_PACING_THRESHOLD_PULSEWIDTH: 0.4 MS
MDC_IDC_MSMT_LEADCHNL_RV_SENSING_INTR_AMPL: 5.12 MV
MDC_IDC_MSMT_LEADCHNL_RV_SENSING_INTR_AMPL: 5.12 MV
MDC_IDC_PG_IMPLANT_DTM: NORMAL
MDC_IDC_PG_MFG: NORMAL
MDC_IDC_PG_MODEL: NORMAL
MDC_IDC_PG_SERIAL: NORMAL
MDC_IDC_PG_TYPE: NORMAL
MDC_IDC_SESS_CLINIC_NAME: NORMAL
MDC_IDC_SESS_DTM: NORMAL
MDC_IDC_SESS_TYPE: NORMAL
MDC_IDC_SET_BRADY_AT_MODE_SWITCH_RATE: 171 {BEATS}/MIN
MDC_IDC_SET_BRADY_HYSTRATE: NORMAL
MDC_IDC_SET_BRADY_LOWRATE: 55 {BEATS}/MIN
MDC_IDC_SET_BRADY_MAX_SENSOR_RATE: 130 {BEATS}/MIN
MDC_IDC_SET_BRADY_MAX_TRACKING_RATE: 130 {BEATS}/MIN
MDC_IDC_SET_BRADY_MODE: NORMAL
MDC_IDC_SET_BRADY_PAV_DELAY_LOW: 150 MS
MDC_IDC_SET_BRADY_SAV_DELAY_LOW: 150 MS
MDC_IDC_SET_LEADCHNL_RA_PACING_AMPLITUDE: 1.5 V
MDC_IDC_SET_LEADCHNL_RA_PACING_ANODE_ELECTRODE_1: NORMAL
MDC_IDC_SET_LEADCHNL_RA_PACING_ANODE_LOCATION_1: NORMAL
MDC_IDC_SET_LEADCHNL_RA_PACING_CAPTURE_MODE: NORMAL
MDC_IDC_SET_LEADCHNL_RA_PACING_CATHODE_ELECTRODE_1: NORMAL
MDC_IDC_SET_LEADCHNL_RA_PACING_CATHODE_LOCATION_1: NORMAL
MDC_IDC_SET_LEADCHNL_RA_PACING_POLARITY: NORMAL
MDC_IDC_SET_LEADCHNL_RA_PACING_PULSEWIDTH: 0.4 MS
MDC_IDC_SET_LEADCHNL_RA_SENSING_ANODE_ELECTRODE_1: NORMAL
MDC_IDC_SET_LEADCHNL_RA_SENSING_ANODE_LOCATION_1: NORMAL
MDC_IDC_SET_LEADCHNL_RA_SENSING_CATHODE_ELECTRODE_1: NORMAL
MDC_IDC_SET_LEADCHNL_RA_SENSING_CATHODE_LOCATION_1: NORMAL
MDC_IDC_SET_LEADCHNL_RA_SENSING_POLARITY: NORMAL
MDC_IDC_SET_LEADCHNL_RA_SENSING_SENSITIVITY: 0.3 MV
MDC_IDC_SET_LEADCHNL_RV_PACING_AMPLITUDE: 2 V
MDC_IDC_SET_LEADCHNL_RV_PACING_ANODE_ELECTRODE_1: NORMAL
MDC_IDC_SET_LEADCHNL_RV_PACING_ANODE_LOCATION_1: NORMAL
MDC_IDC_SET_LEADCHNL_RV_PACING_CAPTURE_MODE: NORMAL
MDC_IDC_SET_LEADCHNL_RV_PACING_CATHODE_ELECTRODE_1: NORMAL
MDC_IDC_SET_LEADCHNL_RV_PACING_CATHODE_LOCATION_1: NORMAL
MDC_IDC_SET_LEADCHNL_RV_PACING_POLARITY: NORMAL
MDC_IDC_SET_LEADCHNL_RV_PACING_PULSEWIDTH: 0.4 MS
MDC_IDC_SET_LEADCHNL_RV_SENSING_ANODE_ELECTRODE_1: NORMAL
MDC_IDC_SET_LEADCHNL_RV_SENSING_ANODE_LOCATION_1: NORMAL
MDC_IDC_SET_LEADCHNL_RV_SENSING_CATHODE_ELECTRODE_1: NORMAL
MDC_IDC_SET_LEADCHNL_RV_SENSING_CATHODE_LOCATION_1: NORMAL
MDC_IDC_SET_LEADCHNL_RV_SENSING_POLARITY: NORMAL
MDC_IDC_SET_LEADCHNL_RV_SENSING_SENSITIVITY: 0.9 MV
MDC_IDC_SET_ZONE_DETECTION_INTERVAL: 350 MS
MDC_IDC_SET_ZONE_DETECTION_INTERVAL: 400 MS
MDC_IDC_SET_ZONE_TYPE: NORMAL
MDC_IDC_STAT_AT_BURDEN_PERCENT: 5.3 %
MDC_IDC_STAT_AT_DTM_END: NORMAL
MDC_IDC_STAT_AT_DTM_START: NORMAL
MDC_IDC_STAT_BRADY_AP_VP_PERCENT: 34.31 %
MDC_IDC_STAT_BRADY_AP_VS_PERCENT: 0.01 %
MDC_IDC_STAT_BRADY_AS_VP_PERCENT: 65.59 %
MDC_IDC_STAT_BRADY_AS_VS_PERCENT: 0.1 %
MDC_IDC_STAT_BRADY_DTM_END: NORMAL
MDC_IDC_STAT_BRADY_DTM_START: NORMAL
MDC_IDC_STAT_BRADY_RA_PERCENT_PACED: 33.22 %
MDC_IDC_STAT_BRADY_RV_PERCENT_PACED: 99.83 %
MDC_IDC_STAT_EPISODE_RECENT_COUNT: 0
MDC_IDC_STAT_EPISODE_RECENT_COUNT: 0
MDC_IDC_STAT_EPISODE_RECENT_COUNT: 1
MDC_IDC_STAT_EPISODE_RECENT_COUNT: 2180
MDC_IDC_STAT_EPISODE_RECENT_COUNT_DTM_END: NORMAL
MDC_IDC_STAT_EPISODE_RECENT_COUNT_DTM_START: NORMAL
MDC_IDC_STAT_EPISODE_TOTAL_COUNT: 0
MDC_IDC_STAT_EPISODE_TOTAL_COUNT: 2
MDC_IDC_STAT_EPISODE_TOTAL_COUNT: 3
MDC_IDC_STAT_EPISODE_TOTAL_COUNT: NORMAL
MDC_IDC_STAT_EPISODE_TOTAL_COUNT_DTM_END: NORMAL
MDC_IDC_STAT_EPISODE_TOTAL_COUNT_DTM_START: NORMAL
MDC_IDC_STAT_EPISODE_TYPE: NORMAL

## 2023-09-07 DIAGNOSIS — R06.09 DYSPNEA ON EXERTION: ICD-10-CM

## 2023-09-07 DIAGNOSIS — Z95.0 CARDIAC PACEMAKER IN SITU: ICD-10-CM

## 2023-09-07 DIAGNOSIS — I44.1 AV BLOCK, 2ND DEGREE: ICD-10-CM

## 2023-09-07 RX ORDER — CARVEDILOL 6.25 MG/1
6.25 TABLET ORAL 2 TIMES DAILY WITH MEALS
Qty: 180 TABLET | Refills: 1 | Status: SHIPPED | OUTPATIENT
Start: 2023-09-07 | End: 2024-06-25

## 2023-09-25 ENCOUNTER — TELEPHONE (OUTPATIENT)
Dept: CARDIOLOGY | Facility: CLINIC | Age: 68
End: 2023-09-25
Payer: COMMERCIAL

## 2023-09-25 NOTE — TELEPHONE ENCOUNTER
[facility] Call Center    Phone Message    May a detailed message be left on voicemail: yes     Reason for Call: Other: Patient was in to see PCP and relayed that he is having some intermittent chest/back pain when power walking-no current symptoms. He would like to speak to his care team about this. Please call the patient to discuss.      Action Taken: Other: cardiology    Travel Screening: Not Applicable  Thank you!  [facility]

## 2023-09-25 NOTE — TELEPHONE ENCOUNTER
Talked to pt who had been to see his PCP and made them aware that when he power walks with in a block he has pain in his shoulders that move around to the ride side to the middle of his chest. Pt can walk and this does not occur, only when he increases activity. Pt had a CTa on 7/27/22 and calcium score was 98.4 and Lexiscan was done on 2/19/18 was normal. Pt was not concerned by call because his PCP asked him to. Will forward this on to Maria Elena for a recommendation. Dom

## 2023-09-26 NOTE — TELEPHONE ENCOUNTER
Called Arias and left ABHINAV (right to VM)    Had just mild disease on CTA with calcium score <100 just 1 year ago 7/2022, and pain is atypical. Let him know I'd try him again later this afternoon to discuss in more detail and decide if stress testing warranted.    September 26, 2023 at 12:39 PM

## 2023-09-26 NOTE — TELEPHONE ENCOUNTER
Called Arias to discuss.  Notes that this discomfort is not similar to what he had when he spoke with Dr. Benavidez last year.  It does not come on with routine activity, it does not cause him to stop his powerwalking and is not associated with undue GARCIA/palpitations/lightheadedness/nausea.    We reviewed that his CT scan 7/2022 showed just mild disease and given that this does not keep him from exercising, he is comfortable continuing to monitor this at present.    Understands that he is to seek emergent attention for significant chest discomfort, severe shortness of breath, severe lightheadedness, etc.    We will see each other in December as planned and discuss this in more detail, obviously earlier if any concerns or questions.    September 26, 2023 at 1:32 PM

## 2023-09-27 ENCOUNTER — ANCILLARY PROCEDURE (OUTPATIENT)
Dept: CARDIOLOGY | Facility: CLINIC | Age: 68
End: 2023-09-27
Attending: INTERNAL MEDICINE
Payer: COMMERCIAL

## 2023-09-27 DIAGNOSIS — Z95.0 CARDIAC PACEMAKER IN SITU: ICD-10-CM

## 2023-09-27 DIAGNOSIS — I44.1 AV BLOCK, 2ND DEGREE: ICD-10-CM

## 2023-09-27 DIAGNOSIS — Z95.0 CARDIAC PACEMAKER IN SITU: Primary | ICD-10-CM

## 2023-09-27 PROCEDURE — 93294 REM INTERROG EVL PM/LDLS PM: CPT | Performed by: INTERNAL MEDICINE

## 2023-09-27 PROCEDURE — 93296 REM INTERROG EVL PM/IDS: CPT | Performed by: INTERNAL MEDICINE

## 2023-10-03 LAB
MDC_IDC_EPISODE_DTM: NORMAL
MDC_IDC_EPISODE_DURATION: 101 S
MDC_IDC_EPISODE_DURATION: 110 S
MDC_IDC_EPISODE_DURATION: 131 S
MDC_IDC_EPISODE_DURATION: 134 S
MDC_IDC_EPISODE_DURATION: 135 S
MDC_IDC_EPISODE_DURATION: 148 S
MDC_IDC_EPISODE_DURATION: 158 S
MDC_IDC_EPISODE_DURATION: 172 S
MDC_IDC_EPISODE_DURATION: 175 S
MDC_IDC_EPISODE_DURATION: 184 S
MDC_IDC_EPISODE_DURATION: 2 S
MDC_IDC_EPISODE_DURATION: 205 S
MDC_IDC_EPISODE_DURATION: 207 S
MDC_IDC_EPISODE_DURATION: 210 S
MDC_IDC_EPISODE_DURATION: 215 S
MDC_IDC_EPISODE_DURATION: 258 S
MDC_IDC_EPISODE_DURATION: 264 S
MDC_IDC_EPISODE_DURATION: 282 S
MDC_IDC_EPISODE_DURATION: 30 S
MDC_IDC_EPISODE_DURATION: 31 S
MDC_IDC_EPISODE_DURATION: 3100 S
MDC_IDC_EPISODE_DURATION: 32 S
MDC_IDC_EPISODE_DURATION: 331 S
MDC_IDC_EPISODE_DURATION: 333 S
MDC_IDC_EPISODE_DURATION: 339 S
MDC_IDC_EPISODE_DURATION: 352 S
MDC_IDC_EPISODE_DURATION: 352 S
MDC_IDC_EPISODE_DURATION: 36 S
MDC_IDC_EPISODE_DURATION: 3635 S
MDC_IDC_EPISODE_DURATION: 43 S
MDC_IDC_EPISODE_DURATION: 465 S
MDC_IDC_EPISODE_DURATION: 49 S
MDC_IDC_EPISODE_DURATION: 51 S
MDC_IDC_EPISODE_DURATION: 5569 S
MDC_IDC_EPISODE_DURATION: 61 S
MDC_IDC_EPISODE_DURATION: 66 S
MDC_IDC_EPISODE_DURATION: 68 S
MDC_IDC_EPISODE_DURATION: 72 S
MDC_IDC_EPISODE_DURATION: 75 S
MDC_IDC_EPISODE_DURATION: 78 S
MDC_IDC_EPISODE_DURATION: 79 S
MDC_IDC_EPISODE_DURATION: 80 S
MDC_IDC_EPISODE_DURATION: 81 S
MDC_IDC_EPISODE_DURATION: 85 S
MDC_IDC_EPISODE_DURATION: 86 S
MDC_IDC_EPISODE_DURATION: 87 S
MDC_IDC_EPISODE_DURATION: 92 S
MDC_IDC_EPISODE_DURATION: 95 S
MDC_IDC_EPISODE_DURATION: 96 S
MDC_IDC_EPISODE_DURATION: 97 S
MDC_IDC_EPISODE_DURATION: 99 S
MDC_IDC_EPISODE_ID: NORMAL
MDC_IDC_EPISODE_TYPE: NORMAL
MDC_IDC_LEAD_IMPLANT_DT: NORMAL
MDC_IDC_LEAD_IMPLANT_DT: NORMAL
MDC_IDC_LEAD_LOCATION: NORMAL
MDC_IDC_LEAD_LOCATION: NORMAL
MDC_IDC_LEAD_LOCATION_DETAIL_1: NORMAL
MDC_IDC_LEAD_LOCATION_DETAIL_1: NORMAL
MDC_IDC_LEAD_MFG: NORMAL
MDC_IDC_LEAD_MFG: NORMAL
MDC_IDC_LEAD_MODEL: NORMAL
MDC_IDC_LEAD_MODEL: NORMAL
MDC_IDC_LEAD_POLARITY_TYPE: NORMAL
MDC_IDC_LEAD_POLARITY_TYPE: NORMAL
MDC_IDC_LEAD_SERIAL: NORMAL
MDC_IDC_LEAD_SERIAL: NORMAL
MDC_IDC_MSMT_BATTERY_DTM: NORMAL
MDC_IDC_MSMT_BATTERY_REMAINING_LONGEVITY: 44 MO
MDC_IDC_MSMT_BATTERY_RRT_TRIGGER: 2.83
MDC_IDC_MSMT_BATTERY_STATUS: NORMAL
MDC_IDC_MSMT_BATTERY_VOLTAGE: 2.97 V
MDC_IDC_MSMT_LEADCHNL_RA_IMPEDANCE_VALUE: 361 OHM
MDC_IDC_MSMT_LEADCHNL_RA_IMPEDANCE_VALUE: 418 OHM
MDC_IDC_MSMT_LEADCHNL_RA_PACING_THRESHOLD_AMPLITUDE: 0.62 V
MDC_IDC_MSMT_LEADCHNL_RA_PACING_THRESHOLD_PULSEWIDTH: 0.4 MS
MDC_IDC_MSMT_LEADCHNL_RA_SENSING_INTR_AMPL: 1.12 MV
MDC_IDC_MSMT_LEADCHNL_RA_SENSING_INTR_AMPL: 1.12 MV
MDC_IDC_MSMT_LEADCHNL_RV_IMPEDANCE_VALUE: 437 OHM
MDC_IDC_MSMT_LEADCHNL_RV_IMPEDANCE_VALUE: 551 OHM
MDC_IDC_MSMT_LEADCHNL_RV_PACING_THRESHOLD_AMPLITUDE: 0.62 V
MDC_IDC_MSMT_LEADCHNL_RV_PACING_THRESHOLD_PULSEWIDTH: 0.4 MS
MDC_IDC_MSMT_LEADCHNL_RV_SENSING_INTR_AMPL: 16.88 MV
MDC_IDC_MSMT_LEADCHNL_RV_SENSING_INTR_AMPL: 16.88 MV
MDC_IDC_PG_IMPLANT_DTM: NORMAL
MDC_IDC_PG_MFG: NORMAL
MDC_IDC_PG_MODEL: NORMAL
MDC_IDC_PG_SERIAL: NORMAL
MDC_IDC_PG_TYPE: NORMAL
MDC_IDC_SESS_CLINIC_NAME: NORMAL
MDC_IDC_SESS_DTM: NORMAL
MDC_IDC_SESS_TYPE: NORMAL
MDC_IDC_SET_BRADY_AT_MODE_SWITCH_RATE: 171 {BEATS}/MIN
MDC_IDC_SET_BRADY_HYSTRATE: NORMAL
MDC_IDC_SET_BRADY_LOWRATE: 55 {BEATS}/MIN
MDC_IDC_SET_BRADY_MAX_SENSOR_RATE: 130 {BEATS}/MIN
MDC_IDC_SET_BRADY_MAX_TRACKING_RATE: 130 {BEATS}/MIN
MDC_IDC_SET_BRADY_MODE: NORMAL
MDC_IDC_SET_BRADY_PAV_DELAY_LOW: 150 MS
MDC_IDC_SET_BRADY_SAV_DELAY_LOW: 150 MS
MDC_IDC_SET_LEADCHNL_RA_PACING_AMPLITUDE: 1.5 V
MDC_IDC_SET_LEADCHNL_RA_PACING_ANODE_ELECTRODE_1: NORMAL
MDC_IDC_SET_LEADCHNL_RA_PACING_ANODE_LOCATION_1: NORMAL
MDC_IDC_SET_LEADCHNL_RA_PACING_CAPTURE_MODE: NORMAL
MDC_IDC_SET_LEADCHNL_RA_PACING_CATHODE_ELECTRODE_1: NORMAL
MDC_IDC_SET_LEADCHNL_RA_PACING_CATHODE_LOCATION_1: NORMAL
MDC_IDC_SET_LEADCHNL_RA_PACING_POLARITY: NORMAL
MDC_IDC_SET_LEADCHNL_RA_PACING_PULSEWIDTH: 0.4 MS
MDC_IDC_SET_LEADCHNL_RA_SENSING_ANODE_ELECTRODE_1: NORMAL
MDC_IDC_SET_LEADCHNL_RA_SENSING_ANODE_LOCATION_1: NORMAL
MDC_IDC_SET_LEADCHNL_RA_SENSING_CATHODE_ELECTRODE_1: NORMAL
MDC_IDC_SET_LEADCHNL_RA_SENSING_CATHODE_LOCATION_1: NORMAL
MDC_IDC_SET_LEADCHNL_RA_SENSING_POLARITY: NORMAL
MDC_IDC_SET_LEADCHNL_RA_SENSING_SENSITIVITY: 0.3 MV
MDC_IDC_SET_LEADCHNL_RV_PACING_AMPLITUDE: 2 V
MDC_IDC_SET_LEADCHNL_RV_PACING_ANODE_ELECTRODE_1: NORMAL
MDC_IDC_SET_LEADCHNL_RV_PACING_ANODE_LOCATION_1: NORMAL
MDC_IDC_SET_LEADCHNL_RV_PACING_CAPTURE_MODE: NORMAL
MDC_IDC_SET_LEADCHNL_RV_PACING_CATHODE_ELECTRODE_1: NORMAL
MDC_IDC_SET_LEADCHNL_RV_PACING_CATHODE_LOCATION_1: NORMAL
MDC_IDC_SET_LEADCHNL_RV_PACING_POLARITY: NORMAL
MDC_IDC_SET_LEADCHNL_RV_PACING_PULSEWIDTH: 0.4 MS
MDC_IDC_SET_LEADCHNL_RV_SENSING_ANODE_ELECTRODE_1: NORMAL
MDC_IDC_SET_LEADCHNL_RV_SENSING_ANODE_LOCATION_1: NORMAL
MDC_IDC_SET_LEADCHNL_RV_SENSING_CATHODE_ELECTRODE_1: NORMAL
MDC_IDC_SET_LEADCHNL_RV_SENSING_CATHODE_LOCATION_1: NORMAL
MDC_IDC_SET_LEADCHNL_RV_SENSING_POLARITY: NORMAL
MDC_IDC_SET_LEADCHNL_RV_SENSING_SENSITIVITY: 0.9 MV
MDC_IDC_SET_ZONE_DETECTION_INTERVAL: 350 MS
MDC_IDC_SET_ZONE_DETECTION_INTERVAL: 400 MS
MDC_IDC_SET_ZONE_TYPE: NORMAL
MDC_IDC_STAT_AT_BURDEN_PERCENT: 3.7 %
MDC_IDC_STAT_AT_DTM_END: NORMAL
MDC_IDC_STAT_AT_DTM_START: NORMAL
MDC_IDC_STAT_BRADY_AP_VP_PERCENT: 26.55 %
MDC_IDC_STAT_BRADY_AP_VS_PERCENT: 0.02 %
MDC_IDC_STAT_BRADY_AS_VP_PERCENT: 73.11 %
MDC_IDC_STAT_BRADY_AS_VS_PERCENT: 0.32 %
MDC_IDC_STAT_BRADY_DTM_END: NORMAL
MDC_IDC_STAT_BRADY_DTM_START: NORMAL
MDC_IDC_STAT_BRADY_RA_PERCENT_PACED: 25.92 %
MDC_IDC_STAT_BRADY_RV_PERCENT_PACED: 99.26 %
MDC_IDC_STAT_EPISODE_RECENT_COUNT: 0
MDC_IDC_STAT_EPISODE_RECENT_COUNT: 0
MDC_IDC_STAT_EPISODE_RECENT_COUNT: 1
MDC_IDC_STAT_EPISODE_RECENT_COUNT: 1071
MDC_IDC_STAT_EPISODE_RECENT_COUNT_DTM_END: NORMAL
MDC_IDC_STAT_EPISODE_RECENT_COUNT_DTM_START: NORMAL
MDC_IDC_STAT_EPISODE_TOTAL_COUNT: 0
MDC_IDC_STAT_EPISODE_TOTAL_COUNT: 2
MDC_IDC_STAT_EPISODE_TOTAL_COUNT: 4
MDC_IDC_STAT_EPISODE_TOTAL_COUNT: NORMAL
MDC_IDC_STAT_EPISODE_TOTAL_COUNT_DTM_END: NORMAL
MDC_IDC_STAT_EPISODE_TOTAL_COUNT_DTM_START: NORMAL
MDC_IDC_STAT_EPISODE_TYPE: NORMAL

## 2023-12-15 ENCOUNTER — OFFICE VISIT (OUTPATIENT)
Dept: CARDIOLOGY | Facility: CLINIC | Age: 68
End: 2023-12-15
Attending: PHYSICIAN ASSISTANT
Payer: COMMERCIAL

## 2023-12-15 ENCOUNTER — ANCILLARY PROCEDURE (OUTPATIENT)
Dept: CARDIOLOGY | Facility: CLINIC | Age: 68
End: 2023-12-15
Attending: INTERNAL MEDICINE
Payer: COMMERCIAL

## 2023-12-15 VITALS
BODY MASS INDEX: 35.67 KG/M2 | HEART RATE: 82 BPM | DIASTOLIC BLOOD PRESSURE: 80 MMHG | HEIGHT: 71 IN | SYSTOLIC BLOOD PRESSURE: 136 MMHG | OXYGEN SATURATION: 95 % | WEIGHT: 254.8 LBS

## 2023-12-15 DIAGNOSIS — I44.1 AV BLOCK, 2ND DEGREE: ICD-10-CM

## 2023-12-15 DIAGNOSIS — I47.29 NSVT (NONSUSTAINED VENTRICULAR TACHYCARDIA) (H): ICD-10-CM

## 2023-12-15 DIAGNOSIS — I48.0 PAROXYSMAL ATRIAL FIBRILLATION (H): ICD-10-CM

## 2023-12-15 DIAGNOSIS — Z95.0 CARDIAC PACEMAKER IN SITU: ICD-10-CM

## 2023-12-15 DIAGNOSIS — R06.09 DYSPNEA ON EXERTION: Primary | ICD-10-CM

## 2023-12-15 DIAGNOSIS — I20.89 STABLE ANGINA PECTORIS (H): ICD-10-CM

## 2023-12-15 PROCEDURE — 93280 PM DEVICE PROGR EVAL DUAL: CPT | Performed by: INTERNAL MEDICINE

## 2023-12-15 PROCEDURE — 99214 OFFICE O/P EST MOD 30 MIN: CPT | Mod: 25 | Performed by: PHYSICIAN ASSISTANT

## 2023-12-15 RX ORDER — DULOXETIN HYDROCHLORIDE 60 MG/1
90 CAPSULE, DELAYED RELEASE ORAL DAILY
COMMUNITY

## 2023-12-15 RX ORDER — ATORVASTATIN CALCIUM 20 MG/1
20 TABLET, FILM COATED ORAL DAILY
COMMUNITY

## 2023-12-15 NOTE — PATIENT INSTRUCTIONS
Arias -      Reviewed that overall doing OK BUT still getting that discomfort when walking  Pacer check looked good    PLAN:  Nuclear stress test due to continued chest discomfort.  See us back 1 year with annual follow-up   CALL if you do no alcohol as will want to check cholesterol (triglycerides)    CALL if issues! [phone]

## 2023-12-15 NOTE — LETTER
"12/15/2023    Leann G Hutchison, MD [facility] [address] [place] [address]    RE: Arias Manzo       Dear Colleague,     I had the pleasure of seeing Arias JUS Jovany in the [facility].  [facility]    I had the pleasure of seeing Arias when he came for follow up of HTN, heart block.  This 68 year old sees Dr. Benavidez for his history of:    1. 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 Mild CM with most recent echo 11/2019 showing EF from 45% up to 50-55%  3. HTN  4. Paroxysmal AFib noted on device interrogations.On Eliquis for CHADSVASc 2 (HTN, age)   5.  NSVT noted on device interrogations.  CTA done in follow-up 7/2022 showed mild CAD and a calcium score 98.4  6.  Polymyalgia rheumatica - dx'd 5/2022.  Started on prednisone, stopped summer 2023  7.  Significant alcohol use - ~25 drinks/week   8.  JOSE G - on CPAP        Last Visit & Interval History:  I saw Arias 5/2023 at which time he was doing well, noting that he had significant improvement in his polymyalgia rheumatica/joint pain after being placed on prednisone.  We reviewed his CTA done 7/2022 (for NSVT and chest discomfort with moderately heavy exertion), which showed just mild CAD and CAC 98.4 (47th percentile).  He had gained weight due to being on prednisone and feeling more \"munchy.\"  I recommended he cut out alcohol given increasing episodes of AF on device interrogation as well as weight gain.  I asked him to see me back at time of in-office device interrogation.    He contacted us 5/2023 noting intermittent shoulder blade discomfort which would radiate around to the right side of the middle of the chest wall \"powerwalking.\"  I contacted him and we agreed to continue to monitor this      Today's Visit:  Overall, Arias is feeling good!  No issues with palpitations but \"feels it\" when he's in AFib, with increased GARCIA and fatigue.  Reviewed pacer check today noting that " "episodes are lasting <1 h.  No bleeding issues on the apixaban.    We reviewed the chest/back discomfort that we discussed 5/2023.  He notes that this is continuing to happen when he \"power walks,\" which sometimes causes him to slow down.  He is does think that the discomfort comes on a little more quickly with his walking, but does not last longer nor has it changed in intensity.    He has not lost any weight, despite coming off of prednisone Summer 2023.  He is frustrated by this.    He did a \"30-day challenge\" where he did not drink any alcohol Fall 2023, but did not note any weight loss.  He is now back to drinking his routine amount of alcohol, which is about 25 drinks/week    VITALS:  Vitals: /80   Pulse 82   Ht 1.803 m (5' 11\")   Wt 115.6 kg (254 lb 12.8 oz)   SpO2 95%   BMI 35.54 kg/m      Diagnostic Testing:  Device interrogation today showed 8.8% AP and 90.8%  in DDD 55/130.  Underlying was SR with CHB.  HR per histogram excellent 50-1 30.  3 years until RRT/LEON.  4.7 AF burden, with episodes lasting up to 42.5 minutes.  V are controlled 50-80 when in AF.  No ventricular arrhythmias.  CTA heart 7/2022 calcium score 98.4, placing him in 47th percentile when compared to age/gender matched controls.  Nonobstructive CAD with mild oLM, mild p/mLAD, mild pOM1 and nondominant RCA.  Normal aorta. Non-cardiac portion nonsignificant  Echocardiogam 11/2019 showed EF 50-55%. No RWMA. RV OK. LA mild-mod dilated 4.5 cm index 43.0 ml/m2. Trace MR. 1+ TR with RVSP 21+RAP. Aortic sclerosis. Mild 1+ AI.  Nuclear Stress Test 2/2018 showed no ischemia  LABS ([facility]) 9/2023:    Total cholesterol 183, triglyceride 191, HDL 48, LDL 97   A1c 5.4%   TSH 2.54  LABS ([facility]) 6/2023:   Hepatic normal   BMP sodium 143, potassium 4.5, chloride 104, BUN 20, creatinine 0.9, glucose 109   CBChemoglobin 14.1 and platelets 183    Plan:  Decrease alcohol consumption  2.    Nuclear stress test  3.    Annual " follow-up with-office device interrogation    Assessment/Plan:    HTN  Remains on lisinopril/HCTZ 20/25 and carvedilol 6.25 mg twice daily  BMP from 6/2023 at PCPs office looks fine    PLAN:  Continue current medications    Atrial arrhythmias  Has known history of paroxysmal AF and AT  He is on carvedilol 6.25 mg BID and device interrogation have continued to show episodes of AF.  Episodes are <1h and HR controlled    Remains on Eliquis for [id number] 2 (HTN, age).  Dose appropriate for age/weight/creatinine hemoglobin was normal 14.1 g/dL 6/2023    PLAN:  Continue to monitor  Limit alcohol  Continue routine device interrogations, and see me back 1 year with in-office device check    CP  Continues to note midsternal chest discomfort that radiates around the right side of the body to the back.  In the past, he had described this more about back discomfort that radiated towards the front  This happens reliably with exertion, and he notes this is  coming on more quickly than it had been when I talked to him in May.  CTA 7/2022 with nonobstructive disease.    PLAN:  Nuclear stress test on medications to assure no evidence of large vessel CAD.  If symptoms persist despite negative testing, could consider isosorbide/uptitrating BB therapy  Continue statin therapy        Maria Elena Khan PA-C, MSPAS      Orders Placed This Encounter   Procedures    Follow-Up with Cardiology KIAN     Orders Placed This Encounter   Medications    DULoxetine (CYMBALTA) 60 MG capsule     Sig: Take 90 mg by mouth daily 1.5 tablets DAILY    atorvastatin (LIPITOR) 20 MG tablet     Sig: Take 20 mg by mouth daily     Medications Discontinued During This Encounter   Medication Reason    simvastatin (ZOCOR) 20 MG tablet Therapy completed (No AVS)    predniSONE (DELTASONE) 20 MG tablet Therapy completed (No AVS)    Sertraline HCl (ZOLOFT PO) Stopped by Patient (No AVS)         Encounter Diagnoses   Name Primary?    NSVT (nonsustained ventricular  "tachycardia) (H)     Paroxysmal atrial fibrillation (H)     Dyspnea on exertion Yes    Stable angina pectoris        CURRENT MEDICATIONS:  Current Outpatient Medications   Medication Sig Dispense Refill    apixaban ANTICOAGULANT (ELIQUIS ANTICOAGULANT) 5 MG tablet Take 1 tablet (5 mg) by mouth 2 times daily 180 tablet 1    atorvastatin (LIPITOR) 20 MG tablet Take 20 mg by mouth daily      carvedilol (COREG) 6.25 MG tablet Take 1 tablet (6.25 mg) by mouth 2 times daily (with meals) 180 tablet 1    DULoxetine (CYMBALTA) 60 MG capsule Take 90 mg by mouth daily 1.5 tablets DAILY      lisinopril-hydrochlorothiazide (PRINZIDE,ZESTORETIC) 20-25 MG per tablet Take 1 tablet by mouth daily         ALLERGIES     Allergies   Allergen Reactions    Flexeril [Cyclobenzaprine] GI Disturbance    Naprosyn [Naproxen] GI Disturbance    Penicillins      As child    Ranitidine GI Disturbance         Review of Systems:  Skin:  Negative     Eyes:  Positive for cataracts  ENT:  Negative    Respiratory:  Positive for dyspnea on exertion  Cardiovascular:  Negative for;palpitations;edema;fatigue;lightheadedness;dizziness Positive for;exercise intolerance;chest pain  Gastroenterology: Negative for melena;hematochezia  Genitourinary:  Negative    Musculoskeletal:  Positive for back pain;neck pain  Neurologic:  Negative numbness or tingling of hands  Psychiatric:  Positive for depression  Heme/Lymph/Imm:  Positive for allergies  Endocrine:  Negative      Physical Exam:  Vitals: /80   Pulse 82   Ht 1.803 m (5' 11\")   Wt 115.6 kg (254 lb 12.8 oz)   SpO2 95%   BMI 35.54 kg/m      Constitutional:  cooperative, alert and oriented, well developed, well nourished, in no acute distress        Skin:  warm and dry to the touch, no apparent skin lesions or masses noted        Head:  normocephalic, no masses or lesions        Eyes:  pupils equal and round;conjunctivae and lids unremarkable;sclera white        ENT:  no pallor or cyanosis, dentition " good        Neck:  JVP normal        Chest:  normal breath sounds, clear to auscultation, normal A-P diameter, normal symmetry, normal respiratory excursion, no use of accessory muscles        Cardiac: regular rhythm;no murmurs, gallops or rubs detected                  Abdomen:  abdomen soft obese      Vascular: pulses full and equal                                      Extremities and Back:  no edema;no deformities, clubbing, cyanosis, erythema observed        Neurological:  no gross motor deficits            PAST MEDICAL HISTORY:  Past Medical History:   Diagnosis Date    CPAP (continuous positive airway pressure) dependence     Depression     Gastro-oesophageal reflux disease     Hepatic steatosis     Hoarseness     Hyperlipidemia     Hypertension     IFG (impaired fasting glucose)     LAFB (left anterior fascicular block)     PONV (postoperative nausea and vomiting)     RBBB     Seizures (H)     as child    Sleep apnea     cpap    Uncomplicated asthma     denies asthma       PAST SURGICAL HISTORY:  Past Surgical History:   Procedure Laterality Date    ANKLE SURGERY      APPENDECTOMY      BACK SURGERY      lami x2    ENT SURGERY      throat granulomas    HERNIA REPAIR      INJECT BOTOX N/A 10/30/2014    Procedure: INJECT BOTOX;  Surgeon: Kalyn Negron MD;  Location: [facility]    INJECT BOTOX N/A 12/8/2016    Procedure: INJECT BOTOX;  Surgeon: Kalyn Negron MD;  [facility]    INJECT STEROID (LOCATION) N/A 12/8/2016    Procedure: INJECT STEROID (LOCATION);  Surgeon: Kalyn Negron MD;  [facility]    LAMINECTOMY LUMBAR ONE LEVEL  3/5/2014    Procedure: LAMINECTOMY LUMBAR ONE LEVEL;  RIGHT L4-5 DISCECTOMY;  Surgeon: Rodrigo Ríos MD;  Location: [facility] OR    LASER CO2 LARYNGOSCOPY N/A 12/8/2016    Procedure: LASER CO2 LARYNGOSCOPY;  Surgeon: Kalyn Negron MD;  [facility]    LASER CO2 LARYNGOSCOPY, COMPLEX Left 10/30/2014    Procedure: LASER CO2 LARYNGOSCOPY,  COMPLEX;  Surgeon: Kalyn Negron MD;  Location: [facility] OR    ORTHOPEDIC SURGERY         FAMILY HISTORY:  Family History   Problem Relation Age of Onset    Cancer Father        SOCIAL HISTORY:  Social History     Socioeconomic History    Marital status:      Spouse name: None    Number of children: None    Years of education: None    Highest education level: None   Tobacco Use    Smoking status: Former     Types: Pipe     Start date: 10/10/1976     Quit date: 1985     Years since quittin.5    Smokeless tobacco: Never   Substance and Sexual Activity    Alcohol use: Yes     Comment: daily- 2 drinks /day    Drug use: No    Sexual activity: Yes     Partners: Female     Birth control/protection: Other               Thank you for allowing me to participate in the care of your patient.      Sincerely,     Melani Khan PA-C     [facility]  cc:   Melani Khan PA-C  [address]  [place] [address]

## 2023-12-15 NOTE — PROGRESS NOTES
"[facility]    I had the pleasure of seeing Arias when he came for follow up of HTN, heart block.  This 68 year old sees Dr. Benavdiez for his history of:    1. 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 Mild CM with most recent echo 11/2019 showing EF from 45% up to 50-55%  3. HTN  4. Paroxysmal AFib noted on device interrogations.On Eliquis for CHADSVASc 2 (HTN, age)   5.  NSVT noted on device interrogations.  CTA done in follow-up 7/2022 showed mild CAD and a calcium score 98.4  6.  Polymyalgia rheumatica - dx'd 5/2022.  Started on prednisone, stopped summer 2023  7.  Significant alcohol use - ~25 drinks/week   8.  JOSE G - on CPAP        Last Visit & Interval History:  I saw Arias 5/2023 at which time he was doing well, noting that he had significant improvement in his polymyalgia rheumatica/joint pain after being placed on prednisone.  We reviewed his CTA done 7/2022 (for NSVT and chest discomfort with moderately heavy exertion), which showed just mild CAD and CAC 98.4 (47th percentile).  He had gained weight due to being on prednisone and feeling more \"munchy.\"  I recommended he cut out alcohol given increasing episodes of AF on device interrogation as well as weight gain.  I asked him to see me back at time of in-office device interrogation.    He contacted us 5/2023 noting intermittent shoulder blade discomfort which would radiate around to the right side of the middle of the chest wall \"powerwalking.\"  I contacted him and we agreed to continue to monitor this      Today's Visit:  Overall, Arias is feeling good!  No issues with palpitations but \"feels it\" when he's in AFib, with increased GARCIA and fatigue.  Reviewed pacer check today noting that episodes are lasting <1 h.  No bleeding issues on the apixaban.    We reviewed the chest/back discomfort that we discussed 5/2023.  He notes that this is continuing to happen when he \"power walks,\" which sometimes causes him to slow down.  He is " "does think that the discomfort comes on a little more quickly with his walking, but does not last longer nor has it changed in intensity.    He has not lost any weight, despite coming off of prednisone Summer 2023.  He is frustrated by this.    He did a \"30-day challenge\" where he did not drink any alcohol Fall 2023, but did not note any weight loss.  He is now back to drinking his routine amount of alcohol, which is about 25 drinks/week    VITALS:  Vitals: /80   Pulse 82   Ht 1.803 m (5' 11\")   Wt 115.6 kg (254 lb 12.8 oz)   SpO2 95%   BMI 35.54 kg/m      Diagnostic Testing:  Device interrogation today showed 8.8% AP and 90.8%  in DDD 55/130.  Underlying was SR with CHB.  HR per histogram excellent 50-1 30.  3 years until RRT/LEON.  4.7 AF burden, with episodes lasting up to 42.5 minutes.  V are controlled 50-80 when in AF.  No ventricular arrhythmias.  CTA heart 7/2022 calcium score 98.4, placing him in 47th percentile when compared to age/gender matched controls.  Nonobstructive CAD with mild oLM, mild p/mLAD, mild pOM1 and nondominant RCA.  Normal aorta. Non-cardiac portion nonsignificant  Echocardiogam 11/2019 showed EF 50-55%. No RWMA. RV OK. LA mild-mod dilated 4.5 cm index 43.0 ml/m2. Trace MR. 1+ TR with RVSP 21+RAP. Aortic sclerosis. Mild 1+ AI.  Nuclear Stress Test 2/2018 showed no ischemia  LABS ([facility]) 9/2023:    Total cholesterol 183, triglyceride 191, HDL 48, LDL 97   A1c 5.4%   TSH 2.54  LABS ([facility]) 6/2023:   Hepatic normal   BMP sodium 143, potassium 4.5, chloride 104, BUN 20, creatinine 0.9, glucose 109   CBChemoglobin 14.1 and platelets 183    Plan:  Decrease alcohol consumption  2.    Nuclear stress test  3.    Annual follow-up with-office device interrogation    Assessment/Plan:    HTN  Remains on lisinopril/HCTZ 20/25 and carvedilol 6.25 mg twice daily  BMP from 6/2023 at PCPs office looks fine    PLAN:  Continue current medications    Atrial arrhythmias  Has " known history of paroxysmal AF and AT  He is on carvedilol 6.25 mg BID and device interrogation have continued to show episodes of AF.  Episodes are <1h and HR controlled    Remains on Eliquis for [id number] 2 (HTN, age).  Dose appropriate for age/weight/creatinine hemoglobin was normal 14.1 g/dL 6/2023    PLAN:  Continue to monitor  Limit alcohol  Continue routine device interrogations, and see me back 1 year with in-office device check    CP  Continues to note midsternal chest discomfort that radiates around the right side of the body to the back.  In the past, he had described this more about back discomfort that radiated towards the front  This happens reliably with exertion, and he notes this is  coming on more quickly than it had been when I talked to him in May.  CTA 7/2022 with nonobstructive disease.    PLAN:  Nuclear stress test on medications to assure no evidence of large vessel CAD.  If symptoms persist despite negative testing, could consider isosorbide/uptitrating BB therapy  Continue statin therapy        Maria Elena Khan PA-C, MSPAS      Orders Placed This Encounter   Procedures    Follow-Up with Cardiology KIAN     Orders Placed This Encounter   Medications    DULoxetine (CYMBALTA) 60 MG capsule     Sig: Take 90 mg by mouth daily 1.5 tablets DAILY    atorvastatin (LIPITOR) 20 MG tablet     Sig: Take 20 mg by mouth daily     Medications Discontinued During This Encounter   Medication Reason    simvastatin (ZOCOR) 20 MG tablet Therapy completed (No AVS)    predniSONE (DELTASONE) 20 MG tablet Therapy completed (No AVS)    Sertraline HCl (ZOLOFT PO) Stopped by Patient (No AVS)         Encounter Diagnoses   Name Primary?    NSVT (nonsustained ventricular tachycardia) (H)     Paroxysmal atrial fibrillation (H)     Dyspnea on exertion Yes    Stable angina pectoris        CURRENT MEDICATIONS:  Current Outpatient Medications   Medication Sig Dispense Refill    apixaban ANTICOAGULANT (ELIQUIS ANTICOAGULANT) 5 MG  "tablet Take 1 tablet (5 mg) by mouth 2 times daily 180 tablet 1    atorvastatin (LIPITOR) 20 MG tablet Take 20 mg by mouth daily      carvedilol (COREG) 6.25 MG tablet Take 1 tablet (6.25 mg) by mouth 2 times daily (with meals) 180 tablet 1    DULoxetine (CYMBALTA) 60 MG capsule Take 90 mg by mouth daily 1.5 tablets DAILY      lisinopril-hydrochlorothiazide (PRINZIDE,ZESTORETIC) 20-25 MG per tablet Take 1 tablet by mouth daily         ALLERGIES     Allergies   Allergen Reactions    Flexeril [Cyclobenzaprine] GI Disturbance    Naprosyn [Naproxen] GI Disturbance    Penicillins      As child    Ranitidine GI Disturbance         Review of Systems:  Skin:  Negative     Eyes:  Positive for cataracts  ENT:  Negative    Respiratory:  Positive for dyspnea on exertion  Cardiovascular:  Negative for;palpitations;edema;fatigue;lightheadedness;dizziness Positive for;exercise intolerance;chest pain  Gastroenterology: Negative for melena;hematochezia  Genitourinary:  Negative    Musculoskeletal:  Positive for back pain;neck pain  Neurologic:  Negative numbness or tingling of hands  Psychiatric:  Positive for depression  Heme/Lymph/Imm:  Positive for allergies  Endocrine:  Negative      Physical Exam:  Vitals: /80   Pulse 82   Ht 1.803 m (5' 11\")   Wt 115.6 kg (254 lb 12.8 oz)   SpO2 95%   BMI 35.54 kg/m      Constitutional:  cooperative, alert and oriented, well developed, well nourished, in no acute distress        Skin:  warm and dry to the touch, no apparent skin lesions or masses noted        Head:  normocephalic, no masses or lesions        Eyes:  pupils equal and round;conjunctivae and lids unremarkable;sclera white        ENT:  no pallor or cyanosis, dentition good        Neck:  JVP normal        Chest:  normal breath sounds, clear to auscultation, normal A-P diameter, normal symmetry, normal respiratory excursion, no use of accessory muscles        Cardiac: regular rhythm;no murmurs, gallops or rubs detected     "              Abdomen:  abdomen soft obese      Vascular: pulses full and equal                                      Extremities and Back:  no edema;no deformities, clubbing, cyanosis, erythema observed        Neurological:  no gross motor deficits            PAST MEDICAL HISTORY:  Past Medical History:   Diagnosis Date    CPAP (continuous positive airway pressure) dependence     Depression     Gastro-oesophageal reflux disease     Hepatic steatosis     Hoarseness     Hyperlipidemia     Hypertension     IFG (impaired fasting glucose)     LAFB (left anterior fascicular block)     PONV (postoperative nausea and vomiting)     RBBB     Seizures (H)     as child    Sleep apnea     cpap    Uncomplicated asthma     denies asthma       PAST SURGICAL HISTORY:  Past Surgical History:   Procedure Laterality Date    ANKLE SURGERY      APPENDECTOMY      BACK SURGERY      lami x2    ENT SURGERY      throat granulomas    HERNIA REPAIR      INJECT BOTOX N/A 10/30/2014    Procedure: INJECT BOTOX;  Surgeon: Kalyn Negron MD;  Location: [facility] OR    INJECT BOTOX N/A 12/8/2016    Procedure: INJECT BOTOX;  Surgeon: Kalyn Negron MD;  [facility]    INJECT STEROID (LOCATION) N/A 12/8/2016    Procedure: INJECT STEROID (LOCATION);  Surgeon: Kalyn Negron MD;  [facility]    LAMINECTOMY LUMBAR ONE LEVEL  3/5/2014    Procedure: LAMINECTOMY LUMBAR ONE LEVEL;  RIGHT L4-5 DISCECTOMY;  Surgeon: Rodrigo Ríos MD;  Location: [facility]    LASER CO2 LARYNGOSCOPY N/A 12/8/2016    Procedure: LASER CO2 LARYNGOSCOPY;  Surgeon: Kalyn Negron MD;  [facility]    LASER CO2 LARYNGOSCOPY, COMPLEX Left 10/30/2014    Procedure: LASER CO2 LARYNGOSCOPY, COMPLEX;  Surgeon: Kalyn Negron MD;  Location: [facility] OR    ORTHOPEDIC SURGERY         FAMILY HISTORY:  Family History   Problem Relation Age of Onset    Cancer Father        SOCIAL HISTORY:  Social History     Socioeconomic History    Marital  status:      Spouse name: None    Number of children: None    Years of education: None    Highest education level: None   Tobacco Use    Smoking status: Former     Types: Pipe     Start date: 10/10/1976     Quit date: 1985     Years since quittin.5    Smokeless tobacco: Never   Substance and Sexual Activity    Alcohol use: Yes     Comment: daily- 2 drinks /day    Drug use: No    Sexual activity: Yes     Partners: Female     Birth control/protection: Other

## 2023-12-16 LAB
MDC_IDC_EPISODE_DTM: NORMAL
MDC_IDC_EPISODE_DURATION: 102 S
MDC_IDC_EPISODE_DURATION: 103 S
MDC_IDC_EPISODE_DURATION: 111 S
MDC_IDC_EPISODE_DURATION: 113 S
MDC_IDC_EPISODE_DURATION: 114 S
MDC_IDC_EPISODE_DURATION: 122 S
MDC_IDC_EPISODE_DURATION: 135 S
MDC_IDC_EPISODE_DURATION: 135 S
MDC_IDC_EPISODE_DURATION: 1357 S
MDC_IDC_EPISODE_DURATION: 139 S
MDC_IDC_EPISODE_DURATION: 139 S
MDC_IDC_EPISODE_DURATION: 147 S
MDC_IDC_EPISODE_DURATION: 153 S
MDC_IDC_EPISODE_DURATION: 174 S
MDC_IDC_EPISODE_DURATION: 177 S
MDC_IDC_EPISODE_DURATION: 184 S
MDC_IDC_EPISODE_DURATION: 184 S
MDC_IDC_EPISODE_DURATION: 188 S
MDC_IDC_EPISODE_DURATION: 198 S
MDC_IDC_EPISODE_DURATION: 208 S
MDC_IDC_EPISODE_DURATION: 218 S
MDC_IDC_EPISODE_DURATION: 220 S
MDC_IDC_EPISODE_DURATION: 221 S
MDC_IDC_EPISODE_DURATION: 23 S
MDC_IDC_EPISODE_DURATION: 230 S
MDC_IDC_EPISODE_DURATION: 24 S
MDC_IDC_EPISODE_DURATION: 253 S
MDC_IDC_EPISODE_DURATION: 2556 S
MDC_IDC_EPISODE_DURATION: 262 S
MDC_IDC_EPISODE_DURATION: 273 S
MDC_IDC_EPISODE_DURATION: 276 S
MDC_IDC_EPISODE_DURATION: 289 S
MDC_IDC_EPISODE_DURATION: 305 S
MDC_IDC_EPISODE_DURATION: 331 S
MDC_IDC_EPISODE_DURATION: 341 S
MDC_IDC_EPISODE_DURATION: 39 S
MDC_IDC_EPISODE_DURATION: 403 S
MDC_IDC_EPISODE_DURATION: 42 S
MDC_IDC_EPISODE_DURATION: 466 S
MDC_IDC_EPISODE_DURATION: 47 S
MDC_IDC_EPISODE_DURATION: 491 S
MDC_IDC_EPISODE_DURATION: 57 S
MDC_IDC_EPISODE_DURATION: 60 S
MDC_IDC_EPISODE_DURATION: 64 S
MDC_IDC_EPISODE_DURATION: 65 S
MDC_IDC_EPISODE_DURATION: 72 S
MDC_IDC_EPISODE_DURATION: 77 S
MDC_IDC_EPISODE_DURATION: 80 S
MDC_IDC_EPISODE_DURATION: 84 S
MDC_IDC_EPISODE_DURATION: 97 S
MDC_IDC_EPISODE_ID: NORMAL
MDC_IDC_EPISODE_TYPE: NORMAL
MDC_IDC_EPISODE_TYPE_INDUCED: NO
MDC_IDC_LEAD_CONNECTION_STATUS: NORMAL
MDC_IDC_LEAD_CONNECTION_STATUS: NORMAL
MDC_IDC_LEAD_IMPLANT_DT: NORMAL
MDC_IDC_LEAD_IMPLANT_DT: NORMAL
MDC_IDC_LEAD_LOCATION: NORMAL
MDC_IDC_LEAD_LOCATION: NORMAL
MDC_IDC_LEAD_LOCATION_DETAIL_1: NORMAL
MDC_IDC_LEAD_LOCATION_DETAIL_1: NORMAL
MDC_IDC_LEAD_MFG: NORMAL
MDC_IDC_LEAD_MFG: NORMAL
MDC_IDC_LEAD_MODEL: NORMAL
MDC_IDC_LEAD_MODEL: NORMAL
MDC_IDC_LEAD_POLARITY_TYPE: NORMAL
MDC_IDC_LEAD_POLARITY_TYPE: NORMAL
MDC_IDC_LEAD_SERIAL: NORMAL
MDC_IDC_LEAD_SERIAL: NORMAL
MDC_IDC_MSMT_BATTERY_DTM: NORMAL
MDC_IDC_MSMT_BATTERY_REMAINING_LONGEVITY: 39 MO
MDC_IDC_MSMT_BATTERY_RRT_TRIGGER: 2.83
MDC_IDC_MSMT_BATTERY_STATUS: NORMAL
MDC_IDC_MSMT_BATTERY_VOLTAGE: 2.97 V
MDC_IDC_MSMT_LEADCHNL_RA_IMPEDANCE_VALUE: 380 OHM
MDC_IDC_MSMT_LEADCHNL_RA_IMPEDANCE_VALUE: 437 OHM
MDC_IDC_MSMT_LEADCHNL_RA_PACING_THRESHOLD_AMPLITUDE: 0.5 V
MDC_IDC_MSMT_LEADCHNL_RA_PACING_THRESHOLD_PULSEWIDTH: 0.4 MS
MDC_IDC_MSMT_LEADCHNL_RA_SENSING_INTR_AMPL: 1 MV
MDC_IDC_MSMT_LEADCHNL_RA_SENSING_INTR_AMPL: 1.88 MV
MDC_IDC_MSMT_LEADCHNL_RV_IMPEDANCE_VALUE: 418 OHM
MDC_IDC_MSMT_LEADCHNL_RV_IMPEDANCE_VALUE: 532 OHM
MDC_IDC_MSMT_LEADCHNL_RV_PACING_THRESHOLD_AMPLITUDE: 0.62 V
MDC_IDC_MSMT_LEADCHNL_RV_PACING_THRESHOLD_PULSEWIDTH: 0.4 MS
MDC_IDC_MSMT_LEADCHNL_RV_SENSING_INTR_AMPL: 12.5 MV
MDC_IDC_MSMT_LEADCHNL_RV_SENSING_INTR_AMPL: 12.5 MV
MDC_IDC_PG_IMPLANT_DTM: NORMAL
MDC_IDC_PG_MFG: NORMAL
MDC_IDC_PG_MODEL: NORMAL
MDC_IDC_PG_SERIAL: NORMAL
MDC_IDC_PG_TYPE: NORMAL
MDC_IDC_SESS_CLINIC_NAME: NORMAL
MDC_IDC_SESS_DTM: NORMAL
MDC_IDC_SESS_TYPE: NORMAL
MDC_IDC_SET_BRADY_AT_MODE_SWITCH_RATE: 171 {BEATS}/MIN
MDC_IDC_SET_BRADY_HYSTRATE: NORMAL
MDC_IDC_SET_BRADY_LOWRATE: 55 {BEATS}/MIN
MDC_IDC_SET_BRADY_MAX_SENSOR_RATE: 130 {BEATS}/MIN
MDC_IDC_SET_BRADY_MAX_TRACKING_RATE: 130 {BEATS}/MIN
MDC_IDC_SET_BRADY_MODE: NORMAL
MDC_IDC_SET_BRADY_PAV_DELAY_LOW: 150 MS
MDC_IDC_SET_BRADY_SAV_DELAY_LOW: 150 MS
MDC_IDC_SET_LEADCHNL_RA_PACING_AMPLITUDE: 1.5 V
MDC_IDC_SET_LEADCHNL_RA_PACING_ANODE_ELECTRODE_1: NORMAL
MDC_IDC_SET_LEADCHNL_RA_PACING_ANODE_LOCATION_1: NORMAL
MDC_IDC_SET_LEADCHNL_RA_PACING_CAPTURE_MODE: NORMAL
MDC_IDC_SET_LEADCHNL_RA_PACING_CATHODE_ELECTRODE_1: NORMAL
MDC_IDC_SET_LEADCHNL_RA_PACING_CATHODE_LOCATION_1: NORMAL
MDC_IDC_SET_LEADCHNL_RA_PACING_POLARITY: NORMAL
MDC_IDC_SET_LEADCHNL_RA_PACING_PULSEWIDTH: 0.4 MS
MDC_IDC_SET_LEADCHNL_RA_SENSING_ANODE_ELECTRODE_1: NORMAL
MDC_IDC_SET_LEADCHNL_RA_SENSING_ANODE_LOCATION_1: NORMAL
MDC_IDC_SET_LEADCHNL_RA_SENSING_CATHODE_ELECTRODE_1: NORMAL
MDC_IDC_SET_LEADCHNL_RA_SENSING_CATHODE_LOCATION_1: NORMAL
MDC_IDC_SET_LEADCHNL_RA_SENSING_POLARITY: NORMAL
MDC_IDC_SET_LEADCHNL_RA_SENSING_SENSITIVITY: 0.3 MV
MDC_IDC_SET_LEADCHNL_RV_PACING_AMPLITUDE: 2 V
MDC_IDC_SET_LEADCHNL_RV_PACING_ANODE_ELECTRODE_1: NORMAL
MDC_IDC_SET_LEADCHNL_RV_PACING_ANODE_LOCATION_1: NORMAL
MDC_IDC_SET_LEADCHNL_RV_PACING_CAPTURE_MODE: NORMAL
MDC_IDC_SET_LEADCHNL_RV_PACING_CATHODE_ELECTRODE_1: NORMAL
MDC_IDC_SET_LEADCHNL_RV_PACING_CATHODE_LOCATION_1: NORMAL
MDC_IDC_SET_LEADCHNL_RV_PACING_POLARITY: NORMAL
MDC_IDC_SET_LEADCHNL_RV_PACING_PULSEWIDTH: 0.4 MS
MDC_IDC_SET_LEADCHNL_RV_SENSING_ANODE_ELECTRODE_1: NORMAL
MDC_IDC_SET_LEADCHNL_RV_SENSING_ANODE_LOCATION_1: NORMAL
MDC_IDC_SET_LEADCHNL_RV_SENSING_CATHODE_ELECTRODE_1: NORMAL
MDC_IDC_SET_LEADCHNL_RV_SENSING_CATHODE_LOCATION_1: NORMAL
MDC_IDC_SET_LEADCHNL_RV_SENSING_POLARITY: NORMAL
MDC_IDC_SET_LEADCHNL_RV_SENSING_SENSITIVITY: 0.9 MV
MDC_IDC_SET_ZONE_DETECTION_INTERVAL: 350 MS
MDC_IDC_SET_ZONE_DETECTION_INTERVAL: 400 MS
MDC_IDC_SET_ZONE_STATUS: NORMAL
MDC_IDC_SET_ZONE_STATUS: NORMAL
MDC_IDC_SET_ZONE_TYPE: NORMAL
MDC_IDC_SET_ZONE_VENDOR_TYPE: NORMAL
MDC_IDC_STAT_AT_BURDEN_PERCENT: 4.7 %
MDC_IDC_STAT_AT_DTM_END: NORMAL
MDC_IDC_STAT_AT_DTM_START: NORMAL
MDC_IDC_STAT_BRADY_AP_VP_PERCENT: 28.78 %
MDC_IDC_STAT_BRADY_AP_VS_PERCENT: 0.01 %
MDC_IDC_STAT_BRADY_AS_VP_PERCENT: 71.01 %
MDC_IDC_STAT_BRADY_AS_VS_PERCENT: 0.2 %
MDC_IDC_STAT_BRADY_DTM_END: NORMAL
MDC_IDC_STAT_BRADY_DTM_START: NORMAL
MDC_IDC_STAT_BRADY_RA_PERCENT_PACED: 27.96 %
MDC_IDC_STAT_BRADY_RV_PERCENT_PACED: 99.6 %
MDC_IDC_STAT_EPISODE_RECENT_COUNT: 0
MDC_IDC_STAT_EPISODE_RECENT_COUNT: 0
MDC_IDC_STAT_EPISODE_RECENT_COUNT: 2
MDC_IDC_STAT_EPISODE_RECENT_COUNT: 4358
MDC_IDC_STAT_EPISODE_RECENT_COUNT_DTM_END: NORMAL
MDC_IDC_STAT_EPISODE_RECENT_COUNT_DTM_START: NORMAL
MDC_IDC_STAT_EPISODE_TOTAL_COUNT: 0
MDC_IDC_STAT_EPISODE_TOTAL_COUNT: 2
MDC_IDC_STAT_EPISODE_TOTAL_COUNT: 4
MDC_IDC_STAT_EPISODE_TOTAL_COUNT: NORMAL
MDC_IDC_STAT_EPISODE_TOTAL_COUNT_DTM_END: NORMAL
MDC_IDC_STAT_EPISODE_TOTAL_COUNT_DTM_START: NORMAL
MDC_IDC_STAT_EPISODE_TYPE: NORMAL
MDC_IDC_STAT_TACHYTHERAPY_RECENT_DTM_END: NORMAL
MDC_IDC_STAT_TACHYTHERAPY_RECENT_DTM_START: NORMAL
MDC_IDC_STAT_TACHYTHERAPY_TOTAL_DTM_END: NORMAL
MDC_IDC_STAT_TACHYTHERAPY_TOTAL_DTM_START: NORMAL

## 2023-12-22 ENCOUNTER — HOSPITAL ENCOUNTER (OUTPATIENT)
Dept: CARDIOLOGY | Facility: CLINIC | Age: 68
Discharge: HOME | End: 2023-12-22
Attending: PHYSICIAN ASSISTANT
Payer: COMMERCIAL

## 2023-12-22 VITALS
DIASTOLIC BLOOD PRESSURE: 90 MMHG | HEIGHT: 71 IN | OXYGEN SATURATION: 97 % | WEIGHT: 251 LBS | BODY MASS INDEX: 35.14 KG/M2 | SYSTOLIC BLOOD PRESSURE: 146 MMHG | HEART RATE: 64 BPM

## 2023-12-22 DIAGNOSIS — R06.09 DYSPNEA ON EXERTION: ICD-10-CM

## 2023-12-22 DIAGNOSIS — I20.89 STABLE ANGINA PECTORIS (H): ICD-10-CM

## 2023-12-22 DIAGNOSIS — I47.29 NSVT (NONSUSTAINED VENTRICULAR TACHYCARDIA) (H): ICD-10-CM

## 2023-12-22 LAB
CV STRESS MAX HR HE: 68
NUC STRESS EJECTION FRACTION: 38 %
RATE PRESSURE PRODUCT: 9928
STRESS ECHO BASELINE DIASTOLIC HE: 90
STRESS ECHO BASELINE HR: 60 BPM
STRESS ECHO BASELINE SYSTOLIC BP: 146
STRESS ECHO CALCULATED PERCENT HR: 45 %
STRESS ECHO LAST STRESS DIASTOLIC BP: 84
STRESS ECHO LAST STRESS SYSTOLIC BP: 146
STRESS ECHO TARGET HR: 151

## 2023-12-22 PROCEDURE — 250N000011 HC RX IP 250 OP 636: Performed by: PHYSICIAN ASSISTANT

## 2023-12-22 PROCEDURE — 93017 CV STRESS TEST TRACING ONLY: CPT

## 2023-12-22 PROCEDURE — 343N000001 HC RX 343: Performed by: PHYSICIAN ASSISTANT

## 2023-12-22 PROCEDURE — A9502 TC99M TETROFOSMIN: HCPCS | Performed by: PHYSICIAN ASSISTANT

## 2023-12-22 PROCEDURE — 93018 CV STRESS TEST I&R ONLY: CPT | Performed by: INTERNAL MEDICINE

## 2023-12-22 PROCEDURE — 78452 HT MUSCLE IMAGE SPECT MULT: CPT | Mod: 26 | Performed by: INTERNAL MEDICINE

## 2023-12-22 PROCEDURE — 93016 CV STRESS TEST SUPVJ ONLY: CPT | Performed by: PHYSICIAN ASSISTANT

## 2023-12-22 RX ORDER — ACYCLOVIR 200 MG/1
0-1 CAPSULE ORAL
Status: DISCONTINUED | OUTPATIENT
Start: 2023-12-22 | End: 2023-12-23 | Stop reason: HOSPADM

## 2023-12-22 RX ORDER — CAFFEINE CITRATE 20 MG/ML
60 SOLUTION INTRAVENOUS
Status: DISCONTINUED | OUTPATIENT
Start: 2023-12-22 | End: 2023-12-23 | Stop reason: HOSPADM

## 2023-12-22 RX ORDER — ALBUTEROL SULFATE 90 UG/1
2 AEROSOL, METERED RESPIRATORY (INHALATION) EVERY 5 MIN PRN
Status: DISCONTINUED | OUTPATIENT
Start: 2023-12-22 | End: 2023-12-23 | Stop reason: HOSPADM

## 2023-12-22 RX ORDER — REGADENOSON 0.08 MG/ML
0.4 INJECTION, SOLUTION INTRAVENOUS ONCE
Status: COMPLETED | OUTPATIENT
Start: 2023-12-22 | End: 2023-12-22

## 2023-12-22 RX ORDER — AMINOPHYLLINE 25 MG/ML
50-100 INJECTION, SOLUTION INTRAVENOUS
Status: DISCONTINUED | OUTPATIENT
Start: 2023-12-22 | End: 2023-12-23 | Stop reason: HOSPADM

## 2023-12-22 RX ADMIN — TETROFOSMIN 9.15 MILLICURIE: 1.38 INJECTION, POWDER, LYOPHILIZED, FOR SOLUTION INTRAVENOUS at 08:51

## 2023-12-22 RX ADMIN — REGADENOSON 0.4 MG: 0.08 INJECTION, SOLUTION INTRAVENOUS at 10:30

## 2023-12-22 RX ADMIN — TETROFOSMIN 28 MILLICURIE: 1.38 INJECTION, POWDER, LYOPHILIZED, FOR SOLUTION INTRAVENOUS at 10:33

## 2023-12-28 ENCOUNTER — TELEPHONE (OUTPATIENT)
Dept: CARDIOLOGY | Facility: CLINIC | Age: 68
End: 2023-12-28
Payer: COMMERCIAL

## 2023-12-28 NOTE — TELEPHONE ENCOUNTER
Spoke to pt and made him aware of Maria Elena interpretations of of his stress test:          Mo Bianchi -great news!  Your stress test was completely normal.  No evidence of significant heart blockages limiting blood flow to the heart muscle as a cause of your discomfort.     No changes needed.  I am not sure what that discomfort is, but I feel better knowing that we have checked out the heart.     Pt was happy to hear the results. Pt will call with any concerns that come up. Dom

## 2023-12-28 NOTE — TELEPHONE ENCOUNTER
I got a notification that Arias didn't see Sporterpilott message I sent. Pls call him -     Mo Bianchi -great news!  Your stress test was completely normal.  No evidence of significant heart blockages limiting blood flow to the heart muscle as a cause of your discomfort.     No changes needed.  I am not sure what that discomfort is, but I feel better knowing that we have checked out the heart.     Call with any questions/concerns! [phone]     Daksha Kim!  Maria Elena    Plan is to see him 12/2024 but he should call if issues.    Maria Elena

## 2024-03-16 ENCOUNTER — HEALTH MAINTENANCE LETTER (OUTPATIENT)
Age: 69
End: 2024-03-16

## 2024-03-26 DIAGNOSIS — I48.0 PAROXYSMAL ATRIAL FIBRILLATION (H): ICD-10-CM

## 2024-04-11 ENCOUNTER — ANCILLARY PROCEDURE (OUTPATIENT)
Dept: CARDIOLOGY | Facility: CLINIC | Age: 69
End: 2024-04-11
Attending: INTERNAL MEDICINE
Payer: COMMERCIAL

## 2024-04-11 ENCOUNTER — TELEPHONE (OUTPATIENT)
Dept: CARDIOLOGY | Facility: CLINIC | Age: 69
End: 2024-04-11

## 2024-04-11 DIAGNOSIS — I44.1 AV BLOCK, 2ND DEGREE: ICD-10-CM

## 2024-04-11 DIAGNOSIS — I47.29 NSVT (NONSUSTAINED VENTRICULAR TACHYCARDIA) (H): Primary | ICD-10-CM

## 2024-04-11 DIAGNOSIS — Z95.0 CARDIAC PACEMAKER IN SITU: ICD-10-CM

## 2024-04-11 LAB
MDC_IDC_EPISODE_DTM: NORMAL
MDC_IDC_EPISODE_DURATION: 1 S
MDC_IDC_EPISODE_DURATION: 101 S
MDC_IDC_EPISODE_DURATION: 115 S
MDC_IDC_EPISODE_DURATION: 117 S
MDC_IDC_EPISODE_DURATION: 127 S
MDC_IDC_EPISODE_DURATION: 129 S
MDC_IDC_EPISODE_DURATION: 133 S
MDC_IDC_EPISODE_DURATION: 1413 S
MDC_IDC_EPISODE_DURATION: 148 S
MDC_IDC_EPISODE_DURATION: 1491 S
MDC_IDC_EPISODE_DURATION: 1583 S
MDC_IDC_EPISODE_DURATION: 16 S
MDC_IDC_EPISODE_DURATION: 166 S
MDC_IDC_EPISODE_DURATION: 166 S
MDC_IDC_EPISODE_DURATION: 175 S
MDC_IDC_EPISODE_DURATION: 177 S
MDC_IDC_EPISODE_DURATION: 180 S
MDC_IDC_EPISODE_DURATION: 225 S
MDC_IDC_EPISODE_DURATION: 26 S
MDC_IDC_EPISODE_DURATION: 266 S
MDC_IDC_EPISODE_DURATION: 270 S
MDC_IDC_EPISODE_DURATION: 274 S
MDC_IDC_EPISODE_DURATION: 282 S
MDC_IDC_EPISODE_DURATION: 293 S
MDC_IDC_EPISODE_DURATION: 314 S
MDC_IDC_EPISODE_DURATION: 41 S
MDC_IDC_EPISODE_DURATION: 428 S
MDC_IDC_EPISODE_DURATION: 48 S
MDC_IDC_EPISODE_DURATION: 48 S
MDC_IDC_EPISODE_DURATION: 51 S
MDC_IDC_EPISODE_DURATION: 53 S
MDC_IDC_EPISODE_DURATION: 54 S
MDC_IDC_EPISODE_DURATION: 547 S
MDC_IDC_EPISODE_DURATION: 57 S
MDC_IDC_EPISODE_DURATION: 570 S
MDC_IDC_EPISODE_DURATION: 59 S
MDC_IDC_EPISODE_DURATION: 59 S
MDC_IDC_EPISODE_DURATION: 7 S
MDC_IDC_EPISODE_DURATION: 716 S
MDC_IDC_EPISODE_DURATION: 72 S
MDC_IDC_EPISODE_DURATION: 72 S
MDC_IDC_EPISODE_DURATION: 73 S
MDC_IDC_EPISODE_DURATION: 74 S
MDC_IDC_EPISODE_DURATION: 76 S
MDC_IDC_EPISODE_DURATION: 76 S
MDC_IDC_EPISODE_DURATION: 77 S
MDC_IDC_EPISODE_DURATION: 784 S
MDC_IDC_EPISODE_DURATION: 80 S
MDC_IDC_EPISODE_DURATION: 83 S
MDC_IDC_EPISODE_DURATION: 91 S
MDC_IDC_EPISODE_DURATION: 93 S
MDC_IDC_EPISODE_DURATION: 95 S
MDC_IDC_EPISODE_ID: NORMAL
MDC_IDC_EPISODE_TYPE: NORMAL
MDC_IDC_LEAD_CONNECTION_STATUS: NORMAL
MDC_IDC_LEAD_CONNECTION_STATUS: NORMAL
MDC_IDC_LEAD_IMPLANT_DT: NORMAL
MDC_IDC_LEAD_IMPLANT_DT: NORMAL
MDC_IDC_LEAD_LOCATION: NORMAL
MDC_IDC_LEAD_LOCATION: NORMAL
MDC_IDC_LEAD_LOCATION_DETAIL_1: NORMAL
MDC_IDC_LEAD_LOCATION_DETAIL_1: NORMAL
MDC_IDC_LEAD_MFG: NORMAL
MDC_IDC_LEAD_MFG: NORMAL
MDC_IDC_LEAD_MODEL: NORMAL
MDC_IDC_LEAD_MODEL: NORMAL
MDC_IDC_LEAD_POLARITY_TYPE: NORMAL
MDC_IDC_LEAD_POLARITY_TYPE: NORMAL
MDC_IDC_LEAD_SERIAL: NORMAL
MDC_IDC_LEAD_SERIAL: NORMAL
MDC_IDC_MSMT_BATTERY_DTM: NORMAL
MDC_IDC_MSMT_BATTERY_REMAINING_LONGEVITY: 35 MO
MDC_IDC_MSMT_BATTERY_RRT_TRIGGER: 2.83
MDC_IDC_MSMT_BATTERY_STATUS: NORMAL
MDC_IDC_MSMT_BATTERY_VOLTAGE: 2.96 V
MDC_IDC_MSMT_LEADCHNL_RA_IMPEDANCE_VALUE: 361 OHM
MDC_IDC_MSMT_LEADCHNL_RA_IMPEDANCE_VALUE: 418 OHM
MDC_IDC_MSMT_LEADCHNL_RA_PACING_THRESHOLD_AMPLITUDE: 0.5 V
MDC_IDC_MSMT_LEADCHNL_RA_PACING_THRESHOLD_PULSEWIDTH: 0.4 MS
MDC_IDC_MSMT_LEADCHNL_RA_SENSING_INTR_AMPL: 1.12 MV
MDC_IDC_MSMT_LEADCHNL_RA_SENSING_INTR_AMPL: 1.12 MV
MDC_IDC_MSMT_LEADCHNL_RV_IMPEDANCE_VALUE: 437 OHM
MDC_IDC_MSMT_LEADCHNL_RV_IMPEDANCE_VALUE: 570 OHM
MDC_IDC_MSMT_LEADCHNL_RV_PACING_THRESHOLD_AMPLITUDE: 0.62 V
MDC_IDC_MSMT_LEADCHNL_RV_PACING_THRESHOLD_PULSEWIDTH: 0.4 MS
MDC_IDC_MSMT_LEADCHNL_RV_SENSING_INTR_AMPL: 14.5 MV
MDC_IDC_MSMT_LEADCHNL_RV_SENSING_INTR_AMPL: 14.5 MV
MDC_IDC_PG_IMPLANT_DTM: NORMAL
MDC_IDC_PG_MFG: NORMAL
MDC_IDC_PG_MODEL: NORMAL
MDC_IDC_PG_SERIAL: NORMAL
MDC_IDC_PG_TYPE: NORMAL
MDC_IDC_SESS_CLINIC_NAME: NORMAL
MDC_IDC_SESS_DTM: NORMAL
MDC_IDC_SESS_TYPE: NORMAL
MDC_IDC_SET_BRADY_AT_MODE_SWITCH_RATE: 171 {BEATS}/MIN
MDC_IDC_SET_BRADY_HYSTRATE: NORMAL
MDC_IDC_SET_BRADY_LOWRATE: 55 {BEATS}/MIN
MDC_IDC_SET_BRADY_MAX_SENSOR_RATE: 130 {BEATS}/MIN
MDC_IDC_SET_BRADY_MAX_TRACKING_RATE: 130 {BEATS}/MIN
MDC_IDC_SET_BRADY_MODE: NORMAL
MDC_IDC_SET_BRADY_PAV_DELAY_LOW: 150 MS
MDC_IDC_SET_BRADY_SAV_DELAY_LOW: 150 MS
MDC_IDC_SET_LEADCHNL_RA_PACING_AMPLITUDE: 1.5 V
MDC_IDC_SET_LEADCHNL_RA_PACING_ANODE_ELECTRODE_1: NORMAL
MDC_IDC_SET_LEADCHNL_RA_PACING_ANODE_LOCATION_1: NORMAL
MDC_IDC_SET_LEADCHNL_RA_PACING_CAPTURE_MODE: NORMAL
MDC_IDC_SET_LEADCHNL_RA_PACING_CATHODE_ELECTRODE_1: NORMAL
MDC_IDC_SET_LEADCHNL_RA_PACING_CATHODE_LOCATION_1: NORMAL
MDC_IDC_SET_LEADCHNL_RA_PACING_POLARITY: NORMAL
MDC_IDC_SET_LEADCHNL_RA_PACING_PULSEWIDTH: 0.4 MS
MDC_IDC_SET_LEADCHNL_RA_SENSING_ANODE_ELECTRODE_1: NORMAL
MDC_IDC_SET_LEADCHNL_RA_SENSING_ANODE_LOCATION_1: NORMAL
MDC_IDC_SET_LEADCHNL_RA_SENSING_CATHODE_ELECTRODE_1: NORMAL
MDC_IDC_SET_LEADCHNL_RA_SENSING_CATHODE_LOCATION_1: NORMAL
MDC_IDC_SET_LEADCHNL_RA_SENSING_POLARITY: NORMAL
MDC_IDC_SET_LEADCHNL_RA_SENSING_SENSITIVITY: 0.3 MV
MDC_IDC_SET_LEADCHNL_RV_PACING_AMPLITUDE: 2 V
MDC_IDC_SET_LEADCHNL_RV_PACING_ANODE_ELECTRODE_1: NORMAL
MDC_IDC_SET_LEADCHNL_RV_PACING_ANODE_LOCATION_1: NORMAL
MDC_IDC_SET_LEADCHNL_RV_PACING_CAPTURE_MODE: NORMAL
MDC_IDC_SET_LEADCHNL_RV_PACING_CATHODE_ELECTRODE_1: NORMAL
MDC_IDC_SET_LEADCHNL_RV_PACING_CATHODE_LOCATION_1: NORMAL
MDC_IDC_SET_LEADCHNL_RV_PACING_POLARITY: NORMAL
MDC_IDC_SET_LEADCHNL_RV_PACING_PULSEWIDTH: 0.4 MS
MDC_IDC_SET_LEADCHNL_RV_SENSING_ANODE_ELECTRODE_1: NORMAL
MDC_IDC_SET_LEADCHNL_RV_SENSING_ANODE_LOCATION_1: NORMAL
MDC_IDC_SET_LEADCHNL_RV_SENSING_CATHODE_ELECTRODE_1: NORMAL
MDC_IDC_SET_LEADCHNL_RV_SENSING_CATHODE_LOCATION_1: NORMAL
MDC_IDC_SET_LEADCHNL_RV_SENSING_POLARITY: NORMAL
MDC_IDC_SET_LEADCHNL_RV_SENSING_SENSITIVITY: 0.9 MV
MDC_IDC_SET_ZONE_DETECTION_INTERVAL: 350 MS
MDC_IDC_SET_ZONE_DETECTION_INTERVAL: 400 MS
MDC_IDC_SET_ZONE_STATUS: NORMAL
MDC_IDC_SET_ZONE_STATUS: NORMAL
MDC_IDC_SET_ZONE_TYPE: NORMAL
MDC_IDC_SET_ZONE_VENDOR_TYPE: NORMAL
MDC_IDC_STAT_AT_BURDEN_PERCENT: 11.8 %
MDC_IDC_STAT_AT_DTM_END: NORMAL
MDC_IDC_STAT_AT_DTM_START: NORMAL
MDC_IDC_STAT_BRADY_AP_VP_PERCENT: 20.37 %
MDC_IDC_STAT_BRADY_AP_VS_PERCENT: 0.04 %
MDC_IDC_STAT_BRADY_AS_VP_PERCENT: 78.99 %
MDC_IDC_STAT_BRADY_AS_VS_PERCENT: 0.61 %
MDC_IDC_STAT_BRADY_DTM_END: NORMAL
MDC_IDC_STAT_BRADY_DTM_START: NORMAL
MDC_IDC_STAT_BRADY_RA_PERCENT_PACED: 19.29 %
MDC_IDC_STAT_BRADY_RV_PERCENT_PACED: 99.1 %
MDC_IDC_STAT_EPISODE_RECENT_COUNT: 0
MDC_IDC_STAT_EPISODE_RECENT_COUNT: 1
MDC_IDC_STAT_EPISODE_RECENT_COUNT: 1
MDC_IDC_STAT_EPISODE_RECENT_COUNT: 4928
MDC_IDC_STAT_EPISODE_RECENT_COUNT_DTM_END: NORMAL
MDC_IDC_STAT_EPISODE_RECENT_COUNT_DTM_START: NORMAL
MDC_IDC_STAT_EPISODE_TOTAL_COUNT: 1
MDC_IDC_STAT_EPISODE_TOTAL_COUNT: 2
MDC_IDC_STAT_EPISODE_TOTAL_COUNT: 5
MDC_IDC_STAT_EPISODE_TOTAL_COUNT: NORMAL
MDC_IDC_STAT_EPISODE_TOTAL_COUNT_DTM_END: NORMAL
MDC_IDC_STAT_EPISODE_TOTAL_COUNT_DTM_START: NORMAL
MDC_IDC_STAT_EPISODE_TYPE: NORMAL
MDC_IDC_STAT_TACHYTHERAPY_RECENT_DTM_END: NORMAL
MDC_IDC_STAT_TACHYTHERAPY_RECENT_DTM_START: NORMAL
MDC_IDC_STAT_TACHYTHERAPY_TOTAL_DTM_END: NORMAL
MDC_IDC_STAT_TACHYTHERAPY_TOTAL_DTM_START: NORMAL

## 2024-04-11 PROCEDURE — 93296 REM INTERROG EVL PM/IDS: CPT | Performed by: INTERNAL MEDICINE

## 2024-04-11 PROCEDURE — 93294 REM INTERROG EVL PM/LDLS PM: CPT | Performed by: INTERNAL MEDICINE

## 2024-04-11 NOTE — TELEPHONE ENCOUNTER
Dr. Benavidez,    FYI: Your patient had an episode of NSVT on today's device check. Pt has had episodes in the past but not of this duration. Episode lasted 24 beats, rates 135-210bpm. Episode occurred 1/31 at 330am. Pt recalls having some faster HRs during the night at times. Taking carvedilol. EF 54% (6/2022). Patient is full code per Epic. Any changes?        Medtronic Advisa (D) Remote PPM Device Check  AP: 20%  : >99%  Mode: DDDR 55/130  Presenting Rhythm: AP/  Historical underlying rhythm: SR 60s with CHB and no JE at VVI30 as of 12/2022  Heart Rate: adequate rates per histograms   Sensing: stable   Pacing Threshold: stable   Impedance: stable   Battery Status: 2.5 years remaining (2-3.5 years)  Atrial Arrhythmia: 4928 mode switch episodes logged comprising 11.8% of the time. EGMs for review show As>Vs for AF, longest episode lasted 26 minutes, ventricular rates controlled. Taking eliquis.  Ventricular Arrhythmia: 1 ventricular high rate logged. EGM shows Vs>As for NSVT lasting 24 beats, rates 135-210bpm. Episode occurred 1/31 at 330am. Pt recalls having some faster HRs during the night at times. Taking carvedilol. EF 54% (6/2022). Patient is full code per Epic. Will notify Dr Benavidez as this episode was longer in duration than pt has had in the past.     Care Plan: F/u PPM [facility] q 3 months. Gave patient results over the phone. CLAY Callahan

## 2024-04-13 NOTE — TELEPHONE ENCOUNTER
Relatively lengthy recent NSVT event.  Please arrange for echocardiogram, last study was in June 2022.  Not urgent.  DI

## 2024-04-15 NOTE — TELEPHONE ENCOUNTER
Per Dr. Benavidez:   Relatively lengthy recent NSVT event.  Please arrange for echocardiogram, last study was in June 2022.  Not urgent.  DI   Orders placed for echo. Routed message to scheduling to reach out. Will call pt at [phone] to inform him of Dr. Benavidez recommendations.     CUCO Brito

## 2024-04-24 ENCOUNTER — HOSPITAL ENCOUNTER (OUTPATIENT)
Dept: CARDIOLOGY | Facility: CLINIC | Age: 69
Discharge: HOME | End: 2024-04-24
Attending: INTERNAL MEDICINE | Admitting: INTERNAL MEDICINE
Payer: COMMERCIAL

## 2024-04-24 DIAGNOSIS — I47.29 NSVT (NONSUSTAINED VENTRICULAR TACHYCARDIA) (H): ICD-10-CM

## 2024-04-24 LAB — LVEF ECHO: NORMAL

## 2024-04-24 PROCEDURE — C8929 TTE W OR WO FOL WCON,DOPPLER: HCPCS

## 2024-04-24 PROCEDURE — 93306 TTE W/DOPPLER COMPLETE: CPT | Mod: 26 | Performed by: INTERNAL MEDICINE

## 2024-04-24 PROCEDURE — 255N000002 HC RX 255 OP 636: Performed by: INTERNAL MEDICINE

## 2024-04-24 RX ADMIN — HUMAN ALBUMIN MICROSPHERES AND PERFLUTREN 2 ML: 10; .22 INJECTION, SOLUTION INTRAVENOUS at 14:45

## 2024-04-29 ENCOUNTER — TELEPHONE (OUTPATIENT)
Dept: CARDIOLOGY | Facility: CLINIC | Age: 69
End: 2024-04-29
Payer: COMMERCIAL

## 2024-04-29 NOTE — TELEPHONE ENCOUNTER
Miley Benavidez MD  [facility] Heart Device Nurse  Done d/t NSVT on device tracing. No clear sx's.  Stable low normal EF 50-55%. Had only mild CAD by CTA in 2022. Already on a BB.  Nothing further.  Please update Peter.              Received above message from Dr. Benavidez.  Spoke with patient. He was happy to hear the news.  MAXIMO NORWOOD

## 2024-06-25 DIAGNOSIS — R06.09 DYSPNEA ON EXERTION: ICD-10-CM

## 2024-06-25 DIAGNOSIS — I44.1 AV BLOCK, 2ND DEGREE: ICD-10-CM

## 2024-06-25 DIAGNOSIS — Z95.0 CARDIAC PACEMAKER IN SITU: ICD-10-CM

## 2024-06-25 RX ORDER — CARVEDILOL 6.25 MG/1
6.25 TABLET ORAL 2 TIMES DAILY WITH MEALS
Qty: 180 TABLET | Refills: 1 | Status: SHIPPED | OUTPATIENT
Start: 2024-06-25

## 2024-08-01 ENCOUNTER — ANCILLARY PROCEDURE (OUTPATIENT)
Dept: CARDIOLOGY | Facility: CLINIC | Age: 69
End: 2024-08-01
Attending: INTERNAL MEDICINE
Payer: COMMERCIAL

## 2024-08-01 DIAGNOSIS — Z95.0 CARDIAC PACEMAKER IN SITU: ICD-10-CM

## 2024-08-01 DIAGNOSIS — I44.1 AV BLOCK, 2ND DEGREE: ICD-10-CM

## 2024-08-01 DIAGNOSIS — Z95.0 CARDIAC PACEMAKER IN SITU: Primary | ICD-10-CM

## 2024-08-01 PROCEDURE — 93296 REM INTERROG EVL PM/IDS: CPT | Performed by: INTERNAL MEDICINE

## 2024-08-01 PROCEDURE — 93294 REM INTERROG EVL PM/LDLS PM: CPT | Performed by: INTERNAL MEDICINE

## 2024-08-14 LAB
MDC_IDC_EPISODE_DTM: NORMAL
MDC_IDC_EPISODE_DURATION: 101 S
MDC_IDC_EPISODE_DURATION: 105 S
MDC_IDC_EPISODE_DURATION: 113 S
MDC_IDC_EPISODE_DURATION: 114 S
MDC_IDC_EPISODE_DURATION: 114 S
MDC_IDC_EPISODE_DURATION: 116 S
MDC_IDC_EPISODE_DURATION: 116 S
MDC_IDC_EPISODE_DURATION: 126 S
MDC_IDC_EPISODE_DURATION: 128 S
MDC_IDC_EPISODE_DURATION: 128 S
MDC_IDC_EPISODE_DURATION: 129 S
MDC_IDC_EPISODE_DURATION: 132 S
MDC_IDC_EPISODE_DURATION: 133 S
MDC_IDC_EPISODE_DURATION: 134 S
MDC_IDC_EPISODE_DURATION: 137 S
MDC_IDC_EPISODE_DURATION: 138 S
MDC_IDC_EPISODE_DURATION: 141 S
MDC_IDC_EPISODE_DURATION: 143 S
MDC_IDC_EPISODE_DURATION: 148 S
MDC_IDC_EPISODE_DURATION: 149 S
MDC_IDC_EPISODE_DURATION: 162 S
MDC_IDC_EPISODE_DURATION: 166 S
MDC_IDC_EPISODE_DURATION: 168 S
MDC_IDC_EPISODE_DURATION: 172 S
MDC_IDC_EPISODE_DURATION: 183 S
MDC_IDC_EPISODE_DURATION: 188 S
MDC_IDC_EPISODE_DURATION: 201 S
MDC_IDC_EPISODE_DURATION: 202 S
MDC_IDC_EPISODE_DURATION: 212 S
MDC_IDC_EPISODE_DURATION: 221 S
MDC_IDC_EPISODE_DURATION: 230 S
MDC_IDC_EPISODE_DURATION: 246 S
MDC_IDC_EPISODE_DURATION: 246 S
MDC_IDC_EPISODE_DURATION: 249 S
MDC_IDC_EPISODE_DURATION: 269 S
MDC_IDC_EPISODE_DURATION: 288 S
MDC_IDC_EPISODE_DURATION: 34 S
MDC_IDC_EPISODE_DURATION: 348 S
MDC_IDC_EPISODE_DURATION: 43 S
MDC_IDC_EPISODE_DURATION: 43 S
MDC_IDC_EPISODE_DURATION: 46 S
MDC_IDC_EPISODE_DURATION: 50 S
MDC_IDC_EPISODE_DURATION: 581 S
MDC_IDC_EPISODE_DURATION: 66 S
MDC_IDC_EPISODE_DURATION: 72 S
MDC_IDC_EPISODE_DURATION: 75 S
MDC_IDC_EPISODE_DURATION: 89 S
MDC_IDC_EPISODE_DURATION: 92 S
MDC_IDC_EPISODE_DURATION: 93 S
MDC_IDC_EPISODE_DURATION: 97 S
MDC_IDC_EPISODE_ID: NORMAL
MDC_IDC_EPISODE_TYPE: NORMAL
MDC_IDC_LEAD_CONNECTION_STATUS: NORMAL
MDC_IDC_LEAD_CONNECTION_STATUS: NORMAL
MDC_IDC_LEAD_IMPLANT_DT: NORMAL
MDC_IDC_LEAD_IMPLANT_DT: NORMAL
MDC_IDC_LEAD_LOCATION: NORMAL
MDC_IDC_LEAD_LOCATION: NORMAL
MDC_IDC_LEAD_LOCATION_DETAIL_1: NORMAL
MDC_IDC_LEAD_LOCATION_DETAIL_1: NORMAL
MDC_IDC_LEAD_MFG: NORMAL
MDC_IDC_LEAD_MFG: NORMAL
MDC_IDC_LEAD_MODEL: NORMAL
MDC_IDC_LEAD_MODEL: NORMAL
MDC_IDC_LEAD_POLARITY_TYPE: NORMAL
MDC_IDC_LEAD_POLARITY_TYPE: NORMAL
MDC_IDC_LEAD_SERIAL: NORMAL
MDC_IDC_LEAD_SERIAL: NORMAL
MDC_IDC_MSMT_BATTERY_DTM: NORMAL
MDC_IDC_MSMT_BATTERY_REMAINING_LONGEVITY: 34 MO
MDC_IDC_MSMT_BATTERY_RRT_TRIGGER: 2.83
MDC_IDC_MSMT_BATTERY_STATUS: NORMAL
MDC_IDC_MSMT_BATTERY_VOLTAGE: 2.95 V
MDC_IDC_MSMT_LEADCHNL_RA_IMPEDANCE_VALUE: 361 OHM
MDC_IDC_MSMT_LEADCHNL_RA_IMPEDANCE_VALUE: 437 OHM
MDC_IDC_MSMT_LEADCHNL_RA_PACING_THRESHOLD_AMPLITUDE: 0.5 V
MDC_IDC_MSMT_LEADCHNL_RA_PACING_THRESHOLD_PULSEWIDTH: 0.4 MS
MDC_IDC_MSMT_LEADCHNL_RA_SENSING_INTR_AMPL: 1.75 MV
MDC_IDC_MSMT_LEADCHNL_RA_SENSING_INTR_AMPL: 1.75 MV
MDC_IDC_MSMT_LEADCHNL_RV_IMPEDANCE_VALUE: 437 OHM
MDC_IDC_MSMT_LEADCHNL_RV_IMPEDANCE_VALUE: 570 OHM
MDC_IDC_MSMT_LEADCHNL_RV_PACING_THRESHOLD_AMPLITUDE: 0.5 V
MDC_IDC_MSMT_LEADCHNL_RV_PACING_THRESHOLD_PULSEWIDTH: 0.4 MS
MDC_IDC_MSMT_LEADCHNL_RV_SENSING_INTR_AMPL: 6.62 MV
MDC_IDC_MSMT_LEADCHNL_RV_SENSING_INTR_AMPL: 6.62 MV
MDC_IDC_PG_IMPLANT_DTM: NORMAL
MDC_IDC_PG_MFG: NORMAL
MDC_IDC_PG_MODEL: NORMAL
MDC_IDC_PG_SERIAL: NORMAL
MDC_IDC_PG_TYPE: NORMAL
MDC_IDC_SESS_CLINIC_NAME: NORMAL
MDC_IDC_SESS_DTM: NORMAL
MDC_IDC_SESS_TYPE: NORMAL
MDC_IDC_SET_BRADY_AT_MODE_SWITCH_RATE: 171 {BEATS}/MIN
MDC_IDC_SET_BRADY_HYSTRATE: NORMAL
MDC_IDC_SET_BRADY_LOWRATE: 55 {BEATS}/MIN
MDC_IDC_SET_BRADY_MAX_SENSOR_RATE: 130 {BEATS}/MIN
MDC_IDC_SET_BRADY_MAX_TRACKING_RATE: 130 {BEATS}/MIN
MDC_IDC_SET_BRADY_MODE: NORMAL
MDC_IDC_SET_BRADY_PAV_DELAY_LOW: 150 MS
MDC_IDC_SET_BRADY_SAV_DELAY_LOW: 150 MS
MDC_IDC_SET_LEADCHNL_RA_PACING_AMPLITUDE: 1.5 V
MDC_IDC_SET_LEADCHNL_RA_PACING_ANODE_ELECTRODE_1: NORMAL
MDC_IDC_SET_LEADCHNL_RA_PACING_ANODE_LOCATION_1: NORMAL
MDC_IDC_SET_LEADCHNL_RA_PACING_CAPTURE_MODE: NORMAL
MDC_IDC_SET_LEADCHNL_RA_PACING_CATHODE_ELECTRODE_1: NORMAL
MDC_IDC_SET_LEADCHNL_RA_PACING_CATHODE_LOCATION_1: NORMAL
MDC_IDC_SET_LEADCHNL_RA_PACING_POLARITY: NORMAL
MDC_IDC_SET_LEADCHNL_RA_PACING_PULSEWIDTH: 0.4 MS
MDC_IDC_SET_LEADCHNL_RA_SENSING_ANODE_ELECTRODE_1: NORMAL
MDC_IDC_SET_LEADCHNL_RA_SENSING_ANODE_LOCATION_1: NORMAL
MDC_IDC_SET_LEADCHNL_RA_SENSING_CATHODE_ELECTRODE_1: NORMAL
MDC_IDC_SET_LEADCHNL_RA_SENSING_CATHODE_LOCATION_1: NORMAL
MDC_IDC_SET_LEADCHNL_RA_SENSING_POLARITY: NORMAL
MDC_IDC_SET_LEADCHNL_RA_SENSING_SENSITIVITY: 0.3 MV
MDC_IDC_SET_LEADCHNL_RV_PACING_AMPLITUDE: 2 V
MDC_IDC_SET_LEADCHNL_RV_PACING_ANODE_ELECTRODE_1: NORMAL
MDC_IDC_SET_LEADCHNL_RV_PACING_ANODE_LOCATION_1: NORMAL
MDC_IDC_SET_LEADCHNL_RV_PACING_CAPTURE_MODE: NORMAL
MDC_IDC_SET_LEADCHNL_RV_PACING_CATHODE_ELECTRODE_1: NORMAL
MDC_IDC_SET_LEADCHNL_RV_PACING_CATHODE_LOCATION_1: NORMAL
MDC_IDC_SET_LEADCHNL_RV_PACING_POLARITY: NORMAL
MDC_IDC_SET_LEADCHNL_RV_PACING_PULSEWIDTH: 0.4 MS
MDC_IDC_SET_LEADCHNL_RV_SENSING_ANODE_ELECTRODE_1: NORMAL
MDC_IDC_SET_LEADCHNL_RV_SENSING_ANODE_LOCATION_1: NORMAL
MDC_IDC_SET_LEADCHNL_RV_SENSING_CATHODE_ELECTRODE_1: NORMAL
MDC_IDC_SET_LEADCHNL_RV_SENSING_CATHODE_LOCATION_1: NORMAL
MDC_IDC_SET_LEADCHNL_RV_SENSING_POLARITY: NORMAL
MDC_IDC_SET_LEADCHNL_RV_SENSING_SENSITIVITY: 0.9 MV
MDC_IDC_SET_ZONE_DETECTION_INTERVAL: 350 MS
MDC_IDC_SET_ZONE_DETECTION_INTERVAL: 400 MS
MDC_IDC_SET_ZONE_STATUS: NORMAL
MDC_IDC_SET_ZONE_STATUS: NORMAL
MDC_IDC_SET_ZONE_TYPE: NORMAL
MDC_IDC_SET_ZONE_VENDOR_TYPE: NORMAL
MDC_IDC_STAT_AT_BURDEN_PERCENT: 15 %
MDC_IDC_STAT_AT_DTM_END: NORMAL
MDC_IDC_STAT_AT_DTM_START: NORMAL
MDC_IDC_STAT_BRADY_AP_VP_PERCENT: 18.8 %
MDC_IDC_STAT_BRADY_AP_VS_PERCENT: 0.01 %
MDC_IDC_STAT_BRADY_AS_VP_PERCENT: 80.84 %
MDC_IDC_STAT_BRADY_AS_VS_PERCENT: 0.35 %
MDC_IDC_STAT_BRADY_DTM_END: NORMAL
MDC_IDC_STAT_BRADY_DTM_START: NORMAL
MDC_IDC_STAT_BRADY_RA_PERCENT_PACED: 17.39 %
MDC_IDC_STAT_BRADY_RV_PERCENT_PACED: 99.49 %
MDC_IDC_STAT_EPISODE_RECENT_COUNT: 0
MDC_IDC_STAT_EPISODE_RECENT_COUNT: 4378
MDC_IDC_STAT_EPISODE_RECENT_COUNT_DTM_END: NORMAL
MDC_IDC_STAT_EPISODE_RECENT_COUNT_DTM_START: NORMAL
MDC_IDC_STAT_EPISODE_TOTAL_COUNT: 1
MDC_IDC_STAT_EPISODE_TOTAL_COUNT: 2
MDC_IDC_STAT_EPISODE_TOTAL_COUNT: 5
MDC_IDC_STAT_EPISODE_TOTAL_COUNT: NORMAL
MDC_IDC_STAT_EPISODE_TOTAL_COUNT_DTM_END: NORMAL
MDC_IDC_STAT_EPISODE_TOTAL_COUNT_DTM_START: NORMAL
MDC_IDC_STAT_EPISODE_TYPE: NORMAL
MDC_IDC_STAT_TACHYTHERAPY_RECENT_DTM_END: NORMAL
MDC_IDC_STAT_TACHYTHERAPY_RECENT_DTM_START: NORMAL
MDC_IDC_STAT_TACHYTHERAPY_TOTAL_DTM_END: NORMAL
MDC_IDC_STAT_TACHYTHERAPY_TOTAL_DTM_START: NORMAL

## 2024-09-09 ENCOUNTER — TELEPHONE (OUTPATIENT)
Dept: CARDIOLOGY | Facility: CLINIC | Age: 69
End: 2024-09-09
Payer: COMMERCIAL

## 2024-09-09 NOTE — TELEPHONE ENCOUNTER
Dr. Benavidez and Maria Elena,     Patient has been having pretty significant symptoms Since Thursday 9/5/24. Symptoms include nausea, lightheadedness and dizziness. If he stands up too quickly he fears he may pass out. He has been taking it easy the last few days. Currently prescribed eliquis and carvedilol. Sometimes he forgets to take his evening medications, encouraged him to take medication as prescribed going forward. Pt sent a remote PPM transmission. He is currently in afib and has consistently been in afib since 9/7/24. Would you like to make any changes? Thank you, EricaCVT    Episode List        Presenting EGM      Heart Rates

## 2024-09-09 NOTE — TELEPHONE ENCOUNTER
Called and reviewed Dr. Benavidez' response and recommendations with patient.  He will check his blood pressure both at rest, but also will check orthostatics after standing and then will reach out to his PCP for follow-up and recommendations.  If his symptoms become severe, he is aware that he should present to the ER.  Patient was appreciative of the call.     CUCO Boyd

## 2024-09-09 NOTE — TELEPHONE ENCOUNTER
He has had AF before, hard to imagine these symptoms are directly related to AF. Of note, he is on Eliquis.    Please have him check his BP and discuss with PCP. He needs to be seen by his PCP.  However, if he feels really unwell, he should go to ER for evaluation.    COCO

## 2024-12-14 NOTE — PROGRESS NOTES
"[facility]    I had the pleasure of seeing Arias when he came for follow up of HTN, heart block.  This 69year old sees Dr. Benavidez for his history of:    1. 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 Mild CM - echo 11/2019 showing EF50-55%, stable 4/2024 50-55%  3. HTN  4. Paroxysmal AFib noted on device interrogations.On Eliquis for CHADSVASc 2 (HTN, age)   5.  NSVT noted on device interrogations.  CTA done in follow-up 7/2022 showed mild CAD and a calcium score 98.4.  Episode lasted 24 beats noted 4/2024 echo 4/2024 with stable LVEF 50-55%.  Nuclear stress test 12/2023 negative for ischemia/infarction  6.  Polymyalgia rheumatica - dx'd 5/2022.  Started on prednisone, stopped summer 2023  7.  Significant alcohol use - ~25 drinks/week   8.  JOSE G - on CPAP        Last Visit & Interval History:  I saw Arias 12/2023 at which time he was doing well.  He had a rare atrial fibrillation, associated with GARCIA and fatigue.  Pacemaker interrogation confirmed that episodes were lasting less than 1 h.  He noted some atypical shoulder blade discomfort, and due to persistence of this, obtain a nuclear stress test.  This was done 12/2023 and was negative for ischemia.    In 4/2024, noted to have an episode of NSVT on device.  Updated echocardiogram showed stable low normal LVEF 50-55% and Dr. Benavidez recommended continued monitoring.    In 9/2024, he was noted to have recurrent AF associated with nausea, lightheadedness, and dizziness.  He had significant orthostasis.  Device interrogation showed he had been in AF since 9/7.  As the symptoms had not necessarily been associated with AF in the past, Dr. Benavidez recommended for BP checks and seeing PCP    Today's Visit:  Arias thinks that things are going \"OK\" but has noted increasing episodes of intense nausea to the point he wants to vomit. No associated CP, SOB, diaphoresis. No diarrhea/abdominal cramping.  He \"lays down\" for awhile when he gets this and sxs " "improve without vomiting. He had an episode of this on Saturday 12/14 and we reviewed that he had some arrhythmias on Saturday as well. Reviewed that he's not always sx'c with     Device check today showed increasing arrhythmia burden, up to 15% since 8/2024. Episodes are now lasting days at a time, where previously they were occurring <3 hours.  Rates controlled in AFib. No bleeding issues with Eliquis    VITALS:  Vitals: /80   Pulse 77   Ht 1.803 m (5' 11\")   Wt 112.9 kg (249 lb)   SpO2 97%   BMI 34.73 kg/m      Diagnostic Testing:  Device interrogation today showed 21%AP and 99.6% in DDDR . Underlying SR 80s without JE @ VVI 30. HR  bpm histogram. 2 y battery remaining. 15% mode switching since 8/1/2024. No ventricular arrhythmias.   Echo 4/2024 LVEF 50-55%.  Apical dyskinesis thought due to pacemaker activation.  Normal RV.  Borderline LAE.  Trace MR, trace TR.  Aortic sclerosis with 1+ AI.  Aortic root 4.1 cm.  Ascending aorta normal 3.6 cm.  Nuclear stress test 12/2023 negative for ischemia/infarction.  No change since 2018  CTA heart 7/2022 calcium score 98.4, placing him in 47th percentile when compared to age/gender matched controls.  Nonobstructive CAD with mild oLM, mild p/mLAD, mild pOM1 and nondominant RCA.  Normal aorta. Non-cardiac portion nonsignificant  Echocardiogam 11/2019 showed EF 50-55%. No RWMA. RV OK. LA mild-mod dilated 4.5 cm index 43.0 ml/m2. Trace MR. 1+ TR with RVSP 21+RAP. Aortic sclerosis. Mild 1+ AI.  Nuclear Stress Test 2/2018 showed no ischemia           Plan:  Decrease alcohol consumption  2.    Write down when having recurrent issues with intense nausea and we'll get remote device check. Looking to correlate arrhythmia with these sxs  3.    Annual follow-up with in-office device interrogation    Assessment/Plan:    HTN  Remains on lisinopril/HCTZ 20/25 and carvedilol 6.25 mg twice daily  NP from PCPs office 5/2024 looked good  BP better on recheck " today    PLAN:  Continue current medications    Atrial arrhythmias  Has known history of paroxysmal AF and AT  He is on carvedilol 6.25 mg BID and device interrogation have continued to show episodes of AF.  Typically, episodes have been <1 hour, but in 7/2024 noted to have episode lasting days at a time.   Has episodes of intense nausea without other sxs. In 9/2024 he had this and was in arrhythmia. Today he describes an episode on Saturday 12/14 and he also had arrhythmias noted then.  He's had other episodes of AFib he can feel with palpitations (including this morning), not a/w other sxs    Remains on Eliquis for [id number] 2 (HTN, age).  Dose appropriate for age/weight/creatinine hemoglobin was normal through PCPs office 3/2024 at 14.3 g/dL    PLAN:  Continue to monitor. Asked him to kim down when he has these intense bouts of nausea to see if we can correlate it with arrhythmias. If so, will consider AAD therapy  Limit alcohol  Continue routine device interrogations, and see me back 1 year with in-office device check    NSVT   As above, noted on device interrogations without significant symptoms   Due to longer episode 4/2024 (24 beats), Dr. Benavidez recommended echocardiogram   Nuclear stress test 12/2023 negative for ischemia/infarction   Echo 4/2024 with stable low-normal LVEF 50 to 55%   Remains on  coreg    PLAN:  Continue routine device interrogations    4. CAC  Had been noted on CTA 7/2022 (47 percentile when compared to age/gender matched controls).  Due to atypical discomfort, had nuclear stress test 12/2023 negative for ischemia  Echo 4/2024 showed stable low-normal LVEF 50-55%  Remains on BB, ace, statin.  No ASA due to Eliquis  LDL 9/2023 was 97 mg/dL, meeting goal of <100mg/dL          Maria Elena Khan PA-C, MSPAS      No orders of the defined types were placed in this encounter.    Orders Placed This Encounter   Medications    lisinopril-hydrochlorothiazide (ZESTORETIC) 20-25 MG tablet     Sig: Take 1  tablet by mouth daily.     Dispense:  90 tablet     Refill:  3    atorvastatin (LIPITOR) 20 MG tablet     Sig: Take 1 tablet (20 mg) by mouth daily.     Dispense:  90 tablet     Refill:  3    carvedilol (COREG) 6.25 MG tablet     Sig: Take 1 tablet (6.25 mg) by mouth 2 times daily (with meals).     Dispense:  180 tablet     Refill:  3    apixaban ANTICOAGULANT (ELIQUIS ANTICOAGULANT) 5 MG tablet     Sig: Take 1 tablet (5 mg) by mouth 2 times daily.     Dispense:  180 tablet     Refill:  3     Medications Discontinued During This Encounter   Medication Reason    lisinopril-hydrochlorothiazide (PRINZIDE,ZESTORETIC) 20-25 MG per tablet Reorder (No AVS)    atorvastatin (LIPITOR) 20 MG tablet Reorder (No AVS)    apixaban ANTICOAGULANT (ELIQUIS ANTICOAGULANT) 5 MG tablet Reorder (No AVS)    carvedilol (COREG) 6.25 MG tablet Reorder (No AVS)           Encounter Diagnoses   Name Primary?    AV block, 2nd degree     Cardiac pacemaker in situ     Dyspnea on exertion     Paroxysmal atrial fibrillation (H)     Dyslipidemia Yes    Benign essential hypertension          CURRENT MEDICATIONS:  Current Outpatient Medications   Medication Sig Dispense Refill    apixaban ANTICOAGULANT (ELIQUIS ANTICOAGULANT) 5 MG tablet Take 1 tablet (5 mg) by mouth 2 times daily. 180 tablet 3    atorvastatin (LIPITOR) 20 MG tablet Take 1 tablet (20 mg) by mouth daily. 90 tablet 3    carvedilol (COREG) 6.25 MG tablet Take 1 tablet (6.25 mg) by mouth 2 times daily (with meals). 180 tablet 3    DULoxetine (CYMBALTA) 60 MG capsule Take 90 mg by mouth daily 1.5 tablets DAILY      lisinopril-hydrochlorothiazide (ZESTORETIC) 20-25 MG tablet Take 1 tablet by mouth daily. 90 tablet 3       ALLERGIES     Allergies   Allergen Reactions    Flexeril [Cyclobenzaprine] GI Disturbance    Naprosyn [Naproxen] GI Disturbance    Penicillins      As child    Ranitidine GI Disturbance         Review of Systems:  Skin:  Negative     Eyes:  Positive for cataracts  ENT:   "Negative    Respiratory:  Positive for dyspnea on exertion  Cardiovascular:  Negative for, palpitations, edema, fatigue, lightheadedness, dizziness Positive for, exercise intolerance  Gastroenterology: Positive for nausea  Genitourinary:  Negative    Musculoskeletal:  Positive for back pain, neck pain  Neurologic:  Negative numbness or tingling of hands  Psychiatric:  Positive for depression  Heme/Lymph/Imm:  Positive for allergies  Endocrine:  Negative      Physical Exam:  Vitals: /80   Pulse 77   Ht 1.803 m (5' 11\")   Wt 112.9 kg (249 lb)   SpO2 97%   BMI 34.73 kg/m      Constitutional:  cooperative, alert and oriented, well developed, well nourished, in no acute distress        Skin:  warm and dry to the touch, no apparent skin lesions or masses noted        Head:  normocephalic, no masses or lesions        Eyes:  pupils equal and round, conjunctivae and lids unremarkable, sclera white        ENT:  no pallor or cyanosis, dentition good        Neck:  JVP normal        Chest:  normal breath sounds, clear to auscultation, normal A-P diameter, normal symmetry, normal respiratory excursion, no use of accessory muscles        Cardiac: regular rhythm, no murmurs, gallops or rubs detected                  Abdomen:  abdomen soft obese      Vascular: pulses full and equal                                      Extremities and Back:  no edema, no deformities, clubbing, cyanosis, erythema observed        Neurological:  no gross motor deficits            PAST MEDICAL HISTORY:  Past Medical History:   Diagnosis Date    CPAP (continuous positive airway pressure) dependence     Depression     Gastro-oesophageal reflux disease     Hepatic steatosis     Hoarseness     Hyperlipidemia     Hypertension     IFG (impaired fasting glucose)     LAFB (left anterior fascicular block)     PONV (postoperative nausea and vomiting)     RBBB     Seizures (H)     as child    Sleep apnea     cpap    Uncomplicated asthma     denies asthma "       PAST SURGICAL HISTORY:  Past Surgical History:   Procedure Laterality Date    ANKLE SURGERY      APPENDECTOMY      BACK SURGERY      lami x2    ENT SURGERY      throat granulomas    HERNIA REPAIR      INJECT BOTOX N/A 10/30/2014    Procedure: INJECT BOTOX;  Surgeon: Kalyn Negron MD;  Location: [facility] OR    INJECT BOTOX N/A 2016    Procedure: INJECT BOTOX;  Surgeon: Kalyn Negron MD;  Location: [facility]    INJECT STEROID (LOCATION) N/A 2016    Procedure: INJECT STEROID (LOCATION);  Surgeon: Kalyn Negron MD;  Location: [facility] OR    LAMINECTOMY LUMBAR ONE LEVEL  3/5/2014    Procedure: LAMINECTOMY LUMBAR ONE LEVEL;  RIGHT L4-5 DISCECTOMY;  Surgeon: Rodrigo Ríos MD;  Location: [facility] OR    LASER CO2 LARYNGOSCOPY N/A 2016    Procedure: LASER CO2 LARYNGOSCOPY;  Surgeon: Kalyn Negron MD;  Location: [facility] OR    LASER CO2 LARYNGOSCOPY, COMPLEX Left 10/30/2014    Procedure: LASER CO2 LARYNGOSCOPY, COMPLEX;  Surgeon: Kalyn Negron MD;  Location: [facility] OR    ORTHOPEDIC SURGERY         FAMILY HISTORY:  Family History   Problem Relation Age of Onset    Cancer Father        SOCIAL HISTORY:  Social History     Socioeconomic History    Marital status:      Spouse name: None    Number of children: None    Years of education: None    Highest education level: None   Tobacco Use    Smoking status: Former     Types: Pipe     Start date: 10/10/1976     Quit date: 1985     Years since quittin.5    Smokeless tobacco: Never   Substance and Sexual Activity    Alcohol use: Yes     Comment: daily- 2 drinks /day    Drug use: No    Sexual activity: Yes     Partners: Female     Birth control/protection: Other

## 2024-12-16 ENCOUNTER — ANCILLARY PROCEDURE (OUTPATIENT)
Dept: CARDIOLOGY | Facility: CLINIC | Age: 69
End: 2024-12-16
Attending: INTERNAL MEDICINE
Payer: COMMERCIAL

## 2024-12-16 VITALS
SYSTOLIC BLOOD PRESSURE: 136 MMHG | WEIGHT: 249 LBS | HEIGHT: 71 IN | BODY MASS INDEX: 34.86 KG/M2 | OXYGEN SATURATION: 97 % | DIASTOLIC BLOOD PRESSURE: 80 MMHG | HEART RATE: 77 BPM

## 2024-12-16 DIAGNOSIS — E78.5 DYSLIPIDEMIA: Primary | ICD-10-CM

## 2024-12-16 DIAGNOSIS — R06.09 DYSPNEA ON EXERTION: ICD-10-CM

## 2024-12-16 DIAGNOSIS — Z95.0 CARDIAC PACEMAKER IN SITU: ICD-10-CM

## 2024-12-16 DIAGNOSIS — I10 BENIGN ESSENTIAL HYPERTENSION: ICD-10-CM

## 2024-12-16 DIAGNOSIS — I48.0 PAROXYSMAL ATRIAL FIBRILLATION (H): ICD-10-CM

## 2024-12-16 DIAGNOSIS — I44.1 AV BLOCK, 2ND DEGREE: ICD-10-CM

## 2024-12-16 PROCEDURE — 93280 PM DEVICE PROGR EVAL DUAL: CPT | Performed by: INTERNAL MEDICINE

## 2024-12-16 RX ORDER — CARVEDILOL 6.25 MG/1
6.25 TABLET ORAL 2 TIMES DAILY WITH MEALS
Qty: 180 TABLET | Refills: 3 | Status: SHIPPED | OUTPATIENT
Start: 2024-12-16

## 2024-12-16 RX ORDER — ATORVASTATIN CALCIUM 20 MG/1
20 TABLET, FILM COATED ORAL DAILY
Qty: 90 TABLET | Refills: 3 | Status: SHIPPED | OUTPATIENT
Start: 2024-12-16

## 2024-12-16 RX ORDER — LISINOPRIL AND HYDROCHLOROTHIAZIDE 20; 25 MG/1; MG/1
1 TABLET ORAL DAILY
Qty: 90 TABLET | Refills: 3 | Status: SHIPPED | OUTPATIENT
Start: 2024-12-16

## 2024-12-16 NOTE — PATIENT INSTRUCTIONS
Arias - it was nice to see you today!    Today we reviewed:    Pacer check today looked good BUT more arrhythmias  ?correlating with intense nausea/vomiting?  BP high initially, improved when I rechecked      MEDICATION CHANGES:    1. NONE    RECOMMENDATIONS:    WRITE DOWN when you're getting the nausea/vomiting so we can work on correlating arrhythmia with symptoms    FOLLOW UP:    Annual follow-up, earlier if needed    IMPORTANT PHONE NUMBERS FOR  [facility] ([place]):    Device nurses: [phone]

## 2024-12-16 NOTE — LETTER
12/16/2024    Leann G Hutchison, MD [facility] [place] [address] [place] [address]    RE: Arias LUU Manzo       Dear Colleague,     I had the pleasure of seeing Arias Manzo in the [facility].  [facility]    I had the pleasure of seeing Arias when he came for follow up of HTN, heart block.  This 69year old sees Dr. Benavidez for his history of:    1. 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 Mild CM - echo 11/2019 showing EF50-55%, stable 4/2024 50-55%  3. HTN  4. Paroxysmal AFib noted on device interrogations.On Eliquis for CHADSVASc 2 (HTN, age)   5.  NSVT noted on device interrogations.  CTA done in follow-up 7/2022 showed mild CAD and a calcium score 98.4.  Episode lasted 24 beats noted 4/2024 echo 4/2024 with stable LVEF 50-55%.  Nuclear stress test 12/2023 negative for ischemia/infarction  6.  Polymyalgia rheumatica - dx'd 5/2022.  Started on prednisone, stopped summer 2023  7.  Significant alcohol use - ~25 drinks/week   8.  JOSE G - on CPAP        Last Visit & Interval History:  I saw Arias 12/2023 at which time he was doing well.  He had a rare atrial fibrillation, associated with GARCIA and fatigue.  Pacemaker interrogation confirmed that episodes were lasting less than 1 h.  He noted some atypical shoulder blade discomfort, and due to persistence of this, obtain a nuclear stress test.  This was done 12/2023 and was negative for ischemia.    In 4/2024, noted to have an episode of NSVT on device.  Updated echocardiogram showed stable low normal LVEF 50-55% and Dr. Benavidez recommended continued monitoring.    In 9/2024, he was noted to have recurrent AF associated with nausea, lightheadedness, and dizziness.  He had significant orthostasis.  Device interrogation showed he had been in AF since 9/7.  As the symptoms had not necessarily been associated with AF in the past, Dr. Benavidez recommended for BP checks and seeing PCP    Today's  "Visit:  Arias thinks that things are going \"OK\" but has noted increasing episodes of intense nausea to the point he wants to vomit. No associated CP, SOB, diaphoresis. No diarrhea/abdominal cramping.  He \"lays down\" for awhile when he gets this and sxs improve without vomiting. He had an episode of this on Saturday 12/14 and we reviewed that he had some arrhythmias on Saturday as well. Reviewed that he's not always sx'c with     Device check today showed increasing arrhythmia burden, up to 15% since 8/2024. Episodes are now lasting days at a time, where previously they were occurring <3 hours.  Rates controlled in AFib. No bleeding issues with Eliquis    VITALS:  Vitals: /80   Pulse 77   Ht 1.803 m (5' 11\")   Wt 112.9 kg (249 lb)   SpO2 97%   BMI 34.73 kg/m      Diagnostic Testing:  Device interrogation today showed 21%AP and 99.6% in DDDR . Underlying SR 80s without JE @ VVI 30. HR  bpm histogram. 2 y battery remaining. 15% mode switching since 8/1/2024. No ventricular arrhythmias.   Echo 4/2024 LVEF 50-55%.  Apical dyskinesis thought due to pacemaker activation.  Normal RV.  Borderline LAE.  Trace MR, trace TR.  Aortic sclerosis with 1+ AI.  Aortic root 4.1 cm.  Ascending aorta normal 3.6 cm.  Nuclear stress test 12/2023 negative for ischemia/infarction.  No change since 2018  CTA heart 7/2022 calcium score 98.4, placing him in 47th percentile when compared to age/gender matched controls.  Nonobstructive CAD with mild oLM, mild p/mLAD, mild pOM1 and nondominant RCA.  Normal aorta. Non-cardiac portion nonsignificant  Echocardiogam 11/2019 showed EF 50-55%. No RWMA. RV OK. LA mild-mod dilated 4.5 cm index 43.0 ml/m2. Trace MR. 1+ TR with RVSP 21+RAP. Aortic sclerosis. Mild 1+ AI.  Nuclear Stress Test 2/2018 showed no ischemia           Plan:  Decrease alcohol consumption  2.    Write down when having recurrent issues with intense nausea and we'll get remote device check. Looking to correlate " arrhythmia with these sxs  3.    Annual follow-up with in-office device interrogation    Assessment/Plan:    HTN  Remains on lisinopril/HCTZ 20/25 and carvedilol 6.25 mg twice daily  NP from PCPs office 5/2024 looked good  BP better on recheck today    PLAN:  Continue current medications    Atrial arrhythmias  Has known history of paroxysmal AF and AT  He is on carvedilol 6.25 mg BID and device interrogation have continued to show episodes of AF.  Typically, episodes have been <1 hour, but in 7/2024 noted to have episode lasting days at a time.   Has episodes of intense nausea without other sxs. In 9/2024 he had this and was in arrhythmia. Today he describes an episode on Saturday 12/14 and he also had arrhythmias noted then.  He's had other episodes of AFib he can feel with palpitations (including this morning), not a/w other sxs    Remains on Eliquis for IMH4DR1-PTIa 2 (HTN, age).  Dose appropriate for age/weight/creatinine hemoglobin was normal through PCPs office 3/2024 at 14.3 g/dL    PLAN:  Continue to monitor. Asked him to kim down when he has these intense bouts of nausea to see if we can correlate it with arrhythmias. If so, will consider AAD therapy  Limit alcohol  Continue routine device interrogations, and see me back 1 year with in-office device check    NSVT   As above, noted on device interrogations without significant symptoms   Due to longer episode 4/2024 (24 beats), Dr. Benavidez recommended echocardiogram   Nuclear stress test 12/2023 negative for ischemia/infarction   Echo 4/2024 with stable low-normal LVEF 50 to 55%   Remains on  coreg    PLAN:  Continue routine device interrogations    4. CAC  Had been noted on CTA 7/2022 (47 percentile when compared to age/gender matched controls).  Due to atypical discomfort, had nuclear stress test 12/2023 negative for ischemia  Echo 4/2024 showed stable low-normal LVEF 50-55%  Remains on BB, ace, statin.  No ASA due to Eliquis  LDL 9/2023 was 97 mg/dL,  meeting goal of <100mg/dL          Maria Elena Khan PA-C, MSPAS      No orders of the defined types were placed in this encounter.    Orders Placed This Encounter   Medications     lisinopril-hydrochlorothiazide (ZESTORETIC) 20-25 MG tablet     Sig: Take 1 tablet by mouth daily.     Dispense:  90 tablet     Refill:  3     atorvastatin (LIPITOR) 20 MG tablet     Sig: Take 1 tablet (20 mg) by mouth daily.     Dispense:  90 tablet     Refill:  3     carvedilol (COREG) 6.25 MG tablet     Sig: Take 1 tablet (6.25 mg) by mouth 2 times daily (with meals).     Dispense:  180 tablet     Refill:  3     apixaban ANTICOAGULANT (ELIQUIS ANTICOAGULANT) 5 MG tablet     Sig: Take 1 tablet (5 mg) by mouth 2 times daily.     Dispense:  180 tablet     Refill:  3     Medications Discontinued During This Encounter   Medication Reason     lisinopril-hydrochlorothiazide (PRINZIDE,ZESTORETIC) 20-25 MG per tablet Reorder (No AVS)     atorvastatin (LIPITOR) 20 MG tablet Reorder (No AVS)     apixaban ANTICOAGULANT (ELIQUIS ANTICOAGULANT) 5 MG tablet Reorder (No AVS)     carvedilol (COREG) 6.25 MG tablet Reorder (No AVS)           Encounter Diagnoses   Name Primary?     AV block, 2nd degree      Cardiac pacemaker in situ      Dyspnea on exertion      Paroxysmal atrial fibrillation (H)      Dyslipidemia Yes     Benign essential hypertension          CURRENT MEDICATIONS:  Current Outpatient Medications   Medication Sig Dispense Refill     apixaban ANTICOAGULANT (ELIQUIS ANTICOAGULANT) 5 MG tablet Take 1 tablet (5 mg) by mouth 2 times daily. 180 tablet 3     atorvastatin (LIPITOR) 20 MG tablet Take 1 tablet (20 mg) by mouth daily. 90 tablet 3     carvedilol (COREG) 6.25 MG tablet Take 1 tablet (6.25 mg) by mouth 2 times daily (with meals). 180 tablet 3     DULoxetine (CYMBALTA) 60 MG capsule Take 90 mg by mouth daily 1.5 tablets DAILY       lisinopril-hydrochlorothiazide (ZESTORETIC) 20-25 MG tablet Take 1 tablet by mouth daily. 90 tablet 3  "      ALLERGIES     Allergies   Allergen Reactions     Flexeril [Cyclobenzaprine] GI Disturbance     Naprosyn [Naproxen] GI Disturbance     Penicillins      As child     Ranitidine GI Disturbance         Review of Systems:  Skin:  Negative     Eyes:  Positive for cataracts  ENT:  Negative    Respiratory:  Positive for dyspnea on exertion  Cardiovascular:  Negative for, palpitations, edema, fatigue, lightheadedness, dizziness Positive for, exercise intolerance  Gastroenterology: Positive for nausea  Genitourinary:  Negative    Musculoskeletal:  Positive for back pain, neck pain  Neurologic:  Negative numbness or tingling of hands  Psychiatric:  Positive for depression  Heme/Lymph/Imm:  Positive for allergies  Endocrine:  Negative      Physical Exam:  Vitals: /80   Pulse 77   Ht 1.803 m (5' 11\")   Wt 112.9 kg (249 lb)   SpO2 97%   BMI 34.73 kg/m      Constitutional:  cooperative, alert and oriented, well developed, well nourished, in no acute distress        Skin:  warm and dry to the touch, no apparent skin lesions or masses noted        Head:  normocephalic, no masses or lesions        Eyes:  pupils equal and round, conjunctivae and lids unremarkable, sclera white        ENT:  no pallor or cyanosis, dentition good        Neck:  JVP normal        Chest:  normal breath sounds, clear to auscultation, normal A-P diameter, normal symmetry, normal respiratory excursion, no use of accessory muscles        Cardiac: regular rhythm, no murmurs, gallops or rubs detected                  Abdomen:  abdomen soft obese      Vascular: pulses full and equal                                      Extremities and Back:  no edema, no deformities, clubbing, cyanosis, erythema observed        Neurological:  no gross motor deficits            PAST MEDICAL HISTORY:  Past Medical History:   Diagnosis Date     CPAP (continuous positive airway pressure) dependence      Depression      Gastro-oesophageal reflux disease      Hepatic " steatosis      Hoarseness      Hyperlipidemia      Hypertension      IFG (impaired fasting glucose)      LAFB (left anterior fascicular block)      PONV (postoperative nausea and vomiting)      RBBB      Seizures (H)     as child     Sleep apnea     cpap     Uncomplicated asthma     denies asthma       PAST SURGICAL HISTORY:  Past Surgical History:   Procedure Laterality Date     ANKLE SURGERY       APPENDECTOMY       BACK SURGERY      lami x2     ENT SURGERY      throat granulomas     HERNIA REPAIR       INJECT BOTOX N/A 10/30/2014    Procedure: INJECT BOTOX;  Surgeon: Kalyn Negron MD;  Location: [facility] OR     INJECT BOTOX N/A 2016    Procedure: INJECT BOTOX;  Surgeon: Kalyn Negron MD;  Location: [facility]     INJECT STEROID (LOCATION) N/A 2016    Procedure: INJECT STEROID (LOCATION);  Surgeon: Kalyn Negron MD;  Location: [facility] OR     LAMINECTOMY LUMBAR ONE LEVEL  3/5/2014    Procedure: LAMINECTOMY LUMBAR ONE LEVEL;  RIGHT L4-5 DISCECTOMY;  Surgeon: Rodrigo Ríos MD;  Location: [facility] OR     LASER CO2 LARYNGOSCOPY N/A 2016    Procedure: LASER CO2 LARYNGOSCOPY;  Surgeon: Kalyn Negron MD;  Location: [facility] OR     LASER CO2 LARYNGOSCOPY, COMPLEX Left 10/30/2014    Procedure: LASER CO2 LARYNGOSCOPY, COMPLEX;  Surgeon: Kalyn Negron MD;  Location: [facility] OR     ORTHOPEDIC SURGERY         FAMILY HISTORY:  Family History   Problem Relation Age of Onset     Cancer Father        SOCIAL HISTORY:  Social History     Socioeconomic History     Marital status:      Spouse name: None     Number of children: None     Years of education: None     Highest education level: None   Tobacco Use     Smoking status: Former     Types: Pipe     Start date: 10/10/1976     Quit date: 1985     Years since quittin.5     Smokeless tobacco: Never   Substance and Sexual Activity     Alcohol use: Yes     Comment: daily- 2 drinks /day     Drug use: No     Sexual activity:  Yes     Partners: Female     Birth control/protection: Other                  Thank you for allowing me to participate in the care of your patient.      Sincerely,     Melani Khan PA-C     [facility]  cc:   Denzel Bey MD  [address] [place] [address]

## 2024-12-17 LAB
MDC_IDC_EPISODE_DTM: NORMAL
MDC_IDC_EPISODE_DURATION: 100 S
MDC_IDC_EPISODE_DURATION: 110 S
MDC_IDC_EPISODE_DURATION: 111 S
MDC_IDC_EPISODE_DURATION: 118 S
MDC_IDC_EPISODE_DURATION: 122 S
MDC_IDC_EPISODE_DURATION: 126 S
MDC_IDC_EPISODE_DURATION: 134 S
MDC_IDC_EPISODE_DURATION: 135 S
MDC_IDC_EPISODE_DURATION: 152 S
MDC_IDC_EPISODE_DURATION: 155 S
MDC_IDC_EPISODE_DURATION: 156 S
MDC_IDC_EPISODE_DURATION: 159 S
MDC_IDC_EPISODE_DURATION: 162 S
MDC_IDC_EPISODE_DURATION: 162 S
MDC_IDC_EPISODE_DURATION: 165 S
MDC_IDC_EPISODE_DURATION: 167 S
MDC_IDC_EPISODE_DURATION: 167 S
MDC_IDC_EPISODE_DURATION: 171 S
MDC_IDC_EPISODE_DURATION: 172 S
MDC_IDC_EPISODE_DURATION: 184 S
MDC_IDC_EPISODE_DURATION: 1901 S
MDC_IDC_EPISODE_DURATION: 196 S
MDC_IDC_EPISODE_DURATION: 200 S
MDC_IDC_EPISODE_DURATION: 211 S
MDC_IDC_EPISODE_DURATION: 213 S
MDC_IDC_EPISODE_DURATION: 222 S
MDC_IDC_EPISODE_DURATION: 235 S
MDC_IDC_EPISODE_DURATION: 240 S
MDC_IDC_EPISODE_DURATION: 245 S
MDC_IDC_EPISODE_DURATION: 255 S
MDC_IDC_EPISODE_DURATION: 259 S
MDC_IDC_EPISODE_DURATION: 260 S
MDC_IDC_EPISODE_DURATION: 273 S
MDC_IDC_EPISODE_DURATION: 274 S
MDC_IDC_EPISODE_DURATION: 294 S
MDC_IDC_EPISODE_DURATION: 295 S
MDC_IDC_EPISODE_DURATION: 354 S
MDC_IDC_EPISODE_DURATION: 362 S
MDC_IDC_EPISODE_DURATION: 3879 S
MDC_IDC_EPISODE_DURATION: 46 S
MDC_IDC_EPISODE_DURATION: 470 S
MDC_IDC_EPISODE_DURATION: 53 S
MDC_IDC_EPISODE_DURATION: 578 S
MDC_IDC_EPISODE_DURATION: 63 S
MDC_IDC_EPISODE_DURATION: 65 S
MDC_IDC_EPISODE_DURATION: 7 S
MDC_IDC_EPISODE_DURATION: 86 S
MDC_IDC_EPISODE_DURATION: 873 S
MDC_IDC_EPISODE_DURATION: 94 S
MDC_IDC_EPISODE_DURATION: 98 S
MDC_IDC_EPISODE_ID: NORMAL
MDC_IDC_EPISODE_TYPE: NORMAL
MDC_IDC_EPISODE_TYPE_INDUCED: NO
MDC_IDC_LEAD_CONNECTION_STATUS: NORMAL
MDC_IDC_LEAD_CONNECTION_STATUS: NORMAL
MDC_IDC_LEAD_IMPLANT_DT: NORMAL
MDC_IDC_LEAD_IMPLANT_DT: NORMAL
MDC_IDC_LEAD_LOCATION: NORMAL
MDC_IDC_LEAD_LOCATION: NORMAL
MDC_IDC_LEAD_LOCATION_DETAIL_1: NORMAL
MDC_IDC_LEAD_LOCATION_DETAIL_1: NORMAL
MDC_IDC_LEAD_MFG: NORMAL
MDC_IDC_LEAD_MFG: NORMAL
MDC_IDC_LEAD_MODEL: NORMAL
MDC_IDC_LEAD_MODEL: NORMAL
MDC_IDC_LEAD_POLARITY_TYPE: NORMAL
MDC_IDC_LEAD_POLARITY_TYPE: NORMAL
MDC_IDC_LEAD_SERIAL: NORMAL
MDC_IDC_LEAD_SERIAL: NORMAL
MDC_IDC_MSMT_BATTERY_DTM: NORMAL
MDC_IDC_MSMT_BATTERY_REMAINING_LONGEVITY: 27 MO
MDC_IDC_MSMT_BATTERY_RRT_TRIGGER: 2.83
MDC_IDC_MSMT_BATTERY_STATUS: NORMAL
MDC_IDC_MSMT_BATTERY_VOLTAGE: 2.94 V
MDC_IDC_MSMT_LEADCHNL_RA_IMPEDANCE_VALUE: 380 OHM
MDC_IDC_MSMT_LEADCHNL_RA_IMPEDANCE_VALUE: 437 OHM
MDC_IDC_MSMT_LEADCHNL_RA_PACING_THRESHOLD_AMPLITUDE: 0.5 V
MDC_IDC_MSMT_LEADCHNL_RA_PACING_THRESHOLD_PULSEWIDTH: 0.4 MS
MDC_IDC_MSMT_LEADCHNL_RA_SENSING_INTR_AMPL: 2.75 MV
MDC_IDC_MSMT_LEADCHNL_RA_SENSING_INTR_AMPL: 3.12 MV
MDC_IDC_MSMT_LEADCHNL_RV_IMPEDANCE_VALUE: 399 OHM
MDC_IDC_MSMT_LEADCHNL_RV_IMPEDANCE_VALUE: 551 OHM
MDC_IDC_MSMT_LEADCHNL_RV_PACING_THRESHOLD_AMPLITUDE: 0.62 V
MDC_IDC_MSMT_LEADCHNL_RV_PACING_THRESHOLD_PULSEWIDTH: 0.4 MS
MDC_IDC_MSMT_LEADCHNL_RV_SENSING_INTR_AMPL: 14.62 MV
MDC_IDC_MSMT_LEADCHNL_RV_SENSING_INTR_AMPL: 14.62 MV
MDC_IDC_PG_IMPLANT_DTM: NORMAL
MDC_IDC_PG_MFG: NORMAL
MDC_IDC_PG_MODEL: NORMAL
MDC_IDC_PG_SERIAL: NORMAL
MDC_IDC_PG_TYPE: NORMAL
MDC_IDC_SESS_CLINIC_NAME: NORMAL
MDC_IDC_SESS_DTM: NORMAL
MDC_IDC_SESS_TYPE: NORMAL
MDC_IDC_SET_BRADY_AT_MODE_SWITCH_RATE: 171 {BEATS}/MIN
MDC_IDC_SET_BRADY_HYSTRATE: NORMAL
MDC_IDC_SET_BRADY_LOWRATE: 55 {BEATS}/MIN
MDC_IDC_SET_BRADY_MAX_SENSOR_RATE: 130 {BEATS}/MIN
MDC_IDC_SET_BRADY_MAX_TRACKING_RATE: 130 {BEATS}/MIN
MDC_IDC_SET_BRADY_MODE: NORMAL
MDC_IDC_SET_BRADY_PAV_DELAY_LOW: 150 MS
MDC_IDC_SET_BRADY_SAV_DELAY_LOW: 150 MS
MDC_IDC_SET_LEADCHNL_RA_PACING_AMPLITUDE: 1.5 V
MDC_IDC_SET_LEADCHNL_RA_PACING_ANODE_ELECTRODE_1: NORMAL
MDC_IDC_SET_LEADCHNL_RA_PACING_ANODE_LOCATION_1: NORMAL
MDC_IDC_SET_LEADCHNL_RA_PACING_CAPTURE_MODE: NORMAL
MDC_IDC_SET_LEADCHNL_RA_PACING_CATHODE_ELECTRODE_1: NORMAL
MDC_IDC_SET_LEADCHNL_RA_PACING_CATHODE_LOCATION_1: NORMAL
MDC_IDC_SET_LEADCHNL_RA_PACING_POLARITY: NORMAL
MDC_IDC_SET_LEADCHNL_RA_PACING_PULSEWIDTH: 0.4 MS
MDC_IDC_SET_LEADCHNL_RA_SENSING_ANODE_ELECTRODE_1: NORMAL
MDC_IDC_SET_LEADCHNL_RA_SENSING_ANODE_LOCATION_1: NORMAL
MDC_IDC_SET_LEADCHNL_RA_SENSING_CATHODE_ELECTRODE_1: NORMAL
MDC_IDC_SET_LEADCHNL_RA_SENSING_CATHODE_LOCATION_1: NORMAL
MDC_IDC_SET_LEADCHNL_RA_SENSING_POLARITY: NORMAL
MDC_IDC_SET_LEADCHNL_RA_SENSING_SENSITIVITY: 0.3 MV
MDC_IDC_SET_LEADCHNL_RV_PACING_AMPLITUDE: 2 V
MDC_IDC_SET_LEADCHNL_RV_PACING_ANODE_ELECTRODE_1: NORMAL
MDC_IDC_SET_LEADCHNL_RV_PACING_ANODE_LOCATION_1: NORMAL
MDC_IDC_SET_LEADCHNL_RV_PACING_CAPTURE_MODE: NORMAL
MDC_IDC_SET_LEADCHNL_RV_PACING_CATHODE_ELECTRODE_1: NORMAL
MDC_IDC_SET_LEADCHNL_RV_PACING_CATHODE_LOCATION_1: NORMAL
MDC_IDC_SET_LEADCHNL_RV_PACING_POLARITY: NORMAL
MDC_IDC_SET_LEADCHNL_RV_PACING_PULSEWIDTH: 0.4 MS
MDC_IDC_SET_LEADCHNL_RV_SENSING_ANODE_ELECTRODE_1: NORMAL
MDC_IDC_SET_LEADCHNL_RV_SENSING_ANODE_LOCATION_1: NORMAL
MDC_IDC_SET_LEADCHNL_RV_SENSING_CATHODE_ELECTRODE_1: NORMAL
MDC_IDC_SET_LEADCHNL_RV_SENSING_CATHODE_LOCATION_1: NORMAL
MDC_IDC_SET_LEADCHNL_RV_SENSING_POLARITY: NORMAL
MDC_IDC_SET_LEADCHNL_RV_SENSING_SENSITIVITY: 0.9 MV
MDC_IDC_SET_ZONE_DETECTION_INTERVAL: 350 MS
MDC_IDC_SET_ZONE_DETECTION_INTERVAL: 400 MS
MDC_IDC_SET_ZONE_STATUS: NORMAL
MDC_IDC_SET_ZONE_STATUS: NORMAL
MDC_IDC_SET_ZONE_TYPE: NORMAL
MDC_IDC_SET_ZONE_VENDOR_TYPE: NORMAL
MDC_IDC_STAT_AT_BURDEN_PERCENT: 15.2 %
MDC_IDC_STAT_AT_DTM_END: NORMAL
MDC_IDC_STAT_AT_DTM_START: NORMAL
MDC_IDC_STAT_BRADY_AP_VP_PERCENT: 20.41 %
MDC_IDC_STAT_BRADY_AP_VS_PERCENT: 0.02 %
MDC_IDC_STAT_BRADY_AS_VP_PERCENT: 79.14 %
MDC_IDC_STAT_BRADY_AS_VS_PERCENT: 0.42 %
MDC_IDC_STAT_BRADY_DTM_END: NORMAL
MDC_IDC_STAT_BRADY_DTM_START: NORMAL
MDC_IDC_STAT_BRADY_RA_PERCENT_PACED: 18.9 %
MDC_IDC_STAT_BRADY_RV_PERCENT_PACED: 99.37 %
MDC_IDC_STAT_EPISODE_RECENT_COUNT: 0
MDC_IDC_STAT_EPISODE_RECENT_COUNT: 1
MDC_IDC_STAT_EPISODE_RECENT_COUNT: 1
MDC_IDC_STAT_EPISODE_RECENT_COUNT: NORMAL
MDC_IDC_STAT_EPISODE_RECENT_COUNT_DTM_END: NORMAL
MDC_IDC_STAT_EPISODE_RECENT_COUNT_DTM_START: NORMAL
MDC_IDC_STAT_EPISODE_TOTAL_COUNT: 1
MDC_IDC_STAT_EPISODE_TOTAL_COUNT: 2
MDC_IDC_STAT_EPISODE_TOTAL_COUNT: 5
MDC_IDC_STAT_EPISODE_TOTAL_COUNT: NORMAL
MDC_IDC_STAT_EPISODE_TOTAL_COUNT_DTM_END: NORMAL
MDC_IDC_STAT_EPISODE_TOTAL_COUNT_DTM_START: NORMAL
MDC_IDC_STAT_EPISODE_TYPE: NORMAL
MDC_IDC_STAT_TACHYTHERAPY_RECENT_DTM_END: NORMAL
MDC_IDC_STAT_TACHYTHERAPY_RECENT_DTM_START: NORMAL
MDC_IDC_STAT_TACHYTHERAPY_TOTAL_DTM_END: NORMAL
MDC_IDC_STAT_TACHYTHERAPY_TOTAL_DTM_START: NORMAL

## 2025-03-17 ENCOUNTER — TELEPHONE (OUTPATIENT)
Dept: CARDIOLOGY | Facility: CLINIC | Age: 70
End: 2025-03-17

## 2025-03-17 ENCOUNTER — ANCILLARY PROCEDURE (OUTPATIENT)
Dept: CARDIOLOGY | Facility: CLINIC | Age: 70
End: 2025-03-17
Attending: INTERNAL MEDICINE
Payer: COMMERCIAL

## 2025-03-17 DIAGNOSIS — I48.0 PAROXYSMAL ATRIAL FIBRILLATION (H): Primary | ICD-10-CM

## 2025-03-17 DIAGNOSIS — I44.1 AV BLOCK, 2ND DEGREE: ICD-10-CM

## 2025-03-17 DIAGNOSIS — Z95.0 CARDIAC PACEMAKER IN SITU: ICD-10-CM

## 2025-03-17 PROCEDURE — 93296 REM INTERROG EVL PM/IDS: CPT | Performed by: INTERNAL MEDICINE

## 2025-03-17 PROCEDURE — 93294 REM INTERROG EVL PM/LDLS PM: CPT | Performed by: INTERNAL MEDICINE

## 2025-03-17 NOTE — TELEPHONE ENCOUNTER
Maria Elena,    Here are the results from your patient's device check today. Per your request, we were looking for arrhythmias that correlate with patient's intense bouts of nausea. Pt has been in AFib/Flutter about 9% of the time since 12/2024. Majority of episodes in logbook occurred 3/16. Pt recalls having episodes of nausea then but couldn't recall specific times of events. Pt also mentions he has a slight burning/tender sensation right about where ppm is implanted. Any changes?        Medtronic Advisa (D) Remote PPM Device Check    Mode: DDDR 55/130  Presenting Rhythm: AS/  Last In-Clinic Underlying Rhythm/Date: SR in the 80's with CHB with no junctional escape at VVI 30 (12/16/24)    Pacing/Histogram Data since:  12/16/24  AP: 28.5%  : >99%  Heart Rate: Adequate rates per histogram    Sensing: stable  Pacing Threshold: stable  Impedance: stable  Battery Status: 2 years    Arrhythmia Data Since: 12/16/24  Atrial Arrhythmia: Pt in mode switch 9.4% of the time. EGMs show AFib/Flutter. Longest episode lasted 32 minutes, ventricular rates controlled. All episodes in logbook occurred 3/16/3/17. Taking Eliquis. Pt reports having episodes of nausea 3/16 but doesn't have specific times they occurred.   Ventricular Arrhythmia: none    Care Plan: F/u PPM [facility] q 3 months. Results sent via [employer]. Will forward results to Maria Elena Khan, looking for correlations between episodes and sxs of nausea. CLAY Callahan

## 2025-03-17 NOTE — TELEPHONE ENCOUNTER
Pt called and left message for Maria Elena Khan. Pt is wondering if he could be prescribed Viagra or something similar. He has hx of HTN and is on Blood pressure medication so is directing request to Maria Elena. Will send TE with request.     Pt is prescribed:

## 2025-03-18 LAB
MDC_IDC_EPISODE_DTM: NORMAL
MDC_IDC_EPISODE_DURATION: 1058 S
MDC_IDC_EPISODE_DURATION: 115 S
MDC_IDC_EPISODE_DURATION: 1229 S
MDC_IDC_EPISODE_DURATION: 1263 S
MDC_IDC_EPISODE_DURATION: 142 S
MDC_IDC_EPISODE_DURATION: 1425 S
MDC_IDC_EPISODE_DURATION: 154 S
MDC_IDC_EPISODE_DURATION: 190 S
MDC_IDC_EPISODE_DURATION: 1958 S
MDC_IDC_EPISODE_DURATION: 230 S
MDC_IDC_EPISODE_DURATION: 237 S
MDC_IDC_EPISODE_DURATION: 254 S
MDC_IDC_EPISODE_DURATION: 265 S
MDC_IDC_EPISODE_DURATION: 275 S
MDC_IDC_EPISODE_DURATION: 278 S
MDC_IDC_EPISODE_DURATION: 28 S
MDC_IDC_EPISODE_DURATION: 283 S
MDC_IDC_EPISODE_DURATION: 287 S
MDC_IDC_EPISODE_DURATION: 291 S
MDC_IDC_EPISODE_DURATION: 294 S
MDC_IDC_EPISODE_DURATION: 299 S
MDC_IDC_EPISODE_DURATION: 312 S
MDC_IDC_EPISODE_DURATION: 317 S
MDC_IDC_EPISODE_DURATION: 33 S
MDC_IDC_EPISODE_DURATION: 330 S
MDC_IDC_EPISODE_DURATION: 346 S
MDC_IDC_EPISODE_DURATION: 35 S
MDC_IDC_EPISODE_DURATION: 350 S
MDC_IDC_EPISODE_DURATION: 362 S
MDC_IDC_EPISODE_DURATION: 365 S
MDC_IDC_EPISODE_DURATION: 369 S
MDC_IDC_EPISODE_DURATION: 398 S
MDC_IDC_EPISODE_DURATION: 413 S
MDC_IDC_EPISODE_DURATION: 414 S
MDC_IDC_EPISODE_DURATION: 43 S
MDC_IDC_EPISODE_DURATION: 44 S
MDC_IDC_EPISODE_DURATION: 462 S
MDC_IDC_EPISODE_DURATION: 464 S
MDC_IDC_EPISODE_DURATION: 513 S
MDC_IDC_EPISODE_DURATION: 56 S
MDC_IDC_EPISODE_DURATION: 651 S
MDC_IDC_EPISODE_DURATION: 72 S
MDC_IDC_EPISODE_DURATION: 813 S
MDC_IDC_EPISODE_DURATION: 876 S
MDC_IDC_EPISODE_DURATION: 877 S
MDC_IDC_EPISODE_DURATION: 883 S
MDC_IDC_EPISODE_DURATION: 89 S
MDC_IDC_EPISODE_DURATION: 918 S
MDC_IDC_EPISODE_DURATION: 919 S
MDC_IDC_EPISODE_DURATION: 969 S
MDC_IDC_EPISODE_ID: NORMAL
MDC_IDC_EPISODE_TYPE: NORMAL
MDC_IDC_LEAD_CONNECTION_STATUS: NORMAL
MDC_IDC_LEAD_CONNECTION_STATUS: NORMAL
MDC_IDC_LEAD_IMPLANT_DT: NORMAL
MDC_IDC_LEAD_IMPLANT_DT: NORMAL
MDC_IDC_LEAD_LOCATION: NORMAL
MDC_IDC_LEAD_LOCATION: NORMAL
MDC_IDC_LEAD_LOCATION_DETAIL_1: NORMAL
MDC_IDC_LEAD_LOCATION_DETAIL_1: NORMAL
MDC_IDC_LEAD_MFG: NORMAL
MDC_IDC_LEAD_MFG: NORMAL
MDC_IDC_LEAD_MODEL: NORMAL
MDC_IDC_LEAD_MODEL: NORMAL
MDC_IDC_LEAD_POLARITY_TYPE: NORMAL
MDC_IDC_LEAD_POLARITY_TYPE: NORMAL
MDC_IDC_LEAD_SERIAL: NORMAL
MDC_IDC_LEAD_SERIAL: NORMAL
MDC_IDC_MSMT_BATTERY_DTM: NORMAL
MDC_IDC_MSMT_BATTERY_REMAINING_LONGEVITY: 25 MO
MDC_IDC_MSMT_BATTERY_RRT_TRIGGER: 2.83
MDC_IDC_MSMT_BATTERY_STATUS: NORMAL
MDC_IDC_MSMT_BATTERY_VOLTAGE: 2.93 V
MDC_IDC_MSMT_LEADCHNL_RA_IMPEDANCE_VALUE: 361 OHM
MDC_IDC_MSMT_LEADCHNL_RA_IMPEDANCE_VALUE: 418 OHM
MDC_IDC_MSMT_LEADCHNL_RA_PACING_THRESHOLD_AMPLITUDE: 0.5 V
MDC_IDC_MSMT_LEADCHNL_RA_PACING_THRESHOLD_PULSEWIDTH: 0.4 MS
MDC_IDC_MSMT_LEADCHNL_RA_SENSING_INTR_AMPL: 1.62 MV
MDC_IDC_MSMT_LEADCHNL_RA_SENSING_INTR_AMPL: 1.62 MV
MDC_IDC_MSMT_LEADCHNL_RV_IMPEDANCE_VALUE: 399 OHM
MDC_IDC_MSMT_LEADCHNL_RV_IMPEDANCE_VALUE: 513 OHM
MDC_IDC_MSMT_LEADCHNL_RV_PACING_THRESHOLD_AMPLITUDE: 0.75 V
MDC_IDC_MSMT_LEADCHNL_RV_PACING_THRESHOLD_PULSEWIDTH: 0.4 MS
MDC_IDC_MSMT_LEADCHNL_RV_SENSING_INTR_AMPL: 11.62 MV
MDC_IDC_MSMT_LEADCHNL_RV_SENSING_INTR_AMPL: 11.62 MV
MDC_IDC_PG_IMPLANT_DTM: NORMAL
MDC_IDC_PG_MFG: NORMAL
MDC_IDC_PG_MODEL: NORMAL
MDC_IDC_PG_SERIAL: NORMAL
MDC_IDC_PG_TYPE: NORMAL
MDC_IDC_SESS_CLINIC_NAME: NORMAL
MDC_IDC_SESS_DTM: NORMAL
MDC_IDC_SESS_TYPE: NORMAL
MDC_IDC_SET_BRADY_AT_MODE_SWITCH_RATE: 171 {BEATS}/MIN
MDC_IDC_SET_BRADY_HYSTRATE: NORMAL
MDC_IDC_SET_BRADY_LOWRATE: 55 {BEATS}/MIN
MDC_IDC_SET_BRADY_MAX_SENSOR_RATE: 130 {BEATS}/MIN
MDC_IDC_SET_BRADY_MAX_TRACKING_RATE: 130 {BEATS}/MIN
MDC_IDC_SET_BRADY_MODE: NORMAL
MDC_IDC_SET_BRADY_PAV_DELAY_LOW: 150 MS
MDC_IDC_SET_BRADY_SAV_DELAY_LOW: 150 MS
MDC_IDC_SET_LEADCHNL_RA_PACING_AMPLITUDE: 1.5 V
MDC_IDC_SET_LEADCHNL_RA_PACING_ANODE_ELECTRODE_1: NORMAL
MDC_IDC_SET_LEADCHNL_RA_PACING_ANODE_LOCATION_1: NORMAL
MDC_IDC_SET_LEADCHNL_RA_PACING_CAPTURE_MODE: NORMAL
MDC_IDC_SET_LEADCHNL_RA_PACING_CATHODE_ELECTRODE_1: NORMAL
MDC_IDC_SET_LEADCHNL_RA_PACING_CATHODE_LOCATION_1: NORMAL
MDC_IDC_SET_LEADCHNL_RA_PACING_POLARITY: NORMAL
MDC_IDC_SET_LEADCHNL_RA_PACING_PULSEWIDTH: 0.4 MS
MDC_IDC_SET_LEADCHNL_RA_SENSING_ANODE_ELECTRODE_1: NORMAL
MDC_IDC_SET_LEADCHNL_RA_SENSING_ANODE_LOCATION_1: NORMAL
MDC_IDC_SET_LEADCHNL_RA_SENSING_CATHODE_ELECTRODE_1: NORMAL
MDC_IDC_SET_LEADCHNL_RA_SENSING_CATHODE_LOCATION_1: NORMAL
MDC_IDC_SET_LEADCHNL_RA_SENSING_POLARITY: NORMAL
MDC_IDC_SET_LEADCHNL_RA_SENSING_SENSITIVITY: 0.3 MV
MDC_IDC_SET_LEADCHNL_RV_PACING_AMPLITUDE: 2 V
MDC_IDC_SET_LEADCHNL_RV_PACING_ANODE_ELECTRODE_1: NORMAL
MDC_IDC_SET_LEADCHNL_RV_PACING_ANODE_LOCATION_1: NORMAL
MDC_IDC_SET_LEADCHNL_RV_PACING_CAPTURE_MODE: NORMAL
MDC_IDC_SET_LEADCHNL_RV_PACING_CATHODE_ELECTRODE_1: NORMAL
MDC_IDC_SET_LEADCHNL_RV_PACING_CATHODE_LOCATION_1: NORMAL
MDC_IDC_SET_LEADCHNL_RV_PACING_POLARITY: NORMAL
MDC_IDC_SET_LEADCHNL_RV_PACING_PULSEWIDTH: 0.4 MS
MDC_IDC_SET_LEADCHNL_RV_SENSING_ANODE_ELECTRODE_1: NORMAL
MDC_IDC_SET_LEADCHNL_RV_SENSING_ANODE_LOCATION_1: NORMAL
MDC_IDC_SET_LEADCHNL_RV_SENSING_CATHODE_ELECTRODE_1: NORMAL
MDC_IDC_SET_LEADCHNL_RV_SENSING_CATHODE_LOCATION_1: NORMAL
MDC_IDC_SET_LEADCHNL_RV_SENSING_POLARITY: NORMAL
MDC_IDC_SET_LEADCHNL_RV_SENSING_SENSITIVITY: 0.9 MV
MDC_IDC_SET_ZONE_DETECTION_INTERVAL: 350 MS
MDC_IDC_SET_ZONE_DETECTION_INTERVAL: 400 MS
MDC_IDC_SET_ZONE_STATUS: NORMAL
MDC_IDC_SET_ZONE_STATUS: NORMAL
MDC_IDC_SET_ZONE_TYPE: NORMAL
MDC_IDC_SET_ZONE_VENDOR_TYPE: NORMAL
MDC_IDC_STAT_AT_BURDEN_PERCENT: 9.4 %
MDC_IDC_STAT_AT_DTM_END: NORMAL
MDC_IDC_STAT_AT_DTM_START: NORMAL
MDC_IDC_STAT_BRADY_AP_VP_PERCENT: 28.53 %
MDC_IDC_STAT_BRADY_AP_VS_PERCENT: 0.01 %
MDC_IDC_STAT_BRADY_AS_VP_PERCENT: 71.18 %
MDC_IDC_STAT_BRADY_AS_VS_PERCENT: 0.28 %
MDC_IDC_STAT_BRADY_DTM_END: NORMAL
MDC_IDC_STAT_BRADY_DTM_START: NORMAL
MDC_IDC_STAT_BRADY_RA_PERCENT_PACED: 27.14 %
MDC_IDC_STAT_BRADY_RV_PERCENT_PACED: 99.48 %
MDC_IDC_STAT_EPISODE_RECENT_COUNT: 0
MDC_IDC_STAT_EPISODE_RECENT_COUNT: 1702
MDC_IDC_STAT_EPISODE_RECENT_COUNT_DTM_END: NORMAL
MDC_IDC_STAT_EPISODE_RECENT_COUNT_DTM_START: NORMAL
MDC_IDC_STAT_EPISODE_TOTAL_COUNT: 1
MDC_IDC_STAT_EPISODE_TOTAL_COUNT: 2
MDC_IDC_STAT_EPISODE_TOTAL_COUNT: 5
MDC_IDC_STAT_EPISODE_TOTAL_COUNT: NORMAL
MDC_IDC_STAT_EPISODE_TOTAL_COUNT_DTM_END: NORMAL
MDC_IDC_STAT_EPISODE_TOTAL_COUNT_DTM_START: NORMAL
MDC_IDC_STAT_EPISODE_TYPE: NORMAL
MDC_IDC_STAT_TACHYTHERAPY_RECENT_DTM_END: NORMAL
MDC_IDC_STAT_TACHYTHERAPY_RECENT_DTM_START: NORMAL
MDC_IDC_STAT_TACHYTHERAPY_TOTAL_DTM_END: NORMAL
MDC_IDC_STAT_TACHYTHERAPY_TOTAL_DTM_START: NORMAL

## 2025-03-18 RX ORDER — AMIODARONE HYDROCHLORIDE 200 MG/1
200 TABLET ORAL DAILY
Qty: 30 TABLET | Refills: 5 | Status: SHIPPED | OUTPATIENT
Start: 2025-03-18

## 2025-03-18 RX ORDER — SILDENAFIL 25 MG/1
25 TABLET, FILM COATED ORAL DAILY PRN
Qty: 10 TABLET | Refills: 0 | Status: SHIPPED | OUTPATIENT
Start: 2025-03-18

## 2025-03-18 NOTE — TELEPHONE ENCOUNTER
"Responding to messages from Eri and Alisha sent to me yesterday -    Given AFib occurred 3/16 and symptoms of nausea (which he's had in the past a/w AFib) occurred together, I think we should try to treat the AFib and see if the nausea gets better  Start amiodarone 200 mg daily to suppress AFib. May not use long term. Baseline labs just done last week with PCP  Continue to monitor and write down date/time of \"intense nausea\"  See me in 6/2025 after next device check and we'll discuss how amiodarone is working and burden of AFib. I've ordered.       Have teed up Rx for low dose sildenafil. If not effective, see PCP and/or Urology.    Magno Arredondo  March 18, 2025 at 7:09 AM      "

## 2025-03-18 NOTE — TELEPHONE ENCOUNTER
Called patient to review recommendations outlined below (please refer to duplicate encounter from 3/17 for additional information re sildenafil). No answer. LVM requesting he call back to the Device RN line.  GG RN    [facility] RN callback number: [phone]

## 2025-03-22 ENCOUNTER — HEALTH MAINTENANCE LETTER (OUTPATIENT)
Age: 70
End: 2025-03-22

## 2025-05-29 ENCOUNTER — TELEPHONE (OUTPATIENT)
Dept: CARDIOLOGY | Facility: CLINIC | Age: 70
End: 2025-05-29
Payer: COMMERCIAL

## 2025-05-29 NOTE — TELEPHONE ENCOUNTER
Maria Elena,     Pt had an episode of symptomatic afib and sent a remote ppm transmission. Pt was going about his day when suddenly he felt the need to throw-up. He laid down and took his BP, which was 151/93. Remote ppm confirms afib since 23:07 5/28/25. Pt takes eliquis and carvedilol. Ventricular rates are controlled. Pt just wanted you to be aware of this. Do you have any changes or recommendations? Thank you, Erica, Device Specialist    Presenting EGM      HR Histograms

## 2025-05-29 NOTE — TELEPHONE ENCOUNTER
We had him start amiodarone 3/2025 to see if could suppress AFib, which is quite symptomatic for him with intense nausea - this was seen in 3/2025 and again now 5/2025 and had been seen prior).      I will see him next week and discuss ablation with Dr. Pramod Arredondo  May 29, 2025 at 1:38 PM

## 2025-06-02 NOTE — PROGRESS NOTES
"[facility]    I had the pleasure of seeing Arias when he came for follow up of HTN, heart block.  This 70 year old sees Dr. Benavidez for his history of:    1. 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 Mild CM - echo 11/2019 showing EF50-55%, stable 4/2024 50-55%  3. HTN  4. Paroxysmal AFib noted on device interrogations.On Eliquis for CHADSVASc 2 (HTN, age)   5.  NSVT noted on device interrogations.  CTA done in follow-up 7/2022 showed mild CAD and a calcium score 98.4.  Episode lasted 24 beats noted 4/2024 echo 4/2024 with stable LVEF 50-55%.  Nuclear stress test 12/2023 negative for ischemia/infarction  6.  Polymyalgia rheumatica - dx'd 5/2022.  Started on prednisone, stopped summer 2023  7.  Significant alcohol use - ~25 drinks/week   8.  JOSE G - on CPAP      Last Visit & Interval History:  I saw Arias 12/2024 at which time he had noted increasing episodes of intense nausea that seemed to correlate with episodes of AFib on device check. I asked him to continue to monitor symptoms to see if they were related to arrhythmias and decrease alcohol consumption.     Device check 3/2025 showed 9% AFib burden, mostly 3/16 again a/w nausea. To help decrease symptomatic burden, asked him to start amiodarone 200 mg daily and see me 6/2025 after next device check to see how successful this had been in preventing AFib and if nausea had improved.  Just a few days ago, had significant onset of nausea and device check showed AFib starting ~2300 on 5/28. V rates were controled.     Today's Visit:  Arias is doing well from a cardiac perspective overall. Ended up not starting amiodarone as read side effects and wanted to wait until appointment to discuss.  Reviewed that most recent episode of AFib was ~5/28 and on 5/29 he was terrible nauseated and \"felt awful.\"     No c/o dizziness, lightheadedness. Denies significant LE edema. No orthopnea, PND. Has been dealing with some depression and there was some " "concern about cognitive decline. He has been doing some testing and not found to have any dementia  - notes he \"got a 27/30.\"  He lost his best fishing lefty this past  year after a fall, which has been hard    VITALS:  Vitals: /87   Pulse 82   Ht 1.803 m (5' 11\")   Wt 110.4 kg (243 lb 4.8 oz)   BMI 33.93 kg/m      Diagnostic Testing:  EKG today, which I overread, showed AsVp  62 bpm on carvedilol  6.25 mg BID  Device interrogation 3/2025 28.5% and >99%  in DDDR 55/130. Underlying SR 80s with CHB.  2 y battery. 9.4%AFib burden lasting up to 32 inutes (3/16-17/2024). A/w nausea.  Echo 4/2024 LVEF 50-55%.  Apical dyskinesis thought due to pacemaker activation.  Normal RV.  Borderline LAE.  Trace MR, trace TR.  Aortic sclerosis with 1+ AI.  Aortic root 4.1 cm.  Ascending aorta normal 3.6 cm.  Nuclear stress test 12/2023 negative for ischemia/infarction.  No change since 2018  CTA heart 7/2022 calcium score 98.4, placing him in 47th percentile when compared to age/gender matched controls.  Nonobstructive CAD with mild oLM, mild p/mLAD, mild pOM1 and nondominant RCA.  Normal aorta. Non-cardiac portion nonsignificant  Echocardiogam 11/2019 showed EF 50-55%. No RWMA. RV OK. LA mild-mod dilated 4.5 cm index 43.0 ml/m2. Trace MR. 1+ TR with RVSP 21+RAP. Aortic sclerosis. Mild 1+ AI.  Nuclear Stress Test 2/2018 showed no ischemia          Plan:  Start amiodarone 200 mg daily  Will contact after 9/2025 device check. If amiodarone suppresses AFib and he's not had any nausea, will discuss consideration of AFib ablation (previously reviewed with Dr. Benavidez) as don't want to use amiodarone long term  Reviewed it seems like nausea is due to AFib, but it's an odd symptom so may not be related.    Assessment/Plan:    HTN  Remains on lisinopril/HCTZ 20/25 and carvedilol 6.25 mg twice daily  BP fine today    PLAN:  Continue current medications    Atrial arrhythmias  Has known history of paroxysmal AF and AT  Has been " correlating intense nausea with episodes of AFib. Based on sx calendar and device interrogations, AFib is seen when he has significant nausea. He thinks he gets nausea b/c of AFib, not the other way around.     Remains on Eliquis for [id number] 2 (HTN, age).  Dose appropriate for age/weight/creatinine hemoglobin was normal through PCPs office 3/2024 at 14.3 g/dL    PLAN:  Start amiodarone 200 mg daily (did not start in 3/2025)  Will contact after 9/2025 device check. If amiodarone suppresses AFib and he's not had any nausea, will discuss consideration of AFib ablation (previously reviewed with Dr. Benavidez) as don't want to use amiodarone long term  Reviewed it seems like nausea is due to AFib, but it's an odd symptom so may not be related.  Limit alcohol  Continue routine device interrogations    NSVT   As above, noted on device interrogations without significant symptoms   Due to longer episode 4/2024 (24 beats), Dr. Benavidez recommended echocardiogram   Nuclear stress test 12/2023 negative for ischemia/infarction   Echo 4/2024 with stable low-normal LVEF 50 to 55%   Remains on  carvedilol and amiodarone     PLAN:  Continue routine device interrogations    4. CAC  Had been noted on CTA 7/2022 (47 percentile when compared to age/gender matched controls).  Due to atypical discomfort, had nuclear stress test 12/2023 negative for ischemia  Echo 4/2024 showed stable low-normal LVEF 50-55%  Remains on BB, ace, statin.  No ASA due to Eliquis  LDL 9/2023 was 97 mg/dL, meeting goal of <100mg/dL     PLAN:  Risk factor modification  OK to increase Viagra to 50 mg PRN     5. On amiodarone   Will start this today 6/4  Blood work ~4/2025 as above with normal TSH, AST, ALT    PLAN:  As above, not planning to use this long term given possible side effects. Looking to suppress AFib to see if nausea improves. If so, will consider AFib ablation per Dr. Benavidez  To contact us with any change in gait/balance, tremor, nausea, increased  GARCIA  Labs in ~6 m if he remains on this          Maria Elena Khan PA-C, MSPAS      Orders Placed This Encounter   Procedures    EKG 12-lead complete w/read - Clinics (performed today)     No orders of the defined types were placed in this encounter.    There are no discontinued medications.          Encounter Diagnosis   Name Primary?    Paroxysmal atrial fibrillation (H)            CURRENT MEDICATIONS:  Current Outpatient Medications   Medication Sig Dispense Refill    apixaban ANTICOAGULANT (ELIQUIS ANTICOAGULANT) 5 MG tablet Take 1 tablet (5 mg) by mouth 2 times daily. 180 tablet 3    atorvastatin (LIPITOR) 20 MG tablet Take 1 tablet (20 mg) by mouth daily. 90 tablet 3    carvedilol (COREG) 6.25 MG tablet Take 1 tablet (6.25 mg) by mouth 2 times daily (with meals). 180 tablet 3    DULoxetine (CYMBALTA) 60 MG capsule Take 90 mg by mouth daily 1.5 tablets DAILY      lisinopril-hydrochlorothiazide (ZESTORETIC) 20-25 MG tablet Take 1 tablet by mouth daily. 90 tablet 3    sildenafil (VIAGRA) 25 MG tablet Take 1 tablet (25 mg) by mouth daily as needed (Take 30 minutes-4 hours before sexual activity). 10 tablet 0    amiodarone (PACERONE) 200 MG tablet Take 1 tablet (200 mg) by mouth daily. (Patient not taking: Reported on 6/4/2025) 30 tablet 5       ALLERGIES     Allergies   Allergen Reactions    Flexeril [Cyclobenzaprine] GI Disturbance    Naprosyn [Naproxen] GI Disturbance    Penicillins      As child    Ranitidine GI Disturbance         Review of Systems:  Skin:  Negative     Eyes:  Positive for cataracts  ENT:  Negative    Respiratory:  Positive for dyspnea on exertion  Cardiovascular:  Negative for, palpitations, edema, fatigue, lightheadedness, dizziness Positive for, exercise intolerance  Gastroenterology: Positive for nausea  Genitourinary:  Negative    Musculoskeletal:  Positive for back pain, neck pain  Neurologic:  Negative numbness or tingling of hands  Psychiatric:  Positive for depression  Heme/Lymph/Imm:   "Positive for allergies  Endocrine:  Negative      Physical Exam:  Vitals: /87   Pulse 82   Ht 1.803 m (5' 11\")   Wt 110.4 kg (243 lb 4.8 oz)   BMI 33.93 kg/m      Constitutional:  cooperative, alert and oriented, well developed, well nourished, in no acute distress        Skin:  warm and dry to the touch, no apparent skin lesions or masses noted        Head:  normocephalic, no masses or lesions        Eyes:  pupils equal and round, conjunctivae and lids unremarkable, sclera white        ENT:  no pallor or cyanosis, dentition good        Neck:  not assessed this visit        Chest:  not assessed this visit        Cardiac:                    Abdomen:  abdomen soft obese      Vascular: not assessed this visit                                      Extremities and Back:  no deformities, clubbing, cyanosis, erythema observed        Neurological:  no gross motor deficits            PAST MEDICAL HISTORY:  Past Medical History:   Diagnosis Date    CPAP (continuous positive airway pressure) dependence     Depression     Gastro-oesophageal reflux disease     Hepatic steatosis     Hoarseness     Hyperlipidemia     Hypertension     IFG (impaired fasting glucose)     LAFB (left anterior fascicular block)     PONV (postoperative nausea and vomiting)     RBBB     Seizures (H)     as child    Sleep apnea     cpap    Uncomplicated asthma     denies asthma       PAST SURGICAL HISTORY:  Past Surgical History:   Procedure Laterality Date    ANKLE SURGERY      APPENDECTOMY      BACK SURGERY      lami x2    ENT SURGERY      throat granulomas    HERNIA REPAIR      INJECT BOTOX N/A 10/30/2014    Procedure: INJECT BOTOX;  Surgeon: Kalyn Negron MD;  Location:  OR    INJECT BOTOX N/A 12/8/2016    Procedure: INJECT BOTOX;  Surgeon: Kalyn Negron MD;  [facility]    INJECT STEROID (LOCATION) N/A 12/8/2016    Procedure: INJECT STEROID (LOCATION);  Surgeon: Kalyn Negron MD;  [facility]    " LAMINECTOMY LUMBAR ONE LEVEL  3/5/2014    Procedure: LAMINECTOMY LUMBAR ONE LEVEL;  RIGHT L4-5 DISCECTOMY;  Surgeon: Rodrigo Ríos MD;  Location:  OR    LASER CO2 LARYNGOSCOPY N/A 2016    Procedure: LASER CO2 LARYNGOSCOPY;  Surgeon: Kalyn Negron MD;  [facility]    LASER CO2 LARYNGOSCOPY, COMPLEX Left 10/30/2014    Procedure: LASER CO2 LARYNGOSCOPY, COMPLEX;  Surgeon: Kalyn Negron MD;  Location: [facility] OR    ORTHOPEDIC SURGERY         FAMILY HISTORY:  Family History   Problem Relation Age of Onset    Cancer Father        SOCIAL HISTORY:  Social History     Socioeconomic History    Marital status:      Spouse name: None    Number of children: None    Years of education: None    Highest education level: None   Tobacco Use    Smoking status: Former     Types: Pipe     Start date: 10/10/1976     Quit date: 1985     Years since quittin.0    Smokeless tobacco: Never   Substance and Sexual Activity    Alcohol use: Yes     Comment: 4 drinks day    Drug use: No    Sexual activity: Yes     Partners: Female     Birth control/protection: Other

## 2025-06-04 ENCOUNTER — RESULTS FOLLOW-UP (OUTPATIENT)
Dept: CARDIOLOGY | Facility: CLINIC | Age: 70
End: 2025-06-04

## 2025-06-04 ENCOUNTER — OFFICE VISIT (OUTPATIENT)
Dept: CARDIOLOGY | Facility: CLINIC | Age: 70
End: 2025-06-04
Payer: COMMERCIAL

## 2025-06-04 VITALS
BODY MASS INDEX: 34.06 KG/M2 | SYSTOLIC BLOOD PRESSURE: 136 MMHG | HEIGHT: 71 IN | HEART RATE: 82 BPM | DIASTOLIC BLOOD PRESSURE: 87 MMHG | WEIGHT: 243.3 LBS

## 2025-06-04 DIAGNOSIS — I48.0 PAROXYSMAL ATRIAL FIBRILLATION (H): ICD-10-CM

## 2025-06-04 NOTE — PATIENT INSTRUCTIONS
Arias  - it was nice to see you today!    Today we reviewed:    Didn't end up starting amiodarone due to concerns for possible side effects  EKG today showed paced rhythm - similar to slast time    MEDICATION CHANGES:    Start amiodarone 200 mg daily  Watch for any side effects that you're concerned with     2. Goal is to suppress AFib and make sure nausea improves    a.  We think they're connected!  If we find they are, Dr. Benavidez has recommended consideration of AFib ablation      RECOMMENDATIONS:    Continue to watch for recurrent AFib/intense nausea    FOLLOW UP:    We'll call after results of ~9/2025 pacer check ... Just to see how much AFib you're having and if have had nausea. If no AFib and no nausea, we'll consider ablation     IMPORTANT PHONE NUMBERS FOR  [facility] ([place]):    Device nurses: [phone]

## 2025-06-04 NOTE — LETTER
6/4/2025    Leann G Hutchison, MD [facility] [place] [address] [place] [address]    RE: Arias Manzo       Dear Colleague,     I had the pleasure of seeing Arias JUS Jovany in the [facility].  [facility]    I had the pleasure of seeing Arias when he came for follow up of HTN, heart block.  This 70 year old sees Dr. Benavidez for his history of:    1. 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 Mild CM - echo 11/2019 showing EF50-55%, stable 4/2024 50-55%  3. HTN  4. Paroxysmal AFib noted on device interrogations.On Eliquis for CHADSVASc 2 (HTN, age)   5.  NSVT noted on device interrogations.  CTA done in follow-up 7/2022 showed mild CAD and a calcium score 98.4.  Episode lasted 24 beats noted 4/2024 echo 4/2024 with stable LVEF 50-55%.  Nuclear stress test 12/2023 negative for ischemia/infarction  6.  Polymyalgia rheumatica - dx'd 5/2022.  Started on prednisone, stopped summer 2023  7.  Significant alcohol use - ~25 drinks/week   8.  JOSE G - on CPAP      Last Visit & Interval History:  I saw Arias 12/2024 at which time he had noted increasing episodes of intense nausea that seemed to correlate with episodes of AFib on device check. I asked him to continue to monitor symptoms to see if they were related to arrhythmias and decrease alcohol consumption.     Device check 3/2025 showed 9% AFib burden, mostly 3/16 again a/w nausea. To help decrease symptomatic burden, asked him to start amiodarone 200 mg daily and see me 6/2025 after next device check to see how successful this had been in preventing AFib and if nausea had improved.  Just a few days ago, had significant onset of nausea and device check showed AFib starting ~2300 on 5/28. V rates were controled.     Today's Visit:  Arias is doing well from a cardiac perspective overall. Ended up not starting amiodarone as read side effects and wanted to wait until appointment to discuss.  Reviewed that  "most recent episode of AFib was ~5/28 and on 5/29 he was terrible nauseated and \"felt awful.\"     No c/o dizziness, lightheadedness. Denies significant LE edema. No orthopnea, PND. Has been dealing with some depression and there was some concern about cognitive decline. He has been doing some testing and not found to have any dementia  - notes he \"got a 27/30.\"  He lost his best fishing lefty this past  year after a fall, which has been hard    VITALS:  Vitals: /87   Pulse 82   Ht 1.803 m (5' 11\")   Wt 110.4 kg (243 lb 4.8 oz)   BMI 33.93 kg/m      Diagnostic Testing:  EKG today, which I overread, showed AsVp  62 bpm on carvedilol  6.25 mg BID  Device interrogation 3/2025 28.5% and >99%  in DDDR 55/130. Underlying SR 80s with CHB.  2 y battery. 9.4%AFib burden lasting up to 32 inutes (3/16-17/2024). A/w nausea.  Echo 4/2024 LVEF 50-55%.  Apical dyskinesis thought due to pacemaker activation.  Normal RV.  Borderline LAE.  Trace MR, trace TR.  Aortic sclerosis with 1+ AI.  Aortic root 4.1 cm.  Ascending aorta normal 3.6 cm.  Nuclear stress test 12/2023 negative for ischemia/infarction.  No change since 2018  CTA heart 7/2022 calcium score 98.4, placing him in 47th percentile when compared to age/gender matched controls.  Nonobstructive CAD with mild oLM, mild p/mLAD, mild pOM1 and nondominant RCA.  Normal aorta. Non-cardiac portion nonsignificant  Echocardiogam 11/2019 showed EF 50-55%. No RWMA. RV OK. LA mild-mod dilated 4.5 cm index 43.0 ml/m2. Trace MR. 1+ TR with RVSP 21+RAP. Aortic sclerosis. Mild 1+ AI.  Nuclear Stress Test 2/2018 showed no ischemia          Plan:  Start amiodarone 200 mg daily  Will contact after 9/2025 device check. If amiodarone suppresses AFib and he's not had any nausea, will discuss consideration of AFib ablation (previously reviewed with Dr. Iskos) as don't want to use amiodarone long term  Reviewed it seems like nausea is due to AFib, but it's an odd symptom so may not be " related.    Assessment/Plan:    HTN  Remains on lisinopril/HCTZ 20/25 and carvedilol 6.25 mg twice daily  BP fine today    PLAN:  Continue current medications    Atrial arrhythmias  Has known history of paroxysmal AF and AT  Has been correlating intense nausea with episodes of AFib. Based on sx calendar and device interrogations, AFib is seen when he has significant nausea. He thinks he gets nausea b/c of AFib, not the other way around.     Remains on Eliquis for [id number] 2 (HTN, age).  Dose appropriate for age/weight/creatinine hemoglobin was normal through PCPs office 3/2024 at 14.3 g/dL    PLAN:  Start amiodarone 200 mg daily (did not start in 3/2025)  Will contact after 9/2025 device check. If amiodarone suppresses AFib and he's not had any nausea, will discuss consideration of AFib ablation (previously reviewed with Dr. Benavidez) as don't want to use amiodarone long term  Reviewed it seems like nausea is due to AFib, but it's an odd symptom so may not be related.  Limit alcohol  Continue routine device interrogations    NSVT   As above, noted on device interrogations without significant symptoms   Due to longer episode 4/2024 (24 beats), Dr. Benavidez recommended echocardiogram   Nuclear stress test 12/2023 negative for ischemia/infarction   Echo 4/2024 with stable low-normal LVEF 50 to 55%   Remains on  carvedilol and amiodarone     PLAN:  Continue routine device interrogations    4. CAC  Had been noted on CTA 7/2022 (47 percentile when compared to age/gender matched controls).  Due to atypical discomfort, had nuclear stress test 12/2023 negative for ischemia  Echo 4/2024 showed stable low-normal LVEF 50-55%  Remains on BB, ace, statin.  No ASA due to Eliquis  LDL 9/2023 was 97 mg/dL, meeting goal of <100mg/dL     PLAN:  Risk factor modification  OK to increase Viagra to 50 mg PRN     5. On amiodarone   Will start this today 6/4  Blood work ~4/2025 as above with normal TSH, AST, ALT    PLAN:  As above, not  planning to use this long term given possible side effects. Looking to suppress AFib to see if nausea improves. If so, will consider AFib ablation per Dr. Benavidez  To contact us with any change in gait/balance, tremor, nausea, increased GARCIA  Labs in ~6 m if he remains on this          Maria Elena Khan PA-C, MSPAS      Orders Placed This Encounter   Procedures     EKG 12-lead complete w/read - Clinics (performed today)     No orders of the defined types were placed in this encounter.    There are no discontinued medications.          Encounter Diagnosis   Name Primary?     Paroxysmal atrial fibrillation (H)            CURRENT MEDICATIONS:  Current Outpatient Medications   Medication Sig Dispense Refill     apixaban ANTICOAGULANT (ELIQUIS ANTICOAGULANT) 5 MG tablet Take 1 tablet (5 mg) by mouth 2 times daily. 180 tablet 3     atorvastatin (LIPITOR) 20 MG tablet Take 1 tablet (20 mg) by mouth daily. 90 tablet 3     carvedilol (COREG) 6.25 MG tablet Take 1 tablet (6.25 mg) by mouth 2 times daily (with meals). 180 tablet 3     DULoxetine (CYMBALTA) 60 MG capsule Take 90 mg by mouth daily 1.5 tablets DAILY       lisinopril-hydrochlorothiazide (ZESTORETIC) 20-25 MG tablet Take 1 tablet by mouth daily. 90 tablet 3     sildenafil (VIAGRA) 25 MG tablet Take 1 tablet (25 mg) by mouth daily as needed (Take 30 minutes-4 hours before sexual activity). 10 tablet 0     amiodarone (PACERONE) 200 MG tablet Take 1 tablet (200 mg) by mouth daily. (Patient not taking: Reported on 6/4/2025) 30 tablet 5       ALLERGIES     Allergies   Allergen Reactions     Flexeril [Cyclobenzaprine] GI Disturbance     Naprosyn [Naproxen] GI Disturbance     Penicillins      As child     Ranitidine GI Disturbance         Review of Systems:  Skin:  Negative     Eyes:  Positive for cataracts  ENT:  Negative    Respiratory:  Positive for dyspnea on exertion  Cardiovascular:  Negative for, palpitations, edema, fatigue, lightheadedness, dizziness Positive for,  "exercise intolerance  Gastroenterology: Positive for nausea  Genitourinary:  Negative    Musculoskeletal:  Positive for back pain, neck pain  Neurologic:  Negative numbness or tingling of hands  Psychiatric:  Positive for depression  Heme/Lymph/Imm:  Positive for allergies  Endocrine:  Negative      Physical Exam:  Vitals: /87   Pulse 82   Ht 1.803 m (5' 11\")   Wt 110.4 kg (243 lb 4.8 oz)   BMI 33.93 kg/m      Constitutional:  cooperative, alert and oriented, well developed, well nourished, in no acute distress        Skin:  warm and dry to the touch, no apparent skin lesions or masses noted        Head:  normocephalic, no masses or lesions        Eyes:  pupils equal and round, conjunctivae and lids unremarkable, sclera white        ENT:  no pallor or cyanosis, dentition good        Neck:  not assessed this visit        Chest:  not assessed this visit        Cardiac:                    Abdomen:  abdomen soft obese      Vascular: not assessed this visit                                      Extremities and Back:  no deformities, clubbing, cyanosis, erythema observed        Neurological:  no gross motor deficits            PAST MEDICAL HISTORY:  Past Medical History:   Diagnosis Date     CPAP (continuous positive airway pressure) dependence      Depression      Gastro-oesophageal reflux disease      Hepatic steatosis      Hoarseness      Hyperlipidemia      Hypertension      IFG (impaired fasting glucose)      LAFB (left anterior fascicular block)      PONV (postoperative nausea and vomiting)      RBBB      Seizures (H)     as child     Sleep apnea     cpap     Uncomplicated asthma     denies asthma       PAST SURGICAL HISTORY:  Past Surgical History:   Procedure Laterality Date     ANKLE SURGERY       APPENDECTOMY       BACK SURGERY      lami x2     ENT SURGERY      throat granulomas     HERNIA REPAIR       INJECT BOTOX N/A 10/30/2014    Procedure: INJECT BOTOX;  Surgeon: Kalyn Negron MD;  " Location[facility]     INJECT BOTOX N/A 2016    Procedure: INJECT BOTOX;  Surgeon: Kalyn Negron MD;  [facility]     INJECT STEROID (LOCATION) N/A 2016    Procedure: INJECT STEROID (LOCATION);  Surgeon: Kalyn Negron MD;  [facility]     LAMINECTOMY LUMBAR ONE LEVEL  3/5/2014    Procedure: LAMINECTOMY LUMBAR ONE LEVEL;  RIGHT L4-5 DISCECTOMY;  Surgeon: Rodrigo Ríos MD;  Location: [facility] OR     LASER CO2 LARYNGOSCOPY N/A 2016    Procedure: LASER CO2 LARYNGOSCOPY;  Surgeon: Kalyn Negron MD;  [facility]     LASER CO2 LARYNGOSCOPY, COMPLEX Left 10/30/2014    Procedure: LASER CO2 LARYNGOSCOPY, COMPLEX;  Surgeon: Kalyn Negron MD;  Location: [facility] OR     ORTHOPEDIC SURGERY         FAMILY HISTORY:  Family History   Problem Relation Age of Onset     Cancer Father        SOCIAL HISTORY:  Social History     Socioeconomic History     Marital status:      Spouse name: None     Number of children: None     Years of education: None     Highest education level: None   Tobacco Use     Smoking status: Former     Types: Pipe     Start date: 10/10/1976     Quit date: 1985     Years since quittin.0     Smokeless tobacco: Never   Substance and Sexual Activity     Alcohol use: Yes     Comment: 4 drinks day     Drug use: No     Sexual activity: Yes     Partners: Female     Birth control/protection: Other       Thank you for allowing me to participate in the care of your patient.      Sincerely,     Melani Khan PA-C     [facility]  cc:   Melani Khan PA-C  [address] [address]

## 2025-06-23 ENCOUNTER — TELEPHONE (OUTPATIENT)
Dept: CARDIOLOGY | Facility: CLINIC | Age: 70
End: 2025-06-23

## 2025-06-23 ENCOUNTER — ANCILLARY PROCEDURE (OUTPATIENT)
Dept: CARDIOLOGY | Facility: CLINIC | Age: 70
End: 2025-06-23
Attending: INTERNAL MEDICINE
Payer: COMMERCIAL

## 2025-06-23 DIAGNOSIS — I44.1 AV BLOCK, 2ND DEGREE: ICD-10-CM

## 2025-06-23 DIAGNOSIS — Z95.0 CARDIAC PACEMAKER IN SITU: ICD-10-CM

## 2025-06-23 LAB
MDC_IDC_EPISODE_DTM: NORMAL
MDC_IDC_EPISODE_DURATION: 105 S
MDC_IDC_EPISODE_DURATION: 1103 S
MDC_IDC_EPISODE_DURATION: 114 S
MDC_IDC_EPISODE_DURATION: 125 S
MDC_IDC_EPISODE_DURATION: 136 S
MDC_IDC_EPISODE_DURATION: 136 S
MDC_IDC_EPISODE_DURATION: 138 S
MDC_IDC_EPISODE_DURATION: 138 S
MDC_IDC_EPISODE_DURATION: 144 S
MDC_IDC_EPISODE_DURATION: 1468 S
MDC_IDC_EPISODE_DURATION: 149 S
MDC_IDC_EPISODE_DURATION: 159 S
MDC_IDC_EPISODE_DURATION: 160 S
MDC_IDC_EPISODE_DURATION: 165 S
MDC_IDC_EPISODE_DURATION: 174 S
MDC_IDC_EPISODE_DURATION: 179 S
MDC_IDC_EPISODE_DURATION: 184 S
MDC_IDC_EPISODE_DURATION: 189 S
MDC_IDC_EPISODE_DURATION: 194 S
MDC_IDC_EPISODE_DURATION: 206 S
MDC_IDC_EPISODE_DURATION: 214 S
MDC_IDC_EPISODE_DURATION: 22 S
MDC_IDC_EPISODE_DURATION: 222 S
MDC_IDC_EPISODE_DURATION: 229 S
MDC_IDC_EPISODE_DURATION: 243 S
MDC_IDC_EPISODE_DURATION: 260 S
MDC_IDC_EPISODE_DURATION: 27 S
MDC_IDC_EPISODE_DURATION: 282 S
MDC_IDC_EPISODE_DURATION: 319 S
MDC_IDC_EPISODE_DURATION: 341 S
MDC_IDC_EPISODE_DURATION: 35 S
MDC_IDC_EPISODE_DURATION: 352 S
MDC_IDC_EPISODE_DURATION: 36 S
MDC_IDC_EPISODE_DURATION: 360 S
MDC_IDC_EPISODE_DURATION: 39 S
MDC_IDC_EPISODE_DURATION: 40 S
MDC_IDC_EPISODE_DURATION: 403 S
MDC_IDC_EPISODE_DURATION: 43 S
MDC_IDC_EPISODE_DURATION: 448 S
MDC_IDC_EPISODE_DURATION: 45 S
MDC_IDC_EPISODE_DURATION: 49 S
MDC_IDC_EPISODE_DURATION: 5 S
MDC_IDC_EPISODE_DURATION: 528 S
MDC_IDC_EPISODE_DURATION: 55 S
MDC_IDC_EPISODE_DURATION: 56 S
MDC_IDC_EPISODE_DURATION: 690 S
MDC_IDC_EPISODE_DURATION: 82 S
MDC_IDC_EPISODE_DURATION: 87 S
MDC_IDC_EPISODE_DURATION: 92 S
MDC_IDC_EPISODE_DURATION: 94 S
MDC_IDC_EPISODE_ID: NORMAL
MDC_IDC_EPISODE_TYPE: NORMAL
MDC_IDC_LEAD_CONNECTION_STATUS: NORMAL
MDC_IDC_LEAD_CONNECTION_STATUS: NORMAL
MDC_IDC_LEAD_IMPLANT_DT: NORMAL
MDC_IDC_LEAD_IMPLANT_DT: NORMAL
MDC_IDC_LEAD_LOCATION: NORMAL
MDC_IDC_LEAD_LOCATION: NORMAL
MDC_IDC_LEAD_LOCATION_DETAIL_1: NORMAL
MDC_IDC_LEAD_LOCATION_DETAIL_1: NORMAL
MDC_IDC_LEAD_MFG: NORMAL
MDC_IDC_LEAD_MFG: NORMAL
MDC_IDC_LEAD_MODEL: NORMAL
MDC_IDC_LEAD_MODEL: NORMAL
MDC_IDC_LEAD_POLARITY_TYPE: NORMAL
MDC_IDC_LEAD_POLARITY_TYPE: NORMAL
MDC_IDC_LEAD_SERIAL: NORMAL
MDC_IDC_LEAD_SERIAL: NORMAL
MDC_IDC_MSMT_BATTERY_DTM: NORMAL
MDC_IDC_MSMT_BATTERY_REMAINING_LONGEVITY: 23 MO
MDC_IDC_MSMT_BATTERY_RRT_TRIGGER: 2.83
MDC_IDC_MSMT_BATTERY_STATUS: NORMAL
MDC_IDC_MSMT_BATTERY_VOLTAGE: 2.93 V
MDC_IDC_MSMT_LEADCHNL_RA_IMPEDANCE_VALUE: 342 OHM
MDC_IDC_MSMT_LEADCHNL_RA_IMPEDANCE_VALUE: 418 OHM
MDC_IDC_MSMT_LEADCHNL_RA_PACING_THRESHOLD_AMPLITUDE: 0.5 V
MDC_IDC_MSMT_LEADCHNL_RA_PACING_THRESHOLD_PULSEWIDTH: 0.4 MS
MDC_IDC_MSMT_LEADCHNL_RA_SENSING_INTR_AMPL: 0.75 MV
MDC_IDC_MSMT_LEADCHNL_RA_SENSING_INTR_AMPL: 0.75 MV
MDC_IDC_MSMT_LEADCHNL_RV_IMPEDANCE_VALUE: 437 OHM
MDC_IDC_MSMT_LEADCHNL_RV_IMPEDANCE_VALUE: 551 OHM
MDC_IDC_MSMT_LEADCHNL_RV_PACING_THRESHOLD_AMPLITUDE: 0.62 V
MDC_IDC_MSMT_LEADCHNL_RV_PACING_THRESHOLD_PULSEWIDTH: 0.4 MS
MDC_IDC_MSMT_LEADCHNL_RV_SENSING_INTR_AMPL: 18.38 MV
MDC_IDC_MSMT_LEADCHNL_RV_SENSING_INTR_AMPL: 18.38 MV
MDC_IDC_PG_IMPLANT_DTM: NORMAL
MDC_IDC_PG_MFG: NORMAL
MDC_IDC_PG_MODEL: NORMAL
MDC_IDC_PG_SERIAL: NORMAL
MDC_IDC_PG_TYPE: NORMAL
MDC_IDC_SESS_CLINIC_NAME: NORMAL
MDC_IDC_SESS_DTM: NORMAL
MDC_IDC_SESS_TYPE: NORMAL
MDC_IDC_SET_BRADY_AT_MODE_SWITCH_RATE: 171 {BEATS}/MIN
MDC_IDC_SET_BRADY_HYSTRATE: NORMAL
MDC_IDC_SET_BRADY_LOWRATE: 55 {BEATS}/MIN
MDC_IDC_SET_BRADY_MAX_SENSOR_RATE: 130 {BEATS}/MIN
MDC_IDC_SET_BRADY_MAX_TRACKING_RATE: 130 {BEATS}/MIN
MDC_IDC_SET_BRADY_MODE: NORMAL
MDC_IDC_SET_BRADY_PAV_DELAY_LOW: 150 MS
MDC_IDC_SET_BRADY_SAV_DELAY_LOW: 150 MS
MDC_IDC_SET_LEADCHNL_RA_PACING_AMPLITUDE: 1.5 V
MDC_IDC_SET_LEADCHNL_RA_PACING_ANODE_ELECTRODE_1: NORMAL
MDC_IDC_SET_LEADCHNL_RA_PACING_ANODE_LOCATION_1: NORMAL
MDC_IDC_SET_LEADCHNL_RA_PACING_CAPTURE_MODE: NORMAL
MDC_IDC_SET_LEADCHNL_RA_PACING_CATHODE_ELECTRODE_1: NORMAL
MDC_IDC_SET_LEADCHNL_RA_PACING_CATHODE_LOCATION_1: NORMAL
MDC_IDC_SET_LEADCHNL_RA_PACING_POLARITY: NORMAL
MDC_IDC_SET_LEADCHNL_RA_PACING_PULSEWIDTH: 0.4 MS
MDC_IDC_SET_LEADCHNL_RA_SENSING_ANODE_ELECTRODE_1: NORMAL
MDC_IDC_SET_LEADCHNL_RA_SENSING_ANODE_LOCATION_1: NORMAL
MDC_IDC_SET_LEADCHNL_RA_SENSING_CATHODE_ELECTRODE_1: NORMAL
MDC_IDC_SET_LEADCHNL_RA_SENSING_CATHODE_LOCATION_1: NORMAL
MDC_IDC_SET_LEADCHNL_RA_SENSING_POLARITY: NORMAL
MDC_IDC_SET_LEADCHNL_RA_SENSING_SENSITIVITY: 0.3 MV
MDC_IDC_SET_LEADCHNL_RV_PACING_AMPLITUDE: 2 V
MDC_IDC_SET_LEADCHNL_RV_PACING_ANODE_ELECTRODE_1: NORMAL
MDC_IDC_SET_LEADCHNL_RV_PACING_ANODE_LOCATION_1: NORMAL
MDC_IDC_SET_LEADCHNL_RV_PACING_CAPTURE_MODE: NORMAL
MDC_IDC_SET_LEADCHNL_RV_PACING_CATHODE_ELECTRODE_1: NORMAL
MDC_IDC_SET_LEADCHNL_RV_PACING_CATHODE_LOCATION_1: NORMAL
MDC_IDC_SET_LEADCHNL_RV_PACING_POLARITY: NORMAL
MDC_IDC_SET_LEADCHNL_RV_PACING_PULSEWIDTH: 0.4 MS
MDC_IDC_SET_LEADCHNL_RV_SENSING_ANODE_ELECTRODE_1: NORMAL
MDC_IDC_SET_LEADCHNL_RV_SENSING_ANODE_LOCATION_1: NORMAL
MDC_IDC_SET_LEADCHNL_RV_SENSING_CATHODE_ELECTRODE_1: NORMAL
MDC_IDC_SET_LEADCHNL_RV_SENSING_CATHODE_LOCATION_1: NORMAL
MDC_IDC_SET_LEADCHNL_RV_SENSING_POLARITY: NORMAL
MDC_IDC_SET_LEADCHNL_RV_SENSING_SENSITIVITY: 0.9 MV
MDC_IDC_SET_ZONE_DETECTION_INTERVAL: 350 MS
MDC_IDC_SET_ZONE_DETECTION_INTERVAL: 400 MS
MDC_IDC_SET_ZONE_STATUS: NORMAL
MDC_IDC_SET_ZONE_STATUS: NORMAL
MDC_IDC_SET_ZONE_TYPE: NORMAL
MDC_IDC_SET_ZONE_VENDOR_TYPE: NORMAL
MDC_IDC_STAT_AT_BURDEN_PERCENT: 9.4 %
MDC_IDC_STAT_AT_DTM_END: NORMAL
MDC_IDC_STAT_AT_DTM_START: NORMAL
MDC_IDC_STAT_BRADY_AP_VP_PERCENT: 41.32 %
MDC_IDC_STAT_BRADY_AP_VS_PERCENT: 0.02 %
MDC_IDC_STAT_BRADY_AS_VP_PERCENT: 58.48 %
MDC_IDC_STAT_BRADY_AS_VS_PERCENT: 0.18 %
MDC_IDC_STAT_BRADY_DTM_END: NORMAL
MDC_IDC_STAT_BRADY_DTM_START: NORMAL
MDC_IDC_STAT_BRADY_RA_PERCENT_PACED: 39.4 %
MDC_IDC_STAT_BRADY_RV_PERCENT_PACED: 99.36 %
MDC_IDC_STAT_EPISODE_RECENT_COUNT: 0
MDC_IDC_STAT_EPISODE_RECENT_COUNT: 590
MDC_IDC_STAT_EPISODE_RECENT_COUNT_DTM_END: NORMAL
MDC_IDC_STAT_EPISODE_RECENT_COUNT_DTM_START: NORMAL
MDC_IDC_STAT_EPISODE_TOTAL_COUNT: 1
MDC_IDC_STAT_EPISODE_TOTAL_COUNT: 2
MDC_IDC_STAT_EPISODE_TOTAL_COUNT: 5
MDC_IDC_STAT_EPISODE_TOTAL_COUNT: NORMAL
MDC_IDC_STAT_EPISODE_TOTAL_COUNT_DTM_END: NORMAL
MDC_IDC_STAT_EPISODE_TOTAL_COUNT_DTM_START: NORMAL
MDC_IDC_STAT_EPISODE_TYPE: NORMAL
MDC_IDC_STAT_TACHYTHERAPY_RECENT_DTM_END: NORMAL
MDC_IDC_STAT_TACHYTHERAPY_RECENT_DTM_START: NORMAL
MDC_IDC_STAT_TACHYTHERAPY_TOTAL_DTM_END: NORMAL
MDC_IDC_STAT_TACHYTHERAPY_TOTAL_DTM_START: NORMAL

## 2025-06-23 PROCEDURE — 93294 REM INTERROG EVL PM/LDLS PM: CPT | Performed by: INTERNAL MEDICINE

## 2025-06-23 PROCEDURE — 93296 REM INTERROG EVL PM/IDS: CPT | Performed by: INTERNAL MEDICINE

## 2025-06-23 NOTE — TELEPHONE ENCOUNTER
Mo Arredondo,    Updating you with the results from pt's remote device check today. Amiodarone started on 6/4/24. Logbook shows episodes occurred between 6/20 - 6/22, longest episode lasted 24 minutes, ventricular rates controlled. Left message to call back regarding episodes and symptoms. Let me know if you have questions!    Thank you,  Muriel, Device Specialist                [employer] An[employer] (D) Remote PPM Device Check    Mode: DDDR 55/130  Presenting Rhythm: AP/  Last In-Clinic Underlying Rhythm/Date: SR in the 80's with CHB with no junctional escape at VVI 30 (12/16/24)    Pacing/Histogram Data since:  5/29/25  AP: 41.4%  : >99%  Heart Rate: Adequate rates per histogram    Sensing: stable  Pacing Threshold: stable  Impedance: stable  Battery Status: 1.5 years    Arrhythmia Data Since: 5/29/25  Atrial Arrhythmia: Pt in mode switch 9.4% of the time since 5/29/25. EGMs show As>Vs for AF/AFL, Longest episode lasted 24 minutes 28 seconds, ventricular rates controlled. Episodes occured between 6/20 - 6/22. Taking Eliquis. Amiodarone started on 6/4/25.   Ventricular Arrhythmia: none    Care Plan: F/u [facility] q 3 months. LM with results. Will forward results to Maria Elena Khan, looking for correlations between episodes and sxs of nausea. Muriel, Device Specialist.    I have reviewed and interpreted the device interrogation, settings

## 2025-06-23 NOTE — TELEPHONE ENCOUNTER
Received call from pt. He has not had any nausea with afib episodes since starting amiodarone 6/4/25.     Erica,Device Specialist

## 2025-06-24 NOTE — TELEPHONE ENCOUNTER
Glad to see minimal AFib and the episodes are shorter (now 24 minutes, had been days in the past).    No changes now.  Please update me after 9/2025 check as he'll be fully loaded on amiodarone. Would expect AFib burden to have improved and no nausea.    Thx!  Maria Elena  June 24, 2025 at 8:07 AM

## 2025-07-16 ENCOUNTER — TELEPHONE (OUTPATIENT)
Dept: CARDIOLOGY | Facility: CLINIC | Age: 70
End: 2025-07-16
Payer: COMMERCIAL

## 2025-07-16 DIAGNOSIS — R06.09 DYSPNEA ON EXERTION: ICD-10-CM

## 2025-07-16 DIAGNOSIS — I44.1 AV BLOCK, 2ND DEGREE: ICD-10-CM

## 2025-07-16 DIAGNOSIS — Z95.0 CARDIAC PACEMAKER IN SITU: ICD-10-CM

## 2025-07-16 RX ORDER — CARVEDILOL 6.25 MG/1
6.25 TABLET ORAL 2 TIMES DAILY WITH MEALS
Qty: 180 TABLET | Refills: 3 | Status: SHIPPED | OUTPATIENT
Start: 2025-07-16

## (undated) RX ORDER — CLINDAMYCIN PHOSPHATE 900 MG/50ML
INJECTION, SOLUTION INTRAVENOUS
Status: DISPENSED
Start: 2017-07-03

## (undated) RX ORDER — LIDOCAINE HYDROCHLORIDE 40 MG/ML
INJECTION, SOLUTION RETROBULBAR
Status: DISPENSED
Start: 2017-03-10

## (undated) RX ORDER — REGADENOSON 0.08 MG/ML
INJECTION, SOLUTION INTRAVENOUS
Status: DISPENSED
Start: 2023-12-22

## (undated) RX ORDER — LIDOCAINE HYDROCHLORIDE 20 MG/ML
INJECTION, SOLUTION INFILTRATION; PERINEURAL
Status: DISPENSED
Start: 2017-03-10

## (undated) RX ORDER — BUPIVACAINE HYDROCHLORIDE 2.5 MG/ML
INJECTION, SOLUTION EPIDURAL; INFILTRATION; INTRACAUDAL
Status: DISPENSED
Start: 2017-07-03

## (undated) RX ORDER — LIDOCAINE HYDROCHLORIDE 10 MG/ML
INJECTION, SOLUTION EPIDURAL; INFILTRATION; INTRACAUDAL; PERINEURAL
Status: DISPENSED
Start: 2017-07-03

## (undated) RX ORDER — LIDOCAINE HYDROCHLORIDE AND EPINEPHRINE 10; 10 MG/ML; UG/ML
INJECTION, SOLUTION INFILTRATION; PERINEURAL
Status: DISPENSED
Start: 2017-03-10

## (undated) RX ORDER — NITROGLYCERIN 0.4 MG/1
TABLET SUBLINGUAL
Status: DISPENSED
Start: 2022-07-07

## (undated) RX ORDER — FENTANYL CITRATE 50 UG/ML
INJECTION, SOLUTION INTRAMUSCULAR; INTRAVENOUS
Status: DISPENSED
Start: 2017-07-03

## (undated) RX ORDER — METOPROLOL TARTRATE 25 MG/1
TABLET, FILM COATED ORAL
Status: DISPENSED
Start: 2022-07-07